# Patient Record
Sex: MALE | Race: WHITE | Employment: OTHER | URBAN - METROPOLITAN AREA
[De-identification: names, ages, dates, MRNs, and addresses within clinical notes are randomized per-mention and may not be internally consistent; named-entity substitution may affect disease eponyms.]

---

## 2022-02-28 ENCOUNTER — APPOINTMENT (OUTPATIENT)
Dept: GENERAL RADIOLOGY | Age: 67
DRG: 200 | End: 2022-02-28
Payer: MEDICARE

## 2022-02-28 ENCOUNTER — HOSPITAL ENCOUNTER (INPATIENT)
Age: 67
LOS: 2 days | Discharge: HOME | DRG: 200 | End: 2022-03-03
Attending: STUDENT IN AN ORGANIZED HEALTH CARE EDUCATION/TRAINING PROGRAM | Admitting: INTERNAL MEDICINE
Payer: MEDICARE

## 2022-02-28 DIAGNOSIS — S22.49XA: Primary | ICD-10-CM

## 2022-02-28 PROBLEM — M54.50 CHRONIC BILATERAL LOW BACK PAIN WITHOUT SCIATICA: Status: ACTIVE | Noted: 2022-02-28

## 2022-02-28 PROBLEM — I10 PRIMARY HYPERTENSION: Status: ACTIVE | Noted: 2022-02-28

## 2022-02-28 PROBLEM — G89.29 CHRONIC BILATERAL LOW BACK PAIN WITHOUT SCIATICA: Status: ACTIVE | Noted: 2022-02-28

## 2022-02-28 PROBLEM — S22.41XA MULTIPLE CLOSED FRACTURES OF RIBS OF RIGHT SIDE: Status: ACTIVE | Noted: 2022-02-28

## 2022-02-28 LAB
ANION GAP SERPL CALCULATED.3IONS-SCNC: 15 MMOL/L (ref 3–16)
BASOPHILS ABSOLUTE: 0.1 K/UL (ref 0–0.2)
BASOPHILS RELATIVE PERCENT: 0.7 %
BUN BLDV-MCNC: 28 MG/DL (ref 7–20)
CALCIUM SERPL-MCNC: 9.8 MG/DL (ref 8.3–10.6)
CHLORIDE BLD-SCNC: 95 MMOL/L (ref 99–110)
CO2: 26 MMOL/L (ref 21–32)
CREAT SERPL-MCNC: 1.2 MG/DL (ref 0.8–1.3)
EOSINOPHILS ABSOLUTE: 0 K/UL (ref 0–0.6)
EOSINOPHILS RELATIVE PERCENT: 0 %
GFR AFRICAN AMERICAN: >60
GFR NON-AFRICAN AMERICAN: >60
GLUCOSE BLD-MCNC: 149 MG/DL (ref 70–99)
HCT VFR BLD CALC: 45.8 % (ref 40.5–52.5)
HEMOGLOBIN: 15.7 G/DL (ref 13.5–17.5)
INFLUENZA A: NOT DETECTED
INFLUENZA B: NOT DETECTED
LYMPHOCYTES ABSOLUTE: 1 K/UL (ref 1–5.1)
LYMPHOCYTES RELATIVE PERCENT: 7.9 %
MCH RBC QN AUTO: 31.2 PG (ref 26–34)
MCHC RBC AUTO-ENTMCNC: 34.4 G/DL (ref 31–36)
MCV RBC AUTO: 90.7 FL (ref 80–100)
MONOCYTES ABSOLUTE: 0.7 K/UL (ref 0–1.3)
MONOCYTES RELATIVE PERCENT: 5.6 %
NEUTROPHILS ABSOLUTE: 10.5 K/UL (ref 1.7–7.7)
NEUTROPHILS RELATIVE PERCENT: 85.8 %
PDW BLD-RTO: 13.9 % (ref 12.4–15.4)
PLATELET # BLD: 167 K/UL (ref 135–450)
PMV BLD AUTO: 9.4 FL (ref 5–10.5)
POTASSIUM REFLEX MAGNESIUM: 4.3 MMOL/L (ref 3.5–5.1)
RBC # BLD: 5.04 M/UL (ref 4.2–5.9)
SARS-COV-2 RNA, RT PCR: NOT DETECTED
SODIUM BLD-SCNC: 136 MMOL/L (ref 136–145)
WBC # BLD: 12.3 K/UL (ref 4–11)

## 2022-02-28 PROCEDURE — G0378 HOSPITAL OBSERVATION PER HR: HCPCS

## 2022-02-28 PROCEDURE — 85025 COMPLETE CBC W/AUTO DIFF WBC: CPT

## 2022-02-28 PROCEDURE — 99221 1ST HOSP IP/OBS SF/LOW 40: CPT | Performed by: NURSE PRACTITIONER

## 2022-02-28 PROCEDURE — 96374 THER/PROPH/DIAG INJ IV PUSH: CPT

## 2022-02-28 PROCEDURE — 2580000003 HC RX 258

## 2022-02-28 PROCEDURE — 96372 THER/PROPH/DIAG INJ SC/IM: CPT

## 2022-02-28 PROCEDURE — 6360000002 HC RX W HCPCS: Performed by: STUDENT IN AN ORGANIZED HEALTH CARE EDUCATION/TRAINING PROGRAM

## 2022-02-28 PROCEDURE — 99284 EMERGENCY DEPT VISIT MOD MDM: CPT

## 2022-02-28 PROCEDURE — 6370000000 HC RX 637 (ALT 250 FOR IP): Performed by: NURSE PRACTITIONER

## 2022-02-28 PROCEDURE — 87636 SARSCOV2 & INF A&B AMP PRB: CPT

## 2022-02-28 PROCEDURE — 80048 BASIC METABOLIC PNL TOTAL CA: CPT

## 2022-02-28 PROCEDURE — 6370000000 HC RX 637 (ALT 250 FOR IP): Performed by: STUDENT IN AN ORGANIZED HEALTH CARE EDUCATION/TRAINING PROGRAM

## 2022-02-28 PROCEDURE — 71101 X-RAY EXAM UNILAT RIBS/CHEST: CPT

## 2022-02-28 PROCEDURE — 6360000002 HC RX W HCPCS

## 2022-02-28 PROCEDURE — 6370000000 HC RX 637 (ALT 250 FOR IP)

## 2022-02-28 RX ORDER — MORPHINE SULFATE 4 MG/ML
4 INJECTION, SOLUTION INTRAMUSCULAR; INTRAVENOUS ONCE
Status: DISCONTINUED | OUTPATIENT
Start: 2022-02-28 | End: 2022-02-28

## 2022-02-28 RX ORDER — OMEPRAZOLE 20 MG/1
20 CAPSULE, DELAYED RELEASE ORAL 2 TIMES DAILY
COMMUNITY

## 2022-02-28 RX ORDER — OXYCODONE HYDROCHLORIDE 5 MG/1
5 TABLET ORAL EVERY 6 HOURS PRN
Status: DISCONTINUED | OUTPATIENT
Start: 2022-02-28 | End: 2022-02-28

## 2022-02-28 RX ORDER — ALBUTEROL SULFATE 90 UG/1
2 AEROSOL, METERED RESPIRATORY (INHALATION) 4 TIMES DAILY PRN
Status: DISCONTINUED | OUTPATIENT
Start: 2022-02-28 | End: 2022-03-03 | Stop reason: HOSPADM

## 2022-02-28 RX ORDER — HYDROXYZINE HYDROCHLORIDE 10 MG/1
25 TABLET, FILM COATED ORAL 3 TIMES DAILY PRN
Status: DISCONTINUED | OUTPATIENT
Start: 2022-02-28 | End: 2022-03-03 | Stop reason: HOSPADM

## 2022-02-28 RX ORDER — GABAPENTIN 300 MG/1
300 CAPSULE ORAL 4 TIMES DAILY
Status: DISCONTINUED | OUTPATIENT
Start: 2022-02-28 | End: 2022-03-03 | Stop reason: HOSPADM

## 2022-02-28 RX ORDER — OXYCODONE HYDROCHLORIDE 5 MG/1
5 TABLET ORAL EVERY 6 HOURS PRN
Status: DISCONTINUED | OUTPATIENT
Start: 2022-02-28 | End: 2022-03-03 | Stop reason: HOSPADM

## 2022-02-28 RX ORDER — POTASSIUM CHLORIDE 7.45 MG/ML
10 INJECTION INTRAVENOUS PRN
Status: DISCONTINUED | OUTPATIENT
Start: 2022-02-28 | End: 2022-03-03 | Stop reason: HOSPADM

## 2022-02-28 RX ORDER — DULOXETIN HYDROCHLORIDE 60 MG/1
60 CAPSULE, DELAYED RELEASE ORAL DAILY
COMMUNITY

## 2022-02-28 RX ORDER — SODIUM CHLORIDE 9 MG/ML
25 INJECTION, SOLUTION INTRAVENOUS PRN
Status: DISCONTINUED | OUTPATIENT
Start: 2022-02-28 | End: 2022-03-03 | Stop reason: HOSPADM

## 2022-02-28 RX ORDER — ACETAMINOPHEN 325 MG/1
650 TABLET ORAL EVERY 6 HOURS PRN
Status: DISCONTINUED | OUTPATIENT
Start: 2022-02-28 | End: 2022-03-01

## 2022-02-28 RX ORDER — CYCLOBENZAPRINE HCL 10 MG
10 TABLET ORAL 3 TIMES DAILY PRN
Status: DISCONTINUED | OUTPATIENT
Start: 2022-02-28 | End: 2022-03-03 | Stop reason: HOSPADM

## 2022-02-28 RX ORDER — ONDANSETRON 4 MG/1
4 TABLET, ORALLY DISINTEGRATING ORAL EVERY 8 HOURS PRN
Status: DISCONTINUED | OUTPATIENT
Start: 2022-02-28 | End: 2022-03-03 | Stop reason: HOSPADM

## 2022-02-28 RX ORDER — PANTOPRAZOLE SODIUM 40 MG/1
40 TABLET, DELAYED RELEASE ORAL
Status: DISCONTINUED | OUTPATIENT
Start: 2022-03-01 | End: 2022-03-03 | Stop reason: HOSPADM

## 2022-02-28 RX ORDER — GABAPENTIN 300 MG/1
300 CAPSULE ORAL 4 TIMES DAILY
COMMUNITY

## 2022-02-28 RX ORDER — ALBUTEROL SULFATE 90 UG/1
2 AEROSOL, METERED RESPIRATORY (INHALATION) 4 TIMES DAILY PRN
COMMUNITY

## 2022-02-28 RX ORDER — POTASSIUM CHLORIDE 20 MEQ/1
40 TABLET, EXTENDED RELEASE ORAL PRN
Status: DISCONTINUED | OUTPATIENT
Start: 2022-02-28 | End: 2022-03-03 | Stop reason: HOSPADM

## 2022-02-28 RX ORDER — CYCLOBENZAPRINE HCL 10 MG
10 TABLET ORAL ONCE
Status: COMPLETED | OUTPATIENT
Start: 2022-02-28 | End: 2022-02-28

## 2022-02-28 RX ORDER — SODIUM CHLORIDE 0.9 % (FLUSH) 0.9 %
5-40 SYRINGE (ML) INJECTION EVERY 12 HOURS SCHEDULED
Status: DISCONTINUED | OUTPATIENT
Start: 2022-02-28 | End: 2022-03-03 | Stop reason: HOSPADM

## 2022-02-28 RX ORDER — LANOLIN ALCOHOL/MO/W.PET/CERES
6 CREAM (GRAM) TOPICAL NIGHTLY PRN
Status: DISCONTINUED | OUTPATIENT
Start: 2022-02-28 | End: 2022-03-03 | Stop reason: HOSPADM

## 2022-02-28 RX ORDER — POLYETHYLENE GLYCOL 3350 17 G/17G
17 POWDER, FOR SOLUTION ORAL DAILY PRN
Status: DISCONTINUED | OUTPATIENT
Start: 2022-02-28 | End: 2022-03-03 | Stop reason: HOSPADM

## 2022-02-28 RX ORDER — MIRTAZAPINE 15 MG/1
15 TABLET, FILM COATED ORAL NIGHTLY
Status: DISCONTINUED | OUTPATIENT
Start: 2022-02-28 | End: 2022-03-03 | Stop reason: HOSPADM

## 2022-02-28 RX ORDER — OXYCODONE HYDROCHLORIDE 5 MG/1
5 TABLET ORAL EVERY 4 HOURS PRN
Status: DISCONTINUED | OUTPATIENT
Start: 2022-02-28 | End: 2022-02-28

## 2022-02-28 RX ORDER — SODIUM CHLORIDE 0.9 % (FLUSH) 0.9 %
10 SYRINGE (ML) INJECTION PRN
Status: DISCONTINUED | OUTPATIENT
Start: 2022-02-28 | End: 2022-03-03 | Stop reason: HOSPADM

## 2022-02-28 RX ORDER — ONDANSETRON 2 MG/ML
4 INJECTION INTRAMUSCULAR; INTRAVENOUS EVERY 6 HOURS PRN
Status: DISCONTINUED | OUTPATIENT
Start: 2022-02-28 | End: 2022-03-03 | Stop reason: HOSPADM

## 2022-02-28 RX ORDER — LISINOPRIL 20 MG/1
20 TABLET ORAL DAILY
COMMUNITY

## 2022-02-28 RX ORDER — OXYCODONE AND ACETAMINOPHEN 7.5; 325 MG/1; MG/1
1 TABLET ORAL EVERY 6 HOURS SCHEDULED
Status: DISCONTINUED | OUTPATIENT
Start: 2022-02-28 | End: 2022-03-03 | Stop reason: HOSPADM

## 2022-02-28 RX ORDER — DULOXETIN HYDROCHLORIDE 60 MG/1
60 CAPSULE, DELAYED RELEASE ORAL DAILY
Status: DISCONTINUED | OUTPATIENT
Start: 2022-02-28 | End: 2022-03-03 | Stop reason: HOSPADM

## 2022-02-28 RX ORDER — CLONAZEPAM 0.5 MG/1
0.5 TABLET ORAL NIGHTLY PRN
Status: DISCONTINUED | OUTPATIENT
Start: 2022-02-28 | End: 2022-03-03 | Stop reason: HOSPADM

## 2022-02-28 RX ORDER — LISINOPRIL 20 MG/1
20 TABLET ORAL DAILY
Status: DISCONTINUED | OUTPATIENT
Start: 2022-02-28 | End: 2022-03-02

## 2022-02-28 RX ORDER — MIRTAZAPINE 15 MG/1
15 TABLET, FILM COATED ORAL NIGHTLY
COMMUNITY

## 2022-02-28 RX ORDER — HYDROXYZINE HYDROCHLORIDE 25 MG/1
25 TABLET, FILM COATED ORAL 3 TIMES DAILY PRN
COMMUNITY

## 2022-02-28 RX ORDER — OXYCODONE HYDROCHLORIDE AND ACETAMINOPHEN 5; 325 MG/1; MG/1
2 TABLET ORAL EVERY 6 HOURS PRN
Status: DISCONTINUED | OUTPATIENT
Start: 2022-02-28 | End: 2022-02-28

## 2022-02-28 RX ORDER — MAGNESIUM SULFATE 1 G/100ML
1000 INJECTION INTRAVENOUS PRN
Status: DISCONTINUED | OUTPATIENT
Start: 2022-02-28 | End: 2022-03-03 | Stop reason: HOSPADM

## 2022-02-28 RX ORDER — CLONAZEPAM 0.5 MG/1
0.5 TABLET ORAL NIGHTLY PRN
COMMUNITY

## 2022-02-28 RX ORDER — IBUPROFEN 400 MG/1
400 TABLET ORAL ONCE
Status: COMPLETED | OUTPATIENT
Start: 2022-02-28 | End: 2022-02-28

## 2022-02-28 RX ORDER — OXYCODONE HYDROCHLORIDE 5 MG/1
10 TABLET ORAL EVERY 4 HOURS PRN
Status: DISCONTINUED | OUTPATIENT
Start: 2022-02-28 | End: 2022-02-28

## 2022-02-28 RX ORDER — OXYCODONE HYDROCHLORIDE 5 MG/1
10 TABLET ORAL EVERY 6 HOURS PRN
Status: DISCONTINUED | OUTPATIENT
Start: 2022-02-28 | End: 2022-02-28

## 2022-02-28 RX ORDER — TERAZOSIN 5 MG/1
10 CAPSULE ORAL 2 TIMES DAILY
COMMUNITY

## 2022-02-28 RX ORDER — OXYCODONE HYDROCHLORIDE AND ACETAMINOPHEN 5; 325 MG/1; MG/1
1 TABLET ORAL EVERY 6 HOURS PRN
Status: DISCONTINUED | OUTPATIENT
Start: 2022-02-28 | End: 2022-02-28

## 2022-02-28 RX ORDER — HYDROCODONE BITARTRATE AND ACETAMINOPHEN 5; 325 MG/1; MG/1
1 TABLET ORAL EVERY 4 HOURS PRN
Status: DISCONTINUED | OUTPATIENT
Start: 2022-02-28 | End: 2022-02-28

## 2022-02-28 RX ORDER — TRAZODONE HYDROCHLORIDE 50 MG/1
50 TABLET ORAL NIGHTLY
COMMUNITY

## 2022-02-28 RX ORDER — ACETAMINOPHEN 650 MG/1
650 SUPPOSITORY RECTAL EVERY 6 HOURS PRN
Status: DISCONTINUED | OUTPATIENT
Start: 2022-02-28 | End: 2022-03-01

## 2022-02-28 RX ORDER — LANOLIN ALCOHOL/MO/W.PET/CERES
6 CREAM (GRAM) TOPICAL NIGHTLY PRN
COMMUNITY

## 2022-02-28 RX ORDER — TRAZODONE HYDROCHLORIDE 50 MG/1
50 TABLET ORAL NIGHTLY
Status: DISCONTINUED | OUTPATIENT
Start: 2022-02-28 | End: 2022-03-03 | Stop reason: HOSPADM

## 2022-02-28 RX ORDER — TIZANIDINE 4 MG/1
4 TABLET ORAL EVERY 8 HOURS PRN
COMMUNITY

## 2022-02-28 RX ORDER — LIDOCAINE 4 G/G
1 PATCH TOPICAL DAILY
Status: DISCONTINUED | OUTPATIENT
Start: 2022-02-28 | End: 2022-03-03 | Stop reason: HOSPADM

## 2022-02-28 RX ORDER — HYDROCODONE BITARTRATE AND ACETAMINOPHEN 5; 325 MG/1; MG/1
2 TABLET ORAL EVERY 4 HOURS PRN
Status: DISCONTINUED | OUTPATIENT
Start: 2022-02-28 | End: 2022-02-28

## 2022-02-28 RX ORDER — DOXAZOSIN 2 MG/1
4 TABLET ORAL DAILY
Status: DISCONTINUED | OUTPATIENT
Start: 2022-02-28 | End: 2022-03-03 | Stop reason: HOSPADM

## 2022-02-28 RX ADMIN — TRAZODONE HYDROCHLORIDE 50 MG: 50 TABLET ORAL at 21:04

## 2022-02-28 RX ADMIN — MIRTAZAPINE 15 MG: 15 TABLET, FILM COATED ORAL at 21:04

## 2022-02-28 RX ADMIN — HYDROMORPHONE HYDROCHLORIDE 0.5 MG: 1 INJECTION, SOLUTION INTRAMUSCULAR; INTRAVENOUS; SUBCUTANEOUS at 11:37

## 2022-02-28 RX ADMIN — DOXAZOSIN MESYLATE 4 MG: 2 TABLET ORAL at 17:09

## 2022-02-28 RX ADMIN — IBUPROFEN 400 MG: 400 TABLET, FILM COATED ORAL at 11:30

## 2022-02-28 RX ADMIN — CLONAZEPAM 0.5 MG: 0.5 TABLET ORAL at 21:04

## 2022-02-28 RX ADMIN — Medication 6 MG: at 21:04

## 2022-02-28 RX ADMIN — DULOXETINE HYDROCHLORIDE 60 MG: 60 CAPSULE, DELAYED RELEASE ORAL at 17:09

## 2022-02-28 RX ADMIN — GABAPENTIN 300 MG: 300 CAPSULE ORAL at 21:04

## 2022-02-28 RX ADMIN — CYCLOBENZAPRINE 10 MG: 10 TABLET, FILM COATED ORAL at 11:30

## 2022-02-28 RX ADMIN — CYCLOBENZAPRINE 10 MG: 10 TABLET, FILM COATED ORAL at 19:01

## 2022-02-28 RX ADMIN — OXYCODONE 10 MG: 5 TABLET ORAL at 17:18

## 2022-02-28 RX ADMIN — LISINOPRIL 20 MG: 20 TABLET ORAL at 17:09

## 2022-02-28 RX ADMIN — OXYCODONE HYDROCHLORIDE AND ACETAMINOPHEN 1 TABLET: 7.5; 325 TABLET ORAL at 21:38

## 2022-02-28 RX ADMIN — SODIUM CHLORIDE, PRESERVATIVE FREE 10 ML: 5 INJECTION INTRAVENOUS at 21:04

## 2022-02-28 RX ADMIN — HYDROMORPHONE HYDROCHLORIDE 0.5 MG: 1 INJECTION, SOLUTION INTRAMUSCULAR; INTRAVENOUS; SUBCUTANEOUS at 13:45

## 2022-02-28 ASSESSMENT — PAIN SCALES - GENERAL
PAINLEVEL_OUTOF10: 8
PAINLEVEL_OUTOF10: 9
PAINLEVEL_OUTOF10: 9
PAINLEVEL_OUTOF10: 4
PAINLEVEL_OUTOF10: 0
PAINLEVEL_OUTOF10: 8
PAINLEVEL_OUTOF10: 9
PAINLEVEL_OUTOF10: 6

## 2022-02-28 ASSESSMENT — PAIN DESCRIPTION - DIRECTION: RADIATING_TOWARDS: BACK

## 2022-02-28 ASSESSMENT — PAIN DESCRIPTION - ORIENTATION
ORIENTATION: RIGHT
ORIENTATION: RIGHT

## 2022-02-28 ASSESSMENT — PAIN DESCRIPTION - FREQUENCY: FREQUENCY: CONTINUOUS

## 2022-02-28 ASSESSMENT — ENCOUNTER SYMPTOMS
GASTROINTESTINAL NEGATIVE: 1
SHORTNESS OF BREATH: 1
CHEST TIGHTNESS: 1

## 2022-02-28 ASSESSMENT — PAIN DESCRIPTION - LOCATION
LOCATION: BACK;RIB CAGE
LOCATION: RIB CAGE

## 2022-02-28 ASSESSMENT — PAIN DESCRIPTION - ONSET: ONSET: ON-GOING

## 2022-02-28 ASSESSMENT — PAIN DESCRIPTION - PAIN TYPE
TYPE: ACUTE PAIN
TYPE: ACUTE PAIN

## 2022-02-28 ASSESSMENT — PAIN DESCRIPTION - DESCRIPTORS: DESCRIPTORS: DISCOMFORT;SORE

## 2022-02-28 ASSESSMENT — PAIN - FUNCTIONAL ASSESSMENT: PAIN_FUNCTIONAL_ASSESSMENT: ACTIVITIES ARE NOT PREVENTED

## 2022-02-28 ASSESSMENT — PAIN DESCRIPTION - PROGRESSION: CLINICAL_PROGRESSION: NOT CHANGED

## 2022-02-28 NOTE — PROGRESS NOTES
PRN pain med given for pain level 8/10 located R ribs. See mar/pain flowsheet. Up in chair. Call light within reach.

## 2022-02-28 NOTE — PLAN OF CARE
2w obs    Multiple rib fractures 2/2 fall  -Acute pain uncontrolled by home percocet for chronic pain  -Admission for pain control and PTOT    Patient needs med rec as no home meds on med list. Nursing communication.     Rere Deluna PA-C  2/28/2022 12:39 PM

## 2022-02-28 NOTE — ED PROVIDER NOTES
[address] ED  EMERGENCY DEPARTMENT ENCOUNTER      Pt Name: Berna Solis  MRN: [id number]  [place] 1955  Date of evaluation: 2/28/2022  Provider: Ramandeep Aquino DO    CHIEF COMPLAINT       Chief Complaint   Patient presents with    Fall     patient states he fell this morning at 4am and c/o rib pain and SOB. Patient SOB is progressively getting worse due to pain.  Shortness of Breath         HISTORY OF PRESENT ILLNESS   (Location/Symptom, Timing/Onset, Context/Setting, Quality, Duration, Modifying Factors, Severity)  Note limiting factors. Berna Solis is a 78 y.o. male who presents to the emergency department complaining of presenting with shortness of breath right-sided chest wall pain. Reports a fall yesterday morning fell down onto his right side, mechanical states he tripped over something that was on the ground fell down onto his arm which compressed into his right lateral chest wall. Pain worsens with movement, breathing. Satting in upper 90s on room air on arrival.  Denies hitting head denies loss of conscious denies midline neck or back pain. Is not on anticoagulation. Took Percocet prior to arrival he is on this for chronic pain. No improvement with home dose to pain meds. Nursing Notes were reviewed. PAST MEDICAL HISTORY     Past Medical History:   Diagnosis Date    Hypertension          SURGICAL HISTORY     History reviewed. No pertinent surgical history. CURRENT MEDICATIONS       Previous Medications    No medications on file       ALLERGIES     Patient has no known allergies. FAMILY HISTORY     History reviewed. No pertinent family history. SOCIAL HISTORY       Social History     Socioeconomic History    Marital status:       Spouse name: None    Number of children: None    Years of education: None    Highest education level: None   Occupational History    None   Tobacco Use    Smoking status: Current Every Day Smoker Packs/day: 1.00     Types: Cigarettes    Smokeless tobacco: None   Substance and Sexual Activity    Alcohol use: Never    Drug use: Never    Sexual activity: None   Other Topics Concern    None   Social History Narrative    None     Social Determinants of Health     Financial Resource Strain:     Difficulty of Paying Living Expenses: Not on file   Food Insecurity:     Worried About Running Out of Food in the Last Year: Not on file    Liss of Food in the Last Year: Not on file   Transportation Needs:     Lack of Transportation (Medical): Not on file    Lack of Transportation (Non-Medical): Not on file   Physical Activity:     Days of Exercise per Week: Not on file    Minutes of Exercise per Session: Not on file   Stress:     Feeling of Stress : Not on file   Social Connections:     Frequency of Communication with Friends and Family: Not on file    Frequency of Social Gatherings with Friends and Family: Not on file    Attends Synagogue Services: Not on file    Active Member of [address] [place] [address] or Organizations: Not on file    Attends Club or Organization Meetings: Not on file    Marital Status: Not on file   Intimate Partner Violence:     Fear of Current or Ex-Partner: Not on file    Emotionally Abused: Not on file    Physically Abused: Not on file    Sexually Abused: Not on file   Housing Stability:     Unable to Pay for Housing in the Last Year: Not on file    Number of [place] in the Last Year: Not on file    Unstable Housing in the Last Year: Not on file       SCREENINGS        Daniela Coma Scale  Eye Opening: Spontaneous  Best Verbal Response: Oriented  Best Motor Response: Obeys commands  Daniela Coma Scale Score: 15                   REVIEW OF SYSTEMS    (2-9 systems for level 4, 10 or more for level 5)   Review of Systems   Constitutional: Negative. HENT: Negative. Respiratory: Positive for chest tightness and shortness of breath. Right-sided chest wall pain. Cardiovascular: Negative. Gastrointestinal: Negative. Hematological: Does not bruise/bleed easily. PHYSICAL EXAM    (up to 7 for level 4, 8 or more for level 5)   RECENT VITALS:     Temp: 97.8 °F (36.6 °C),  Pulse: 87, Resp: 17, BP: 134/77, SpO2: 96 %    Physical Exam  Constitutional:       Appearance: He is well-developed. Neck:      Comments: No midline neck or back pain. Cardiovascular:      Rate and Rhythm: Normal rate and regular rhythm. Pulmonary:      Effort: Tachypnea present. Comments: Low expiratory volume. Diminished lung fields throughout. Significant tenderness over the posterior lateral anterior right chest wall. No crepitus. No flail chest.  Musculoskeletal:      Cervical back: Normal range of motion. Neurological:      Mental Status: He is alert. DIAGNOSTIC RESULTS     EKG: All EKG's are interpreted by the Emergency Department Physician who either signs or Co-signs this chart in the absence of a cardiologist.      RADIOLOGY:   Non-plain film images such as CT, Ultrasound and MRI are read by the radiologist. Plain radiographic images are visualized and preliminarily interpreted by the emergency physician. Interpretation per the Radiologist below, if available at the time of this note:    XR RIBS RIGHT INCLUDE CHEST (MIN 3 VIEWS)   Final Result   Acute mildly displaced right 5th through 9th rib fractures. LABS:  Labs Reviewed   COVID-19 & INFLUENZA COMBO    Narrative:     Performed at:  [address] [facility],  [place], [facility]   Phone [phone]       All other labs were within normal range or not returned as of this dictation. EMERGENCY DEPARTMENT COURSE and DIFFERENTIAL DIAGNOSIS/MDM:   Ana M Solis is a 78 y.o. male who presents to the emergency department with the complaint of right-sided chest wall pain after a fall fell down onto his right upper extremity.   Right upper extremity is asymptomatic without complaint. Does have tenderness throughout the posterior lateral anterior right-sided chest wall without crepitus no flail chest component. Diminished respiratory effort throughout. Obtain a repeat troponin to rule out pneumothorax, evaluate for rib fracture. Will treat with Lidoderm patch, IM morphine, Flexeril    Patient with mildly displaced right-sided 5-9 rib fracture. No pneumothorax no pulmonary contusion seen on plain film imaging. Pain not well controlled even after multimodal therapy including muscle relaxant, anti-inflammatory, pain control and topical Lidoderm. Based on the number of ribs that were fractured, and patient's poor inspiratory effort I do feel he needs to be monitored in hospital.  Isolated rib fractures no other injury noted at this time do not feel he needs to be transferred emergently to [facility] for trauma evaluation. CRITICAL CARE TIME   Total Critical Care time was 0 minutes, excluding separately reportable procedures. There was a high probability of clinically significant/life threatening deterioration in the patient's condition which required my urgent intervention. Clinical concern   Intervention     CONSULTS:  IP CONSULT TO HOSPITALIST    PROCEDURES:  Unless otherwise noted below, none     Procedures        FINAL IMPRESSION      1. Closed traumatic displaced fracture of multiple ribs          DISPOSITION/PLAN   DISPOSITION Admitted 02/28/2022 12:36:53 PM      PATIENT REFERRED TO:  No follow-up provider specified. DISCHARGE MEDICATIONS:  New Prescriptions    No medications on file     Controlled Substances Monitoring:     No flowsheet data found.     (Please note that portions of this note were completed with a voice recognition program.  Efforts were made to edit the dictations but occasionally words are mis-transcribed.)    Edith Elias DO (electronically signed)  Attending Emergency Physician            Edith Elias DO  02/28/22 1342

## 2022-02-28 NOTE — ED NOTES
Writer was told Arianna Weeks RN, whom is taking over care of pt is at lunch and unavailable to take report.      Bell Perez RN  02/28/22 [id number]

## 2022-02-28 NOTE — H&P
Hospital Medicine History & Physical      PCP: Nathanael Jaimes MD    Date of Admission: 2/28/2022    Date of Service: Pt seen/examined on 02/28/22     Chief Complaint:    Chief Complaint   Patient presents with    Fall     patient states he fell this morning at 4am and c/o rib pain and SOB. Patient SOB is progressively getting worse due to pain.  Shortness of Breath       History Of Present Illness: The patient is a 78 y.o. male with PMH of HTN, chronic back pain, Insomnia- hx of hepatitis C, tobacco abuse who presents to [facility] with shortness of breath and rib pain after a mechanical fall. Pt is a poor historian and unsure of his medical diagnosis, [facility]. History obtained from the patient and review of EMR. Reports that he fell yesterday morning about 4 AM on his right side after tripping over a heater in his trailer. Noted that Percocet was not controlling his pain at home after fall and he noticed it was harder for him to take deep breaths related to his pain. He then came into the ED for further evaluation. He stated his pain was a 10/10 upon his arrival.  It is now manageable at a 6/10. He stated he is never pain free which his chronic back and arm pain. In ED chest x-ray was btained which showed mildly displaced right-sided 5 through 9 rib fractures. No pneumothorax or pulmonary contusion seen on plain film imaging. Patient was given muscle relaxer, anti-inflammatory, Lidocaine patch and IV pain medications, in the ED. Patient noted with poor inspiratory effort related to the pain. Patient was admitted for observation for pain control. Past Medical History:        Diagnosis Date    Hypertension        Past Surgical History:    History reviewed. No pertinent surgical history.     Medications Prior to Admission:    Prior to Admission medications    Not on File       Allergies:  Patient has no known allergies. Social History:  The patient currently lives home     TOBACCO:   reports that he has been smoking cigarettes. He has been smoking about 1.00 pack per day. He does not have any smokeless tobacco history on file. ETOH:   reports no history of alcohol use. Family History:   Positive as follows:    History reviewed. No pertinent family history. REVIEW OF SYSTEMS:       Constitutional: Negative for fever   HENT: Negative for sore throat   Eyes: Negative for redness   Respiratory: +SOB, 2/2 pain   Cardiovascular: Negative for chest pain +right sided rib pain   Gastrointestinal: Negative for vomiting, diarrhea   Genitourinary: Negative for hematuria   Musculoskeletal: +arthralgias   Skin: Negative for rash   Neurological: Negative for syncope   Hematological: Negative for adenopathy   Psychiatric/Behavorial: Negative for anxiety    PHYSICAL EXAM:    BP (!) 142/83   Pulse 86   Temp 97.8 °F (36.6 °C) (Temporal)   Resp 12   SpO2 94%     Gen: mild distress. Alert. Eyes: PERRL. No sclera icterus. No conjunctival injection. ENT: No discharge. Pharynx clear. Neck: No JVD. Trachea midline. Resp: No accessory muscle use. Diminished throughout. No crackles. No wheezes. No rhonchi. TTP right anterior and posterior ribs. No crepitus. No flail chest.    CV: Regular rate. Regular rhythm. No murmur. No rub. No edema. GI: Non-tender. Non-distended. No masses. No organomegaly. Normal bowel sounds. No hernia. Skin: Warm and dry. No nodule on exposed extremities. No rash on exposed extremities. M/S: No cyanosis. No joint deformity. No clubbing. Neuro: Awake. Grossly nonfocal    Psych: Oriented x 3. No anxiety or agitation.      CBC:   Recent Labs     02/28/22  1419   WBC 12.3*   HGB 15.7   HCT 45.8   MCV 90.7        BMP:   Recent Labs     02/28/22  1419      K 4.3   CL 95*   CO2 26   BUN 28*   CREATININE 1.2          CULTURES  SARS-CoV-2 RNA, RT PCR NOT DETECTED NOT DETECTED

## 2022-02-28 NOTE — ED NOTES
80- Perfect served Hospitalist for a consult. Telly Joseph  02/28/22 1216    1224- huston called back and spoke with Dr. Elan Muñiz.      Telly Joseph  02/28/22 1228

## 2022-03-01 ENCOUNTER — APPOINTMENT (OUTPATIENT)
Dept: CT IMAGING | Age: 67
DRG: 200 | End: 2022-03-01
Payer: MEDICARE

## 2022-03-01 PROBLEM — S22.49XA: Status: ACTIVE | Noted: 2022-03-01

## 2022-03-01 PROBLEM — S22.41XD MULTIPLE CLOSED FRACTURES OF RIBS OF RIGHT SIDE WITH ROUTINE HEALING: Status: ACTIVE | Noted: 2022-03-01

## 2022-03-01 LAB
ALBUMIN SERPL-MCNC: 4.2 G/DL (ref 3.4–5)
ALP BLD-CCNC: 107 U/L (ref 40–129)
ALT SERPL-CCNC: 27 U/L (ref 10–40)
ANION GAP SERPL CALCULATED.3IONS-SCNC: 13 MMOL/L (ref 3–16)
AST SERPL-CCNC: 27 U/L (ref 15–37)
BASOPHILS ABSOLUTE: 0 K/UL (ref 0–0.2)
BASOPHILS RELATIVE PERCENT: 0.4 %
BILIRUB SERPL-MCNC: 0.4 MG/DL (ref 0–1)
BILIRUBIN DIRECT: <0.2 MG/DL (ref 0–0.3)
BILIRUBIN, INDIRECT: NORMAL MG/DL (ref 0–1)
BUN BLDV-MCNC: 34 MG/DL (ref 7–20)
CALCIUM SERPL-MCNC: 9.6 MG/DL (ref 8.3–10.6)
CHLORIDE BLD-SCNC: 95 MMOL/L (ref 99–110)
CO2: 25 MMOL/L (ref 21–32)
CREAT SERPL-MCNC: 1.4 MG/DL (ref 0.8–1.3)
EOSINOPHILS ABSOLUTE: 0 K/UL (ref 0–0.6)
EOSINOPHILS RELATIVE PERCENT: 0.1 %
GFR AFRICAN AMERICAN: >60
GFR NON-AFRICAN AMERICAN: 50
GLUCOSE BLD-MCNC: 158 MG/DL (ref 70–99)
HCT VFR BLD CALC: 42 % (ref 40.5–52.5)
HEMOGLOBIN: 14.5 G/DL (ref 13.5–17.5)
INR BLD: 1.03 (ref 0.88–1.12)
LYMPHOCYTES ABSOLUTE: 1.6 K/UL (ref 1–5.1)
LYMPHOCYTES RELATIVE PERCENT: 16 %
MCH RBC QN AUTO: 31.7 PG (ref 26–34)
MCHC RBC AUTO-ENTMCNC: 34.6 G/DL (ref 31–36)
MCV RBC AUTO: 91.6 FL (ref 80–100)
MONOCYTES ABSOLUTE: 0.9 K/UL (ref 0–1.3)
MONOCYTES RELATIVE PERCENT: 9.1 %
NEUTROPHILS ABSOLUTE: 7.4 K/UL (ref 1.7–7.7)
NEUTROPHILS RELATIVE PERCENT: 74.4 %
PDW BLD-RTO: 14 % (ref 12.4–15.4)
PLATELET # BLD: 144 K/UL (ref 135–450)
PMV BLD AUTO: 9.6 FL (ref 5–10.5)
POTASSIUM REFLEX MAGNESIUM: 3.6 MMOL/L (ref 3.5–5.1)
PROTHROMBIN TIME: 11.6 SEC (ref 9.9–12.7)
RBC # BLD: 4.58 M/UL (ref 4.2–5.9)
SODIUM BLD-SCNC: 133 MMOL/L (ref 136–145)
TOTAL PROTEIN: 7.5 G/DL (ref 6.4–8.2)
WBC # BLD: 10 K/UL (ref 4–11)

## 2022-03-01 PROCEDURE — 6360000002 HC RX W HCPCS: Performed by: RADIOLOGY

## 2022-03-01 PROCEDURE — 97530 THERAPEUTIC ACTIVITIES: CPT

## 2022-03-01 PROCEDURE — 85025 COMPLETE CBC W/AUTO DIFF WBC: CPT

## 2022-03-01 PROCEDURE — 2709999900 CT GUIDED CHEST TUBE

## 2022-03-01 PROCEDURE — 80048 BASIC METABOLIC PNL TOTAL CA: CPT

## 2022-03-01 PROCEDURE — 97162 PT EVAL MOD COMPLEX 30 MIN: CPT

## 2022-03-01 PROCEDURE — 99233 SBSQ HOSP IP/OBS HIGH 50: CPT | Performed by: INTERNAL MEDICINE

## 2022-03-01 PROCEDURE — 2580000003 HC RX 258: Performed by: INTERNAL MEDICINE

## 2022-03-01 PROCEDURE — 6370000000 HC RX 637 (ALT 250 FOR IP): Performed by: NURSE PRACTITIONER

## 2022-03-01 PROCEDURE — 6360000002 HC RX W HCPCS

## 2022-03-01 PROCEDURE — 0W9930Z DRAINAGE OF RIGHT PLEURAL CAVITY WITH DRAINAGE DEVICE, PERCUTANEOUS APPROACH: ICD-10-PCS | Performed by: INTERNAL MEDICINE

## 2022-03-01 PROCEDURE — 2580000003 HC RX 258

## 2022-03-01 PROCEDURE — 85610 PROTHROMBIN TIME: CPT

## 2022-03-01 PROCEDURE — 36415 COLL VENOUS BLD VENIPUNCTURE: CPT

## 2022-03-01 PROCEDURE — 80076 HEPATIC FUNCTION PANEL: CPT

## 2022-03-01 PROCEDURE — 1200000000 HC SEMI PRIVATE

## 2022-03-01 PROCEDURE — 6370000000 HC RX 637 (ALT 250 FOR IP)

## 2022-03-01 PROCEDURE — 77012 CT SCAN FOR NEEDLE BIOPSY: CPT

## 2022-03-01 PROCEDURE — 6360000004 HC RX CONTRAST MEDICATION: Performed by: INTERNAL MEDICINE

## 2022-03-01 PROCEDURE — 99223 1ST HOSP IP/OBS HIGH 75: CPT | Performed by: INTERNAL MEDICINE

## 2022-03-01 PROCEDURE — 71260 CT THORAX DX C+: CPT

## 2022-03-01 PROCEDURE — 6370000000 HC RX 637 (ALT 250 FOR IP): Performed by: INTERNAL MEDICINE

## 2022-03-01 RX ORDER — SODIUM CHLORIDE 9 MG/ML
INJECTION, SOLUTION INTRAVENOUS ONCE
Status: COMPLETED | OUTPATIENT
Start: 2022-03-01 | End: 2022-03-01

## 2022-03-01 RX ORDER — FENTANYL CITRATE 50 UG/ML
INJECTION, SOLUTION INTRAMUSCULAR; INTRAVENOUS
Status: COMPLETED | OUTPATIENT
Start: 2022-03-01 | End: 2022-03-01

## 2022-03-01 RX ORDER — 0.9 % SODIUM CHLORIDE 0.9 %
1000 INTRAVENOUS SOLUTION INTRAVENOUS ONCE
Status: COMPLETED | OUTPATIENT
Start: 2022-03-01 | End: 2022-03-01

## 2022-03-01 RX ORDER — CELECOXIB 200 MG/1
200 CAPSULE ORAL DAILY
Status: DISCONTINUED | OUTPATIENT
Start: 2022-03-01 | End: 2022-03-02

## 2022-03-01 RX ORDER — SODIUM CHLORIDE 9 MG/ML
INJECTION, SOLUTION INTRAVENOUS CONTINUOUS
Status: DISCONTINUED | OUTPATIENT
Start: 2022-03-01 | End: 2022-03-03

## 2022-03-01 RX ORDER — ACETAMINOPHEN 325 MG/1
650 TABLET ORAL EVERY 4 HOURS
Status: DISCONTINUED | OUTPATIENT
Start: 2022-03-01 | End: 2022-03-03 | Stop reason: HOSPADM

## 2022-03-01 RX ADMIN — OXYCODONE HYDROCHLORIDE AND ACETAMINOPHEN 1 TABLET: 7.5; 325 TABLET ORAL at 18:29

## 2022-03-01 RX ADMIN — OXYCODONE 5 MG: 5 TABLET ORAL at 08:05

## 2022-03-01 RX ADMIN — SODIUM CHLORIDE: 9 INJECTION, SOLUTION INTRAVENOUS at 09:58

## 2022-03-01 RX ADMIN — SODIUM CHLORIDE, PRESERVATIVE FREE 10 ML: 5 INJECTION INTRAVENOUS at 19:49

## 2022-03-01 RX ADMIN — OXYCODONE HYDROCHLORIDE AND ACETAMINOPHEN 1 TABLET: 7.5; 325 TABLET ORAL at 11:29

## 2022-03-01 RX ADMIN — GABAPENTIN 300 MG: 300 CAPSULE ORAL at 17:15

## 2022-03-01 RX ADMIN — OXYCODONE 5 MG: 5 TABLET ORAL at 14:42

## 2022-03-01 RX ADMIN — DOXAZOSIN MESYLATE 4 MG: 2 TABLET ORAL at 08:05

## 2022-03-01 RX ADMIN — PANTOPRAZOLE SODIUM 40 MG: 40 TABLET, DELAYED RELEASE ORAL at 06:36

## 2022-03-01 RX ADMIN — CELECOXIB 200 MG: 200 CAPSULE ORAL at 08:54

## 2022-03-01 RX ADMIN — OXYCODONE HYDROCHLORIDE AND ACETAMINOPHEN 1 TABLET: 7.5; 325 TABLET ORAL at 06:36

## 2022-03-01 RX ADMIN — SODIUM CHLORIDE 1000 ML: 9 INJECTION, SOLUTION INTRAVENOUS at 21:02

## 2022-03-01 RX ADMIN — ACETAMINOPHEN 650 MG: 325 TABLET ORAL at 08:54

## 2022-03-01 RX ADMIN — SODIUM CHLORIDE, PRESERVATIVE FREE 10 ML: 5 INJECTION INTRAVENOUS at 08:07

## 2022-03-01 RX ADMIN — CYCLOBENZAPRINE 10 MG: 10 TABLET, FILM COATED ORAL at 02:13

## 2022-03-01 RX ADMIN — ACETAMINOPHEN 650 MG: 325 TABLET ORAL at 17:15

## 2022-03-01 RX ADMIN — OXYCODONE 5 MG: 5 TABLET ORAL at 01:20

## 2022-03-01 RX ADMIN — ACETAMINOPHEN 650 MG: 325 TABLET ORAL at 21:06

## 2022-03-01 RX ADMIN — GABAPENTIN 300 MG: 300 CAPSULE ORAL at 12:52

## 2022-03-01 RX ADMIN — ENOXAPARIN SODIUM 40 MG: 100 INJECTION SUBCUTANEOUS at 17:16

## 2022-03-01 RX ADMIN — LISINOPRIL 20 MG: 20 TABLET ORAL at 08:05

## 2022-03-01 RX ADMIN — ACETAMINOPHEN 650 MG: 325 TABLET ORAL at 12:52

## 2022-03-01 RX ADMIN — SODIUM CHLORIDE: 9 INJECTION, SOLUTION INTRAVENOUS at 19:48

## 2022-03-01 RX ADMIN — DULOXETINE HYDROCHLORIDE 60 MG: 60 CAPSULE, DELAYED RELEASE ORAL at 08:05

## 2022-03-01 RX ADMIN — FENTANYL CITRATE 50 MCG: 50 INJECTION, SOLUTION INTRAMUSCULAR; INTRAVENOUS at 13:46

## 2022-03-01 RX ADMIN — MIRTAZAPINE 15 MG: 15 TABLET, FILM COATED ORAL at 21:06

## 2022-03-01 RX ADMIN — IOPAMIDOL 75 ML: 755 INJECTION, SOLUTION INTRAVENOUS at 11:37

## 2022-03-01 RX ADMIN — GABAPENTIN 300 MG: 300 CAPSULE ORAL at 08:05

## 2022-03-01 ASSESSMENT — PAIN DESCRIPTION - ONSET
ONSET: ON-GOING
ONSET: SUDDEN
ONSET: ON-GOING
ONSET: ON-GOING

## 2022-03-01 ASSESSMENT — PAIN DESCRIPTION - PAIN TYPE
TYPE: ACUTE PAIN

## 2022-03-01 ASSESSMENT — PAIN DESCRIPTION - LOCATION
LOCATION: RIB CAGE

## 2022-03-01 ASSESSMENT — PAIN DESCRIPTION - FREQUENCY
FREQUENCY: CONTINUOUS

## 2022-03-01 ASSESSMENT — PAIN DESCRIPTION - DESCRIPTORS
DESCRIPTORS: DISCOMFORT;SORE
DESCRIPTORS: DISCOMFORT;SORE
DESCRIPTORS: ACHING;DISCOMFORT;SORE
DESCRIPTORS: DISCOMFORT;SORE
DESCRIPTORS: ACHING;DISCOMFORT;SHARP;SORE
DESCRIPTORS: SHARP;SORE;DISCOMFORT

## 2022-03-01 ASSESSMENT — PAIN DESCRIPTION - ORIENTATION
ORIENTATION: RIGHT

## 2022-03-01 ASSESSMENT — PAIN DESCRIPTION - DIRECTION
RADIATING_TOWARDS: BACK

## 2022-03-01 ASSESSMENT — PAIN DESCRIPTION - PROGRESSION
CLINICAL_PROGRESSION: NOT CHANGED

## 2022-03-01 ASSESSMENT — PAIN SCALES - GENERAL
PAINLEVEL_OUTOF10: 0
PAINLEVEL_OUTOF10: 8
PAINLEVEL_OUTOF10: 8
PAINLEVEL_OUTOF10: 9
PAINLEVEL_OUTOF10: 4
PAINLEVEL_OUTOF10: 8
PAINLEVEL_OUTOF10: 8
PAINLEVEL_OUTOF10: 6
PAINLEVEL_OUTOF10: 7
PAINLEVEL_OUTOF10: 4
PAINLEVEL_OUTOF10: 9
PAINLEVEL_OUTOF10: 8
PAINLEVEL_OUTOF10: 8

## 2022-03-01 ASSESSMENT — PAIN - FUNCTIONAL ASSESSMENT
PAIN_FUNCTIONAL_ASSESSMENT: ACTIVITIES ARE NOT PREVENTED

## 2022-03-01 NOTE — PROGRESS NOTES
Brief Postoperative Note    Poncho Smith  YOB: 1955  [id number]    Pre-operative Diagnosis: Right pneumothorax    Post-operative Diagnosis: Same    Procedure: CT guided chest tube placement. Anesthesia: fentanyl only    Surgeons/Assistants: Hermelinda Briscoe MD    Estimated Blood Loss: less than 5    Complications: None    Specimens: Was Not Obtained    Findings: 8 Fr chest tube within the right pleural space.     Electronically signed by Hermelinda Briscoe MD on 3/1/2022 at 2:44 PM

## 2022-03-01 NOTE — PROGRESS NOTES
Inpatient Physical Therapy Evaluation and Treatment    Unit: 2 [facility]  Date:  3/1/2022  Patient Name:    Poncho Dillon  Admitting diagnosis:  Closed fracture of multiple ribs of right side, initial encounter [S22.41XA]  Closed traumatic displaced fracture of multiple ribs [S22.49XA]  Multiple closed fractures of ribs of right side with routine healing [S22.41XD]  Admit Date:  2/28/2022  Precautions/Restrictions/WB Status/ Lines/ Wounds/ Oxygen: general precautions    Treatment Time:  08:30-08:50  Treatment Number:  1   Timed Code Treatment Minutes: 10 minutes  Total Treatment Minutes:  20  minutes    Patient Goals for Therapy: no needs expressed. Discharge Recommendations: Home PRN assist   DME needs for discharge: Needs Met       Therapy recommendation for EMS Transport: can transport by wheelchair    Therapy recommendations for staff: Independent with use of No AD for all transfers and ambulation within room    History of Present Illness: Per Yanelis Leiva APRN H&P 2/28: \"The patient is a 78 y.o. male with PMH of HTN, chronic back pain, Insomnia- hx of hepatitis C, tobacco abuse who presents to [facility] with shortness of breath and rib pain after a mechanical fall. Pt is a poor historian and unsure of his medical diagnosis, he [facility] and [facility]. History obtained from the patient and review of EMR. Reports that he fell yesterday morning about 4 AM on his right side after tripping over a heater in his trailer. Noted that Percocet was not controlling his pain at home after fall and he noticed it was harder for him to take deep breaths related to his pain. He then came into the ED for further evaluation. He stated his pain was a 10/10 upon his arrival.  It is now manageable at a 6/10.   He stated he is never pain free which his chronic back and arm pain.      In ED chest x-ray was btained which showed mildly displaced right-sided 5 through 9 rib fractures. No pneumothorax or pulmonary contusion seen on plain film imaging. Patient was given muscle relaxer, anti-inflammatory, Lidocaine patch and IV pain medications, in the ED. Patient noted with poor inspiratory effort related to the pain. Patient was admitted for observation for pain control. \"    Home Health S4 Level Recommendation:  NA  AM-PAC Mobility Score       AM-PAC Inpatient Mobility without Stair Climbing Raw Score : 17    Preadmission Environment    Pt. Lives Alone. Son lives in [place], does not work, may be able to help PRN with chores, etc - pt has not confirmed. Home environment:  camper trailer  Steps to enter first floor: 1 steps to enter and hand rail bilateral (through doorway)  Steps to second floor: N/A  Bathroom: tub/shower unit; standard height toilet  Shower at brother's house (bigger water heater); this is a tub shower. Equipment owned: RW and SPC   Pt sleeps in flat non-adjustable bed. Laundry on main level. Preadmission Status:  Pt. Able to drive: Yes  Pt Fully independent with ADLs: Yes  Pt. Required assistance from family for: Independent PTA  Pt. independent for transfers and gait and walked with No Device  History of falls Yes (one, leading to this admission)    Pain   Yes  Location: R flank  Rating: severe /10 (unrated)  Pain Medicine Status: Received pain med prior to tx   Comments: Pt did not c/o pain initially, while sitting in chair. Pain exacerbated during transfers, bed mobility, ADLs. Cognition    A&O Person, Place and Situation; says it's Feb 25, 2022   Able to follow 2 step commands    Subjective  Patient sitting up in chair with no family present. Pt agreeable to this PT eval & tx. Upper Extremity ROM/Strength  Please see OT evaluation. Lower Extremity ROM / Strength   AROM WFL: Yes    BLE strength WFL, but not formally assessed with MMT.      Lower Extremity Sensation    [facility]    Lower Extremity Proprioception:   [facility]    Coordination and Tone  WFL    Balance  Sitting:  Good ; Independent  Comments: Trunk excursion limited, jim in flexion, due to flank pain. Standing: Good ; Independent  Comments: Steady balance throughout functional mobility today. Limited UE reach with guarded posture 2/2 pain. He keeps L arm braced around abdomen typically while transferring and ambulating. Despite this, no overt balance concerns. Bed Mobility   Supine to Sit:    Independent  Sit to Supine:   Independent  Rolling:   Independent  Scooting in sitting: Independent  Scooting in supine:  Not Tested   Comment: Pt education provided regarding techniques for bracing and log rolling. Transfer Training     Sit to stand:   Supervision. 2 x trials from recliner with relative ease although painful. Pt then had increased difficulty standing from EOB. Pt education provided regarding exhalation during standing to prevent valsalva pressure. Pt then able to stand easier next attempt. Stand to sit: Independent  Bed to Chair:   Independent with use of No AD    Gait gait completed as indicated below  Distance:      250 ft  Deviations (firm surface/linoleum):  decreased julius, decreased step length bilaterally and decreased/no arm swing  Assistive Device Used:    No AD  Level of Assist:    Independent  Comment: Mildly increased RR upon completion, likely 2/2 pain/anxiety. HR/SpO2 [facility]. Pain gradually increasing. Stair Training deferred; pt able to perform standing high step with sufficient foot clearance and balance. Activity Tolerance   Pt completed therapy session with No adverse symptoms noted w/activity    Positioning Needs   Pt up in chair, no alarm needed, positioned in proper neutral alignment and pressure relief provided.    Call light provided and all needs within reach    Exercises Initiated  Aldo deferred secondary to treatment focus on functional mobility  NA    Other  Pt was unable to flex forward from seated position to reach foot to demo shoe don/doff 2/2 flank pain. Says he will be wearing sandals for as long as needed (currently wearing personal sandals). Able to don/doff these indep. Pt able to don his personal zip up sweatshirt. Discussed ideal upper body and lower body clothing for ease of dressing tasks and techniques. Pt able to perform sit<>stand from std height toilet indep. Patient/Family Education   Pt educated on role of inpatient PT, POC, importance of continued activity, DC recommendations, transfer techniques and calling for assist with mobility. Assessment  Pt seen for Physical Therapy evaluation in acute care setting. At baseline he lives alone and is indep with all ADLs and mobility without a device. Now limited by acute flank pain related to R rib fxs. Pt able to perform ADLs and mobility with Sup/Ind today although slowly and with inc rest breaks 2/2 pain. Expect pt will be safe to return home with PRN assist.    Recommending Home PRN assist upon discharge as patient functioning close to baseline level    Goals / Plan    1 time eval & treat only - no further acute PT warranted. Signature: Day King, PT, DPT    If patient discharges from this facility prior to next visit, this note will serve as the Discharge Summary.

## 2022-03-01 NOTE — FLOWSHEET NOTE
03/01/22 0119   Vital Signs   Temp 97.9 °F (36.6 °C)   Temp Source Oral   Pulse 88   Heart Rate Source Monitor   Resp 16   BP (!) 143/84   BP Location Right upper arm   Patient Position High fowlers   Level of Consciousness Alert (0)   MEWS Score 1   Oxygen Therapy   SpO2 93 %   O2 Device None (Room air)   Pt awake at this time. PRN Oxy given. Pt requesting his Trazodone informed pt he received that earlier. Pt denies any other needs.  Call light within reach

## 2022-03-01 NOTE — PROGRESS NOTES
Woke pt up at this time for his scheduled meds. Scheduled Percocet given. Pt denies any needs.  Call light within reach

## 2022-03-01 NOTE — PROGRESS NOTES
Pt arrived for image guided chest tube insertion right. Procedure explained including the risk and benefits of the procedure. All questions answered. Pt verbalizes understanding of the procedure and states no more questions. Consent confirmed. Vital signs stable. Labs, allergies, medications, and code status reviewed. No contraindications noted. Vital Signs  Vitals:    03/01/22 0749   BP: 123/72   Pulse: 91   Resp: 16   Temp: 97.9 °F (36.6 °C)   SpO2: 92%      Allergies  Patient has no known allergies.  (allergies)    Labs  Lab Results   Component Value Date    INR 1.03 03/01/2022    PROTIME 11.6 03/01/2022     Lab Results   Component Value Date    CREATININE 1.4 (H) 03/01/2022    BUN 34 (H) 03/01/2022     (L) 03/01/2022    K 3.6 03/01/2022    CL 95 (L) 03/01/2022    CO2 25 03/01/2022     Lab Results   Component Value Date    WBC 10.0 03/01/2022    HGB 14.5 03/01/2022    HCT 42.0 03/01/2022    MCV 91.6 03/01/2022     03/01/2022

## 2022-03-01 NOTE — FLOWSHEET NOTE
02/28/22 1944   Vital Signs   Temp 98.6 °F (37 °C)   Temp Source Oral   Pulse 98   Heart Rate Source Monitor   Resp 16   /86   BP Location Right upper arm   Patient Position Up in chair   Level of Consciousness Alert (0)   MEWS Score 1   Oxygen Therapy   SpO2 94 %   O2 Device None (Room air)   Pt sitting up in chair informed pt of new orders for pain medication. Pt asking for his Flexeril informed pt he had that at 1900. Assessment completed. Pt complains of right side rib pain that radiates to back worse with deep breathing. Pt refuses yellow socks. Pt refused skin assessment, removing his jeans etc. Meds given per MAR. PRN Klonopin and Melatonin given. Pt medium fall risk steady on feet. Pt had yellow pill bottle with polident tablet and 5 blue pills pt states are his smoking cessation pills. Educated pt on policy and pills placed in security bag and locked up in drawer.

## 2022-03-01 NOTE — PROGRESS NOTES
Inpatient Occupational Therapy  Evaluation and Treatment    Unit: [facility]  Date:  3/1/2022  Patient Name:    Paulo Perera  Admitting diagnosis:  Closed fracture of multiple ribs of right side, initial encounter [S22.41XA]  Closed traumatic displaced fracture of multiple ribs [S22.49XA]  Multiple closed fractures of ribs of right side with routine healing [S22.41XD]  Admit Date:  2/28/2022  Precautions/Restrictions/WB Status/ Lines/ Wounds/ Oxygen: {precautions:90428}    Treatment Time:  ***  Treatment Number: 1     Billable Treatment Time: *** minutes   Total Treatment Time:   ***   minutes    Patient Goals for Therapy:  \" *** \"      Discharge Recommendations: {Therapy discharge recommendations:42227}  DME needs for discharge: {DME needs for discharge:53011}       Therapy recommendations for staff:   {staff assist:99709} with use of {Assistive Devices:81897} for all {transfers ambulation:65879} {distance options:60588}    History of Present Illness: 78 y.o. male who presents to the emergency department complaining of presenting with shortness of breath right-sided chest wall pain. Reports a fall yesterday morning fell down onto his right side, mechanical states he tripped over something that was on the ground fell down onto his arm which compressed into his right lateral chest wall. Pain worsens with movement, breathing. Satting in upper 90s on room air on arrival.  Denies hitting head denies loss of conscious denies midline neck or back pain. Is not on anticoagulation. Took Percocet prior to arrival he is on this for chronic pain. No improvement with home dose to pain meds  X-ray R ches-tAcute mildly displaced right 5th through 9th rib fractures    Home Health S4 Level Recommendation:  {Home Health S4 Level :92727::\"NA\"}  AM-PAC Score:      Preadmission Environment    Pt. Lives {Preadmission Environment Pt.  Lives:27968}  Home environment:  {Preadmission Environment Home Environment:41087}  Steps to enter first floor:   {Preadmission Environment Steps 1st:28656}    Steps to second floor: {Steps to enter 2nd floor:34376}  Bathroom:  {Preadmission Environment Bathroom:66799}  Equipment owned:  {Preadmission Environment Equip Owned:22500}     Preadmission Status / PLOF:  History of falls   {Yes No Unknown:22501}  Pt. Able to drive   {Yes No VMBULVT:29402}  Pt Fully independent with ADL's  {Yes No Unknown:22501}  Pt. Required assistance from family for:  {Pt. Required Assistance:22502}    Pt. Fully independent for transfers and gait and walked with: {Pt Fully Independent Walk With:97071}    Pain  {YES/NO:19726}  Rating:{Pain Rating :38348}  Location:***  Pain Medicine Status: {Pain Med Status:66672}      Cognition    A&O {Alert and Oriented:52227}   Able to follow {Cognition:91965}    Subjective  Patient lying supine in bed with no family present   Pt agreeable to this OT eval & tx.      Upper Extremity ROM:    {UE restrictions:82143}    Upper Extremity Strength:    {UE strength assessment:85377}    Upper Extremity Sensation    {WNL Impaired:46825}    Upper Extremity Proprioception:  {WNL/WFL/impaired/NT:17308}    Coordination and Tone  {WNL Impaired:55252}    Balance  Functional Sitting Balance:  {WNL Impaired:92273}  Functional Standing Balance:{WNL Impaired:62677}    Bed mobility:    Supine to sit:   {Level of Assist:41253}  Sit to supine:   {Level of Assist:87200}  Rolling:    {Level of Assist:96592}  Scooting in sitting:  {Level of Assist:83159}  Scooting to head of bed:   {Level of Assist:45782}    Bridging:   {Level of Assist:24614}    Transfers:    Sit to stand:  {Level of Assist:55506}  Stand to sit:  {Level of Assist:67125}  Bed to chair:   {Level of Assist:89329}  Standard toilet: {Level of Assist:38126}  Bed to BSC:  {Level of Assist:20964}    Dressing:      UE:   {Level of Assist:38079}  LE:    {Level of Assist:75903}    Bathing:    UE:  {Level of Assist:84721}  LE:  {Level of Assist:45194}    Eating: {Level of Assist:63770}    Toileting:  {Level of Assist:92615}    Grooming: {Level of Assist:91804}    Activity Tolerance   Pt completed therapy session with {Activity Tolerance PT List:76606}  SpO2: ***  HR: ***  BP: ***    Positioning Needs:   {Positioning Needs :26333}    Exercise / Activities Initiated:   {UE exercises:63453}    Patient/Family Education:   {OT education:55764}    Assessment of Deficits: Pt seen for Occupational therapy evaluation in acute care setting. Pt demonstrated decreased {OT assessment:76470}. Pt functioning below baseline and will likely benefit from skilled occupational therapy services to maximize safety and independence. Goal(s) : To be met in 3 Visits:  1). Bed to toilet/BSC: {Level of Assist:76496}    To be met in 5 Visits:  1). Supine to/from Sit:  {Level of Assist:50170}  2). Upper Body Bathing:   {Level of Assist:47828}  3). Lower Body Bathing:   {Level of Assist:10862}  4). Upper Body Dressing:  {Level of Assist:17288}  5). Lower Body Dressing:  {Level of Assist:62152}  6). Pt to demonstrate UE exs x 15 reps with minimal cues    Rehabilitation Potential:  {Good Fair Poor:49497} for goals listed above. Strengths for achieving goals include: {Strengths for Achieving Goals:58478}  Barriers to achieving goals include:  {IP barriers:62005}     Plan: To be seen {IP plan of care/frequency :45894} while in acute care setting for therapeutic exercises, bed mobility, transfers, dressing, bathing, family/patient education, ADL/IADL retraining, energy conservation training.      ***          If patient discharges from this facility prior to next visit, this note will serve as the Discharge Summary

## 2022-03-01 NOTE — CONSULTS
Patient is being seen at the request of Corry Benedict MD   for a consultation for multiple rib fractures    HISTORY OF PRESENT ILLNESS:   78years old with history of hypertension and hepatitis C presented rib pain after mechanical fall. Severe. Worse with taking deep breath. Associated with shortness of breath. Started after he fell to/27 4 AM after tripping over a heater in his trailer landed on the floor with his elbow tucking underneath him. In ED x-ray showed mildly displaced right-sided 5 through 9 rib fractures. No pneumothorax. No history of falling or trauma. No recent hospitalization. Denies any loss of consciousness. Smoker 1 pack/day uses albuterol as needed. No alcohol. PAST MEDICAL HISTORY:  Past Medical History:   Diagnosis Date    Hepatitis C     Hypertension      PAST SURGICAL HISTORY:  History reviewed. No pertinent surgical history. FAMILY HISTORY:  No lung cancer  SOCIAL HISTORY:   reports that he has been smoking cigarettes. He has been smoking about 1.00 pack per day. He does not have any smokeless tobacco history on file.     Scheduled Meds:   acetaminophen  650 mg Oral Q4H    celecoxib  200 mg Oral Daily    lidocaine  1 patch TransDERmal Daily    sodium chloride flush  5-40 mL IntraVENous 2 times per day    enoxaparin  40 mg SubCUTAneous Daily    DULoxetine  60 mg Oral Daily    mirtazapine  15 mg Oral Nightly    pantoprazole  40 mg Oral QAM AC    doxazosin  4 mg Oral Daily    traZODone  50 mg Oral Nightly    lisinopril  20 mg Oral Daily    gabapentin  300 mg Oral 4x Daily    oxyCODONE-acetaminophen  1 tablet Oral 4 times per day     Continuous Infusions:   sodium chloride       PRN Meds:  sodium chloride flush, sodium chloride, potassium chloride **OR** potassium alternative oral replacement **OR** potassium chloride, magnesium sulfate, ondansetron **OR** ondansetron, polyethylene glycol, cyclobenzaprine, albuterol sulfate HFA, clonazePAM, hydrOXYzine, melatonin, oxyCODONE, HYDROmorphone **OR** HYDROmorphone    ALLERGIES:  Patient has No Known Allergies. REVIEW OF SYSTEMS:  Constitutional: Negative for fever  HENT: Negative for sore throat  Eyes: Negative for redness   Respiratory: + Shortness of breath  Cardiovascular: Negative for chest pain  Gastrointestinal: Negative for vomiting, diarrhea   Genitourinary: Negative for hematuria   Musculoskeletal: + Arthralgias   Skin: Negative for rash  Neurological: Negative for syncope  Hematological: Negative for adenopathy  Psychiatric/Behavorial: Negative for anxiety    PHYSICAL EXAM:  Blood pressure 123/72, pulse 91, temperature 97.9 °F (36.6 °C), temperature source Oral, resp. rate 16, height 5' 10\" (1.778 m), weight 186 lb 3.2 oz (84.5 kg), SpO2 92 %.' on RA  Gen: Mild distress. Eyes: PERRL. No sclera icterus. No conjunctival injection. ENT: No discharge. Pharynx clear. Neck: Trachea midline. No obvious mass. Resp: No accessory muscle use. No crackles. No wheezes. No rhonchi. No dullness on percussion. CV: Regular rate. Regular rhythm. No murmur or rub. No edema. GI: Non-tender. Non-distended. No hernia. Skin: Warm and dry. No nodule on exposed extremities. Lymph: No cervical LAD. No supraclavicular LAD. M/S: No cyanosis. No joint deformity. No clubbing. Right-sided anterior lateral chest wall tenderness with no subcutaneous emphysema  Neuro: Awake. Alert. Moves all four extremities. Psych: Oriented x 3. No anxiety.      LABS:  CBC:   Recent Labs     02/28/22  1419 03/01/22  0500   WBC 12.3* 10.0   HGB 15.7 14.5   HCT 45.8 42.0   MCV 90.7 91.6    144     BMP:   Recent Labs     02/28/22  1419 03/01/22  0500    133*   K 4.3 3.6   CL 95* 95*   CO2 26 25   BUN 28* 34*   CREATININE 1.2 1.4*     LIVER PROFILE:   Recent Labs     03/01/22  0500   AST 27   ALT 27   BILIDIR <0.2   BILITOT 0.4   ALKPHOS 107     PT/INR: No results for input(s): PROTIME, INR in the last 72 hours.  APTT: No results for input(s): APTT in the last 72 hours. UA:No results for input(s): NITRITE, COLORU, PHUR, LABCAST, WBCUA, RBCUA, MUCUS, TRICHOMONAS, YEAST, BACTERIA, CLARITYU, SPECGRAV, LEUKOCYTESUR, UROBILINOGEN, BILIRUBINUR, BLOODU, GLUCOSEU, AMORPHOUS in the last 72 hours. Invalid input(s): KETONESU  No results for input(s): PHART, FSQ3CGB, PO2ART in the last 72 hours. Chest x-ray 2/28 imaging was reviewed by me and showed   Acute mildly displaced right fifth through ninth rib fractures    ASSESSMENT:  · Right-sided 5 through 9 rib fractures-no evidence of flail chest  · Right-sided chest pain  · Shortness of breath-secondary to above  · Acute kidney injury  · Hepatitis C  · 46-pack-year history of smoking    PLAN:  Supplemental oxygen to maintain SaO2 >92%; wean as tolerated  Pulmonary toilet/incentive spirometry  Early mobilization  Pain control-narcotics, Lidoderm patches, Neurontin. Epidural catheter placement/regional analgesia could be considered  IV fluid 1 L normal saline bolus followed by 125 cc an hour  CT chest with IV contrast evaluate for intrathoracic injury related to the fall-aortic injury, contusion, pneumothorax, hemothorax.   Discussed with internal medicine

## 2022-03-01 NOTE — PLAN OF CARE
Problem: Falls - Risk of:  Goal: Will remain free from falls  Description: Will remain free from falls  2/28/2022 2212 by Nava Marie RN  Outcome: Ongoing  2/28/2022 1538 by Abram Hernandez RN  Outcome: Ongoing  Goal: Absence of physical injury  Description: Absence of physical injury  2/28/2022 1538 by Abram Hernandez RN  Outcome: Ongoing     Problem: Infection:  Goal: Will remain free from infection  Description: Will remain free from infection  2/28/2022 1538 by Abram Hernandez RN  Outcome: Ongoing     Problem: Safety:  Goal: Free from accidental physical injury  Description: Free from accidental physical injury  2/28/2022 2212 by Nava Marie RN  Outcome: Ongoing  2/28/2022 1538 by Abram Hernandez RN  Outcome: Ongoing  Goal: Free from intentional harm  Description: Free from intentional harm  2/28/2022 1538 by Abram Hernandez RN  Outcome: Ongoing     Problem: Daily Care:  Goal: Daily care needs are met  Description: Daily care needs are met  2/28/2022 2212 by Nava Marie RN  Outcome: Ongoing  2/28/2022 1538 by Abram Hernandez RN  Outcome: Ongoing     Problem: Pain:  Goal: Patient's pain/discomfort is manageable  Description: Patient's pain/discomfort is manageable  2/28/2022 2212 by Nava Marie RN  Outcome: Ongoing  2/28/2022 1538 by Abram Hernandez RN  Outcome: Ongoing     Problem: Skin Integrity:  Goal: Skin integrity will stabilize  Description: Skin integrity will stabilize  2/28/2022 2212 by Nava Marie RN  Outcome: Ongoing  2/28/2022 1538 by Abram Hernandez RN  Outcome: Ongoing     Problem: Discharge Planning:  Goal: Patients continuum of care needs are met  Description: Patients continuum of care needs are met  2/28/2022 2212 by Nava Maire RN  Outcome: Ongoing  2/28/2022 1538 by Abram Hernandez RN  Outcome: Ongoing

## 2022-03-01 NOTE — CARE COORDINATION
Chart reviewed. Message left for son Mitchell Wilkes to call back @ [phone]. Pt is 78year old male admitted as inpatient for fall yesterday. Pt with rib pain and SOB. Found to have multiple displaced rib fractures. Pulmonology saw this am and CT chest ordered. Pt now GAY for right chest tube insertion. Pt is from home alone. IPTA. PT/OT worked with Pt today and home with prn assist is recommended. Pt has [place] PCP and insurance. No needs identified at this time. However, will follow pending progress.

## 2022-03-01 NOTE — PROGRESS NOTES
Occupational Therapy  Per discussion with PT pt able to perform ADL's and toilet transfers, pt functioning close to baseline and no further tx indicated at this time. Please see PT note for details, will d/c OT order.    Herminia Jj OTR/L #[id number]

## 2022-03-01 NOTE — PROGRESS NOTES
Scheduled Percocet given at this time. Pt continues to sit up in chair offered pillow and blankets to pt, pt refused. Informed pt that his home meds are locked up in his room and to request them when he leaves tomorrow. Pt aware that the next pain medication available will be around 2300. Pt denies any needs.  Call light within reach

## 2022-03-01 NOTE — PROGRESS NOTES
AM assessment complete. See flowsheets. Gave am meds due see mar. Refused Lidocaine Patch. Gave chocolate milk. A/O x 4. PRN pain med given for pain level 8/10 located R ribcage. See mar/pain flowsheet. Bed locked/lowest position. Call light within reach.

## 2022-03-01 NOTE — PROGRESS NOTES
Image guided chest tube insertion right completed. Dr. Aquilino Boyer placed 8 Georgian 25 cm Angiodynamics Exodus Array multipurpose drainage catheter LOT # [id number] EXP 09/30/2024 in the right pleural space. Drain/tube secured at the 23 cm line with sutures. Drain/tube dressing clean, dry, and intact. Pt tolerated procedure without any signs or symptoms of distress. Vital signs stable. Report called to [facility] on [facility]. Pt transported back to [facility] in stable condition via bed by transport and IR Nurse.      Vital Signs  Vitals:    03/01/22 1353   BP: 135/63   Pulse: 89   Resp: 14   Temp:    SpO2: 97%      Post Liana  2 - Able to move 4 extremities voluntarily on command  2 - BP+/- 20mmHg of normal  2 - Fully awake  2 - Able to maintain oxygen saturation >92% on room air  1 - Dyspnic, shallow, or limited breathing     IV Medication  50 mcg IV Fentanyl

## 2022-03-01 NOTE — PROGRESS NOTES
Bedside report given to [facility] pt in stable condition no needs at this time.  Call light within reach

## 2022-03-01 NOTE — PROGRESS NOTES
Progress Note    Admit Date:  2/28/2022    Subjective:  Mr. Ragini Birmingham came to emergency room after he tripped over his space heater. Work-up revealed displaced rib fractures on the right side. 5 ribs are fractured-ribs 5 through 9. He is in a lot of pain. He rates his pain as an 8 on 10. Patient takes chronic narcotics at home. He says is not helping with the pain. Work-up in the ER did not show any evidence of pneumothorax. He is afebrile. His pain seems to be under moderate control at rest but is much worse when he moves in bed. He is having a hard time sitting up. He was alone. He says the lidocaine patch is not helping. Objective:   /72   Pulse 91   Temp 97.9 °F (36.6 °C) (Oral)   Resp 16   Ht 5' 10\" (1.778 m)   Wt 186 lb 3.2 oz (84.5 kg)   SpO2 92%   BMI 26.72 kg/m²        Intake/Output Summary (Last 24 hours) at 3/1/2022 4620  Last data filed at 3/1/2022 6613  Gross per 24 hour   Intake 10 ml   Output --   Net 10 ml       Physical Exam:    General appearance: alert, appears stated age and cooperative/mild distress due to pain  Head: Normocephalic, without obvious abnormality, atraumatic  Eyes: conjunctivae/corneas clear. PERRL, EOM's intact. Neck: no adenopathy, no carotid bruit, no JVD, supple, symmetrical, trachea midline and thyroid not enlarged, symmetric, no tenderness/mass/nodules  Lungs: No accessory muscle use. No wheezes rhonchi or crackles. No subcutaneous emphysema.   Palpable tenderness of the right lower chest.  Heart: regular rate and rhythm, S1, S2 normal, no murmur, click, rub or gallop  Abdomen: soft, non-tender; bowel sounds normal; no masses,  no organomegaly  Extremities: extremities normal, atraumatic, no cyanosis or edema  Pulses: 2+ and symmetric  Skin: Skin color, texture, turgor normal. No rashes or lesions  Neurologic: Grossly normal    Scheduled Meds:   acetaminophen  650 mg Oral Q4H    celecoxib  200 mg Oral Daily    lidocaine  1 patch TransDERmal Daily    sodium chloride flush  5-40 mL IntraVENous 2 times per day    enoxaparin  40 mg SubCUTAneous Daily    DULoxetine  60 mg Oral Daily    mirtazapine  15 mg Oral Nightly    pantoprazole  40 mg Oral QAM AC    doxazosin  4 mg Oral Daily    traZODone  50 mg Oral Nightly    lisinopril  20 mg Oral Daily    gabapentin  300 mg Oral 4x Daily    oxyCODONE-acetaminophen  1 tablet Oral 4 times per day       Continuous Infusions:   sodium chloride         PRN Meds:  sodium chloride flush, sodium chloride, potassium chloride **OR** potassium alternative oral replacement **OR** potassium chloride, magnesium sulfate, ondansetron **OR** ondansetron, polyethylene glycol, cyclobenzaprine, albuterol sulfate HFA, clonazePAM, hydrOXYzine, melatonin, oxyCODONE, HYDROmorphone **OR** HYDROmorphone      Data:  CBC:   Recent Labs     02/28/22  1419 03/01/22  0500   WBC 12.3* 10.0   HGB 15.7 14.5   HCT 45.8 42.0   MCV 90.7 91.6    144     BMP:   Recent Labs     02/28/22  1419 03/01/22  0500    133*   K 4.3 3.6   CL 95* 95*   CO2 26 25   BUN 28* 34*   CREATININE 1.2 1.4*     LIVER PROFILE:   Recent Labs     03/01/22  0500   AST 27   ALT 27   BILIDIR <0.2   BILITOT 0.4   ALKPHOS 107     PT/INR: No results for input(s): PROTIME, INR in the last 72 hours. Cultures:   Results for Dai Vogelvis (MRN [id number]) as of 3/1/2022 08:20   Ref. Range 2/28/2022 12:39   INFLUENZA A Latest Ref Range: NOT DETECTED  NOT DETECTED   INFLUENZA B Latest Ref Range: NOT DETECTED  NOT DETECTED   SARS-CoV-2 RNA, RT PCR Latest Ref Range: NOT DETECTED  NOT DETECTED       XR RIBS RIGHT INCLUDE CHEST (MIN 3 VIEWS)   Final Result   Acute mildly displaced right 5th through 9th rib fractures.               Assessment:  Principal Problem:    Closed fracture of multiple ribs of right side with routine healing  Active Problems:    Multiple closed fractures of ribs of right side    Chronic bilateral low back pain without sciatica    Primary hypertension    Accident due to mechanical fall without injury    Tobacco abuse    Multiple closed fractures of ribs of right side with routine healing  Resolved Problems:    * No resolved hospital problems. *      Plan:    #Multiple rib fractures of the right chest after trauma. Mildly displaced. No evidence of subcutaneous emphysema or pneumothorax. No obvious evidence of pulmonary contusion. He does not have any hemoptysis. Admitted to the hospital.  Is requiring IV pain medication. Pulmonary consultation. Add Celebrex. Add around-the-clock Tylenol. Has a lidocaine patch. Incentive spirometry to prevent atelectasis and pneumonia. #Chronic back pain. On Percocet at home. We will continue. #Hypertension. Blood pressures well controlled. Continue lisinopril. #GERD. Continue Prilosec. #Klonopin for anxiety. #Lovenox DVT prophylaxis. IMPORTANT: Please note that some portions of this note may have been created using Dragon voice recognition software. Some \"sound-alike\" and totally wrong word substitutions may have taken place due to known inherent limitations of any such software, including this voice recognition software. In spite of efforts to eliminate such errors, some may not have been corrected. So please read the note with this in mind and recognize such mistakes and understand the correct version using the  context. If there are still uncertainties in the mind of the medical provider reading this note about any aspect of the note, the provider can feel free to contact me. Thanks.      Peggy Dang MD, MD 3/1/2022 8:23 AM

## 2022-03-01 NOTE — PLAN OF CARE
Problem: Falls - Risk of:  Goal: Will remain free from falls  Description: Will remain free from falls  3/1/2022 0811 by Dustin Sánchez RN  Outcome: Ongoing  2/28/2022 2212 by Aakash Michaud RN  Outcome: Ongoing  Goal: Absence of physical injury  Description: Absence of physical injury  Outcome: Ongoing     Problem: Infection:  Goal: Will remain free from infection  Description: Will remain free from infection  Outcome: Ongoing     Problem: Safety:  Goal: Free from accidental physical injury  Description: Free from accidental physical injury  3/1/2022 0811 by Dustin Sánchez RN  Outcome: Ongoing  2/28/2022 2212 by Aakash Michaud RN  Outcome: Ongoing  Goal: Free from intentional harm  Description: Free from intentional harm  Outcome: Ongoing     Problem: Daily Care:  Goal: Daily care needs are met  Description: Daily care needs are met  3/1/2022 0811 by Dustin Sánchez RN  Outcome: Ongoing  2/28/2022 2212 by Aakash Michaud RN  Outcome: Ongoing     Problem: Pain:  Goal: Patient's pain/discomfort is manageable  Description: Patient's pain/discomfort is manageable  3/1/2022 0811 by Dustin Sánchez RN  Outcome: Ongoing  2/28/2022 2212 by Aakash Michaud RN  Outcome: Ongoing  Goal: Pain level will decrease  Description: Pain level will decrease  Outcome: Ongoing  Goal: Control of acute pain  Description: Control of acute pain  Outcome: Ongoing  Goal: Control of chronic pain  Description: Control of chronic pain  Outcome: Ongoing     Problem: Skin Integrity:  Goal: Skin integrity will stabilize  Description: Skin integrity will stabilize  3/1/2022 0811 by Dustin Sánchez RN  Outcome: Ongoing  2/28/2022 2212 by Aakash Michaud RN  Outcome: Ongoing     Problem: Discharge Planning:  Goal: Patients continuum of care needs are met  Description: Patients continuum of care needs are met  3/1/2022 0811 by Dustin Sánchez RN  Outcome: Ongoing  2/28/2022 2212 by Aakash Michaud RN  Outcome: Ongoing

## 2022-03-02 ENCOUNTER — APPOINTMENT (OUTPATIENT)
Dept: GENERAL RADIOLOGY | Age: 67
DRG: 200 | End: 2022-03-02
Payer: MEDICARE

## 2022-03-02 PROBLEM — J94.8 HYDROPNEUMOTHORAX: Status: ACTIVE | Noted: 2022-03-02

## 2022-03-02 LAB
ANION GAP SERPL CALCULATED.3IONS-SCNC: 12 MMOL/L (ref 3–16)
BASOPHILS ABSOLUTE: 0 K/UL (ref 0–0.2)
BASOPHILS RELATIVE PERCENT: 0.4 %
BUN BLDV-MCNC: 33 MG/DL (ref 7–20)
CALCIUM SERPL-MCNC: 8.4 MG/DL (ref 8.3–10.6)
CHLORIDE BLD-SCNC: 102 MMOL/L (ref 99–110)
CO2: 24 MMOL/L (ref 21–32)
CREAT SERPL-MCNC: 1.4 MG/DL (ref 0.8–1.3)
EOSINOPHILS ABSOLUTE: 0 K/UL (ref 0–0.6)
EOSINOPHILS RELATIVE PERCENT: 0.4 %
GFR AFRICAN AMERICAN: >60
GFR NON-AFRICAN AMERICAN: 50
GLUCOSE BLD-MCNC: 160 MG/DL (ref 70–99)
HCT VFR BLD CALC: 37.3 % (ref 40.5–52.5)
HEMOGLOBIN: 12.8 G/DL (ref 13.5–17.5)
LYMPHOCYTES ABSOLUTE: 1.4 K/UL (ref 1–5.1)
LYMPHOCYTES RELATIVE PERCENT: 16 %
MCH RBC QN AUTO: 31.6 PG (ref 26–34)
MCHC RBC AUTO-ENTMCNC: 34.3 G/DL (ref 31–36)
MCV RBC AUTO: 92.1 FL (ref 80–100)
MONOCYTES ABSOLUTE: 1 K/UL (ref 0–1.3)
MONOCYTES RELATIVE PERCENT: 11.1 %
NEUTROPHILS ABSOLUTE: 6.3 K/UL (ref 1.7–7.7)
NEUTROPHILS RELATIVE PERCENT: 72.1 %
PDW BLD-RTO: 14.3 % (ref 12.4–15.4)
PLATELET # BLD: 126 K/UL (ref 135–450)
PMV BLD AUTO: 9.8 FL (ref 5–10.5)
POTASSIUM REFLEX MAGNESIUM: 4 MMOL/L (ref 3.5–5.1)
RBC # BLD: 4.05 M/UL (ref 4.2–5.9)
SODIUM BLD-SCNC: 138 MMOL/L (ref 136–145)
WBC # BLD: 8.8 K/UL (ref 4–11)

## 2022-03-02 PROCEDURE — 2580000003 HC RX 258: Performed by: INTERNAL MEDICINE

## 2022-03-02 PROCEDURE — 6360000002 HC RX W HCPCS: Performed by: INTERNAL MEDICINE

## 2022-03-02 PROCEDURE — 6370000000 HC RX 637 (ALT 250 FOR IP): Performed by: INTERNAL MEDICINE

## 2022-03-02 PROCEDURE — 6370000000 HC RX 637 (ALT 250 FOR IP)

## 2022-03-02 PROCEDURE — 85025 COMPLETE CBC W/AUTO DIFF WBC: CPT

## 2022-03-02 PROCEDURE — 36415 COLL VENOUS BLD VENIPUNCTURE: CPT

## 2022-03-02 PROCEDURE — 99233 SBSQ HOSP IP/OBS HIGH 50: CPT | Performed by: INTERNAL MEDICINE

## 2022-03-02 PROCEDURE — 6370000000 HC RX 637 (ALT 250 FOR IP): Performed by: NURSE PRACTITIONER

## 2022-03-02 PROCEDURE — 71045 X-RAY EXAM CHEST 1 VIEW: CPT

## 2022-03-02 PROCEDURE — 1200000000 HC SEMI PRIVATE

## 2022-03-02 PROCEDURE — 80048 BASIC METABOLIC PNL TOTAL CA: CPT

## 2022-03-02 RX ADMIN — SODIUM CHLORIDE: 9 INJECTION, SOLUTION INTRAVENOUS at 04:07

## 2022-03-02 RX ADMIN — OXYCODONE HYDROCHLORIDE AND ACETAMINOPHEN 1 TABLET: 7.5; 325 TABLET ORAL at 00:55

## 2022-03-02 RX ADMIN — MIRTAZAPINE 15 MG: 15 TABLET, FILM COATED ORAL at 20:59

## 2022-03-02 RX ADMIN — CELECOXIB 200 MG: 200 CAPSULE ORAL at 08:29

## 2022-03-02 RX ADMIN — HYDROXYZINE HYDROCHLORIDE 25 MG: 10 TABLET, FILM COATED ORAL at 09:58

## 2022-03-02 RX ADMIN — TRAZODONE HYDROCHLORIDE 50 MG: 50 TABLET ORAL at 20:59

## 2022-03-02 RX ADMIN — GABAPENTIN 300 MG: 300 CAPSULE ORAL at 08:29

## 2022-03-02 RX ADMIN — Medication 6 MG: at 20:59

## 2022-03-02 RX ADMIN — SODIUM CHLORIDE: 9 INJECTION, SOLUTION INTRAVENOUS at 12:54

## 2022-03-02 RX ADMIN — OXYCODONE 5 MG: 5 TABLET ORAL at 15:58

## 2022-03-02 RX ADMIN — OXYCODONE HYDROCHLORIDE AND ACETAMINOPHEN 1 TABLET: 7.5; 325 TABLET ORAL at 18:22

## 2022-03-02 RX ADMIN — OXYCODONE HYDROCHLORIDE AND ACETAMINOPHEN 1 TABLET: 7.5; 325 TABLET ORAL at 11:29

## 2022-03-02 RX ADMIN — CLONAZEPAM 0.5 MG: 0.5 TABLET ORAL at 20:59

## 2022-03-02 RX ADMIN — OXYCODONE 5 MG: 5 TABLET ORAL at 06:21

## 2022-03-02 RX ADMIN — PANTOPRAZOLE SODIUM 40 MG: 40 TABLET, DELAYED RELEASE ORAL at 06:06

## 2022-03-02 RX ADMIN — DULOXETINE HYDROCHLORIDE 60 MG: 60 CAPSULE, DELAYED RELEASE ORAL at 08:29

## 2022-03-02 RX ADMIN — GABAPENTIN 300 MG: 300 CAPSULE ORAL at 12:43

## 2022-03-02 RX ADMIN — HYDROMORPHONE HYDROCHLORIDE 1 MG: 1 INJECTION, SOLUTION INTRAMUSCULAR; INTRAVENOUS; SUBCUTANEOUS at 17:21

## 2022-03-02 RX ADMIN — GABAPENTIN 300 MG: 300 CAPSULE ORAL at 20:59

## 2022-03-02 RX ADMIN — ACETAMINOPHEN 650 MG: 325 TABLET ORAL at 17:21

## 2022-03-02 RX ADMIN — CYCLOBENZAPRINE 10 MG: 10 TABLET, FILM COATED ORAL at 15:58

## 2022-03-02 RX ADMIN — GABAPENTIN 300 MG: 300 CAPSULE ORAL at 17:21

## 2022-03-02 RX ADMIN — HYDROMORPHONE HYDROCHLORIDE 1 MG: 1 INJECTION, SOLUTION INTRAMUSCULAR; INTRAVENOUS; SUBCUTANEOUS at 12:44

## 2022-03-02 RX ADMIN — CYCLOBENZAPRINE 10 MG: 10 TABLET, FILM COATED ORAL at 09:08

## 2022-03-02 RX ADMIN — HYDROMORPHONE HYDROCHLORIDE 1 MG: 1 INJECTION, SOLUTION INTRAMUSCULAR; INTRAVENOUS; SUBCUTANEOUS at 21:00

## 2022-03-02 RX ADMIN — OXYCODONE 5 MG: 5 TABLET ORAL at 22:15

## 2022-03-02 RX ADMIN — HYDROMORPHONE HYDROCHLORIDE 1 MG: 1 INJECTION, SOLUTION INTRAMUSCULAR; INTRAVENOUS; SUBCUTANEOUS at 08:26

## 2022-03-02 ASSESSMENT — PAIN SCALES - GENERAL
PAINLEVEL_OUTOF10: 8
PAINLEVEL_OUTOF10: 7
PAINLEVEL_OUTOF10: 8
PAINLEVEL_OUTOF10: 7
PAINLEVEL_OUTOF10: 9
PAINLEVEL_OUTOF10: 8
PAINLEVEL_OUTOF10: 9
PAINLEVEL_OUTOF10: 8
PAINLEVEL_OUTOF10: 9
PAINLEVEL_OUTOF10: 9
PAINLEVEL_OUTOF10: 7
PAINLEVEL_OUTOF10: 7

## 2022-03-02 ASSESSMENT — PAIN DESCRIPTION - DESCRIPTORS
DESCRIPTORS: SHARP;STABBING
DESCRIPTORS: SHARP;SHOOTING;SPASM
DESCRIPTORS: SHARP;DISCOMFORT;SORE
DESCRIPTORS: SHARP;SHOOTING

## 2022-03-02 ASSESSMENT — PAIN DESCRIPTION - ORIENTATION
ORIENTATION: RIGHT

## 2022-03-02 ASSESSMENT — PAIN DESCRIPTION - PAIN TYPE
TYPE: ACUTE PAIN

## 2022-03-02 ASSESSMENT — PAIN DESCRIPTION - FREQUENCY
FREQUENCY: CONTINUOUS

## 2022-03-02 ASSESSMENT — PAIN DESCRIPTION - LOCATION
LOCATION: RIB CAGE

## 2022-03-02 ASSESSMENT — PAIN DESCRIPTION - DIRECTION
RADIATING_TOWARDS: BACK

## 2022-03-02 ASSESSMENT — PAIN DESCRIPTION - ONSET: ONSET: SUDDEN

## 2022-03-02 NOTE — FLOWSHEET NOTE
Pt A/Ox4 but appears tired at this time. BP still low, 1000 ml bolus infusing at this time. Pt denies feeling light-headed & \"woozy\" at this moment. Denies SOB. Pt unlabored; respirations even, regular, effortless. RA. No distress noted. Shift assessment complete. See flowsheet. Chest tube to anterior right side of chest; Drsg non-occlusive change of shift, this RN applied new drsg with split gauze & Tegaderm, occlusive at this time. PM meds given except gabapentin & trazodone d/t BP. See MAR. Will reassess BP after bolus infusion complete. Denies needs at this time. Pt up in chair with wheels locked. Telemetry & continuous pulse ox in place. Call light within reach.         03/01/22 2105 03/01/22 2106   Vital Signs   Pulse 69  --    Heart Rate Source Monitor  --    Resp 16  --    BP 86/60  --    BP Location Right upper arm  --    Patient Position Sitting;Up in chair  --    Level of Consciousness Alert (0)  --    Patient Currently in Pain  --  Yes   Pain Assessment   Pain Assessment  --  0-10   Pain Level  --  9   Patient's Stated Pain Goal  --  No pain   Pain Location  --  Rib cage   Pain Orientation  --  Right   Pain Radiating Towards  --  back   Pain Descriptors  --  Sharp;Sore;Discomfort   Pain Frequency  --  Continuous   Pain Onset  --  Sudden  (with breathing pattern, coughing, laughing)   Non-Pharmaceutical Pain Intervention(s)  --  Rest;Distraction   Oxygen Therapy   SpO2 94 %  --    O2 Device None (Room air)  --

## 2022-03-02 NOTE — PROGRESS NOTES
Bedside report given and pt care transferred to [facility]. Pt denies needs at this time. Call light within reach.

## 2022-03-02 NOTE — PROGRESS NOTES
[facility] called, patient had 8 beats of bigeminy. Called phlebotomy to draw this patient first when arrived to unit. Notified MD via secure message.

## 2022-03-02 NOTE — PROGRESS NOTES
Progress Note    Admit Date:  2/28/2022    Subjective:  Mr. Felicia Hunt came to emergency room after he tripped over his space heater. Work-up revealed displaced rib fractures on the right side. 5 ribs are fractured-ribs 5 through 9. He is in a lot of pain. He rates his pain as an 8 on 10. Patient takes chronic narcotics at home. He says is not helping with the pain. Work-up in the ER did not show any evidence of pneumothorax. He is afebrile. His pain seems to be under moderate control at rest but is much worse when he moves in bed. He is having a hard time sitting up. He was alone. He says the lidocaine patch is not helping. 3/2- see by pulmonary. Chest CT done. It showed a right hydropneumothorax. A chest tube was placed. His pain is not much better. BP was low last night. Objective:   /67   Pulse 67   Temp 98 °F (36.7 °C) (Oral)   Resp 16   Ht 5' 10\" (1.778 m)   Wt 186 lb 3.2 oz (84.5 kg)   SpO2 94%   BMI 26.72 kg/m²          Intake/Output Summary (Last 24 hours) at 3/2/2022 0439  Last data filed at 3/2/2022 0422  Gross per 24 hour   Intake 2192.06 ml   Output 308 ml   Net 1884.06 ml       Physical Exam:    General appearance: alert, appears stated age and cooperative/mild distress due to pain  Head: Normocephalic, without obvious abnormality, atraumatic  Eyes: conjunctivae/corneas clear. PERRL, EOM's intact. Neck: no adenopathy, no carotid bruit, no JVD, supple, symmetrical, trachea midline and thyroid not enlarged, symmetric, no tenderness/mass/nodules  Lungs: No accessory muscle use. No wheezes rhonchi or crackles. No subcutaneous emphysema. Palpable tenderness of the right lower chest. Right sided chest tube is present.    Heart: regular rate and rhythm, S1, S2 normal, no murmur, click, rub or gallop  Abdomen: soft, non-tender; bowel sounds normal; no masses,  no organomegaly  Extremities: extremities normal, atraumatic, no cyanosis or edema  Pulses: 2+ and symmetric  Skin: Skin color, texture, turgor normal. No rashes or lesions  Neurologic: Grossly normal    Scheduled Meds:   acetaminophen  650 mg Oral Q4H    celecoxib  200 mg Oral Daily    lidocaine  1 patch TransDERmal Daily    sodium chloride flush  5-40 mL IntraVENous 2 times per day    enoxaparin  40 mg SubCUTAneous Daily    DULoxetine  60 mg Oral Daily    mirtazapine  15 mg Oral Nightly    pantoprazole  40 mg Oral QAM AC    doxazosin  4 mg Oral Daily    traZODone  50 mg Oral Nightly    lisinopril  20 mg Oral Daily    gabapentin  300 mg Oral 4x Daily    oxyCODONE-acetaminophen  1 tablet Oral 4 times per day       Continuous Infusions:   sodium chloride 125 mL/hr at 03/02/22 0407    sodium chloride         PRN Meds:  sodium chloride flush, sodium chloride, potassium chloride **OR** potassium alternative oral replacement **OR** potassium chloride, magnesium sulfate, ondansetron **OR** ondansetron, polyethylene glycol, cyclobenzaprine, albuterol sulfate HFA, clonazePAM, hydrOXYzine, melatonin, oxyCODONE, HYDROmorphone **OR** HYDROmorphone      Data:  CBC:   Recent Labs     02/28/22  1419 03/01/22  0500   WBC 12.3* 10.0   HGB 15.7 14.5   HCT 45.8 42.0   MCV 90.7 91.6    144     BMP:   Recent Labs     02/28/22  1419 03/01/22  0500    133*   K 4.3 3.6   CL 95* 95*   CO2 26 25   BUN 28* 34*   CREATININE 1.2 1.4*     LIVER PROFILE:   Recent Labs     03/01/22  0500   AST 27   ALT 27   BILIDIR <0.2   BILITOT 0.4   ALKPHOS 107     PT/INR:   Recent Labs     03/01/22  1245   PROTIME 11.6   INR 1.03     Cultures:   Results for Bob Bowen (MRN [id number]) as of 3/1/2022 08:20   Ref. Range 2/28/2022 12:39   INFLUENZA A Latest Ref Range: NOT DETECTED  NOT DETECTED   INFLUENZA B Latest Ref Range: NOT DETECTED  NOT DETECTED   SARS-CoV-2 RNA, RT PCR Latest Ref Range: NOT DETECTED  NOT DETECTED       CT GUIDED CHEST TUBE   Final Result   Successful CT guided placement of an 8 Japanese right-sided chest tube. CT GUIDED NEEDLE PLACEMENT   Final Result   Successful CT guided placement of an 8 [place] right-sided chest tube. CT CHEST W CONTRAST   Final Result   Displaced right 4th through 8th rib fractures with small right-sided   hydropneumothorax. Right basilar atelectasis. XR RIBS RIGHT INCLUDE CHEST (MIN 3 VIEWS)   Final Result   Acute mildly displaced right 5th through 9th rib fractures. XR CHEST PORTABLE    (Results Pending)       Assessment:  Principal Problem:    Closed fracture of multiple ribs of right side with routine healing  Active Problems:    Multiple closed fractures of ribs of right side    Chronic bilateral low back pain without sciatica    Primary hypertension    Accident due to mechanical fall without injury    Tobacco abuse    Closed traumatic displaced fracture of multiple ribs    Gastroesophageal reflux disease    Shortness of breath    Chest pain    Hydropneumothorax  Resolved Problems:    * No resolved hospital problems. *      Plan:    #Multiple rib fractures of the right chest after trauma. Mildly displaced. No evidence of subcutaneous emphysema or pneumothorax. No obvious evidence of pulmonary contusion. He does not have any hemoptysis. Admitted to the hospital.  Is requiring IV pain medication. Pulmonary consultation. On Celebrex. Add around-the-clock Tylenol. Has a lidocaine patch. Pulmonary consult appreciated. Increase dose of Dilaudid. #Right sided hydropneumothorax. Chest tube placed. Seen on chest CT. #Chronic back pain. On Percocet at home. We will continue. #Hypertension. Blood pressures low. Hold lisinopril. #GERD. Continue Prilosec. #Klonopin for anxiety. #Lovenox DVT prophylaxis. IMPORTANT: Please note that some portions of this note may have been created using Dragon voice recognition software.  Some \"sound-alike\" and totally wrong word substitutions may have taken place due to known inherent limitations of any such software, including this voice recognition software. In spite of efforts to eliminate such errors, some may not have been corrected. So please read the note with this in mind and recognize such mistakes and understand the correct version using the  context. If there are still uncertainties in the mind of the medical provider reading this note about any aspect of the note, the provider can feel free to contact me. Thanks.      Barry Stevens MD, MD 3/2/2022 4:39 AM

## 2022-03-02 NOTE — PLAN OF CARE
Problem: Falls - Risk of:  Goal: Will remain free from falls  Description: Will remain free from falls  Outcome: Ongoing  Goal: Absence of physical injury  Description: Absence of physical injury  Outcome: Ongoing     Problem: Infection:  Goal: Will remain free from infection  Description: Will remain free from infection  Outcome: Ongoing     Problem: Safety:  Goal: Free from accidental physical injury  Description: Free from accidental physical injury  Outcome: Ongoing  Goal: Free from intentional harm  Description: Free from intentional harm  Outcome: Ongoing     Problem: Daily Care:  Goal: Daily care needs are met  Description: Daily care needs are met  Outcome: Ongoing     Problem: Pain:  Goal: Patient's pain/discomfort is manageable  Description: Patient's pain/discomfort is manageable  Outcome: Ongoing  Goal: Pain level will decrease  Description: Pain level will decrease  Outcome: Ongoing  Goal: Control of acute pain  Description: Control of acute pain  Outcome: Ongoing     Problem: Skin Integrity:  Goal: Skin integrity will stabilize  Description: Skin integrity will stabilize  Outcome: Ongoing     Problem: Discharge Planning:  Goal: Patients continuum of care needs are met  Description: Patients continuum of care needs are met  Outcome: Ongoing

## 2022-03-02 NOTE — PROGRESS NOTES
Pulmonary Progress Note    CC: Pneumothorax    Subjective:   Still complaining of severe right-sided chest pain  Chest tube in place with no air leak  Dark blood in the Pneumovax    IV line: Peripheral IVs      Intake/Output Summary (Last 24 hours) at 3/2/2022 0857  Last data filed at 3/2/2022 0612  Gross per 24 hour   Intake 2182.06 ml   Output 308 ml   Net 1874.06 ml       Exam:   /77   Pulse 75   Temp 96.6 °F (35.9 °C) (Oral)   Resp 14   Ht 5' 10\" (1.778 m)   Wt 186 lb 3.2 oz (84.5 kg)   SpO2 95%   BMI 26.72 kg/m²  on room air  Gen: Mild distress. Eyes: PERRL. No sclera icterus. No conjunctival injection. ENT: No discharge. Pharynx clear. Neck: Trachea midline. No obvious mass. Resp: No accessory muscle use. No crackles. No wheezes. No rhonchi. No dullness on percussion. CV: Regular rate. Regular rhythm. No murmur or rub. No edema. GI: Non-tender. Non-distended. No hernia. Skin: Warm and dry. No nodule on exposed extremities. Lymph: No cervical LAD. No supraclavicular LAD. M/S: No cyanosis. No joint deformity. No clubbing. Right-sided anterior lateral chest wall tenderness. Chest tube in place  Neuro: Awake. Alert. Moves all four extremities. Psych: Oriented x 3.  No anxiety    Scheduled Meds:   acetaminophen  650 mg Oral Q4H    celecoxib  200 mg Oral Daily    lidocaine  1 patch TransDERmal Daily    sodium chloride flush  5-40 mL IntraVENous 2 times per day    enoxaparin  40 mg SubCUTAneous Daily    DULoxetine  60 mg Oral Daily    mirtazapine  15 mg Oral Nightly    pantoprazole  40 mg Oral QAM AC    doxazosin  4 mg Oral Daily    traZODone  50 mg Oral Nightly    lisinopril  20 mg Oral Daily    gabapentin  300 mg Oral 4x Daily    oxyCODONE-acetaminophen  1 tablet Oral 4 times per day     Continuous Infusions:   sodium chloride 125 mL/hr at 03/02/22 0407    sodium chloride       PRN Meds:  HYDROmorphone **OR** HYDROmorphone, sodium chloride flush, sodium chloride, potassium chloride **OR** potassium alternative oral replacement **OR** potassium chloride, magnesium sulfate, ondansetron **OR** ondansetron, polyethylene glycol, cyclobenzaprine, albuterol sulfate HFA, clonazePAM, hydrOXYzine, melatonin, oxyCODONE    Labs:  CBC:   Recent Labs     02/28/22  1419 03/01/22  0500 03/02/22  0447   WBC 12.3* 10.0 8.8   HGB 15.7 14.5 12.8*   HCT 45.8 42.0 37.3*   MCV 90.7 91.6 92.1    144 126*     BMP:   Recent Labs     02/28/22  1419 03/01/22  0500 03/02/22  0447    133* 138   K 4.3 3.6 4.0   CL 95* 95* 102   CO2 26 25 24   BUN 28* 34* 33*   CREATININE 1.2 1.4* 1.4*     LIVER PROFILE:   Recent Labs     03/01/22  0500   AST 27   ALT 27   BILIDIR <0.2   BILITOT 0.4   ALKPHOS 107     PT/INR:   Recent Labs     03/01/22  1245   PROTIME 11.6   INR 1.03     APTT: No results for input(s): APTT in the last 72 hours. UA:No results for input(s): NITRITE, COLORU, PHUR, LABCAST, WBCUA, RBCUA, MUCUS, TRICHOMONAS, YEAST, BACTERIA, CLARITYU, SPECGRAV, LEUKOCYTESUR, UROBILINOGEN, BILIRUBINUR, BLOODU, GLUCOSEU, AMORPHOUS in the last 72 hours. Invalid input(s): KETONESU  No results for input(s): PHART, EYJ3EXV, PO2ART in the last 72 hours. Cultures:   2/28 COVID-19 not detected    Films:  CT chest 3/1/2022 imaging reviewed by me and showed  Displaced right 4th through 8th rib fractures with small right-sided   hydropneumothorax.    Right basilar atelectasis    Chest x-ray 3/2/2022 imaging reviewed by me and showed  2 cm residual right pneumothorax  Chest tube in place  Right basilar atelectasis      ASSESSMENT:  · Traumatic right-sided pneumothorax post chest tube 3/1/2022-persistent despite chest tube placement  · Right-sided hemothorax  · Right-sided 5 through 9 rib fractures-no evidence of flail chest  · Right-sided chest pain  · Shortness of breath-secondary to above  · Acute kidney injury  · Hepatitis C  · 46-pack-year history of smoking     PLAN:  · Supplemental oxygen to maintain SaO2 >92%; wean as tolerated  · Pulmonary toilet/incentive spirometry  · Early mobilization  · Pain control-narcotics, Lidoderm patches, Neurontin.   Epidural catheter placement/regional analgesia could be considered  · Continue chest tube -20 suction  · Daily chest x-ray  · Normal saline and follow creatinine  · I recommend transfer to  to be evaluated by trauma team given hemothorax, displaced rib fracture, severe pain, persistent pneumothorax despite chest tube placement  · Discussed with internal medicine

## 2022-03-02 NOTE — PROGRESS NOTES
I talked to the trauma surgeon at [facility]. The trauma surgeon felt the patient is appropriate for UC. UC is discharge dependent. I talked to the pulmonologist after I talked to UC trauma. Plan is to get the patient to UC when bed becomes available.       Sara Hameed MD 3/2/2022 12:54 PM

## 2022-03-02 NOTE — PROGRESS NOTES
Dr. Elida Rizzo called.  New orders for chest tube to continuous suction at -20, repeat chest XR in AM.

## 2022-03-02 NOTE — FLOWSHEET NOTE
03/02/22 0734   Handoff   Communication Given Shift Handoff   Handoff Given To [address] Received From [facility] [place] Communication Face to Face; At bedside   Time Handoff Given 0719   End of Shift Check Performed Yes

## 2022-03-02 NOTE — FLOWSHEET NOTE
03/01/22 2330 03/02/22 0612   Chest Tube 1 Right Pleural 8 [place]   Placement Date/Time: 03/01/22 1402   Timeout: Patient;Procedure;Site/Side;Consent Confirmed; Appropriate Equipment;Sterile Technique using full body drape;Site prepped with chlorhexidine; Availability of Implant; Correct Position  Inserted by: Dr. Herman Fix. .. Suction -20 cm H2O -20 cm H2O   Chest Tube Airleak No No   Drainage Description Sanguinous  --    Dressing Status Clean;Dry; Intact Clean;Dry; Intact   Dressing Type Split gauze; Other (Comment)  (tegaderm) Split gauze; Other (Comment)  (tegaderm)   Site Assessment Not assessed Not assessed   Surrounding Skin Unable to view Unable to view   Output (ml) 8 ml 0 ml   Measurement at Chest Wall  23  --      Minimum chest tube drainage this shift. Total 8 ml output, sanguinous. No indications of a air leak.  CXR this AM.

## 2022-03-02 NOTE — PROGRESS NOTES
Scheduled Tylenol & Percocet not given. Pt refused Tylenol, stated it does not help. Tylenol limit almost met in 24 hrs, if this RN would have given Percocet, tylenol limit would have been over the limit in 24 hrs.

## 2022-03-02 NOTE — FLOWSHEET NOTE
1000 ml bolus complete. See VS below.         03/01/22 2246   Vital Signs   Temp 97.7 °F (36.5 °C)   Temp Source Oral   Pulse 65   Heart Rate Source Monitor   Resp 16   BP (!) 96/52   BP Location Right upper arm   Patient Position Sitting;Up in chair   Level of Consciousness Alert (0)   MEWS Score 2   Oxygen Therapy   SpO2 94 %   O2 Device None (Room air)

## 2022-03-02 NOTE — FLOWSHEET NOTE
BP low, see VS below. Pt stated he felt \"woozy\" & light-headed. Restarted continuous IVF at prescribed rate. MD notified of abnormal VS. Waiting for orders. 2036: See new orders. 2102: 1000 ml bolus infusing at this time.       03/01/22 1936 03/01/22 1938   Vital Signs   Pulse 73 71   Heart Rate Source Monitor Monitor   Resp 16 16   BP (!) 72/47 (!) 87/49   BP Location Left upper arm Right upper arm   Patient Position Up in chair;Sitting Up in chair;Sitting   Level of Consciousness Alert (0) Alert (0)   Oxygen Therapy   SpO2 93 % 94 %   O2 Device None (Room air) None (Room air)

## 2022-03-02 NOTE — PROGRESS NOTES
Updated from Clinical  that pt is 12th in line for bed at [facility].

## 2022-03-02 NOTE — PROGRESS NOTES
AM assessment completed, see flow sheet. Pt is alert and oriented. Vital signs are WNL. Respirations are even & easy on RA. Heather Kid Pt c/o pain right ribcage 9/10, medicated see MAR. Right chest tube and dressing c,d,i to -20 continuous suction. Pt denies needs at this time. Refused lidocaine patch and tylenol r/t 4 gm warning. Pt up sitting in chair with alarm on. Call light is within reach.

## 2022-03-03 ENCOUNTER — APPOINTMENT (OUTPATIENT)
Dept: GENERAL RADIOLOGY | Age: 67
DRG: 200 | End: 2022-03-03
Payer: MEDICARE

## 2022-03-03 VITALS
DIASTOLIC BLOOD PRESSURE: 75 MMHG | HEART RATE: 83 BPM | RESPIRATION RATE: 16 BRPM | OXYGEN SATURATION: 93 % | SYSTOLIC BLOOD PRESSURE: 145 MMHG | BODY MASS INDEX: 26.66 KG/M2 | WEIGHT: 186.2 LBS | HEIGHT: 70 IN | TEMPERATURE: 96.5 F

## 2022-03-03 LAB
ANION GAP SERPL CALCULATED.3IONS-SCNC: 8 MMOL/L (ref 3–16)
BASOPHILS ABSOLUTE: 0 K/UL (ref 0–0.2)
BASOPHILS RELATIVE PERCENT: 0.5 %
BUN BLDV-MCNC: 13 MG/DL (ref 7–20)
CALCIUM SERPL-MCNC: 8.7 MG/DL (ref 8.3–10.6)
CHLORIDE BLD-SCNC: 101 MMOL/L (ref 99–110)
CO2: 28 MMOL/L (ref 21–32)
CREAT SERPL-MCNC: 0.7 MG/DL (ref 0.8–1.3)
EOSINOPHILS ABSOLUTE: 0.1 K/UL (ref 0–0.6)
EOSINOPHILS RELATIVE PERCENT: 1.3 %
GFR AFRICAN AMERICAN: >60
GFR NON-AFRICAN AMERICAN: >60
GLUCOSE BLD-MCNC: 142 MG/DL (ref 70–99)
HCT VFR BLD CALC: 38.2 % (ref 40.5–52.5)
HEMOGLOBIN: 13.1 G/DL (ref 13.5–17.5)
LYMPHOCYTES ABSOLUTE: 1.4 K/UL (ref 1–5.1)
LYMPHOCYTES RELATIVE PERCENT: 16.6 %
MCH RBC QN AUTO: 31.3 PG (ref 26–34)
MCHC RBC AUTO-ENTMCNC: 34.2 G/DL (ref 31–36)
MCV RBC AUTO: 91.5 FL (ref 80–100)
MONOCYTES ABSOLUTE: 0.9 K/UL (ref 0–1.3)
MONOCYTES RELATIVE PERCENT: 10.5 %
NEUTROPHILS ABSOLUTE: 6 K/UL (ref 1.7–7.7)
NEUTROPHILS RELATIVE PERCENT: 71.1 %
PDW BLD-RTO: 13.8 % (ref 12.4–15.4)
PLATELET # BLD: 134 K/UL (ref 135–450)
PMV BLD AUTO: 9.6 FL (ref 5–10.5)
POTASSIUM REFLEX MAGNESIUM: 3.8 MMOL/L (ref 3.5–5.1)
RBC # BLD: 4.18 M/UL (ref 4.2–5.9)
SODIUM BLD-SCNC: 137 MMOL/L (ref 136–145)
WBC # BLD: 8.5 K/UL (ref 4–11)

## 2022-03-03 PROCEDURE — 71045 X-RAY EXAM CHEST 1 VIEW: CPT

## 2022-03-03 PROCEDURE — 6370000000 HC RX 637 (ALT 250 FOR IP): Performed by: INTERNAL MEDICINE

## 2022-03-03 PROCEDURE — 36415 COLL VENOUS BLD VENIPUNCTURE: CPT

## 2022-03-03 PROCEDURE — 6360000002 HC RX W HCPCS: Performed by: INTERNAL MEDICINE

## 2022-03-03 PROCEDURE — 6370000000 HC RX 637 (ALT 250 FOR IP): Performed by: NURSE PRACTITIONER

## 2022-03-03 PROCEDURE — 85025 COMPLETE CBC W/AUTO DIFF WBC: CPT

## 2022-03-03 PROCEDURE — 99239 HOSP IP/OBS DSCHRG MGMT >30: CPT | Performed by: INTERNAL MEDICINE

## 2022-03-03 PROCEDURE — 99233 SBSQ HOSP IP/OBS HIGH 50: CPT | Performed by: INTERNAL MEDICINE

## 2022-03-03 PROCEDURE — 6370000000 HC RX 637 (ALT 250 FOR IP)

## 2022-03-03 PROCEDURE — 80048 BASIC METABOLIC PNL TOTAL CA: CPT

## 2022-03-03 RX ADMIN — CYCLOBENZAPRINE 10 MG: 10 TABLET, FILM COATED ORAL at 04:25

## 2022-03-03 RX ADMIN — GABAPENTIN 300 MG: 300 CAPSULE ORAL at 09:00

## 2022-03-03 RX ADMIN — GABAPENTIN 300 MG: 300 CAPSULE ORAL at 13:56

## 2022-03-03 RX ADMIN — DOXAZOSIN MESYLATE 4 MG: 2 TABLET ORAL at 09:00

## 2022-03-03 RX ADMIN — OXYCODONE 5 MG: 5 TABLET ORAL at 16:00

## 2022-03-03 RX ADMIN — HYDROMORPHONE HYDROCHLORIDE 1 MG: 1 INJECTION, SOLUTION INTRAMUSCULAR; INTRAVENOUS; SUBCUTANEOUS at 13:56

## 2022-03-03 RX ADMIN — CYCLOBENZAPRINE 10 MG: 10 TABLET, FILM COATED ORAL at 10:13

## 2022-03-03 RX ADMIN — ACETAMINOPHEN 650 MG: 325 TABLET ORAL at 13:56

## 2022-03-03 RX ADMIN — GABAPENTIN 300 MG: 300 CAPSULE ORAL at 16:45

## 2022-03-03 RX ADMIN — HYDROMORPHONE HYDROCHLORIDE 1 MG: 1 INJECTION, SOLUTION INTRAMUSCULAR; INTRAVENOUS; SUBCUTANEOUS at 01:23

## 2022-03-03 RX ADMIN — ACETAMINOPHEN 650 MG: 325 TABLET ORAL at 16:45

## 2022-03-03 RX ADMIN — HYDROMORPHONE HYDROCHLORIDE 1 MG: 1 INJECTION, SOLUTION INTRAMUSCULAR; INTRAVENOUS; SUBCUTANEOUS at 07:29

## 2022-03-03 RX ADMIN — HYDROXYZINE HYDROCHLORIDE 25 MG: 10 TABLET, FILM COATED ORAL at 04:24

## 2022-03-03 RX ADMIN — OXYCODONE HYDROCHLORIDE AND ACETAMINOPHEN 1 TABLET: 7.5; 325 TABLET ORAL at 02:10

## 2022-03-03 RX ADMIN — OXYCODONE HYDROCHLORIDE AND ACETAMINOPHEN 1 TABLET: 7.5; 325 TABLET ORAL at 12:06

## 2022-03-03 RX ADMIN — OXYCODONE 5 MG: 5 TABLET ORAL at 04:25

## 2022-03-03 RX ADMIN — OXYCODONE HYDROCHLORIDE AND ACETAMINOPHEN 1 TABLET: 7.5; 325 TABLET ORAL at 06:36

## 2022-03-03 RX ADMIN — OXYCODONE 5 MG: 5 TABLET ORAL at 10:13

## 2022-03-03 RX ADMIN — PANTOPRAZOLE SODIUM 40 MG: 40 TABLET, DELAYED RELEASE ORAL at 06:36

## 2022-03-03 ASSESSMENT — PAIN DESCRIPTION - LOCATION
LOCATION: RIB CAGE

## 2022-03-03 ASSESSMENT — PAIN DESCRIPTION - ORIENTATION
ORIENTATION: RIGHT

## 2022-03-03 ASSESSMENT — PAIN SCALES - GENERAL
PAINLEVEL_OUTOF10: 8
PAINLEVEL_OUTOF10: 9
PAINLEVEL_OUTOF10: 7
PAINLEVEL_OUTOF10: 7
PAINLEVEL_OUTOF10: 6
PAINLEVEL_OUTOF10: 7
PAINLEVEL_OUTOF10: 7
PAINLEVEL_OUTOF10: 8
PAINLEVEL_OUTOF10: 9
PAINLEVEL_OUTOF10: 9
PAINLEVEL_OUTOF10: 8

## 2022-03-03 ASSESSMENT — PAIN DESCRIPTION - DESCRIPTORS: DESCRIPTORS: SHARP;SHOOTING;SPASM

## 2022-03-03 ASSESSMENT — PAIN DESCRIPTION - DIRECTION
RADIATING_TOWARDS: BACK

## 2022-03-03 ASSESSMENT — PAIN DESCRIPTION - PAIN TYPE
TYPE: ACUTE PAIN

## 2022-03-03 NOTE — PROGRESS NOTES
Progress Note    Admit Date:  2/28/2022    Subjective:  Mr. Sanya Garnica came to emergency room after he tripped over his space heater. Work-up revealed displaced rib fractures on the right side. 5 ribs are fractured-ribs 5 through 9. He is in a lot of pain. He rates his pain as an 8 on 10. Patient takes chronic narcotics at home. He says is not helping with the pain. Work-up in the ER did not show any evidence of pneumothorax. He is afebrile. His pain seems to be under moderate control at rest but is much worse when he moves in bed. He is having a hard time sitting up. He was alone. He says the lidocaine patch is not helping. 3/2- see by pulmonary. Chest CT done. It showed a right hydropneumothorax. A chest tube was placed. His pain is not much better. BP was low last night.     3/3-  trauma team has accepted patient. Bed not available at present. He has no air leak. He has about 100 cc of dark blood fluid in the pneumo vac. Objective:   BP (!) 180/84   Pulse 79   Temp 98.1 °F (36.7 °C) (Oral)   Resp 14   Ht 5' 10\" (1.778 m)   Wt 186 lb 3.2 oz (84.5 kg)   SpO2 91%   BMI 26.72 kg/m²          Intake/Output Summary (Last 24 hours) at 3/3/2022 1006  Last data filed at 3/3/2022 0840  Gross per 24 hour   Intake 2218.13 ml   Output 2218 ml   Net 0.13 ml       Physical Exam:    General appearance: alert, appears stated age and cooperative/mild distress due to pain  Head: Normocephalic, without obvious abnormality, atraumatic  Eyes: conjunctivae/corneas clear. PERRL, EOM's intact. Neck: no adenopathy, no carotid bruit, no JVD, supple, symmetrical, trachea midline and thyroid not enlarged, symmetric, no tenderness/mass/nodules  Lungs: No accessory muscle use. No wheezes rhonchi or crackles. No subcutaneous emphysema. Palpable tenderness of the right lower chest. Right sided chest tube is present.    Heart: regular rate and rhythm, S1, S2 normal, no murmur, click, rub or gallop  Abdomen: soft, non-tender; bowel sounds normal; no masses,  no organomegaly  Extremities: extremities normal, atraumatic, no cyanosis or edema  Pulses: 2+ and symmetric  Skin: Skin color, texture, turgor normal. No rashes or lesions  Neurologic: Grossly normal    Scheduled Meds:   acetaminophen  650 mg Oral Q4H    lidocaine  1 patch TransDERmal Daily    sodium chloride flush  5-40 mL IntraVENous 2 times per day    enoxaparin  40 mg SubCUTAneous Daily    DULoxetine  60 mg Oral Daily    mirtazapine  15 mg Oral Nightly    pantoprazole  40 mg Oral QAM AC    doxazosin  4 mg Oral Daily    traZODone  50 mg Oral Nightly    gabapentin  300 mg Oral 4x Daily    oxyCODONE-acetaminophen  1 tablet Oral 4 times per day       Continuous Infusions:   sodium chloride 125 mL/hr at 03/02/22 1605    sodium chloride         PRN Meds:  HYDROmorphone **OR** HYDROmorphone, sodium chloride flush, sodium chloride, potassium chloride **OR** potassium alternative oral replacement **OR** potassium chloride, magnesium sulfate, ondansetron **OR** ondansetron, polyethylene glycol, cyclobenzaprine, albuterol sulfate HFA, clonazePAM, hydrOXYzine, melatonin, oxyCODONE      Data:  CBC:   Recent Labs     03/01/22  0500 03/02/22  0447 03/03/22  0535   WBC 10.0 8.8 8.5   HGB 14.5 12.8* 13.1*   HCT 42.0 37.3* 38.2*   MCV 91.6 92.1 91.5    126* 134*     BMP:   Recent Labs     03/01/22  0500 03/02/22  0447 03/03/22  0535   * 138 137   K 3.6 4.0 3.8   CL 95* 102 101   CO2 25 24 28   BUN 34* 33* 13   CREATININE 1.4* 1.4* 0.7*     LIVER PROFILE:   Recent Labs     03/01/22  0500   AST 27   ALT 27   BILIDIR <0.2   BILITOT 0.4   ALKPHOS 107     PT/INR:   Recent Labs     03/01/22  1245   PROTIME 11.6   INR 1.03     Cultures:   Results for Torrie Hodge (MRN [id number]) as of 3/1/2022 08:20   Ref.  Range 2/28/2022 12:39   INFLUENZA A Latest Ref Range: NOT DETECTED  NOT DETECTED   INFLUENZA B Latest Ref Range: NOT DETECTED  NOT DETECTED   SARS-CoV-2 RNA, RT PCR Latest Ref Range: NOT DETECTED  NOT DETECTED       XR CHEST PORTABLE   Final Result   Small right apical pneumothorax, minimally improved. XR CHEST PORTABLE   Final Result   2 cm residual right apical pneumothorax with small caliber right pleural   catheter in satisfactory position. Right lateral rib fractures and basilar atelectasis. CT GUIDED CHEST TUBE   Final Result   Successful CT guided placement of an 8 [place] right-sided chest tube. CT GUIDED NEEDLE PLACEMENT   Final Result   Successful CT guided placement of an 8 [place] right-sided chest tube. CT CHEST W CONTRAST   Final Result   Displaced right 4th through 8th rib fractures with small right-sided   hydropneumothorax. Right basilar atelectasis. XR RIBS RIGHT INCLUDE CHEST (MIN 3 VIEWS)   Final Result   Acute mildly displaced right 5th through 9th rib fractures. XR CHEST PORTABLE    (Results Pending)       Assessment:  Principal Problem:    Closed fracture of multiple ribs of right side with routine healing  Active Problems:    Multiple closed fractures of ribs of right side    Chronic bilateral low back pain without sciatica    Primary hypertension    Accident due to mechanical fall without injury    Tobacco abuse    Closed traumatic displaced fracture of multiple ribs    Gastroesophageal reflux disease    Shortness of breath    Chest pain    Hydropneumothorax    Pneumothorax, traumatic    Hemothorax  Resolved Problems:    * No resolved hospital problems. *      Plan:    #Multiple rib fractures of the right chest after trauma. Mildly displaced. No evidence of subcutaneous emphysema or pneumothorax. No obvious evidence of pulmonary contusion. He does not have any hemoptysis. Admitted to the hospital.  Is requiring IV pain medication. Pulmonary consultation. Celebrex stopped. On around-the-clock Tylenol. Has a lidocaine patch. Pulmonary consult appreciated. On IV Dilaudid.     #Right sided hydropneumothorax. Chest tube placed. Seen on chest CT. Has some dark bloody fluid. #Chronic back pain. On Percocet at home. We will continue. #Hypertension. Blood pressures low. Hold lisinopril. #GERD. Continue Prilosec. #Klonopin for anxiety. #Lovenox DVT prophylaxis. He will go to  trauma when bed becomes available. IMPORTANT: Please note that some portions of this note may have been created using Dragon voice recognition software. Some \"sound-alike\" and totally wrong word substitutions may have taken place due to known inherent limitations of any such software, including this voice recognition software. In spite of efforts to eliminate such errors, some may not have been corrected. So please read the note with this in mind and recognize such mistakes and understand the correct version using the  context. If there are still uncertainties in the mind of the medical provider reading this note about any aspect of the note, the provider can feel free to contact me. Thanks.      Nora Elder MD, MD 3/3/2022 10:06 AM

## 2022-03-03 NOTE — PROGRESS NOTES
Pt requesting pain medication for rib pain rating 8/10. PRN  pain medication given, see MAR. SR up x2, Call light and bedside table in easy reach. Denies any other needs at this time.

## 2022-03-03 NOTE — PROGRESS NOTES
[facility] access states that  does not have a bed as of yet and will probably not have one tonight. They will call when a bed is available.

## 2022-03-03 NOTE — PROGRESS NOTES
Pulmonary Progress Note    CC: Pneumothorax    Subjective:   Still with right-sided chest pain  No air leak on chest tube  Pneumo vac has approximately 100 cc of dark bloody fluid      IV line: Peripheral IVs      Intake/Output Summary (Last 24 hours) at 3/3/2022 0906  Last data filed at 3/3/2022 0840  Gross per 24 hour   Intake 2218.13 ml   Output 2218 ml   Net 0.13 ml       Exam:   BP (!) 180/84   Pulse 79   Temp 98.1 °F (36.7 °C) (Oral)   Resp 14   Ht 5' 10\" (1.778 m)   Wt 186 lb 3.2 oz (84.5 kg)   SpO2 91%   BMI 26.72 kg/m²  on room air  Gen: Mild distress. Eyes: PERRL. No sclera icterus. No conjunctival injection. ENT: No discharge. Pharynx clear. Neck: Trachea midline. No obvious mass. Resp: No accessory muscle use. Few crackles. No wheezes. No rhonchi. No dullness on percussion. CV: Regular rate. Regular rhythm. No murmur or rub. No edema. GI: Non-tender. Non-distended. No hernia. Skin: Warm and dry. No nodule on exposed extremities. Lymph: No cervical LAD. No supraclavicular LAD. M/S: No cyanosis. No joint deformity. No clubbing. Right-sided anterior lateral chest wall tenderness-no change. Chest tube in place with no air leak. No subcutaneous emphysema  Neuro: Awake. Alert. Moves all four extremities. Psych: Oriented x 3.  No anxiety    Scheduled Meds:   acetaminophen  650 mg Oral Q4H    lidocaine  1 patch TransDERmal Daily    sodium chloride flush  5-40 mL IntraVENous 2 times per day    enoxaparin  40 mg SubCUTAneous Daily    DULoxetine  60 mg Oral Daily    mirtazapine  15 mg Oral Nightly    pantoprazole  40 mg Oral QAM AC    doxazosin  4 mg Oral Daily    traZODone  50 mg Oral Nightly    gabapentin  300 mg Oral 4x Daily    oxyCODONE-acetaminophen  1 tablet Oral 4 times per day     Continuous Infusions:   sodium chloride 125 mL/hr at 03/02/22 1605    sodium chloride       PRN Meds:  HYDROmorphone **OR** HYDROmorphone, sodium chloride flush, sodium chloride, potassium chloride **OR** potassium alternative oral replacement **OR** potassium chloride, magnesium sulfate, ondansetron **OR** ondansetron, polyethylene glycol, cyclobenzaprine, albuterol sulfate HFA, clonazePAM, hydrOXYzine, melatonin, oxyCODONE    Labs:  CBC:   Recent Labs     03/01/22  0500 03/02/22  0447 03/03/22  0535   WBC 10.0 8.8 8.5   HGB 14.5 12.8* 13.1*   HCT 42.0 37.3* 38.2*   MCV 91.6 92.1 91.5    126* 134*     BMP:   Recent Labs     03/01/22  0500 03/02/22  0447 03/03/22  0535   * 138 137   K 3.6 4.0 3.8   CL 95* 102 101   CO2 25 24 28   BUN 34* 33* 13   CREATININE 1.4* 1.4* 0.7*     LIVER PROFILE:   Recent Labs     03/01/22  0500   AST 27   ALT 27   BILIDIR <0.2   BILITOT 0.4   ALKPHOS 107     PT/INR:   Recent Labs     03/01/22  1245   PROTIME 11.6   INR 1.03     APTT: No results for input(s): APTT in the last 72 hours. UA:No results for input(s): NITRITE, COLORU, PHUR, LABCAST, WBCUA, RBCUA, MUCUS, TRICHOMONAS, YEAST, BACTERIA, CLARITYU, SPECGRAV, LEUKOCYTESUR, UROBILINOGEN, BILIRUBINUR, BLOODU, GLUCOSEU, AMORPHOUS in the last 72 hours. Invalid input(s): KETONESU  No results for input(s): PHART, GEI6BSL, PO2ART in the last 72 hours. Cultures:   2/28 COVID-19 not detected    Films:  CT chest 3/1/2022 imaging reviewed by me and showed  Displaced right 4th through 8th rib fractures with small right-sided   hydropneumothorax.    Right basilar atelectasis    Chest x-ray 3/3/2022 imaging reviewed by me and showed  Right apical pneumothorax-somewhat improving  Small right effusion layering laterally  Chest tube in place  Right basilar atelectasis-maybe worse      ASSESSMENT:  · Traumatic right-sided pneumothorax post chest tube 3/1/2022-persistent small apical on repeat x-ray today  · Right-sided hemothorax  · Right-sided 5 through 9 rib fractures-no evidence of flail chest  · Right-sided chest pain  · Shortness of breath-secondary to above  · Acute kidney injury  · Hepatitis C  · 46-pack-year history of smoking     PLAN:  · Supplemental oxygen to maintain SaO2 >92%; wean as tolerated  · Continue pulmonary toilet/incentive spirometry  · Continue early mobilization  · Pain control-narcotics, Lidoderm patches, Neurontin.   Epidural catheter placement/regional analgesia could be considered  · Continue chest tube -20 suction  · Daily chest x-ray  · DC IV fluid  · Trauma team at  accepted the patient pending bed availability, hopefully today  · Discussed with internal medicine

## 2022-03-03 NOTE — PROGRESS NOTES
Discussed with patient that transfer center also looking into trauma center in [place] and [address] r/t bed availability. Pt agreeable as long as \"medicaid would cover\". Discussed with  lou and Moy Yanez and they agreed that as long as medically necessary and [facility] couldn't provide bed it would be covered.

## 2022-03-03 NOTE — PROGRESS NOTES
Pt requesting pain medication for rib pain rating 9/10. PRN  pain medication given, see MAR. SR up x2, Call light and bedside table in easy reach. Denies any other needs at this time.

## 2022-03-03 NOTE — DISCHARGE SUMMARY
Name:  Annmarie Lopez  Room:  6051/0146-07  MRN:    [id number]    Discharge Summary      This discharge summary is in conjunction with a complete physical exam done on the day of discharge. Discharging Physician: Dr. Yang Gun: 2/28/2022  Discharge:  3/3/2022    Diagnoses this Admission    Principal Problem:    Closed fracture of multiple ribs of right side with routine healing  Active Problems:    Multiple closed fractures of ribs of right side    Chronic bilateral low back pain without sciatica    Primary hypertension    Accident due to mechanical fall without injury    Tobacco abuse    Closed traumatic displaced fracture of multiple ribs    Gastroesophageal reflux disease    Shortness of breath    Chest pain    Hydropneumothorax    Pneumothorax, traumatic    Hemothorax  Resolved Problems:    * No resolved hospital problems. *          Procedures (Please Review Full Report for Details)  Chest tube placement      Consults    IP CONSULT TO HOSPITALIST  IP CONSULT TO PULMONOLOGY      HPI:    The patient is a 78 y.o. male with PMH of HTN, chronic back pain, Insomnia- hx of hepatitis C, tobacco abuse who presents to [facility] with shortness of breath and rib pain after a mechanical fall. Pt is a poor historian and unsure of his medical diagnosis, [facility]. History obtained from the patient and review of EMR. Reports that he fell yesterday morning about 4 AM on his right side after tripping over a heater in his trailer. Noted that Percocet was not controlling his pain at home after fall and he noticed it was harder for him to take deep breaths related to his pain. He then came into the ED for further evaluation. He stated his pain was a 10/10 upon his arrival.  It is now manageable at a 6/10.   He stated he is never pain free which his chronic back and arm pain.      In ED chest x-ray was btained which showed mildly displaced right-sided 5 through 9 rib fractures. No pneumothorax or pulmonary contusion seen on plain film imaging. Patient was given muscle relaxer, anti-inflammatory, Lidocaine patch and IV pain medications, in the ED. Patient noted with poor inspiratory effort related to the pain. Patient was admitted for observation for pain control. Physical Exam at Discharge:  BP (!) 145/75   Pulse 83   Temp 96.5 °F (35.8 °C) (Oral)   Resp 16   Ht 5' 10\" (1.778 m)   Wt 186 lb 3.2 oz (84.5 kg)   SpO2 93%   BMI 26.72 kg/m²     General appearance: alert, appears stated age and cooperative/mild distress due to pain  Head: Normocephalic, without obvious abnormality, atraumatic  Eyes: conjunctivae/corneas clear. PERRL, EOM's intact. Neck: no adenopathy, no carotid bruit, no JVD, supple, symmetrical, trachea midline and thyroid not enlarged, symmetric, no tenderness/mass/nodules  Lungs: No accessory muscle use. No wheezes rhonchi or crackles. No subcutaneous emphysema. Palpable tenderness of the right lower chest. Right sided chest tube is present. Heart: regular rate and rhythm, S1, S2 normal, no murmur, click, rub or gallop  Abdomen: soft, non-tender; bowel sounds normal; no masses,  no organomegaly  Extremities: extremities normal, atraumatic, no cyanosis or edema  Pulses: 2+ and symmetric  Skin: Skin color, texture, turgor normal. No rashes or lesions  Neurologic: Grossly normal    Hospital Course    #Multiple rib fractures of the right chest after trauma. Mildly displaced. No evidence of subcutaneous emphysema or pneumothorax. No obvious evidence of pulmonary contusion. He does not have any hemoptysis. Admitted to the hospital.  Is requiring IV pain medication. Pulmonary consultation. Celebrex stopped. On around-the-clock Tylenol. Has a lidocaine patch. IV dilaudid used PRN     #Right sided hydropneumothorax. Chest tube placed. Seen on chest CT. Has some dark bloody fluid.  Will transfer to [place] [facility] Michaud MD 3/10/2022 2:54 PM

## 2022-03-03 NOTE — PROGRESS NOTES
Transfer report called to Puja Walters RN at [facility]. Pt going to [address] and room 4415. Transport setup for 5PM. Pt signed transfer consent and called his family to update. Patient belongings packed.

## 2022-03-03 NOTE — PROGRESS NOTES
Pt requesting pain medication for rib pain rating 7/10. PRN  pain medication given, see MAR. SR up x2, Call light and bedside table in easy reach. Denies any other needs at this time.

## 2022-03-03 NOTE — PROGRESS NOTES
Shift assessment complete; see flow sheet. Scheduled medications administered; See MAR. IV infusing without difficulty. Pt c/o rib pain 8/10 PRN pain medication given at this time. Pt denies any needs at this time.  Pt educated on use of call light and to call out with needs, verbalized understanding, bed in low locked position for pt safety

## 2022-03-03 NOTE — PROGRESS NOTES
Report given @ bedside to [facility], for transferral of care. Pt resting in bed. Call light in reach.

## 2022-03-03 NOTE — PROGRESS NOTES
Handoff report and transfer of care given at bedside to Saroj Most. Patient in stable condition, denies needs/concerns at this time. Call light within reach.

## 2022-03-03 NOTE — PLAN OF CARE
Problem: Falls - Risk of:  Goal: Will remain free from falls  Description: Will remain free from falls  Outcome: Ongoing  Goal: Absence of physical injury  Description: Absence of physical injury  Outcome: Ongoing     Problem: Infection:  Goal: Will remain free from infection  Description: Will remain free from infection  Outcome: Ongoing     Problem: Safety:  Goal: Free from accidental physical injury  Description: Free from accidental physical injury  Outcome: Ongoing  Goal: Free from intentional harm  Description: Free from intentional harm  Outcome: Ongoing     Problem: Daily Care:  Goal: Daily care needs are met  Description: Daily care needs are met  Outcome: Ongoing     Problem: Pain:  Description: Pain management should include both nonpharmacologic and pharmacologic interventions.   Goal: Patient's pain/discomfort is manageable  Description: Patient's pain/discomfort is manageable  Outcome: Ongoing  Goal: Pain level will decrease  Description: Pain level will decrease  Outcome: Ongoing  Goal: Control of acute pain  Description: Control of acute pain  Outcome: Ongoing  Goal: Control of chronic pain  Description: Control of chronic pain  Outcome: Ongoing     Problem: Skin Integrity:  Goal: Skin integrity will stabilize  Description: Skin integrity will stabilize  Outcome: Ongoing     Problem: Discharge Planning:  Goal: Patients continuum of care needs are met  Description: Patients continuum of care needs are met  Outcome: Ongoing

## 2023-01-05 ENCOUNTER — APPOINTMENT (OUTPATIENT)
Dept: GENERAL RADIOLOGY | Age: 68
End: 2023-01-05
Payer: MEDICARE

## 2023-01-05 ENCOUNTER — APPOINTMENT (OUTPATIENT)
Dept: CT IMAGING | Age: 68
End: 2023-01-05
Payer: MEDICARE

## 2023-01-05 ENCOUNTER — HOSPITAL ENCOUNTER (EMERGENCY)
Age: 68
Discharge: ANOTHER HOSPITAL | End: 2023-01-05
Attending: STUDENT IN AN ORGANIZED HEALTH CARE EDUCATION/TRAINING PROGRAM
Payer: MEDICARE

## 2023-01-05 ENCOUNTER — HOSPITAL ENCOUNTER (INPATIENT)
Age: 68
LOS: 2 days | Discharge: HOME HEALTH | DRG: 065 | End: 2023-01-07
Attending: INTERNAL MEDICINE | Admitting: INTERNAL MEDICINE
Payer: MEDICARE

## 2023-01-05 VITALS
HEART RATE: 91 BPM | SYSTOLIC BLOOD PRESSURE: 174 MMHG | HEIGHT: 71 IN | DIASTOLIC BLOOD PRESSURE: 79 MMHG | WEIGHT: 207 LBS | BODY MASS INDEX: 28.98 KG/M2 | TEMPERATURE: 97.6 F | OXYGEN SATURATION: 94 % | RESPIRATION RATE: 18 BRPM

## 2023-01-05 DIAGNOSIS — I63.9 ACUTE CVA (CEREBROVASCULAR ACCIDENT) (HCC): Primary | ICD-10-CM

## 2023-01-05 DIAGNOSIS — R53.1 LEFT-SIDED WEAKNESS: Primary | ICD-10-CM

## 2023-01-05 PROBLEM — E83.42 HYPOMAGNESEMIA: Status: ACTIVE | Noted: 2023-01-05

## 2023-01-05 PROBLEM — N17.9 AKI (ACUTE KIDNEY INJURY) (HCC): Status: ACTIVE | Noted: 2023-01-05

## 2023-01-05 LAB
A/G RATIO: 1.3 (ref 1.1–2.2)
ALBUMIN SERPL-MCNC: 4.4 G/DL (ref 3.4–5)
ALP BLD-CCNC: 132 U/L (ref 40–129)
ALT SERPL-CCNC: 68 U/L (ref 10–40)
ANION GAP SERPL CALCULATED.3IONS-SCNC: 16 MMOL/L (ref 3–16)
AST SERPL-CCNC: 57 U/L (ref 15–37)
BACTERIA: ABNORMAL /HPF
BASOPHILS ABSOLUTE: 0.1 K/UL (ref 0–0.2)
BASOPHILS RELATIVE PERCENT: 0.6 %
BILIRUB SERPL-MCNC: 0.4 MG/DL (ref 0–1)
BILIRUBIN URINE: NEGATIVE
BLOOD, URINE: ABNORMAL
BUN BLDV-MCNC: 31 MG/DL (ref 7–20)
CALCIUM SERPL-MCNC: 8.8 MG/DL (ref 8.3–10.6)
CHLORIDE BLD-SCNC: 93 MMOL/L (ref 99–110)
CLARITY: CLEAR
CO2: 26 MMOL/L (ref 21–32)
COLOR: YELLOW
CREAT SERPL-MCNC: 3.6 MG/DL (ref 0.8–1.3)
EKG ATRIAL RATE: 62 BPM
EKG DIAGNOSIS: NORMAL
EKG P AXIS: 34 DEGREES
EKG P-R INTERVAL: 138 MS
EKG Q-T INTERVAL: 520 MS
EKG QRS DURATION: 92 MS
EKG QTC CALCULATION (BAZETT): 527 MS
EKG R AXIS: 36 DEGREES
EKG T AXIS: 17 DEGREES
EKG VENTRICULAR RATE: 62 BPM
EOSINOPHILS ABSOLUTE: 0 K/UL (ref 0–0.6)
EOSINOPHILS RELATIVE PERCENT: 0.4 %
EPITHELIAL CELLS, UA: ABNORMAL /HPF (ref 0–5)
GFR SERPL CREATININE-BSD FRML MDRD: 18 ML/MIN/{1.73_M2}
GLUCOSE BLD-MCNC: 149 MG/DL (ref 70–99)
GLUCOSE URINE: NEGATIVE MG/DL
HCT VFR BLD CALC: 41.8 % (ref 40.5–52.5)
HEMOGLOBIN: 14.7 G/DL (ref 13.5–17.5)
INR BLD: 1.02 (ref 0.87–1.14)
KETONES, URINE: NEGATIVE MG/DL
LEUKOCYTE ESTERASE, URINE: NEGATIVE
LYMPHOCYTES ABSOLUTE: 1.4 K/UL (ref 1–5.1)
LYMPHOCYTES RELATIVE PERCENT: 17 %
MAGNESIUM: 1.2 MG/DL (ref 1.8–2.4)
MCH RBC QN AUTO: 30.3 PG (ref 26–34)
MCHC RBC AUTO-ENTMCNC: 35.1 G/DL (ref 31–36)
MCV RBC AUTO: 86.4 FL (ref 80–100)
MICROSCOPIC EXAMINATION: YES
MONOCYTES ABSOLUTE: 0.5 K/UL (ref 0–1.3)
MONOCYTES RELATIVE PERCENT: 6.2 %
NEUTROPHILS ABSOLUTE: 6.1 K/UL (ref 1.7–7.7)
NEUTROPHILS RELATIVE PERCENT: 75.8 %
NITRITE, URINE: NEGATIVE
PDW BLD-RTO: 17.9 % (ref 12.4–15.4)
PH UA: 5.5 (ref 5–8)
PLATELET # BLD: 180 K/UL (ref 135–450)
PMV BLD AUTO: 9.1 FL (ref 5–10.5)
POTASSIUM REFLEX MAGNESIUM: 3.8 MMOL/L (ref 3.5–5.1)
PROTEIN UA: NEGATIVE MG/DL
PROTHROMBIN TIME: 13.3 SEC (ref 11.7–14.5)
RBC # BLD: 4.84 M/UL (ref 4.2–5.9)
RBC UA: ABNORMAL /HPF (ref 0–4)
SARS-COV-2, NAAT: NOT DETECTED
SODIUM BLD-SCNC: 135 MMOL/L (ref 136–145)
SPECIFIC GRAVITY UA: 1.02 (ref 1–1.03)
TOTAL CK: 258 U/L (ref 39–308)
TOTAL PROTEIN: 7.8 G/DL (ref 6.4–8.2)
TROPONIN: 0.02 NG/ML
TROPONIN: 0.04 NG/ML
URINE REFLEX TO CULTURE: ABNORMAL
URINE TYPE: ABNORMAL
UROBILINOGEN, URINE: 0.2 E.U./DL
WBC # BLD: 8.1 K/UL (ref 4–11)
WBC UA: ABNORMAL /HPF (ref 0–5)

## 2023-01-05 PROCEDURE — 2060000000 HC ICU INTERMEDIATE R&B

## 2023-01-05 PROCEDURE — 72125 CT NECK SPINE W/O DYE: CPT

## 2023-01-05 PROCEDURE — 1200000000 HC SEMI PRIVATE

## 2023-01-05 PROCEDURE — 93005 ELECTROCARDIOGRAM TRACING: CPT | Performed by: EMERGENCY MEDICINE

## 2023-01-05 PROCEDURE — 6370000000 HC RX 637 (ALT 250 FOR IP): Performed by: INTERNAL MEDICINE

## 2023-01-05 PROCEDURE — 93010 ELECTROCARDIOGRAM REPORT: CPT | Performed by: INTERNAL MEDICINE

## 2023-01-05 PROCEDURE — 87635 SARS-COV-2 COVID-19 AMP PRB: CPT

## 2023-01-05 PROCEDURE — 81001 URINALYSIS AUTO W/SCOPE: CPT

## 2023-01-05 PROCEDURE — 99285 EMERGENCY DEPT VISIT HI MDM: CPT

## 2023-01-05 PROCEDURE — 36415 COLL VENOUS BLD VENIPUNCTURE: CPT

## 2023-01-05 PROCEDURE — 6370000000 HC RX 637 (ALT 250 FOR IP): Performed by: STUDENT IN AN ORGANIZED HEALTH CARE EDUCATION/TRAINING PROGRAM

## 2023-01-05 PROCEDURE — 84484 ASSAY OF TROPONIN QUANT: CPT

## 2023-01-05 PROCEDURE — 2580000003 HC RX 258: Performed by: INTERNAL MEDICINE

## 2023-01-05 PROCEDURE — 6360000002 HC RX W HCPCS: Performed by: INTERNAL MEDICINE

## 2023-01-05 PROCEDURE — 70450 CT HEAD/BRAIN W/O DYE: CPT

## 2023-01-05 PROCEDURE — 83735 ASSAY OF MAGNESIUM: CPT

## 2023-01-05 PROCEDURE — 2580000003 HC RX 258: Performed by: STUDENT IN AN ORGANIZED HEALTH CARE EDUCATION/TRAINING PROGRAM

## 2023-01-05 PROCEDURE — 71045 X-RAY EXAM CHEST 1 VIEW: CPT

## 2023-01-05 PROCEDURE — 82550 ASSAY OF CK (CPK): CPT

## 2023-01-05 PROCEDURE — 80053 COMPREHEN METABOLIC PANEL: CPT

## 2023-01-05 PROCEDURE — 85610 PROTHROMBIN TIME: CPT

## 2023-01-05 PROCEDURE — 6360000002 HC RX W HCPCS: Performed by: STUDENT IN AN ORGANIZED HEALTH CARE EDUCATION/TRAINING PROGRAM

## 2023-01-05 PROCEDURE — 85025 COMPLETE CBC W/AUTO DIFF WBC: CPT

## 2023-01-05 RX ORDER — MAGNESIUM SULFATE IN WATER 40 MG/ML
2000 INJECTION, SOLUTION INTRAVENOUS ONCE
Status: COMPLETED | OUTPATIENT
Start: 2023-01-05 | End: 2023-01-05

## 2023-01-05 RX ORDER — PANTOPRAZOLE SODIUM 40 MG/1
40 TABLET, DELAYED RELEASE ORAL
Status: DISCONTINUED | OUTPATIENT
Start: 2023-01-06 | End: 2023-01-07 | Stop reason: HOSPADM

## 2023-01-05 RX ORDER — SODIUM CHLORIDE 0.9 % (FLUSH) 0.9 %
5-40 SYRINGE (ML) INJECTION EVERY 12 HOURS SCHEDULED
Status: DISCONTINUED | OUTPATIENT
Start: 2023-01-05 | End: 2023-01-07 | Stop reason: HOSPADM

## 2023-01-05 RX ORDER — SODIUM CHLORIDE 9 MG/ML
INJECTION, SOLUTION INTRAVENOUS PRN
Status: DISCONTINUED | OUTPATIENT
Start: 2023-01-05 | End: 2023-01-07 | Stop reason: HOSPADM

## 2023-01-05 RX ORDER — ONDANSETRON 2 MG/ML
4 INJECTION INTRAMUSCULAR; INTRAVENOUS EVERY 6 HOURS PRN
Status: DISCONTINUED | OUTPATIENT
Start: 2023-01-05 | End: 2023-01-05 | Stop reason: SDUPTHER

## 2023-01-05 RX ORDER — SODIUM CHLORIDE 9 MG/ML
INJECTION, SOLUTION INTRAVENOUS CONTINUOUS
Status: DISCONTINUED | OUTPATIENT
Start: 2023-01-05 | End: 2023-01-07 | Stop reason: HOSPADM

## 2023-01-05 RX ORDER — ACETAMINOPHEN 325 MG/1
650 TABLET ORAL EVERY 6 HOURS PRN
Status: DISCONTINUED | OUTPATIENT
Start: 2023-01-05 | End: 2023-01-07 | Stop reason: HOSPADM

## 2023-01-05 RX ORDER — HYDROXYZINE HYDROCHLORIDE 25 MG/1
25 TABLET, FILM COATED ORAL 3 TIMES DAILY PRN
Status: DISCONTINUED | OUTPATIENT
Start: 2023-01-05 | End: 2023-01-05 | Stop reason: ALTCHOICE

## 2023-01-05 RX ORDER — ENOXAPARIN SODIUM 100 MG/ML
30 INJECTION SUBCUTANEOUS DAILY
Status: DISCONTINUED | OUTPATIENT
Start: 2023-01-05 | End: 2023-01-06

## 2023-01-05 RX ORDER — ASPIRIN 81 MG/1
324 TABLET, CHEWABLE ORAL ONCE
Status: COMPLETED | OUTPATIENT
Start: 2023-01-05 | End: 2023-01-05

## 2023-01-05 RX ORDER — ACETAMINOPHEN 650 MG/1
650 SUPPOSITORY RECTAL EVERY 6 HOURS PRN
Status: DISCONTINUED | OUTPATIENT
Start: 2023-01-05 | End: 2023-01-07 | Stop reason: HOSPADM

## 2023-01-05 RX ORDER — POLYETHYLENE GLYCOL 3350 17 G/17G
17 POWDER, FOR SOLUTION ORAL DAILY PRN
Status: DISCONTINUED | OUTPATIENT
Start: 2023-01-05 | End: 2023-01-07 | Stop reason: HOSPADM

## 2023-01-05 RX ORDER — MIRTAZAPINE 15 MG/1
15 TABLET, FILM COATED ORAL NIGHTLY
Status: DISCONTINUED | OUTPATIENT
Start: 2023-01-05 | End: 2023-01-07 | Stop reason: HOSPADM

## 2023-01-05 RX ORDER — ATORVASTATIN CALCIUM 80 MG/1
80 TABLET, FILM COATED ORAL NIGHTLY
Status: DISCONTINUED | OUTPATIENT
Start: 2023-01-05 | End: 2023-01-07 | Stop reason: HOSPADM

## 2023-01-05 RX ORDER — ONDANSETRON 4 MG/1
4 TABLET, ORALLY DISINTEGRATING ORAL EVERY 8 HOURS PRN
Status: DISCONTINUED | OUTPATIENT
Start: 2023-01-05 | End: 2023-01-05 | Stop reason: SDUPTHER

## 2023-01-05 RX ORDER — ASPIRIN 300 MG/1
300 SUPPOSITORY RECTAL DAILY
Status: DISCONTINUED | OUTPATIENT
Start: 2023-01-05 | End: 2023-01-07 | Stop reason: HOSPADM

## 2023-01-05 RX ORDER — TRAZODONE HYDROCHLORIDE 50 MG/1
50 TABLET ORAL NIGHTLY
Status: DISCONTINUED | OUTPATIENT
Start: 2023-01-05 | End: 2023-01-07 | Stop reason: HOSPADM

## 2023-01-05 RX ORDER — ASPIRIN 81 MG/1
81 TABLET ORAL DAILY
Status: DISCONTINUED | OUTPATIENT
Start: 2023-01-05 | End: 2023-01-07 | Stop reason: HOSPADM

## 2023-01-05 RX ORDER — SODIUM CHLORIDE 0.9 % (FLUSH) 0.9 %
5-40 SYRINGE (ML) INJECTION PRN
Status: DISCONTINUED | OUTPATIENT
Start: 2023-01-05 | End: 2023-01-07 | Stop reason: HOSPADM

## 2023-01-05 RX ORDER — ONDANSETRON 4 MG/1
4 TABLET, ORALLY DISINTEGRATING ORAL EVERY 8 HOURS PRN
Status: DISCONTINUED | OUTPATIENT
Start: 2023-01-05 | End: 2023-01-07 | Stop reason: HOSPADM

## 2023-01-05 RX ORDER — SODIUM CHLORIDE 9 MG/ML
INJECTION, SOLUTION INTRAVENOUS CONTINUOUS
Status: DISCONTINUED | OUTPATIENT
Start: 2023-01-05 | End: 2023-01-05 | Stop reason: HOSPADM

## 2023-01-05 RX ORDER — 0.9 % SODIUM CHLORIDE 0.9 %
1000 INTRAVENOUS SOLUTION INTRAVENOUS ONCE
Status: COMPLETED | OUTPATIENT
Start: 2023-01-05 | End: 2023-01-05

## 2023-01-05 RX ORDER — ONDANSETRON 2 MG/ML
4 INJECTION INTRAMUSCULAR; INTRAVENOUS EVERY 6 HOURS PRN
Status: DISCONTINUED | OUTPATIENT
Start: 2023-01-05 | End: 2023-01-07 | Stop reason: HOSPADM

## 2023-01-05 RX ORDER — DULOXETIN HYDROCHLORIDE 60 MG/1
60 CAPSULE, DELAYED RELEASE ORAL DAILY
Status: DISCONTINUED | OUTPATIENT
Start: 2023-01-05 | End: 2023-01-07 | Stop reason: HOSPADM

## 2023-01-05 RX ORDER — DOXAZOSIN MESYLATE 4 MG/1
4 TABLET ORAL 2 TIMES DAILY
Status: DISCONTINUED | OUTPATIENT
Start: 2023-01-05 | End: 2023-01-07 | Stop reason: HOSPADM

## 2023-01-05 RX ORDER — ALBUTEROL SULFATE 90 UG/1
2 AEROSOL, METERED RESPIRATORY (INHALATION) 4 TIMES DAILY PRN
Status: DISCONTINUED | OUTPATIENT
Start: 2023-01-05 | End: 2023-01-07 | Stop reason: HOSPADM

## 2023-01-05 RX ADMIN — MIRTAZAPINE 15 MG: 15 TABLET, FILM COATED ORAL at 21:22

## 2023-01-05 RX ADMIN — Medication 10 ML: at 20:27

## 2023-01-05 RX ADMIN — SODIUM CHLORIDE: 9 INJECTION, SOLUTION INTRAVENOUS at 20:29

## 2023-01-05 RX ADMIN — ATORVASTATIN CALCIUM 80 MG: 80 TABLET, FILM COATED ORAL at 21:21

## 2023-01-05 RX ADMIN — MAGNESIUM SULFATE HEPTAHYDRATE 2000 MG: 40 INJECTION, SOLUTION INTRAVENOUS at 08:29

## 2023-01-05 RX ADMIN — SODIUM CHLORIDE: 9 INJECTION, SOLUTION INTRAVENOUS at 10:56

## 2023-01-05 RX ADMIN — SODIUM CHLORIDE: 9 INJECTION, SOLUTION INTRAVENOUS at 22:15

## 2023-01-05 RX ADMIN — TRAZODONE HYDROCHLORIDE 50 MG: 50 TABLET ORAL at 21:22

## 2023-01-05 RX ADMIN — ASPIRIN 81 MG: 81 TABLET, COATED ORAL at 21:24

## 2023-01-05 RX ADMIN — ASPIRIN 324 MG: 81 TABLET, CHEWABLE ORAL at 10:55

## 2023-01-05 RX ADMIN — ENOXAPARIN SODIUM 30 MG: 100 INJECTION SUBCUTANEOUS at 21:24

## 2023-01-05 RX ADMIN — DULOXETINE HYDROCHLORIDE 60 MG: 60 CAPSULE, DELAYED RELEASE ORAL at 21:24

## 2023-01-05 RX ADMIN — SODIUM CHLORIDE 1000 ML: 9 INJECTION, SOLUTION INTRAVENOUS at 08:27

## 2023-01-05 RX ADMIN — ACETAMINOPHEN 650 MG: 325 TABLET ORAL at 22:01

## 2023-01-05 RX ADMIN — DOXAZOSIN 4 MG: 4 TABLET ORAL at 21:22

## 2023-01-05 ASSESSMENT — PAIN DESCRIPTION - DESCRIPTORS
DESCRIPTORS: SHARP

## 2023-01-05 ASSESSMENT — PAIN - FUNCTIONAL ASSESSMENT
PAIN_FUNCTIONAL_ASSESSMENT: NONE - DENIES PAIN
PAIN_FUNCTIONAL_ASSESSMENT: 0-10

## 2023-01-05 ASSESSMENT — PAIN DESCRIPTION - ORIENTATION
ORIENTATION: RIGHT;LEFT

## 2023-01-05 ASSESSMENT — PAIN DESCRIPTION - FREQUENCY
FREQUENCY: CONTINUOUS
FREQUENCY: CONTINUOUS

## 2023-01-05 ASSESSMENT — PAIN SCALES - GENERAL
PAINLEVEL_OUTOF10: 0
PAINLEVEL_OUTOF10: 5
PAINLEVEL_OUTOF10: 5
PAINLEVEL_OUTOF10: 4

## 2023-01-05 ASSESSMENT — PAIN DESCRIPTION - ONSET
ONSET: ON-GOING
ONSET: ON-GOING

## 2023-01-05 ASSESSMENT — LIFESTYLE VARIABLES: HOW OFTEN DO YOU HAVE A DRINK CONTAINING ALCOHOL: NEVER

## 2023-01-05 ASSESSMENT — PAIN DESCRIPTION - PAIN TYPE
TYPE: CHRONIC PAIN

## 2023-01-05 ASSESSMENT — PAIN DESCRIPTION - LOCATION
LOCATION: ARM

## 2023-01-05 NOTE — ED TRIAGE NOTES
Pt arrived via EMS c/o weakness to left side and fall at home. Pt states he woke up on the floor and did not remember getting there. Further questioning, pt reports falls in increasing amounts and weakness has been increasing.

## 2023-01-05 NOTE — ED NOTES
Elly at the transfer center affirmed [facility] are not accepting, she stated [facility] is on bypass and she is calling [facility] to check on their status for a Neuro bed.       Melina Collins  01/05/23 0920

## 2023-01-05 NOTE — ED PROVIDER NOTES
Emergency Department Encounter    Patient: Lenard Henao  MRN: [id number]  : 1955  Date of Evaluation: 2023  ED Provider:  Maria Eugenia Rizvi MD    Triage Chief Complaint:   Extremity Weakness (Left sided x 1 week)    [place]:  Lenard Henao is a 75 y.o. male with history seen below presenting with complaints of left upper and lower extremity weakness and numbness. Patient states over the past couple weeks he has had increased falls. Patient denies any neck or back pain, chest pain or shortness of breath, abdominal pain, nausea vomiting, diarrhea constipation, urinary symptoms, fevers or chills. States he also has had some mild blurred vision over the past several days. Patient states yesterday the weakness in his left upper extremity worsened. Patient states his left upper extremity weakness and numbness worsened around 3 PM yesterday. Denies headache. Denies pain in his extremities. Denies history of strokes in the past.    ROS - see HPI, below listed is current ROS at time of my eval:  At least 14 systems reviewed, negative other HPI    Past Medical History:   Diagnosis Date    Hepatitis C     Hypertension      Past Surgical History:   Procedure Laterality Date    CT GUIDED CHEST TUBE  3/1/2022    CT GUIDED CHEST TUBE 3/1/2022 [address] SCAN     History reviewed. No pertinent family history. Social History     Socioeconomic History    Marital status:       Spouse name: Not on file    Number of children: Not on file    Years of education: Not on file    Highest education level: Not on file   Occupational History    Not on file   Tobacco Use    Smoking status: Every Day     Packs/day: 1.00     Types: Cigarettes    Smokeless tobacco: Not on file   Substance and Sexual Activity    Alcohol use: Never    Drug use: Never    Sexual activity: Not on file   Other Topics Concern    Not on file   Social History Narrative    Not on file     Social Determinants of Health     Financial Resource Strain: Not on file   Food Insecurity: Not on file   Transportation Needs: Not on file   Physical Activity: Not on file   Stress: Not on file   Social Connections: Not on file   Intimate Partner Violence: Not on file   Housing Stability: Not on file     No current facility-administered medications for this encounter. Current Outpatient Medications   Medication Sig Dispense Refill    albuterol sulfate HFA (VENTOLIN HFA) 108 (90 Base) MCG/ACT inhaler Inhale 2 puffs into the lungs 4 times daily as needed for Wheezing or Shortness of Breath      clonazePAM (KLONOPIN) 0.5 MG tablet Take 0.5 mg by mouth nightly as needed. DULoxetine (CYMBALTA) 60 MG extended release capsule Take 60 mg by mouth daily      gabapentin (NEURONTIN) 300 MG capsule Take 300 mg by mouth 4 times daily. 3 capsules      hydrOXYzine (ATARAX) 25 MG tablet Take 25 mg by mouth 3 times daily as needed for Anxiety      lisinopril (PRINIVIL;ZESTRIL) 20 MG tablet Take 20 mg by mouth daily      melatonin 3 MG TABS tablet Take 6 mg by mouth nightly as needed      mirtazapine (REMERON) 15 MG tablet Take 15 mg by mouth nightly      omeprazole (PRILOSEC) 20 MG delayed release capsule Take 20 mg by mouth 2 times daily      terazosin (HYTRIN) 5 MG capsule Take 10 mg by mouth 2 times daily      tiZANidine (ZANAFLEX) 4 MG tablet Take 4 mg by mouth every 8 hours as needed 2 tablets      traZODone (DESYREL) 50 MG tablet Take 50 mg by mouth nightly       No Known Allergies    Nursing Notes Reviewed    Physical Exam:  Triage VS:    ED Triage Vitals [01/05/23 0643]   Enc Vitals Group      /60      Heart Rate 61      Resp 13      Temp 97.6 °F (36.4 °C)      Temp Source Oral      SpO2 92 %      Weight 207 lb (93.9 kg)      Height 5' 11\" (1.803 m)      Head Circumference       Peak Flow       Pain Score       Pain Loc       Pain Edu? Excl. in [address]? My pulse ox interpretation is - normal    General appearance:  No acute distress. Skin:  Warm. Dry. Eye:  Extraocular movements intact. Ears, nose, mouth and throat:  Oral mucosa moist   Neck:  Trachea midline. Extremity: Easily palpable and symmetric pulses in all extremities, no swelling. Normal ROM     Heart:  Regular rate and rhythm, normal S1 & S2, no extra heart sounds. Perfusion:  intact  Respiratory:  Lungs clear to auscultation bilaterally. Respirations nonlabored. Abdominal:  Normal bowel sounds. Soft. Nontender. Non distended. Back:  No CVA tenderness to palpation no tenderness palpation of the CTL spine  Neurological:  Alert and oriented times 3. No facial asymmetry, normal sensation light touch of the face, extraocular eye movements are intact, pupils are 3 mm reactive bilaterally, no gross visual field deficits, patient does have pronator drift of the left upper and lower extremity the left upper extremity does not hit the bed the left lower extremity does hit the bed, there is decreased sensation of the left upper and lower extremity although patient states he does have sensation denies total numbness.   There is no pronator drift or sensory changes in the right upper or lower extremity patient has normal finger-nose-finger and heel shin there is no dysarthria dysphagia          Psychiatric:  Appropriate    I have reviewed and interpreted all of the currently available lab results from this visit (if applicable):  Results for orders placed or performed during the hospital encounter of 01/05/23   Comprehensive Metabolic Panel w/ Reflex to MG   Result Value Ref Range    Sodium 135 (L) 136 - 145 mmol/L    Potassium reflex Magnesium 3.8 3.5 - 5.1 mmol/L    Chloride 93 (L) 99 - 110 mmol/L    CO2 26 21 - 32 mmol/L    Anion Gap 16 3 - 16    Glucose 149 (H) 70 - 99 mg/dL    BUN 31 (H) 7 - 20 mg/dL    Creatinine 3.6 (H) 0.8 - 1.3 mg/dL    Est, Glom Filt Rate 18 (A) >60    Calcium 8.8 8.3 - 10.6 mg/dL    Total Protein 7.8 6.4 - 8.2 g/dL    Albumin 4.4 3.4 - 5.0 g/dL    Albumin/Globulin Ratio 1.3 1.1 - 2.2    Total Bilirubin 0.4 0.0 - 1.0 mg/dL    Alkaline Phosphatase 132 (H) 40 - 129 U/L    ALT 68 (H) 10 - 40 U/L    AST 57 (H) 15 - 37 U/L   CBC with Auto Differential   Result Value Ref Range    WBC 8.1 4.0 - 11.0 K/uL    RBC 4.84 4.20 - 5.90 M/uL    Hemoglobin 14.7 13.5 - 17.5 g/dL    Hematocrit 41.8 40.5 - 52.5 %    MCV 86.4 80.0 - 100.0 fL    MCH 30.3 26.0 - 34.0 pg    MCHC 35.1 31.0 - 36.0 g/dL    RDW 17.9 (H) 12.4 - 15.4 %    Platelets 140 421 - 659 K/uL    MPV 9.1 5.0 - 10.5 fL    Neutrophils % 75.8 %    Lymphocytes % 17.0 %    Monocytes % 6.2 %    Eosinophils % 0.4 %    Basophils % 0.6 %    Neutrophils Absolute 6.1 1.7 - 7.7 K/uL    Lymphocytes Absolute 1.4 1.0 - 5.1 K/uL    Monocytes Absolute 0.5 0.0 - 1.3 K/uL    Eosinophils Absolute 0.0 0.0 - 0.6 K/uL    Basophils Absolute 0.1 0.0 - 0.2 K/uL   Troponin   Result Value Ref Range    Troponin 0.04 (H) <0.01 ng/mL   EKG 12 Lead   Result Value Ref Range    Ventricular Rate 62 BPM    Atrial Rate 62 BPM    P-R Interval 138 ms    QRS Duration 92 ms    Q-T Interval 520 ms    QTc Calculation (Bazett) 527 ms    P Axis 34 degrees    R Axis 36 degrees    T Axis 17 degrees    Diagnosis       Normal sinus rhythmProlonged QTAbnormal ECGNo previous ECGs available      Radiographs (if obtained):  Radiologist's Report Reviewed:  No results found. EKG (if obtained): (All EKG's are interpreted by myself in the absence of a cardiologist)  Normal sinus rhythm, ventricular rate 62, HI interval 138, QRS duration 92, QTc 527, no significant ST elevation or depression    MDM:    59-year-old male presenting with history seen above. Vitals on presentation are reassuring and patient afebrile satting well on room air. Physical exam can be seen above. CBC reveals an ROSALINO with creatinine of 3.6 with a baseline of 0.7. CBC reveals no leukocytosis. Hemoglobin is within normal limits.   Abigail Aver is mildly elevated 0.04 patient denies any recent chest pain or shortness of breath. May be related to elevated creatinine. Magnesium is 1.2 and began replacement in the ED. Chest x-ray reveals mild improvement from prior with persistent mild basilar atelectasis bilateral trace pleural effusions. CT head reveals no acute intracranial abnormality. There is a remote right frontal infarct with mild small vessel ischemic changes in the brain. There is no acute findings of the cervical spine. A stroke alert was called on presentation given that that last known well was difficult to ascertain. Does seem like last known well was several days ago but then patient states symptoms worsened around 3 PM yesterday with elevated creatinine did discuss with stroke neurology who believes the risk of CTA head and neck likely outweigh the benefits given multiple recent falls recently and visual changes increased over the past 2 days likely would not be a thrombectomy candidate. States he would like to hold off on CTA at this time with transfer to neuro capable center for MRI and further evaluation and treatment. Will transfer for further evaluation and treatment. Clinical Impression:  1. Left-sided weakness      Total critical care time provided today was 32 minutes. This excludes seperately billable procedures and family discussion time. Critical care time provided for obtaining history, conducting a physical exam, performing and monitoring interventions, ordering, collecting and interpreting tests, and establishing medical decision-making. There was a potential for life/limb threatening pathology requiring close evaluation and intervention with concern for patient decompensation. Comment: Please note this report has been produced using speech recognition software and may contain errors related to that system including errors in grammar, punctuation, and spelling, as well as words and phrases that may be inappropriate. Efforts were made to edit the dictations. MD  01/16/23 2512       Lauri Gama MD  01/16/23 2768

## 2023-01-05 NOTE — ED NOTES
Bedside report given to Apple Cai RN. POC reviewed and care transferred at this time.       Duayne Landau, RN  01/05/23 1314

## 2023-01-05 NOTE — ED NOTES
Stroke team notified per MD request, return call and MD completed consult.       Schuyler Aguilera RN  01/05/23 8541

## 2023-01-05 NOTE — PROGRESS NOTES
Patient with L sided weakness at [facility] ED. Will transfer to SDU for acute CVA and ROSALINO.     Ivy Sharma MD

## 2023-01-05 NOTE — ED NOTES
Elly at the transfer center advised that [facility] is wait listing and currently has 9 holds. Dr. Carroll Pryor then requested any neuro bed outside of the [facility] system and to try [address] first. She is calling [address] and will advise.       Lorena Kelly  01/05/23 5677

## 2023-01-05 NOTE — ED NOTES
Advised Beth that we can cancel [facility] as [facility]ital has already found a closer transport.       Vidhya Arias  01/05/23 2683

## 2023-01-05 NOTE — ED NOTES
Elly at the transfer center advised [facility] is wait listing patients, they have 29 holds and some are waiting longer than a day. She suggested [facility] and is starting the process with [facility].       Dylan Matias  01/05/23 1016

## 2023-01-06 ENCOUNTER — APPOINTMENT (OUTPATIENT)
Dept: MRI IMAGING | Age: 68
DRG: 065 | End: 2023-01-06
Attending: INTERNAL MEDICINE
Payer: MEDICARE

## 2023-01-06 LAB
ANION GAP SERPL CALCULATED.3IONS-SCNC: 10 MMOL/L (ref 3–16)
ANISOCYTOSIS: ABNORMAL
BASOPHILS ABSOLUTE: 0 K/UL (ref 0–0.2)
BASOPHILS RELATIVE PERCENT: 0 %
BUN BLDV-MCNC: 20 MG/DL (ref 7–20)
CALCIUM SERPL-MCNC: 8.5 MG/DL (ref 8.3–10.6)
CHLORIDE BLD-SCNC: 102 MMOL/L (ref 99–110)
CHOLESTEROL, TOTAL: 184 MG/DL (ref 0–199)
CO2: 27 MMOL/L (ref 21–32)
CREAT SERPL-MCNC: 1.4 MG/DL (ref 0.8–1.3)
EOSINOPHILS ABSOLUTE: 0.1 K/UL (ref 0–0.6)
EOSINOPHILS RELATIVE PERCENT: 1 %
GFR SERPL CREATININE-BSD FRML MDRD: 55 ML/MIN/{1.73_M2}
GLUCOSE BLD-MCNC: 159 MG/DL (ref 70–99)
HCT VFR BLD CALC: 38.7 % (ref 40.5–52.5)
HDLC SERPL-MCNC: 32 MG/DL (ref 40–60)
HEMOGLOBIN: 13.1 G/DL (ref 13.5–17.5)
LDL CHOLESTEROL CALCULATED: 98 MG/DL
LV EF: 65 %
LVEF MODALITY: NORMAL
LYMPHOCYTES ABSOLUTE: 1.4 K/UL (ref 1–5.1)
LYMPHOCYTES RELATIVE PERCENT: 27 %
MAGNESIUM: 1.6 MG/DL (ref 1.8–2.4)
MCH RBC QN AUTO: 29.9 PG (ref 26–34)
MCHC RBC AUTO-ENTMCNC: 34 G/DL (ref 31–36)
MCV RBC AUTO: 88.1 FL (ref 80–100)
MONOCYTES ABSOLUTE: 0.5 K/UL (ref 0–1.3)
MONOCYTES RELATIVE PERCENT: 10 %
NEUTROPHILS ABSOLUTE: 3.3 K/UL (ref 1.7–7.7)
NEUTROPHILS RELATIVE PERCENT: 62 %
PDW BLD-RTO: 17.3 % (ref 12.4–15.4)
PLATELET # BLD: 167 K/UL (ref 135–450)
PLATELET SLIDE REVIEW: ADEQUATE
PMV BLD AUTO: 9.3 FL (ref 5–10.5)
POTASSIUM REFLEX MAGNESIUM: 3.4 MMOL/L (ref 3.5–5.1)
RBC # BLD: 4.39 M/UL (ref 4.2–5.9)
SLIDE REVIEW: ABNORMAL
SODIUM BLD-SCNC: 139 MMOL/L (ref 136–145)
TRIGL SERPL-MCNC: 272 MG/DL (ref 0–150)
VLDLC SERPL CALC-MCNC: 54 MG/DL
WBC # BLD: 5.3 K/UL (ref 4–11)

## 2023-01-06 PROCEDURE — 83735 ASSAY OF MAGNESIUM: CPT

## 2023-01-06 PROCEDURE — 6370000000 HC RX 637 (ALT 250 FOR IP): Performed by: INTERNAL MEDICINE

## 2023-01-06 PROCEDURE — 92523 SPEECH SOUND LANG COMPREHEN: CPT

## 2023-01-06 PROCEDURE — 97116 GAIT TRAINING THERAPY: CPT

## 2023-01-06 PROCEDURE — 1200000000 HC SEMI PRIVATE

## 2023-01-06 PROCEDURE — 97530 THERAPEUTIC ACTIVITIES: CPT

## 2023-01-06 PROCEDURE — 80061 LIPID PANEL: CPT

## 2023-01-06 PROCEDURE — 36415 COLL VENOUS BLD VENIPUNCTURE: CPT

## 2023-01-06 PROCEDURE — 6370000000 HC RX 637 (ALT 250 FOR IP): Performed by: NURSE PRACTITIONER

## 2023-01-06 PROCEDURE — 2580000003 HC RX 258: Performed by: INTERNAL MEDICINE

## 2023-01-06 PROCEDURE — 6360000002 HC RX W HCPCS: Performed by: INTERNAL MEDICINE

## 2023-01-06 PROCEDURE — 70551 MRI BRAIN STEM W/O DYE: CPT

## 2023-01-06 PROCEDURE — 97535 SELF CARE MNGMENT TRAINING: CPT

## 2023-01-06 PROCEDURE — 93880 EXTRACRANIAL BILAT STUDY: CPT

## 2023-01-06 PROCEDURE — 97166 OT EVAL MOD COMPLEX 45 MIN: CPT

## 2023-01-06 PROCEDURE — 99223 1ST HOSP IP/OBS HIGH 75: CPT | Performed by: PSYCHIATRY & NEUROLOGY

## 2023-01-06 PROCEDURE — 92610 EVALUATE SWALLOWING FUNCTION: CPT

## 2023-01-06 PROCEDURE — 85025 COMPLETE CBC W/AUTO DIFF WBC: CPT

## 2023-01-06 PROCEDURE — 80048 BASIC METABOLIC PNL TOTAL CA: CPT

## 2023-01-06 PROCEDURE — C8929 TTE W OR WO FOL WCON,DOPPLER: HCPCS

## 2023-01-06 PROCEDURE — 83036 HEMOGLOBIN GLYCOSYLATED A1C: CPT

## 2023-01-06 PROCEDURE — 97162 PT EVAL MOD COMPLEX 30 MIN: CPT

## 2023-01-06 PROCEDURE — 6360000004 HC RX CONTRAST MEDICATION: Performed by: INTERNAL MEDICINE

## 2023-01-06 RX ORDER — CARVEDILOL 6.25 MG/1
12.5 TABLET ORAL 2 TIMES DAILY WITH MEALS
Status: DISCONTINUED | OUTPATIENT
Start: 2023-01-06 | End: 2023-01-07 | Stop reason: HOSPADM

## 2023-01-06 RX ORDER — OXYCODONE AND ACETAMINOPHEN 7.5; 325 MG/1; MG/1
1 TABLET ORAL 4 TIMES DAILY PRN
COMMUNITY

## 2023-01-06 RX ORDER — HYDRALAZINE HYDROCHLORIDE 20 MG/ML
10 INJECTION INTRAMUSCULAR; INTRAVENOUS EVERY 4 HOURS PRN
Status: DISCONTINUED | OUTPATIENT
Start: 2023-01-06 | End: 2023-01-07 | Stop reason: HOSPADM

## 2023-01-06 RX ORDER — POTASSIUM CHLORIDE 20 MEQ/1
40 TABLET, EXTENDED RELEASE ORAL
Status: COMPLETED | OUTPATIENT
Start: 2023-01-06 | End: 2023-01-06

## 2023-01-06 RX ORDER — GABAPENTIN 300 MG/1
300 CAPSULE ORAL 4 TIMES DAILY
Status: DISCONTINUED | OUTPATIENT
Start: 2023-01-06 | End: 2023-01-07 | Stop reason: HOSPADM

## 2023-01-06 RX ORDER — ENOXAPARIN SODIUM 100 MG/ML
40 INJECTION SUBCUTANEOUS DAILY
Status: DISCONTINUED | OUTPATIENT
Start: 2023-01-07 | End: 2023-01-07 | Stop reason: HOSPADM

## 2023-01-06 RX ORDER — MAGNESIUM SULFATE IN WATER 40 MG/ML
2000 INJECTION, SOLUTION INTRAVENOUS ONCE
Status: COMPLETED | OUTPATIENT
Start: 2023-01-06 | End: 2023-01-06

## 2023-01-06 RX ORDER — HYDRALAZINE HYDROCHLORIDE 25 MG/1
50 TABLET, FILM COATED ORAL EVERY 8 HOURS SCHEDULED
Status: DISCONTINUED | OUTPATIENT
Start: 2023-01-06 | End: 2023-01-07 | Stop reason: HOSPADM

## 2023-01-06 RX ORDER — DIAZEPAM 5 MG/ML
5 INJECTION, SOLUTION INTRAMUSCULAR; INTRAVENOUS ONCE
Status: COMPLETED | OUTPATIENT
Start: 2023-01-06 | End: 2023-01-06

## 2023-01-06 RX ORDER — OXYCODONE AND ACETAMINOPHEN 7.5; 325 MG/1; MG/1
1 TABLET ORAL EVERY 4 HOURS PRN
Status: DISCONTINUED | OUTPATIENT
Start: 2023-01-06 | End: 2023-01-07 | Stop reason: HOSPADM

## 2023-01-06 RX ORDER — NIFEDIPINE 30 MG/1
30 TABLET, EXTENDED RELEASE ORAL 2 TIMES DAILY
Status: DISCONTINUED | OUTPATIENT
Start: 2023-01-06 | End: 2023-01-07 | Stop reason: HOSPADM

## 2023-01-06 RX ADMIN — GABAPENTIN 300 MG: 300 CAPSULE ORAL at 09:09

## 2023-01-06 RX ADMIN — Medication 10 ML: at 06:10

## 2023-01-06 RX ADMIN — ATORVASTATIN CALCIUM 80 MG: 80 TABLET, FILM COATED ORAL at 20:22

## 2023-01-06 RX ADMIN — MIRTAZAPINE 15 MG: 15 TABLET, FILM COATED ORAL at 20:22

## 2023-01-06 RX ADMIN — PERFLUTREN 1.5 ML: 6.52 INJECTION, SUSPENSION INTRAVENOUS at 14:46

## 2023-01-06 RX ADMIN — HYDRALAZINE HYDROCHLORIDE 10 MG: 20 INJECTION INTRAMUSCULAR; INTRAVENOUS at 17:21

## 2023-01-06 RX ADMIN — GABAPENTIN 300 MG: 300 CAPSULE ORAL at 20:22

## 2023-01-06 RX ADMIN — DOXAZOSIN 4 MG: 4 TABLET ORAL at 20:25

## 2023-01-06 RX ADMIN — HYDRALAZINE HYDROCHLORIDE 50 MG: 25 TABLET, FILM COATED ORAL at 21:51

## 2023-01-06 RX ADMIN — CARVEDILOL 12.5 MG: 6.25 TABLET, FILM COATED ORAL at 09:09

## 2023-01-06 RX ADMIN — GABAPENTIN 300 MG: 300 CAPSULE ORAL at 12:47

## 2023-01-06 RX ADMIN — MAGNESIUM SULFATE HEPTAHYDRATE 2000 MG: 40 INJECTION, SOLUTION INTRAVENOUS at 09:16

## 2023-01-06 RX ADMIN — HYDRALAZINE HYDROCHLORIDE 10 MG: 20 INJECTION INTRAMUSCULAR; INTRAVENOUS at 09:11

## 2023-01-06 RX ADMIN — OXYCODONE AND ACETAMINOPHEN 1 TABLET: 7.5; 325 TABLET ORAL at 20:23

## 2023-01-06 RX ADMIN — DOXAZOSIN 4 MG: 4 TABLET ORAL at 07:39

## 2023-01-06 RX ADMIN — NIFEDIPINE 30 MG: 30 TABLET, EXTENDED RELEASE ORAL at 09:09

## 2023-01-06 RX ADMIN — DIAZEPAM 5 MG: 5 INJECTION, SOLUTION INTRAMUSCULAR; INTRAVENOUS at 12:48

## 2023-01-06 RX ADMIN — GABAPENTIN 300 MG: 300 CAPSULE ORAL at 02:21

## 2023-01-06 RX ADMIN — PANTOPRAZOLE SODIUM 40 MG: 40 TABLET, DELAYED RELEASE ORAL at 09:09

## 2023-01-06 RX ADMIN — GABAPENTIN 300 MG: 300 CAPSULE ORAL at 16:19

## 2023-01-06 RX ADMIN — HYDRALAZINE HYDROCHLORIDE 50 MG: 25 TABLET, FILM COATED ORAL at 16:19

## 2023-01-06 RX ADMIN — TRAZODONE HYDROCHLORIDE 50 MG: 50 TABLET ORAL at 21:51

## 2023-01-06 RX ADMIN — NIFEDIPINE 30 MG: 30 TABLET, EXTENDED RELEASE ORAL at 20:23

## 2023-01-06 RX ADMIN — POTASSIUM CHLORIDE 40 MEQ: 1500 TABLET, EXTENDED RELEASE ORAL at 10:00

## 2023-01-06 RX ADMIN — DULOXETINE HYDROCHLORIDE 60 MG: 60 CAPSULE, DELAYED RELEASE ORAL at 09:09

## 2023-01-06 RX ADMIN — HYDRALAZINE HYDROCHLORIDE 10 MG: 20 INJECTION INTRAMUSCULAR; INTRAVENOUS at 12:44

## 2023-01-06 RX ADMIN — CARVEDILOL 12.5 MG: 6.25 TABLET, FILM COATED ORAL at 16:19

## 2023-01-06 RX ADMIN — POTASSIUM CHLORIDE 40 MEQ: 1500 TABLET, EXTENDED RELEASE ORAL at 09:09

## 2023-01-06 RX ADMIN — ASPIRIN 81 MG: 81 TABLET, COATED ORAL at 09:09

## 2023-01-06 RX ADMIN — ENOXAPARIN SODIUM 30 MG: 100 INJECTION SUBCUTANEOUS at 09:09

## 2023-01-06 RX ADMIN — ACETAMINOPHEN 650 MG: 325 TABLET ORAL at 11:01

## 2023-01-06 ASSESSMENT — PAIN DESCRIPTION - DESCRIPTORS
DESCRIPTORS: SHARP
DESCRIPTORS: SHARP

## 2023-01-06 ASSESSMENT — PAIN DESCRIPTION - ORIENTATION
ORIENTATION: RIGHT;LEFT
ORIENTATION: LEFT
ORIENTATION: RIGHT;LEFT

## 2023-01-06 ASSESSMENT — PAIN DESCRIPTION - LOCATION
LOCATION: SHOULDER
LOCATION: ARM
LOCATION: SHOULDER
LOCATION: SHOULDER
LOCATION: ARM
LOCATION: SHOULDER

## 2023-01-06 ASSESSMENT — PAIN DESCRIPTION - FREQUENCY
FREQUENCY: CONTINUOUS
FREQUENCY: CONTINUOUS

## 2023-01-06 ASSESSMENT — PAIN SCALES - GENERAL
PAINLEVEL_OUTOF10: 5
PAINLEVEL_OUTOF10: 4
PAINLEVEL_OUTOF10: 8
PAINLEVEL_OUTOF10: 8
PAINLEVEL_OUTOF10: 6
PAINLEVEL_OUTOF10: 5
PAINLEVEL_OUTOF10: 8

## 2023-01-06 ASSESSMENT — PAIN DESCRIPTION - ONSET
ONSET: ON-GOING
ONSET: ON-GOING

## 2023-01-06 ASSESSMENT — PAIN DESCRIPTION - PAIN TYPE
TYPE: CHRONIC PAIN
TYPE: CHRONIC PAIN

## 2023-01-06 NOTE — CARE COORDINATION
Discharge Planning. PT/OT recommended ARU. The patient has declined ARU and would like the patient stated he would like to return home with [facility] AT [facility]. The  sent a referral to  [facility]. Referral pending at this time.        Electronically signed by DEANNA Glasgow on 1/6/2023 at 4:04 PM

## 2023-01-06 NOTE — PLAN OF CARE
Problem: Discharge Planning  Goal: Discharge to home or other facility with appropriate resources  Outcome: Progressing     Problem: Pain  Goal: Verbalizes/displays adequate comfort level or baseline comfort level  1/6/2023 1408 by Elsie Squires RN  Outcome: Progressing     Problem: Safety - Adult  Goal: Free from fall injury  1/6/2023 1408 by Elsie Squires RN  Outcome: Progressing     Problem: Hematologic - Adult  Goal: Maintains hematologic stability  1/6/2023 1408 by Elsie Squires RN  Outcome: Progressing

## 2023-01-06 NOTE — PROGRESS NOTES
Facility/Department: [address]  SLP Clinical Swallow Evaluation and Speech Language Cognitive Assessment     Patient: Susana Martinez   : 1955   MRN: [id number]      Evaluation Date: 2023      Admitting Dx: Acute CVA (cerebrovascular accident) [facility]) [I63.9]  Pain: Reported pain in 5/10, RN notified                                  H&P: \"Pt arrived via EMS c/o weakness to left side and fall at home. Pt states he woke up on the floor and did not remember getting there. Further questioning, pt reports falls in increasing amounts and weakness has been increasing. \"    Imaging:  Chest X-ray:   Mild improvement from priors. Persistent mild basilar atelectasis versus   infiltrates. Bilateral trace pleural effusions. Head CT:   No acute intracranial abnormality. Remote right frontal infarct. Mild small vessel ischemic changes in the   brain. History/Prior Level of Function:   Living Status: lives at home alone  Prior Dysphagia History: no dysphagia noted per chart review  Prior Speech History: no prior speech history noted per chart review    Reason for referral: SLP evaluation orders received due to CVA protocol . DYSPHAGIA BEDSIDE SWALLOW EVALUATION   Dysphagia Impressions/Dysphagia Diagnosis: Oropharyngeal Dysphagia   Pt alert, cooperative, pleasant, agreeable to evaluation and fully upright in bed. Pt with upper and lower dentures. OME appears functional. Pt assessed with ice chips, thins via tsp, cup edge, and straw, puree, and regular solids. Pt with no anterior loss, adequate mastication, oral prep, AP transit time, and swallow; no overt s/s associated with aspiration; no cough/ throat clear/ wet vocal quality noted. Pt assessed with meds whole with thins; no overt s/s associated with aspiration; no cough/ throat clear/ wet vocal quality noted. Recommend regular/ thin/ meds whole with thin liquids.  No further tx needed for dysphagia; if s/s occur with PO downgrade to NPO and reconsult ST. Recommended Diet and Intervention:  Diet Solids Recommendation:  Regular texture diet  Liquid Consistency Recommendation: Thin liquids  Recommended form of Meds: Meds whole with water        Compensatory Swallowing Strategies: Alternate solids/liquids , Upright as possible with all PO intake , Small bites/sips , Eat/feed slowly, Remain upright 30-45 min     Oral Mechanism Exam:  [x]WFL []Mild   [] Moderate  []Severe  []To be assessed    Patient Positioning: Upright in bed       SPEECH LANGUAGE COGNITIVE ASSESSMENT:     Speech Diagnosis:   No cognitive linguistic impairment    Impressions:   Pt reporting no change with speech, language, or cognition. Pt administered the MoCA with a score of 26/30 which falls within normal range. Pt educated on results of assessment and tx if wanting to pursue; pt politely declined. Pt educated if speech, language, cognition changes ST can be reconsulted. [place] Cognitive Assessment (MoCA)    Section Subtest Score Comments   Visuospatial/ [place] making 1/1     Copy Cube 1/1     Clock 3/3    Naming Picture naming 3/3    Attention Number repetition 1/2 Pt repeated both lines in forward order when told of error pt corrected    Selective attention 1/1     Serial 7s 3/3    Language Repetition 2/2     Fluency 1/1    Abstraction Comparisons 2/2    Delayed Recall Recall 5 novel words 2/5 Able to increase to 5/5 when given category cue   Orientation Spatial and Temporal 6/6     Total: 26/30      COMPREHENSION  Auditory Comprehension: Within functional limits     EXPRESSION  Verbal Expression: Within functional limits      Pragmatics/Social Functioning: Within functional limits       MOTOR SPEECH  Motor Speech: Within functional limits     VOICE  Voice:  Within functional limits     COGNITION    Overall Orientation : Within functional limits    Oriented x4    Attention: Mild      Memory: Mild    Impaired Short-term Memory  Impaired Immediate/working Memory    Problem Solving: Within functional limits      Safety/Judgement: Within functional limits      GOALS:  Plan of care: No further follow-up indicated     Discharge Recommendations:  No further follow-up appears indicated at this time. EDUCATION:   Provided education regarding role of SLP, results of assessment, recommendations and general speech pathology plan of care. [x] Pt verbalized understanding and agreement   [] Pt requires ongoing learning   [] No evidence of comprehension     If patient discharges prior to next visit, this note will serve as discharge.      Treatment time:  Timed Code Treatment Minutes: 0  Total Treatment time: 28 minutes    Electronically signed by:    Kar Suero MA. CF- SLP  Speech Language Pathologist  [id number]

## 2023-01-06 NOTE — PROGRESS NOTES
[address] [facility] Msb Department   Phone: [phone]    Occupational Therapy     [x]Initial Evaluation            [] Daily Treatment Note         [] Discharge Summary      Patient: Yasmin Ruiz   : 1955   MRN: [id number]   Date of Service:  2023    Admitting Diagnosis:  Acute CVA (cerebrovascular accident) [facility])  Current Admission Summary:   Pt arrived via EMS c/o weakness to left side and fall at home. Pt states he woke up on the floor and did not remember getting there. Further questioning, pt reports falls in increasing amounts and weakness has been increasing. Patient transferred from [facility], patient is from [place]. [place]      Past Medical History:  has a past medical history of COPD (chronic obstructive pulmonary disease) ([facility] [address] Diabetic peripheral neuropathy ([facility] [address] Hepatitis C, and Hypertension. Past Surgical History:  has a past surgical history that includes CT GUIDED CHEST TUBE (3/1/2022). Discharge Recommendations: Yasmin Ruiz scored a 19/24 on the AM-PAC ADL Inpatient form. Current research shows that an AM-PAC score of 17 or less is typically not associated with a discharge to the patient's home setting. Based on the patient's AM-PAC score and their current ADL deficits, it is recommended that the patient have 5-7 sessions per week of Occupational Therapy at d/c to increase the patient's independence. At this time, this patient demonstrates complex nursing, medical, and rehabilitative needs, and would benefit from intensive rehabilitation services upon discharge from the Inpatient setting. This patient demonstrates the ability to participate in and benefit from an intensive therapy program with a coordinated interdisciplinary team approach to foster frequent, structured, and documented communication among disciplines, who will work together to establish, prioritize, and achieve treatment goals.  Please see assessment section for further patient specific details. If patient discharges prior to next session this note will serve as a discharge summary. Please see below for the latest assessment towards goals. Patient is not safe to return home alone, recommend ARU evaluation due to lives alone and needs CGA/min assist with ambulation. Unable to perform further transfers/ambulation due to high BP this date. Will continue to assess. MRI pending to rule out CVA    DME Required For Discharge: DME to be determined pending patient progress, rolling walker, shower chair with back    Precautions/Restrictions: high fall risk, up as tolerated  Weight Bearing Restrictions: no restrictions  [] Right Upper Extremity  [] Left Upper Extremity [] Right Lower Extremity  [] Left Lower Extremity     Required Braces/Orthotics: no braces required   [] Right  [] Left  Positional Restrictions:no positional restrictions        Pre-Admission Information   Lives With: alone    Type of Home:  travel/camper besides broth's house   Home Layout: one level  Home Access:  3 step to enter without rails   Bathroom Layout: tub/shower unit-- at [facility] [place]   Bathroom Equipment: grab bars in shower  Toilet Height: standard height  Home Equipment: single point cane  Transfer Assistance: Independent without use of device  Ambulation Assistance:Independent without use of device  ADL Assistance: independent with all ADL's  IADL Assistance: independent with homemaking tasks  Active :        [x] Yes  [] No  Hand Dominance: [] Left  [x] Right  Current Employment: disabled for 21 years  Hobbies: watch TV, drive around in the car. Lives in [facility] [place]   Recent Falls: reports 2 major falls in the last 3 weeks.  Stated left side is \"collapsing\" and \"down I go\"        Examination   Vision:   Vision Corrective Device: wears glasses at all times  [facility]  Perception:   WFL  Observation:   General Observation:  high BP, nurse and MD aware   Posture: C/o left shoulder pain   Sensation:   reports numbness and tingling in (L) UE-- ring and little finger  Proprioception:    WFL  Tone:   Normotonic  Coordination Testing:   WFL    ROM:   (B) UE AROM WFL  Strength:   (B) UE strength grossly WFL    Therapist Clinical Decision Making (Complexity): medium complexity  Clinical Presentation: evolving-- getting MRI to r/o CVA       Subjective  General: Upon entering room patient in bathroom with nurse aide, patient reports ambulation is worse than normal. Stated left side weakness started 1-2 days ago. MRI pending to rule out CVA   Pain: 6/10. Location: left shoulder from fall  Pain Interventions: RN notified of patient request for pain medication and repositioned , ice pack applied on left shoulder      /109 sitting EOB, pulse 87, room air 94%. Took another BP around 10 minutes later with /107. Activities of Daily Living  Basic Activities of Daily Living  Feeding: Independent  Grooming: Comment: supervision in stance to wash hands   Upper Extremity Bathing: Comment: not tested this date  Lower Extremity Bathing: Comment: declined to perform    Upper Extremity Dressing: contact guard assistance minimal assistance Comment: don/doff socks, NT pants   Lower Extremity Dressing: contact guard assistance  Toileting: supervision contact guard assistance. Toileting Comments: CGA functional transfers, used RW back from bathroom,   General Comments: unable to ambulate or perform full ADLs due to high BP. Instrumental Activities of Daily Living  No IADL completed on this date. Functional Mobility  Bed Mobility  Supine to Sit: modified independent  Sit to Supine: modified independent  Comments:  Transfers  Sit to stand transfer:contact guard assistance  Stand to sit transfer: contact guard assistance  Bed / Chair transfer: contact guard assistance.     Bed / Chair comments: CGA  Toilet transfer: contact guard assistance  Comments: heavy use of grab bar on right side with transfer to [facility]. 1 loss of balance without use of RW when ambulating into bathroom, able to correct self. Functional Mobility:  Sitting Balance: Independent. Functional Mobility: .  contact guard assistance, minimal assistance  Functional Mobility Comment: min assist without use of device, CGA with RW x 15 feet x 3. See PT for gait analysis. Ambulated quickly, patient stating gait is NOT baseline. Other Therapeutic Interventions-- mild left incoordination in left hand, reports left ring and index fingers are numb and tingly. Functional Outcomes  AM-PAC Inpatient Daily Activity Raw Score: 19    Cognition  WFL  Orientation:    alert and oriented x 4  Command Following:   [facility]     Education  Barriers To Learning: none  Patient Education: patient educated on discharge recommendations  Learning Assessment:  patient verbalizes and demonstrates understanding    Assessment  Activity Tolerance: Unable to tolerate ambulation and further mobility due to high BP this date, to further assess  Impairments Requiring Therapeutic Intervention: decreased functional mobility, decreased ADL status, decreased endurance, decreased IADL, increased pain  Prognosis: excellent and good  Clinical Assessment: Unable to perform full ADL and ambulation assessment due to high BP. Nurse aware. Recommend ARU evaluation due to patient lives alone and gait and mobility is not at baseline.   Safety Interventions: patient left in bed, bed alarm in place, call light within reach, and patient at risk for falls    Plan  Frequency: 5-7 x/week (rulfe out CVA, MRI pending)  Current Treatment Recommendations: strengthening, ROM, balance training, functional mobility training, patient/caregiver education, ADL/self-care training, IADL training, and pain management    Goals  Patient Goals: Get stronger, go home    Short Term Goals:  Time Frame: until d/c   Patient will complete lower body ADL at set up assistance   Patient will complete toileting at Independent   Patient will complete functional transfers at modified independent   Patient will complete functional mobility at modified independent   Patient will increase [facility] ADL score = to or > than 23/24    Therapy Session Time     Individual Group Co-treatment   Time In    1017   Time Out    1058   Minutes    41        Timed Code Treatment Minutes: 26    Total Treatment Minutes:  41       Electronically Signed By: MAMTA Jean Baptiste/L [address]

## 2023-01-06 NOTE — RT PROTOCOL NOTE
RT Inhaler-Nebulizer Bronchodilator Protocol Note    There is a bronchodilator order in the chart from a provider indicating to follow the RT Bronchodilator Protocol and there is an Initiate RT Inhaler-Nebulizer Bronchodilator Protocol order as well (see protocol at bottom of note). CXR Findings:  XR CHEST PORTABLE    Result Date: 1/5/2023  Mild improvement from priors. Persistent mild basilar atelectasis versus infiltrates. Bilateral trace pleural effusions. The findings from the last RT Protocol Assessment were as follows:   History Pulmonary Disease: Chronic pulmonary disease  Respiratory Pattern: Regular pattern and RR 12-20 bpm  Breath Sounds: Clear breath sounds  Cough: Strong, spontaneous, non-productive  Indication for Bronchodilator Therapy: Decreased or absent breath sounds  Bronchodilator Assessment Score: 2    Aerosolized bronchodilator medication orders have been revised according to the RT Inhaler-Nebulizer Bronchodilator Protocol below. Respiratory Therapist to perform RT Therapy Protocol Assessment initially then follow the protocol. Repeat RT Therapy Protocol Assessment PRN for score 0-3 or on second treatment, BID, and PRN for scores above 3. No Indications - adjust the frequency to every 6 hours PRN wheezing or bronchospasm, if no treatments needed after 48 hours then discontinue using Per Protocol order mode. If indication present, adjust the RT bronchodilator orders based on the Bronchodilator Assessment Score as indicated below. Use Inhaler orders unless patient has one or more of the following: on home nebulizer, not able to hold breath for 10 seconds, is not alert and oriented, cannot activate and use MDI correctly, or respiratory rate 25 breaths per minute or more, then use the equivalent nebulizer order(s) with same Frequency and PRN reasons based on the score. If a patient is on this medication at home then do not decrease Frequency below that used at home.     0-3 - enter or revise RT bronchodilator order(s) to equivalent RT Bronchodilator order with Frequency of every 4 hours PRN for wheezing or increased work of breathing using Per Protocol order mode. 4-6 - enter or revise RT Bronchodilator order(s) to two equivalent RT bronchodilator orders with one order with BID Frequency and one order with Frequency of every 4 hours PRN wheezing or increased work of breathing using Per Protocol order mode. 7-10 - enter or revise RT Bronchodilator order(s) to two equivalent RT bronchodilator orders with one order with TID Frequency and one order with Frequency of every 4 hours PRN wheezing or increased work of breathing using Per Protocol order mode. 11-13 - enter or revise RT Bronchodilator order(s) to one equivalent RT bronchodilator order with QID Frequency and an Albuterol order with Frequency of every 4 hours PRN wheezing or increased work of breathing using Per Protocol order mode. Greater than 13 - enter or revise RT Bronchodilator order(s) to one equivalent RT bronchodilator order with every 4 hours Frequency and an Albuterol order with Frequency of every 2 hours PRN wheezing or increased work of breathing using Per Protocol order mode. RT to enter RT Home Evaluation for COPD & MDI Assessment order using Per Protocol order mode.     Electronically signed by Augie Pham RCP on 1/6/2023 at 4:32 AM

## 2023-01-06 NOTE — PLAN OF CARE
Problem: Hematologic - Adult  Goal: Maintains hematologic stability  Outcome: Progressing     Problem: Pain  Goal: Verbalizes/displays adequate comfort level or baseline comfort level  Outcome: Progressing     Problem: Safety - Adult  Goal: Free from fall injury  Outcome: Progressing     Vitals:    01/06/23 0357   BP: (!) 149/67   Pulse: 87   Resp: 20   Temp: 98.1 °F (36.7 °C)   SpO2: 94%     Pt awake in bed, had eaten all snack, denies pain in feet now s/p Neurontin but reports chronic pain in arms continues; pt declines Tylenol at this time. VSS with /67. See [facility] & all flowsheets. Pt is a high fall risk. Pt remains free from falls, throughout night. Bed alarm remains in place, door open. Pt encouraged to use call light for needs throughout night; call light is within reach. Bed lock is in lowest position. Will continue to monitor throughout night.

## 2023-01-06 NOTE — H&P
HOSPITALISTS HISTORY AND PHYSICAL    1/6/2023 8:47 AM    Patient Information:  Joli Sicard is a 75 y.o. male [id number]  PCP:  Doron Gauthier MD (Tel: [phone] )    Chief complaint:  Direct admit from [facility] ER with acute CVA    History of Present Illness:  Louisa Eid is a 75 y.o. male with history of COPD, DM 2, HTN, Hep C, diabetic neuropathy went to [facility] ER yesterday with complaints of LUE/LLE weakness and numbness. Symptoms began about 2 weeks ago and were associated with falls. He noted blurry vision. No CP, SOB, HA or syncope. Patient noted worsening LUE weakness and numbness 2 days ago. Found to have remote R frontal infarct on CT Head and ROSALINO in ED. Lives alone. Otherwise complete ROS is negative unless listed above. REVIEW OF SYSTEMS:   Pertinent positives as noted in HPI. All other systems were reviewed and are negative. Past Medical History:   has a past medical history of COPD (chronic obstructive pulmonary disease) ([facility] [address] Diabetic peripheral neuropathy ([facility] [address] Hepatitis C, and Hypertension. Past Surgical History:   has a past surgical history that includes CT GUIDED CHEST TUBE (3/1/2022). Medications:  No current facility-administered medications on file prior to encounter. Current Outpatient Medications on File Prior to Encounter   Medication Sig Dispense Refill    albuterol sulfate HFA (PROVENTIL;VENTOLIN;PROAIR) 108 (90 Base) MCG/ACT inhaler Inhale 2 puffs into the lungs 4 times daily as needed for Wheezing or Shortness of Breath      clonazePAM (KLONOPIN) 0.5 MG tablet Take 0.5 mg by mouth nightly as needed. DULoxetine (CYMBALTA) 60 MG extended release capsule Take 60 mg by mouth at bedtime      gabapentin (NEURONTIN) 300 MG capsule Take 300 mg by mouth 4 times daily.  3 capsules per dose to total 900mg 4 times a day      hydrOXYzine (ATARAX) 25 MG tablet Take 25 mg by mouth 3 times daily as needed for Anxiety (Patient not taking: Reported on 1/5/2023)      lisinopril (PRINIVIL;ZESTRIL) 20 MG tablet Take 20 mg by mouth 2 times daily      melatonin 3 MG TABS tablet Take 6 mg by mouth nightly as needed (Patient not taking: Reported on 1/5/2023)      mirtazapine (REMERON) 15 MG tablet Take 15 mg by mouth nightly      omeprazole (PRILOSEC) 20 MG delayed release capsule Take 20 mg by mouth 2 times daily      terazosin (HYTRIN) 5 MG capsule Take 5 mg by mouth 2 times daily      tiZANidine (ZANAFLEX) 4 MG tablet Take 12 mg by mouth 4 times daily as needed      traZODone (DESYREL) 50 MG tablet Take 150 mg by mouth nightly         Allergies:  No Known Allergies     Social History:  Patient Lives alone   reports that he has been smoking cigarettes. He has been smoking an average of 1 pack per day. He does not have any smokeless tobacco history on file. He reports that he does not drink alcohol and does not use drugs. Family History:  family history is not on file. ,    Physical Exam:  BP (!) 220/107   Pulse 83   Temp 97.5 °F (36.4 °C) (Oral)   Resp 20   Ht 5' 11\" (1.803 m)   Wt 209 lb 11.2 oz (95.1 kg)   SpO2 95%   BMI 29.25 kg/m²     General appearance:  Appears comfortable. Well nourished  Eyes: Sclera clear, pupils equal  ENT: Moist mucus membranes, no thrush. Trachea midline. Cardiovascular: Regular rhythm, normal S1, S2. No murmur, gallop, rub. No edema in lower extremities  Respiratory: Clear to auscultation bilaterally, no wheeze, good inspiratory effort  Gastrointestinal: Abdomen soft, non-tender, not distended, normal bowel sounds  Musculoskeletal: No cyanosis in digits, neck supple  Neurology: Decreased sensation on LUE/LLE vs RUE/RLE.  5/5 all extremities. Psychiatry: Appropriate affect.  Not agitated  Skin: Warm, dry, normal turgor, no rash  Brisk capillary refill, peripheral pulses palpable   Labs:  CBC:   Lab Results   Component Value Date/Time    WBC 5.3 01/06/2023 06:58 AM    RBC 4.39 01/06/2023 06:58 AM    HGB 13.1 01/06/2023 06:58 AM    HCT 38.7 01/06/2023 06:58 AM    MCV 88.1 01/06/2023 06:58 AM    MCH 29.9 01/06/2023 06:58 AM    MCHC 34.0 01/06/2023 06:58 AM    RDW 17.3 01/06/2023 06:58 AM     01/06/2023 06:58 AM    MPV 9.3 01/06/2023 06:58 AM     BMP:    Lab Results   Component Value Date/Time     01/06/2023 06:58 AM    K 3.4 01/06/2023 06:58 AM     01/06/2023 06:58 AM    CO2 27 01/06/2023 06:58 AM    BUN 20 01/06/2023 06:58 AM    CREATININE 1.4 01/06/2023 06:58 AM    CALCIUM 8.5 01/06/2023 06:58 AM    GFRAA >60 03/03/2022 05:35 AM    LABGLOM 55 01/06/2023 06:58 AM    GLUCOSE 159 01/06/2023 06:58 AM     Vascular carotid duplex bilateral    (Results Pending)   MRI brain without contrast    (Results Pending)       Problem List  Principal Problem:    Acute CVA (cerebrovascular accident) ([facility] [address]  Active Problems:    ROSALINO (acute kidney injury) ([facility] [address]    Hypomagnesemia    Primary hypertension  Resolved Problems:    * No resolved hospital problems. *        Assessment/Plan:   Acute CVA  Admit to inpatient SDU  Check MRI Brain to evaluate for acute CVA  Check BL Carotid US to evaluate for carotid disease  Check Echo for acute CVA  Start ASA and Lipitor  Neuro checks  PT/OT/SLP eval  Telemetry to r/o arrhythmia    2. HTN   -   Start Coreg 12.5 mg PO bid   -   Start Nifedipine 30 mg PO bid   -   Start Hydralazine 50 mg PO tid   -   Hydralazine IV PRN SBP > 160 or DBP > 90    3. ROSALINO   -   IVF   -   Hold Lisinopril    -   Check PVR, place Bledsoe if > 300 mL    4. BPH   -   Continue Terazosin    5. DM 2   -   Check HgA1C   -   SSI    6. Diabetic neuropathy   -   Continue Gabapentin      DVT prophylaxis Lovenox  Code status Full code  Diet General  IV access Peripheral   Bledsoe Catheter No    Admit as inpatient.  I anticipate hospitalization spanning more than two midnights for investigation and treatment of the above medically necessary diagnoses. Discussed with patient, nursing and CM.     Leidy Rose MD    1/6/2023 8:47 AM

## 2023-01-06 NOTE — FLOWSHEET NOTE
pt passed swallow screening prior to nightshift RN coming on shift, and pt has already had a turkey sandwich, pudding x2, and soda. pt tolerates well.     see nursing communication for MD order to advance diet after swallow screening passed.  Pt diet at home \"regular\" and d/w pt; regular diet order placed in epic

## 2023-01-06 NOTE — PROGRESS NOTES
[facility] Department   Phone: [phone]    Physical Therapy    [x] Initial Evaluation            [] Daily Treatment Note         [] Discharge Summary      Patient: Lauren Sheehan   : 1955   MRN: [id number]   Date of Service:  2023  Admitting Diagnosis: Acute CVA (cerebrovascular accident) [facility])  Current Admission Summary: 76 yo male admitted to [facility] from [place]. [place] [facility] after fall at home with (L) sided weakness. Pt feels he has had a stroke. BP elevated. Past Medical History:  has a past medical history of COPD (chronic obstructive pulmonary disease) ([facility] [address] Diabetic peripheral neuropathy ([facility] [address] Hepatitis C, and Hypertension. Past Surgical History:  has a past surgical history that includes CT GUIDED CHEST TUBE (3/1/2022). Discharge Recommendations: Lauren Sheehan scored a 17/24 on the AM-PAC short mobility form. Current research shows that an AM-PAC score of 17 or less is typically not associated with a discharge to the patient's home setting. Based on the patient's AM-PAC score and their current functional mobility deficits, it is recommended that the patient have 5-7 sessions per week of Physical Therapy at d/c to increase the patient's independence. At this time, this patient demonstrates complex nursing, medical, and rehabilitative needs, and would benefit from intensive rehabilitation services upon discharge from the Inpatient setting. This patient demonstrates the ability to participate in and benefit from an intensive therapy program with a coordinated interdisciplinary team approach to foster frequent, structured, and documented communication among disciplines, who will work together to establish, prioritize, and achieve treatment goals. Please see assessment section for further patient specific details. If patient discharges prior to next session this note will serve as a discharge summary.   Please see below for the latest assessment towards goals. DME Required For Discharge: DME to be determined at next level of care    Precautions/Restrictions: high fall risk (recommended)  Weight Bearing Restrictions: no restrictions  [] Right Upper Extremity  [] Left Upper Extremity [] Right Lower Extremity  [] Left Lower Extremity     Required Braces/Orthotics: no braces required   [] Right  [] Left  Positional Restrictions:no positional restrictions    Pre-Admission Information   Lives With: alone                     Type of Home:  travel/camper besides brother's house   Home Layout: one level  Home Access:  3 step to enter without rails   Bathroom Layout: tub/shower unit-- at brother's house   Bathroom Equipment: grab bars in shower  Toilet Height: standard height  Home Equipment: single point cane  Transfer Assistance: Independent without use of device  Ambulation Assistance:Independent without use of device  ADL Assistance: independent with all ADL's  IADL Assistance: independent with homemaking tasks  Active :        [x] Yes                 [] No  Hand Dominance: [] Left                 [x] Right  Current Employment: disabled for 21 years  Hobbies: watch TV, drive around in the car. Lives in [place]. [place]   Recent Falls: reports 2 major falls in the last 3 weeks.  Stated left side is \"collapsing\" and down I go     Examination   Vision:   Vision Gross Assessment: Impaired and Vision Corrective Device: wears glasses at all times  Visual tracking WNL  Hearing:   WFL  Observation:   General Observation:  Pt on RA throughout session, peripheral IV line  Posture:   Good - some guarding of (R) shoulder noted, due to shoulder pain since fall at home  Sensation:   accurately detects gross touch to all lower extremities  Numbness/tingling in (L) ring and little finger  Proprioception:    diminished proprioception in (L) UE  Tone:   Normotonic  Coordination Testing:   Finger to Nose: Impaired on Left with eyes open, deviation greater with eyes closed x3 trials  Nose to therapist's finger with static and varying target: impaired on (L)  Alternating Pronation/Supination: WFL  Finger/Thumb Opposition: WFL  Heel to Shin: Impaired on Left  Alternating Toe Tapping: Impaired on Left    ROM:   (B) LE AROM WFL  Strength:   (L) Hip flex: -4        (R) Hip flex: -4  (L) Knee flex: +4     (R) Knee flex: 5  (L) Knee ext: 4     (R) Knee ext: 5  (L) Ankle DF: +4     (R) Ankle DF: 5  Therapist Clinical Decision Making (Complexity): medium complexity  Clinical Presentation: evolving      Subjective  General: Patient up in bathroom with PCA in room making bed. Pt ambulated back to bed with CGA from OT, very unsteady, reaching for furniture. Pt states he feels he has had a stroke. Pain: 6/10. Location: (L) shoulder since fall  Pain Interventions: ice applied and repositioned        Functional Mobility  Bed Mobility  Supine to Sit: supervision  Sit to Supine: supervision  Scooting: supervision  Comments: Bed flat for one trial, HOB elevated for 2nd trial  Transfers  Sit to stand transfer: contact guard assistance  Stand to sit transfer: contact guard assistance  Toilet transfer: contact guard assistance, heavy use of grab bar for stability  Comments: Pt holding onto bed rail for sit<>stand at EOB with forward flexed posture once in standing. Pt reaching for garb bar with both hands, only able to reach (L) grab bar at this time, for stability with toilet transfer. Pt stood to RW from toilet for return trip from bathroom, education/cues for hand placement. Ambulation  Surface:level surface  Assistive Device: no device  Assistance: minimal assistance  Distance: 20 ft  Gait Mechanics: Fast julius, forward flexed posture, (L) LE instability, decreased (L) stance time, decreased (R) step length, decreased (L) toe off, pt heavily furniture walking for stability  Comments:  Pt very unsteady and with abnormal gait, at high risk of falling without Min A from therapist for stability.  1 LOB after first 2 steps without UE support on furniture. Ambulation Trial 2  Surface:level surface  Assistive Device: rolling walker  Assistance: contact guard assistance  Distance: 20 ft  Gait Mechanics: Fast julius, improved stability, decreased (L) stance time, forward flexed posture present but improved  Comments:  Overall improve stability and moderate improvement in gait deviations. (L) LE continues to be unstable but pt able to compensate with UE support. Further ambulation assessment deferred due to elevated BP. Stair Mobility  Stair mobility not completed on this date. Comments:  Wheelchair Mobility:  No w/c mobility completed on this date. Comments:  Balance  Static Sitting Balance: good: independent with functional balance in unsupported position  Dynamic Sitting Balance: fair (+): maintains balance at SBA/supervision without use of UE support  Static Standing Balance: fair: maintains balance at CGA without use of UE support  Dynamic Standing Balance: poor (+): requires min (A) to maintain balance  Comments: Mild posterior lean with LE MMTs at EOB but no LOB, pt able to return to mid line. Static standing at EOB with wide JOSE, crouch stance, arms out for balance, CGA, mild sway. Pt with LOB when ambulating without AD and very unsteady. Balance improved with UE support via furniture walking. Balance further improved with (B) UE support on RW.     Other Therapeutic Interventions  See OT note for assist with ADLs    Functional Outcomes  AM-PAC Inpatient Mobility Raw Score : 17              Cognition  WFL  Orientation:    alert and oriented x 4  Command Following:   [facility]    Education  Barriers To Learning: none  Patient Education: patient educated on goals, PT role and benefits, plan of care, general safety, functional mobility training, proper use of assistive device/equipment, disease specific education, transfer training, discharge recommendations  Learning Assessment:  patient verbalizes understanding, would benefit from continued reinforcement    Assessment  Activity Tolerance: Further activity limited by elevated BP. Sitting EOB SpO2 93% on RA, HR 86 bpm, /109. RN notified, pt with no further medications at this time, RN to discuss with MD. Pt returned to bed but asymptomatic. Impairments Requiring Therapeutic Intervention: decreased functional mobility, decreased ADL status, decreased strength, decreased endurance, decreased balance, decreased fine motor control, decreased coordination, decreased posture  Prognosis: good  Clinical Assessment: Patient is a 74 yo male admitted to [facility] for (L) sided weakness and fall at home. At this time the patient continues to have (L) LE weakness and impaired (L) UE/LE coordination resulting in decreased balance and safety with ambulation. The patient required Min A for ambulating without AD but demonstrated improved balance with use of RW. The patient is normally independent for ambulation and all ADLs. Recommending continued skilled PT to safely progress tolerance to activity and independence with functional mobility back to baseline.    Safety Interventions: patient left in bed, bed alarm in place, call light within reach, gait belt, patient at risk for falls, and nurse notified    Plan  Frequency: 5-7 x/week  Current Treatment Recommendations: strengthening, balance training, functional mobility training, transfer training, gait training, stair training, endurance training, neuromuscular re-education, patient/caregiver education, home exercise program, safety education, and equipment evaluation/education    Goals  Patient Goals: None stated   Short Term Goals:  Time Frame: Before discharge  Patient will complete bed mobility at Independent   Patient will complete transfers at stand by assistance   Patient will ambulate 50 ft with use of rolling walker at supervision  Patient will ascend/descend 3 stairs without use of HR at minimal assistance with [employer] (lives [place] [address] [facility], unable to use RW on stairs)  Patient to maintain standing at contact guard assistance for 5 minutes with unilateral UE support in order to complete ADLs    Therapy Session Time      Individual Group Co-treatment   Time In     1017   Time Out     1058   Minutes     41     Timed Code Treatment Minutes:  26 Minutes  Total Treatment Minutes:  41 minutes       Electronically Signed By: Wagner Salgado, PT      Eliseo Wang PT, DPT #[id number]

## 2023-01-06 NOTE — FLOWSHEET NOTE
Messaged hospitalist via [employer] at 0144 per pt request, \"Pt c/o pain, Tylenol was ineffective, and pt is requesting Neurontin please. Pt takes at home; home med rec updated overnight. \"

## 2023-01-06 NOTE — CONSULTS
Patient: Abdirizak Patel  [id number]  Date: 1/6/2023      Chief Complaint: Left-sided weakness    History of Present Illness/Hospital Course:  21-year-old male with a history of COPD, diabetes, diabetic neuropathy, HTN, and hepatitis C who was admitted from [facility] with acute left-sided weakness. CTA with no acute abnormality. Of note his BP was greater than 744 systolic on admission and has remained elevated throughout his stay. MRI brain revealed a small acute to subacute infarct in the right posterior frontal corona radiata extending into the right basal ganglia. Neurology is currently following. Echo currently pending. He was evaluated by therapy and suggested to continue in an inpatient setting prior to returning home. He states that he is not interested in any postacute inpatient stay and he is only interested in discharging to home to be with his \"boy\". Prior Level of Function:  Independent and living alone    Current Level of Function:  CGA     has a past medical history of COPD (chronic obstructive pulmonary disease) ([address] Diabetic peripheral neuropathy ([address] Hepatitis C, and Hypertension. has a past surgical history that includes CT GUIDED CHEST TUBE (3/1/2022). reports that he has been smoking cigarettes. He has been smoking an average of 1 pack per day. He does not have any smokeless tobacco history on file. He reports that he does not drink alcohol and does not use drugs. family history is not contributory      REVIEW OF SYSTEMS:   CONSTITUTIONAL: negative for fevers, chills, diaphoresis, appetite change, night sweats and unexpected weight change. HEENT: negative for hearing loss, tinnitus, ear drainage, sinus pressure, nasal congestion, epistaxis and snoring. RESPIRATORY: Negative for hemoptysis, cough, sputum production. CARDIOVASCULAR: negative for chest pain, palpitations, exertional chest pressure/discomfort, edema, syncope.    GASTROINTESTINAL: negative for nausea, vomiting, diarrhea, constipation, blood in stool and abdominal pain. GENITOURINARY: negative for frequency, dysuria, urinary incontinence, decreased urine volume, and hematuria. HEMATOLOGIC/LYMPHATIC: negative for easy bruising, bleeding and lymphadenopathy. ALLERGIC/IMMUNOLOGIC: negative for recurrent infections, angioedema, anaphylaxis and drug reactions. ENDOCRINE: negative for weight changes and diabetic symptoms including polyuria, polydipsia and polyphagia. MUSCULOSKELETAL: negative for pain, joint swelling, decreased range of motion and muscle weakness. NEUROLOGICAL: negative for headaches, slurred speech. Positive unilateral weakness. PSYCHIATRIC/BEHAVIORAL: negative for hallucinations, behavioral problems, confusion and agitation. All pertinent positives are noted in the HPI. Physical Examination:  Vitals: Patient Vitals for the past 24 hrs:   BP Temp Temp src Pulse Resp SpO2 Height Weight   01/06/23 1130 (!) 229/118 97.2 °F (36.2 °C) Oral 80 20 93 % -- --   01/06/23 0945 (!) 194/106 -- -- -- -- -- -- --   01/06/23 0845 (!) 224/114 -- -- -- -- -- -- --   01/06/23 0753 -- -- -- -- -- -- -- 209 lb 11.2 oz (95.1 kg)   01/06/23 0730 (!) 220/107 97.5 °F (36.4 °C) Oral 83 20 95 % -- --   01/06/23 0400 -- -- -- 88 -- -- -- --   01/06/23 0357 (!) 149/67 98.1 °F (36.7 °C) Oral 87 20 94 % -- --   01/06/23 0020 (!) 177/82 97.7 °F (36.5 °C) Oral 93 18 93 % -- --   01/05/23 2227 -- -- -- (!) 101 -- -- -- --   01/05/23 2022 -- -- -- -- -- -- -- 209 lb 4.8 oz (94.9 kg)   01/05/23 2000 -- -- -- -- -- -- 5' 11\" (1.803 m) 207 lb (93.9 kg)   01/05/23 1905 (!) 166/87 97.5 °F (36.4 °C) Oral 91 18 94 % -- --     Psych: Stable mood, normal judgement, normal affect. Const: No distress  Eyes: Conjunctiva noninjected, no icterus noted; pupils equal, round. HENT: Atraumatic, normocephalic; Oral mucosa moist  Neck: Trachea midline, neck supple. No thyromegaly noted.   CV: No audible murmurs  Resp: No increased WOB, no audible wheezing   GI: Nondistended   Neuro: Alert, oriented, appropriate. LUE/LLE 4/5 diffusely  Skin: No visible abnormalities  MSK: No joint abnormalities noted. Ext: No significant edema appreciated. No varicosities. Lab Results   Component Value Date    WBC 5.3 01/06/2023    HGB 13.1 (L) 01/06/2023    HCT 38.7 (L) 01/06/2023    MCV 88.1 01/06/2023     01/06/2023     Lab Results   Component Value Date    INR 1.02 01/05/2023    INR 1.03 03/01/2022    PROTIME 13.3 01/05/2023    PROTIME 11.6 03/01/2022     Lab Results   Component Value Date    CREATININE 1.4 (H) 01/06/2023    BUN 20 01/06/2023     01/06/2023    K 3.4 (L) 01/06/2023     01/06/2023    CO2 27 01/06/2023     Lab Results   Component Value Date    ALT 68 (H) 01/05/2023    AST 57 (H) 01/05/2023    ALKPHOS 132 (H) 01/05/2023    BILITOT 0.4 01/05/2023         Most recent imaging studies revealed   EXAMINATION:   MRI OF THE BRAIN WITHOUT CONTRAST  1/6/2023 1:32 pm       TECHNIQUE:   Multiplanar multisequence MRI of the brain was performed without the   administration of intravenous contrast.       COMPARISON:   None. HISTORY:   ORDERING SYSTEM PROVIDED HISTORY: Acute CVA   TECHNOLOGIST PROVIDED HISTORY:   Reason for exam:->Acute CVA   Reason for Exam: acute CVA       Initial evaluation. FINDINGS:   INTRACRANIAL STRUCTURES/VENTRICLES: There is a small acute to subacute   infarct involving the right posterior frontal corona radiata extending along   the posterior right basal ganglia. There is no significant mass effect or   midline shift. Areas of chronic infarct are seen involving the frontal lobes   bilaterally, right more than left. Areas of T2 FLAIR hyperintensity are seen   in the periventricular and subcortical white matter, which are nonspecific,   but may represent chronic microvascular ischemic change. There is minimal   global parenchymal volume loss.   The normal signal voids within the major intracranial vessels appear maintained. No acute abnormality within the   sellar or suprasellar region. ORBITS: The visualized portion of the orbits demonstrate no acute abnormality. SINUSES: The visualized paranasal sinuses and mastoid air cells demonstrate   no acute abnormality. BONES/SOFT TISSUES: The bone marrow signal intensity appears normal. The soft   tissues demonstrate no acute abnormality. Impression   1. Small acute to subacute infarct involving the right posterior frontal   corona radiata extending along the posterior right basal ganglia. No   significant mass effect or midline shift. 2. Otherwise, no acute intracranial abnormality. 3. There is a chronic infarct involving the bilateral frontal lobes, right   greater than left. 4. Minimal global parenchymal volume loss with mild chronic microvascular   ischemic changes. The above laboratory data have been reviewed. The above imaging data have been reviewed. The above medical testing have been reviewed. Body mass index is 29.25 kg/m². Assessment and Plan:  Acute ischemic stroke: ASA, statin. BP controlled. PT/OT/SLP  Uncontrolled HTN  Hypokalemia  DM  DM neuropathy    Dispo: Patient is an appropriate ARU candidate. Functionally high risk for falls however the patient is not interested in any postacute stay. He is only interested in discharging to home with home health care. He states he has multiple family members that live on the same property that would be able to assist him in his single level trailer. CM notified. If the patient has to change his mind, he would require [employer] pre-CERT for an ARU level of care. Please notify us should he change his intentions. We will sign off. Thank you for the consultation. Shari Jimenes MD 1/6/2023, 2:29 PM     * This document was created using dictation software.   While all precautions were taken to ensure accuracy, errors may have occurred. Please disregard any typographical errors. Admission Reconciliation is Completed  Discharge Reconciliation is Not Complete Admission Reconciliation is Completed  Discharge Reconciliation is Completed

## 2023-01-06 NOTE — FLOWSHEET NOTE
sending $71 cash, money clip, credit cards, keys,  lottery tickets, nail file/pic, cigarettes, & lighter to security

## 2023-01-06 NOTE — PROGRESS NOTES
/107 at shift change. Cardua given per [facility] Dr. Joshua Jesus notified. Will continue to monitor.

## 2023-01-06 NOTE — FLOWSHEET NOTE
Security here to [employer]; sent $71 cash, money clip, credit cards, keys,  lottery tickets, nail file/pic, cigarettes, & lighter to security.  Receipt/slip placed on chart

## 2023-01-06 NOTE — CONSULTS
In patient Neurology consult        [facility] Neurology      Verner Richards, MD Brice Jessikirti  1955    Date of Service: 1/6/2023    Referring Physician: Juan Merino MD      Reason for the consult and CC: Acute left sided weakness    HPI:   The patient is a 75y.o.  years old male with multiple medical problems was admitted to the hospital yesterday with acute left-sided weakness. Onset was 2 to 3 days ago. Description increased weakness in the left side at home with falling but no head injury or trauma. Degree was severe. Duration was persistent. Other associated symptom including intermittent speech impairment. No visual changes. No other relieving or aggravating factors or clear triggers. No other associated symptoms. He came to the ED for evaluation of any possible stroke. Initial imaging showed no acute stroke. No CTA. He was admitted. Today he is about the same. Other review of system was unremarkable except for his blood pressure was above 200. Constitutional:   Vitals:    01/06/23 0753 01/06/23 0845 01/06/23 0945 01/06/23 1130   BP:  (!) 224/114 (!) 194/106 (!) 229/118   Pulse:    80   Resp:    20   Temp:    97.2 °F (36.2 °C)   TempSrc:    Oral   SpO2:    93%   Weight: 209 lb 11.2 oz (95.1 kg)      Height:             I personally reviewed and updated social history, past medical history, medications, allergy, surgical history, and family history as documented in the patient's electronic health records. ROS: 10-14 ROS reviewed with the patient/nurse/family which were unremarkable except mentioned in H&P. General appearance:  Normal development and appear in no acute distress. Mental Status:   Oriented to person, place, problem, and time. Memory: Good immediate recall. Intact remote memory  Normal attention span and concentration. Language: intact naming, repeating and fluency   Good fund of Knowledge.  Aware of current events and vocabulary   Cranial Nerves: II: Visual fields: Full. Pupils: equal, round, reactive to light, bilaterally  III,IV,VI: Extra Ocular Movements are intact. No nystagmus  V: Facial sensation is intact  VII: Facial strength and movements: intact and symmetric  IX: Normal palatal elevation and shoulder shrug  XII: Tongue movements are normal  Musculoskeletal: 5/5 in right side. Left-sided weakness -4/5. Good range of motion. No muscle atrophy. Tone: Normal tone. Reflexes: Symmetric 2+ in the arms and 2+ in the legs, symmetric  Planters: Flexor bilaterally  Coordination: Left pronator drift, no dysmetria with FNF and normal REM on the right but more on the left  Sensation: normal in both arms and legs. Gait/Posture: unsteady gait with normal posturing and station. Medical decision making:  Data: reviewed   LABS:   Lab Results   Component Value Date/Time     01/06/2023 06:58 AM    K 3.4 01/06/2023 06:58 AM     01/06/2023 06:58 AM    CO2 27 01/06/2023 06:58 AM    BUN 20 01/06/2023 06:58 AM    CREATININE 1.4 01/06/2023 06:58 AM    GFRAA >60 03/03/2022 05:35 AM    LABGLOM 55 01/06/2023 06:58 AM    GLUCOSE 159 01/06/2023 06:58 AM    MG 1.60 01/06/2023 06:58 AM    CALCIUM 8.5 01/06/2023 06:58 AM     Lab Results   Component Value Date/Time    WBC 5.3 01/06/2023 06:58 AM    RBC 4.39 01/06/2023 06:58 AM    HGB 13.1 01/06/2023 06:58 AM    HCT 38.7 01/06/2023 06:58 AM    MCV 88.1 01/06/2023 06:58 AM    RDW 17.3 01/06/2023 06:58 AM     01/06/2023 06:58 AM     Lab Results   Component Value Date    INR 1.02 01/05/2023    PROTIME 13.3 01/05/2023       Neuroimaging was independently reviewed by myself and discussed results with the patient  Reviewed notes from different physicians including H&P and ED notes. Reviewed lab and blood testing    Impression:  Acute left-sided weakness likely secondary to new ischemic right hemispheric CVA and ICA/MCA distribution.   Could be thromboembolic from poorly controlled hypertension  Hypertensive urgency  Hyperlipidemia      Recommendation:  Aspirin  Statin  Blood pressure control and management  Inpatient goal 160/90  MRI brain  Carotid Doppler  Echo  DVT and GI prophylaxis  Lipid panel  A1c  Diabetic control  PT OT  Speech evaluation  Neurochecks  Stroke education and prevention discussed with the patient   Discussed risk of poorly controlled hypertension  Will follow      Thank you for referring such patient. If you have any questions regarding my consult note, please don't hesitate to call me. Angelique Garcia MD  [phone]    This dictation was generated by voice recognition computer software.  Although all attempts are made to edit the dictation for accuracy, there may be errors in the  transcription that are not intended

## 2023-01-07 ENCOUNTER — APPOINTMENT (OUTPATIENT)
Dept: GENERAL RADIOLOGY | Age: 68
DRG: 065 | End: 2023-01-07
Attending: INTERNAL MEDICINE
Payer: MEDICARE

## 2023-01-07 VITALS
OXYGEN SATURATION: 94 % | TEMPERATURE: 97.7 F | HEART RATE: 71 BPM | HEIGHT: 71 IN | DIASTOLIC BLOOD PRESSURE: 83 MMHG | BODY MASS INDEX: 29.36 KG/M2 | RESPIRATION RATE: 18 BRPM | SYSTOLIC BLOOD PRESSURE: 124 MMHG | WEIGHT: 209.7 LBS

## 2023-01-07 LAB
ANION GAP SERPL CALCULATED.3IONS-SCNC: 12 MMOL/L (ref 3–16)
BASOPHILS ABSOLUTE: 0 K/UL (ref 0–0.2)
BASOPHILS RELATIVE PERCENT: 0.5 %
BUN BLDV-MCNC: 15 MG/DL (ref 7–20)
CALCIUM SERPL-MCNC: 8.9 MG/DL (ref 8.3–10.6)
CHLORIDE BLD-SCNC: 102 MMOL/L (ref 99–110)
CO2: 25 MMOL/L (ref 21–32)
CREAT SERPL-MCNC: 0.8 MG/DL (ref 0.8–1.3)
EOSINOPHILS ABSOLUTE: 0 K/UL (ref 0–0.6)
EOSINOPHILS RELATIVE PERCENT: 0.6 %
ESTIMATED AVERAGE GLUCOSE: 159.9 MG/DL
GFR SERPL CREATININE-BSD FRML MDRD: >60 ML/MIN/{1.73_M2}
GLUCOSE BLD-MCNC: 134 MG/DL (ref 70–99)
HBA1C MFR BLD: 7.2 %
HCT VFR BLD CALC: 44.4 % (ref 40.5–52.5)
HEMOGLOBIN: 14.6 G/DL (ref 13.5–17.5)
LYMPHOCYTES ABSOLUTE: 1.9 K/UL (ref 1–5.1)
LYMPHOCYTES RELATIVE PERCENT: 25.3 %
MCH RBC QN AUTO: 29.1 PG (ref 26–34)
MCHC RBC AUTO-ENTMCNC: 32.9 G/DL (ref 31–36)
MCV RBC AUTO: 88.6 FL (ref 80–100)
MONOCYTES ABSOLUTE: 0.6 K/UL (ref 0–1.3)
MONOCYTES RELATIVE PERCENT: 8.7 %
NEUTROPHILS ABSOLUTE: 4.8 K/UL (ref 1.7–7.7)
NEUTROPHILS RELATIVE PERCENT: 64.9 %
PDW BLD-RTO: 16.9 % (ref 12.4–15.4)
PLATELET # BLD: 184 K/UL (ref 135–450)
PMV BLD AUTO: 9 FL (ref 5–10.5)
POTASSIUM REFLEX MAGNESIUM: 4.3 MMOL/L (ref 3.5–5.1)
RBC # BLD: 5.01 M/UL (ref 4.2–5.9)
SODIUM BLD-SCNC: 139 MMOL/L (ref 136–145)
WBC # BLD: 7.4 K/UL (ref 4–11)

## 2023-01-07 PROCEDURE — 99232 SBSQ HOSP IP/OBS MODERATE 35: CPT | Performed by: PSYCHIATRY & NEUROLOGY

## 2023-01-07 PROCEDURE — 6370000000 HC RX 637 (ALT 250 FOR IP): Performed by: NURSE PRACTITIONER

## 2023-01-07 PROCEDURE — 6370000000 HC RX 637 (ALT 250 FOR IP): Performed by: INTERNAL MEDICINE

## 2023-01-07 PROCEDURE — 80048 BASIC METABOLIC PNL TOTAL CA: CPT

## 2023-01-07 PROCEDURE — 2580000003 HC RX 258: Performed by: INTERNAL MEDICINE

## 2023-01-07 PROCEDURE — 97116 GAIT TRAINING THERAPY: CPT

## 2023-01-07 PROCEDURE — 73030 X-RAY EXAM OF SHOULDER: CPT

## 2023-01-07 PROCEDURE — 97530 THERAPEUTIC ACTIVITIES: CPT

## 2023-01-07 PROCEDURE — 36415 COLL VENOUS BLD VENIPUNCTURE: CPT

## 2023-01-07 PROCEDURE — 6360000002 HC RX W HCPCS: Performed by: INTERNAL MEDICINE

## 2023-01-07 PROCEDURE — 85025 COMPLETE CBC W/AUTO DIFF WBC: CPT

## 2023-01-07 RX ORDER — HYDRALAZINE HYDROCHLORIDE 50 MG/1
50 TABLET, FILM COATED ORAL EVERY 8 HOURS SCHEDULED
Qty: 90 TABLET | Refills: 0 | Status: SHIPPED | OUTPATIENT
Start: 2023-01-07

## 2023-01-07 RX ORDER — ATORVASTATIN CALCIUM 80 MG/1
80 TABLET, FILM COATED ORAL NIGHTLY
Qty: 30 TABLET | Refills: 0 | Status: SHIPPED | OUTPATIENT
Start: 2023-01-07

## 2023-01-07 RX ORDER — CARVEDILOL 12.5 MG/1
12.5 TABLET ORAL 2 TIMES DAILY WITH MEALS
Qty: 60 TABLET | Refills: 0 | Status: SHIPPED | OUTPATIENT
Start: 2023-01-07

## 2023-01-07 RX ORDER — NIFEDIPINE 30 MG/1
30 TABLET, EXTENDED RELEASE ORAL 2 TIMES DAILY
Qty: 60 TABLET | Refills: 0 | Status: SHIPPED | OUTPATIENT
Start: 2023-01-07

## 2023-01-07 RX ORDER — ASPIRIN 81 MG/1
81 TABLET ORAL DAILY
Qty: 30 TABLET | Refills: 0 | Status: SHIPPED | OUTPATIENT
Start: 2023-01-08

## 2023-01-07 RX ADMIN — HYDRALAZINE HYDROCHLORIDE 50 MG: 25 TABLET, FILM COATED ORAL at 07:03

## 2023-01-07 RX ADMIN — GABAPENTIN 300 MG: 300 CAPSULE ORAL at 13:29

## 2023-01-07 RX ADMIN — OXYCODONE AND ACETAMINOPHEN 1 TABLET: 7.5; 325 TABLET ORAL at 13:29

## 2023-01-07 RX ADMIN — ENOXAPARIN SODIUM 40 MG: 100 INJECTION SUBCUTANEOUS at 08:53

## 2023-01-07 RX ADMIN — NIFEDIPINE 30 MG: 30 TABLET, EXTENDED RELEASE ORAL at 08:53

## 2023-01-07 RX ADMIN — OXYCODONE AND ACETAMINOPHEN 1 TABLET: 7.5; 325 TABLET ORAL at 03:29

## 2023-01-07 RX ADMIN — OXYCODONE AND ACETAMINOPHEN 1 TABLET: 7.5; 325 TABLET ORAL at 08:52

## 2023-01-07 RX ADMIN — Medication 10 ML: at 08:55

## 2023-01-07 RX ADMIN — ASPIRIN 81 MG: 81 TABLET, COATED ORAL at 08:53

## 2023-01-07 RX ADMIN — GABAPENTIN 300 MG: 300 CAPSULE ORAL at 08:53

## 2023-01-07 RX ADMIN — DULOXETINE HYDROCHLORIDE 60 MG: 60 CAPSULE, DELAYED RELEASE ORAL at 08:53

## 2023-01-07 RX ADMIN — HYDRALAZINE HYDROCHLORIDE 50 MG: 25 TABLET, FILM COATED ORAL at 13:29

## 2023-01-07 RX ADMIN — DOXAZOSIN 4 MG: 4 TABLET ORAL at 08:53

## 2023-01-07 RX ADMIN — PANTOPRAZOLE SODIUM 40 MG: 40 TABLET, DELAYED RELEASE ORAL at 07:03

## 2023-01-07 RX ADMIN — CARVEDILOL 12.5 MG: 6.25 TABLET, FILM COATED ORAL at 08:53

## 2023-01-07 ASSESSMENT — PAIN DESCRIPTION - ORIENTATION
ORIENTATION: LEFT

## 2023-01-07 ASSESSMENT — PAIN DESCRIPTION - LOCATION
LOCATION: SHOULDER
LOCATION: BACK;SHOULDER
LOCATION: SHOULDER
LOCATION: SHOULDER

## 2023-01-07 ASSESSMENT — PAIN DESCRIPTION - DESCRIPTORS
DESCRIPTORS: SHARP
DESCRIPTORS: SHARP

## 2023-01-07 ASSESSMENT — PAIN SCALES - GENERAL
PAINLEVEL_OUTOF10: 7
PAINLEVEL_OUTOF10: 7
PAINLEVEL_OUTOF10: 6
PAINLEVEL_OUTOF10: 5
PAINLEVEL_OUTOF10: 6

## 2023-01-07 NOTE — CARE COORDINATION
Discharge note:      CM/SW has been notified of discharge. Patient noted to have the following needs at discharge. CM/SW has coordinated the following services: Home with home health care PT/OT/SLP/RN      [facility])  [address] [address]  Phone: [phone]  Fax: Jeffry Rodríguez Provider: Malathi Bajwa. [place], [address] [facility]  P: [phone]  F: [phone]      Discharge Destination: Home  Transportation: Family/friend        All CM/SW needs met, will sign off.     Electronically signed by Adi Chinchilla RN on 1/7/2023 at 2:40 PM

## 2023-01-07 NOTE — PLAN OF CARE
Problem: Pain  Goal: Verbalizes/displays adequate comfort level or baseline comfort level  Outcome: Progressing     Problem: Safety - Adult  Goal: Free from fall injury  Outcome: Progressing     Problem: Cardiovascular - Adult  Goal: Maintains optimal cardiac output and hemodynamic stability  Outcome: Progressing     Problem: Cardiovascular - Adult  Goal: Absence of cardiac dysrhythmias or at baseline  Outcome: Progressing     Problem: Musculoskeletal - Adult  Goal: Return mobility to safest level of function  Outcome: Progressing

## 2023-01-07 NOTE — PLAN OF CARE
Problem: Discharge Planning  Goal: Discharge to home or other facility with appropriate resources  Outcome: Progressing  Flowsheets (Taken 1/7/2023 0848)  Discharge to home or other facility with appropriate resources:   Identify barriers to discharge with patient and caregiver   Arrange for needed discharge resources and transportation as appropriate   Identify discharge learning needs (meds, wound care, etc)   Arrange for interpreters to assist at discharge as needed   Refer to discharge planning if patient needs post-hospital services based on physician order or complex needs related to functional status, cognitive ability or social support system     Problem: Pain  Goal: Verbalizes/displays adequate comfort level or baseline comfort level  1/7/2023 1349 by Juani Daniels RN  Outcome: Progressing  1/7/2023 0654 by Esperanza Ward RN  Outcome: Progressing     Problem: Safety - Adult  Goal: Free from fall injury  1/7/2023 1349 by Juani Daniels RN  Outcome: Progressing  1/7/2023 0654 by Esperanza Ward RN  Outcome: Progressing     Problem: Hematologic - Adult  Goal: Maintains hematologic stability  Outcome: Progressing  Flowsheets (Taken 1/7/2023 0848)  Maintains hematologic stability:   Assess for signs and symptoms of bleeding or hemorrhage   Monitor labs for bleeding or clotting disorders   Administer blood products/factors as ordered     Problem: Cardiovascular - Adult  Goal: Maintains optimal cardiac output and hemodynamic stability  1/7/2023 1349 by Juani Daniels RN  Outcome: Progressing  Flowsheets (Taken 1/7/2023 0848)  Maintains optimal cardiac output and hemodynamic stability:   Monitor blood pressure and heart rate   Monitor urine output and notify Licensed Independent Practitioner for values outside of normal range   Assess for signs of decreased cardiac output   Administer fluid and/or volume expanders as ordered   Administer vasoactive medications as ordered   For PPHN infants, administer sedation as ordered and minimize all controllable stressors.   1/7/2023 0654 by Trenna Brittle, RN  Outcome: Progressing  Goal: Absence of cardiac dysrhythmias or at baseline  1/7/2023 1349 by Jessi Brown RN  Outcome: Progressing  Flowsheets (Taken 1/7/2023 0848)  Absence of cardiac dysrhythmias or at baseline:   Monitor cardiac rate and rhythm   Assess for signs of decreased cardiac output   Administer antiarrhythmia medication and electrolyte replacement as ordered  1/7/2023 0654 by Trenna Brittle, RN  Outcome: Progressing     Problem: Musculoskeletal - Adult  Goal: Return mobility to safest level of function  1/7/2023 1349 by Jessi Brown RN  Outcome: Progressing  Flowsheets (Taken 1/7/2023 0848)  Return Mobility to Safest Level of Function:   Assess patient stability and activity tolerance for standing, transferring and ambulating with or without assistive devices   Assist with transfers and ambulation using safe patient handling equipment as needed   Ensure adequate protection for wounds/incisions during mobilization   Obtain physical therapy/occupational therapy consults as needed   Apply continuous passive motion per provider or physical therapy orders to increase flexion toward goal   Instruct patient/family in ordered activity level  1/7/2023 0654 by Trenna Brittle, RN  Outcome: Progressing     Problem: Chronic Conditions and Co-morbidities  Goal: Patient's chronic conditions and co-morbidity symptoms are monitored and maintained or improved  Outcome: Progressing  Flowsheets (Taken 1/7/2023 0848)  Care Plan - Patient's Chronic Conditions and Co-Morbidity Symptoms are Monitored and Maintained or Improved:   Monitor and assess patient's chronic conditions and comorbid symptoms for stability, deterioration, or improvement   Collaborate with multidisciplinary team to address chronic and comorbid conditions and prevent exacerbation or deterioration   Update acute care plan with appropriate goals if chronic or comorbid symptoms are exacerbated and prevent overall improvement and discharge

## 2023-01-07 NOTE — PROGRESS NOTES
Patient discharged to home with home care. IV removed without complication. Dressing applied to both sites. Discharge instructions explained and all questions answered. Patient given paper prescriptions to fill at a pharmacy of his choosing. Patient will be driven home by family. Patient taken to lobby via wheel chair by PCA.

## 2023-01-07 NOTE — DISCHARGE INSTR - COC
Continuity of Care Form    Patient Name: Dalia Mcfarlane   :    MRN:  [id number]    Admit date:  2023  Discharge date:  2023      Code Status Order: Full Code   Advance Directives:     Admitting Physician:  Elzbieta Whitney MD  PCP: Danii Anguiano MD    Discharging Nurse: Casey Morris RN  [address] Unit/Room#: 7VS-4419/8654-61  Discharging Unit Phone Number: [phone]    Emergency Contact:   Extended Emergency Contact Information  Primary Emergency Contact: Edward Esteban  Mobile Phone: [phone]  Relation: Other Relative  Preferred language: English   needed? No    Past Surgical History:  Past Surgical History:   Procedure Laterality Date    CT GUIDED CHEST TUBE  3/1/2022    CT GUIDED CHEST TUBE 3/1/2022 [facility] CT SCAN       Immunization History: There is no immunization history on file for this patient. Active Problems:  Patient Active Problem List   Diagnosis Code    Multiple closed fractures of ribs of right side S22.41XA    Chronic bilateral low back pain without sciatica M54.50, G89.29    Primary hypertension I10    Accident due to mechanical fall without injury W19. XXXA    Closed fracture of multiple ribs of right side with routine healing S22.41XD    Tobacco abuse Z72.0    Closed traumatic displaced fracture of multiple ribs S22.49XA    Gastroesophageal reflux disease K21.9    Shortness of breath R06.02    Chest pain R07.9    Hydropneumothorax J94.8    Pneumothorax, traumatic S27. 0XXA    Hemothorax J94.2    Acute CVA (cerebrovascular accident) ([facility] [address] I63.9    ROSALINO (acute kidney injury) ([facility] [address] N17.9    Hypomagnesemia E83.42       Isolation/Infection:   Isolation            No Isolation          Patient Infection Status       Infection Onset Added Last Indicated Last Indicated By Review Planned Expiration Resolved Resolved By    None active    Resolved    COVID-19 (Rule Out) 23 COVID-19, Rapid (Ordered)   23 Rule-Out Test Resulted    COVID-19 (Rule Out) 02/28/22 02/28/22 02/28/22 COVID-19 & Influenza Combo (Ordered)   02/28/22 Rule-Out Test Resulted            Nurse Assessment:  Last Vital Signs: /83   Pulse 71   Temp 97.7 °F (36.5 °C) (Oral)   Resp 18   Ht 5' 11\" (1.803 m)   Wt 209 lb 11.2 oz (95.1 kg)   SpO2 94%   BMI 29.25 kg/m²     Last documented pain score (0-10 scale): Pain Level: 7  Last Weight:   Wt Readings from Last 1 Encounters:   01/06/23 209 lb 11.2 oz (95.1 kg)     Mental Status:  oriented, alert, coherent, logical, thought processes intact, and able to concentrate and follow conversation    IV Access:  - None    Nursing Mobility/ADLs:  Walking   Assisted  Transfer  Independent  Bathing  Independent  Dressing  Independent  [address]  Independent  Med Delivery   whole    Wound Care Documentation and Therapy:        Elimination:  Continence: Bowel: Yes  Bladder: Yes  Urinary Catheter: None   Colostomy/Ileostomy/Ileal Conduit: No       Date of Last BM: 01/07/2023      Intake/Output Summary (Last 24 hours) at 1/7/2023 1243  Last data filed at 1/7/2023 0704  Gross per 24 hour   Intake 529.91 ml   Output 3600 ml   Net -3070.09 ml     I/O last 3 completed shifts: In: 1330.7 [P.O.:222; I.V.:1108.7]  Out: 4800 [Urine:4800]    Safety Concerns:     History of Falls (last 30 days) and At Risk for Falls    Impairments/Disabilities:      None    Nutrition Therapy:  Current Nutrition Therapy:   - Oral Diet:  General    Routes of Feeding: Oral  Liquids: Thin Liquids  Daily Fluid Restriction: no  Last Modified Barium Swallow with Video (Video Swallowing Test): not done    Treatments at the Time of Hospital Discharge:   Respiratory Treatments:     Oxygen Therapy:  is not on home oxygen therapy.   Ventilator:    - No ventilator support    Rehab Therapies: SN, PT,OT,ST  Weight Bearing Status/Restrictions: No weight bearing restrictions  Other Medical Equipment (for information only, NOT a DME order):  walker  Other Treatments:HOME HEALTH CARE: LEVEL 3 SAFETY       -Initial home health evaluation to occur within 24-48 hours, in patient home   -Home health agency to establish plan of care for patient over 60 day period   -Medication Reconciliation   -PT/OT/Speech evaluations in home within 24-48 hours of discharge; including  -DME and home safety   -Frontload therapy 5 days, then 3x a week   -OT to evaluate if patient has [address] needs for personal care   - evaluation within 24-48 hours, includes evaluation of resources   and insurance to determine [place], [place], LTC, and Medicaid options   -PCP Visit scheduled within three to seven days of discharge   -Telehealth-Homecare Vitals(If patient is agreeable and meets guidelines)       Patient's personal belongings (please select all that are sent with patient):  Dentures upper and lower    RN SIGNATURE:  Electronically signed by Gabbie Juarez RN on 1/7/23 at 2:12 PM EST    CASE MANAGEMENT/SOCIAL WORK SECTION    Inpatient Status Date: 1/5/2023    Readmission Risk Assessment Score: 12  Readmission Risk              Risk of Unplanned Readmission:  12           Discharging to Facility/ [address])  [address] [address]  Phone: [phone]  Fax: [address] Provider:    [place]  [address] [place], [address] [place] [address]  P: [phone]  F: [phone]               / signature: Electronically signed by Pepper Hamilton RN on 1/7/23 at 2:38 PM EST    PHYSICIAN SECTION    Prognosis: Fair    Condition at Discharge: Stable    Rehab Potential (if transferring to Rehab):  Fair    Recommended Labs or Other Treatments After Discharge:     Physician Certification: I certify the above information and transfer of Lenard Henao  is necessary for the continuing treatment of the diagnosis listed and that he requires 1 [facility] for [facility] 30 days.      Update Admission H&P: No change in H&P    PHYSICIAN SIGNATURE:  Electronically signed by Gagan Dinero MD on 1/7/23 at 12:43 PM EST

## 2023-01-07 NOTE — PROGRESS NOTES
[facility]   Phone: [phone]    Physical Therapy    [] Initial Evaluation            [x] Daily Treatment Note         [] Discharge Summary      Patient: Dakota Blas   : 1955   MRN: [id number]   Date of Service:  2023  Admitting Diagnosis: Acute CVA (cerebrovascular accident) [facility])  Current Admission Summary: 76 yo male admitted to [facility] from [place]. [place] [facility] after fall at home with (L) sided weakness. Pt feels he has had a stroke. BP elevated. Past Medical History:  has a past medical history of COPD (chronic obstructive pulmonary disease) ([facility] [address] Diabetic peripheral neuropathy ([facility] [address] Hepatitis C, and Hypertension. Past Surgical History:  has a past surgical history that includes CT GUIDED CHEST TUBE (3/1/2022). Discharge Recommendations: Dakota Blas scored a 21/24 on the AM-PAC short mobility form. Current research shows that an AM-PAC score of 18 or greater is typically associated with a discharge to the patient's home setting. Based on the patient's AM-PAC score and their current functional mobility deficits, it is recommended that the patient have 2-3 sessions per week of Physical Therapy at d/c to increase the patient's independence. At this time, this patient demonstrates the endurance and safety to discharge home with home [facility] PT services, however. pt may refuse and is expressing an interest in OP PT services and a follow up treatment frequency of 2-3x/wk. Please see assessment section for further patient specific details. If patient discharges prior to next session this note will serve as a discharge summary. Please see below for the latest assessment towards goals.        DME Required For Discharge: rolling walker    Precautions/Restrictions: high fall risk (recommended)  Weight Bearing Restrictions: no restrictions  [] Right Upper Extremity  [] Left Upper Extremity [] Right Lower Extremity  [] Left Lower Extremity Required Braces/Orthotics: no braces required   [] Right  [] Left  Positional Restrictions:no positional restrictions    Pre-Admission Information   Lives With: alone                     Type of Home:  travel/camper besides brother's house   Home Layout: one level  Home Access:  3 step to enter without rails   Bathroom Layout: tub/shower unit-- at brother's house   Bathroom Equipment: grab bars in shower  Toilet Height: standard height  Home Equipment: single point cane  Transfer Assistance: Independent without use of device  Ambulation Assistance:Independent without use of device  ADL Assistance: independent with all ADL's  IADL Assistance: independent with homemaking tasks  Active :        [x] Yes                 [] No  Hand Dominance: [] Left                 [x] Right  Current Employment: disabled for 21 years  Hobbies: watch TV, drive around in the car. Lives in [place]. [place]   Recent Falls: reports 2 major falls in the last 3 weeks.  Stated left side is \"collapsing\" and down I go     Examination   Vision:   Vision Gross Assessment: Impaired and Vision Corrective Device: wears glasses at all times  Visual tracking WNL  Hearing:   WFL  Observation:   General Observation:  Pt on RA throughout session, peripheral IV line  Posture:   Good - some guarding of (R) shoulder noted, due to shoulder pain since fall at home  Sensation:   accurately detects gross touch to all lower extremities  Numbness/tingling in (L) ring and little finger  Proprioception:    diminished proprioception in (L) UE  Tone:   Normotonic  Coordination Testing:   Finger to Nose: Impaired on Left with eyes open, deviation greater with eyes closed x3 trials  Nose to therapist's finger with static and varying target: impaired on (L)  Alternating Pronation/Supination: WFL  Finger/Thumb Opposition: WFL  Heel to Shin: Impaired on Left  Alternating Toe Tapping: Impaired on Left    ROM:   (B) LE AROM WFL  Strength:   (L) Hip flex: -4        (R) Hip flex: -4  (L) Knee flex: +4     (R) Knee flex: 5  (L) Knee ext: 4     (R) Knee ext: 5  (L) Ankle DF: +4     (R) Ankle DF: 5  Therapist Clinical Decision Making (Complexity): medium complexity  Clinical Presentation: evolving      Subjective  General: Pt supine in bed upon arrival, agreeable to PT and requesting to use bathroom. Pain: Patient does not rate upon questioning; L shoulder   Pain Interventions: repositioned        Functional Mobility  Bed Mobility  Supine to Sit: modified independent  Sit to Supine: modified independent  Scooting: modified independent  Comments: Bed flat, increased time   Transfers  Sit to stand transfer: supervision  Stand to sit transfer: supervision  Toilet transfer: supervision  Comments: verbal cues for hand placement with RW and alignment with RW prior to standing     Ambulation   Surface:level surface  Assistive Device: rolling walker  Assistance: contact guard assistance  Distance: 175'   Gait Mechanics: Fast julius, improved stability, decreased (L) stance time, forward flexed posture present but improved  Comments:  Overall improve stability and moderate improvement in gait deviations. (L) LE continues to be unstable but pt able to compensate with UE support. Stair Mobility  Number of Steps: 3  Step Height: 6 inch  Hand Rails: None  Assistance: contact guard assistance  Comments:  Wheelchair Mobility:  No w/c mobility completed on this date.   Comments:  Balance  Static Sitting Balance: good: independent with functional balance in unsupported position  Dynamic Sitting Balance: good: independent with functional balance in unsupported position  Static Standing Balance: fair (+): maintains balance at SBA/supervision without use of UE support  Dynamic Standing Balance: fair (-): maintains balance at CGA with use of UE support  Comments: Pt impulsive with movement, especially with turns which reduces dynamic balance     Other Therapeutic Interventions  Pt completed toileting at start of session. Functional Outcomes  AM-PAC Inpatient Mobility Raw Score : 21              Cognition  WFL  Orientation:    alert and oriented x 4  Command Following:   [facility]    Education  Barriers To Learning: none  Patient Education: patient educated on goals, PT role and benefits, plan of care, general safety, functional mobility training, proper use of assistive device/equipment, disease specific education, transfer training, discharge recommendations  Learning Assessment:  patient verbalizes understanding, would benefit from continued reinforcement    Assessment  Activity Tolerance: Further activity limited by elevated BP. Sitting EOB SpO2 93% on RA, HR 86 bpm, /109. RN notified, pt with no further medications at this time, RN to discuss with MD. Pt returned to bed but asymptomatic. Impairments Requiring Therapeutic Intervention: decreased functional mobility, decreased ADL status, decreased strength, decreased endurance, decreased balance, decreased fine motor control, decreased coordination, decreased posture  Prognosis: good  Clinical Assessment: Pt showing improved functional mobility on this date completing bed mobility at [facility], supervision for transfers, and CGA for ambulation and stairs with RW. Pt was initially recommended to go to ARU but is demonstrating safe functional mobility to return home with 24 hour supervision/assist as needed. Pt educated on HHPT vs OPPT services and is recommended to receive HHPT initially and then to begin OP PT to progress ambulation with reduce need for RW; pt also with increased L shoulder pain due to fall and imaging is pending. Pt would continue to benefit from skilled PT services to improve safety with functional mobility.    Safety Interventions: patient left in bed, bed alarm in place, call light within reach, gait belt, patient at risk for falls, and nurse notified    Plan  Frequency: 5-7 x/week  Current Treatment Recommendations: strengthening, balance training, functional mobility training, transfer training, gait training, stair training, endurance training, neuromuscular re-education, patient/caregiver education, home exercise program, safety education, and equipment evaluation/education    Goals  Patient Goals: None stated   Short Term Goals:  Time Frame: Before discharge  Patient will complete bed mobility at Independent   Patient will complete transfers at stand by assistance MET 1/7  Patient will ambulate 50 ft with use of rolling walker at supervision  Patient will ascend/descend 3 stairs without use of HR at minimal assistance with SPC (lives in [place], unable to use RW on stairs) MET 1/7  Patient to maintain standing at contact guard assistance for 5 minutes with unilateral UE support in order to complete ADLs  2 goals met 1/7    Therapy Session Time      Individual Group Co-treatment   Time In 1158       Time Out 1226       Minutes 28         Timed Code Treatment Minutes:   28 minutes   Total Treatment Minutes:  28 minutes       Electronically Signed By: Mary Carmen Horton [address] [place] [address], DPT [id number]

## 2023-01-07 NOTE — PROGRESS NOTES
[place]  Neurology Follow-up  [facility] Neurology    Date of Service: 1/7/2023    Subjective:   CC: Follow up today regarding: Acute left-sided weakness and new stroke    Events noted. Chart and lab reviewed. The patient had his MRI of the brain which showed acute right ischemic MCA stroke. He feels better today. Mild weakness of the left side. No headache, dysphagia or dysarthria, no neck or back pain. Other review of system was unremarkable. ROS : A 10-12 system review obtained and updated today and is unremarkable except as mentioned  in my interval history. Past medical history, social history, medication and family history reviewed. Objective:  Exam:   Constitutional:   Vitals:    01/07/23 0328 01/07/23 0700 01/07/23 0852 01/07/23 1150   BP: (!) 150/82 (!) 157/81 (!) 157/81 124/83   Pulse: 73 69 74 71   Resp: 18 18 18    Temp: 97.8 °F (36.6 °C) 97.5 °F (36.4 °C)  97.7 °F (36.5 °C)   TempSrc: Oral Oral  Oral   SpO2: 94% 94%  94%   Weight:       Height:         General appearance:  Normal development and appear in no acute distress. Mental Status:   Oriented to person, place, problem, and time. Memory: Good immediate recall. Intact remote memory  Normal attention span and concentration. Language: intact naming, repeating and fluency   Good fund of Knowledge. Cranial Nerves:   II:   Pupils: equal, round, reactive to light  III,IV,VI: Extra Ocular Movements are intact. No nystagmus  V: Facial sensation is intact  VII: Facial strength and movements: intact and symmetric  XII: Tongue movements are normal  Musculoskeletal: 5/5 in the right side. Left-sided weakness 4/5, the same . Tone: Normal tone. Reflexes: Symmetric 2+ in both arms and legs.   Coordination: no pronator drift, no dysmetria with FNF  Sensation: normal.  Gait/Posture: unsteady gait        Data:  LABS:   Lab Results   Component Value Date/Time     01/07/2023 05:07 AM    K 4.3 01/07/2023 05:07 AM  01/07/2023 05:07 AM    CO2 25 01/07/2023 05:07 AM    BUN 15 01/07/2023 05:07 AM    CREATININE 0.8 01/07/2023 05:07 AM    GFRAA >60 03/03/2022 05:35 AM    LABGLOM >60 01/07/2023 05:07 AM    GLUCOSE 134 01/07/2023 05:07 AM    MG 1.60 01/06/2023 06:58 AM    CALCIUM 8.9 01/07/2023 05:07 AM     Lab Results   Component Value Date/Time    WBC 7.4 01/07/2023 05:07 AM    RBC 5.01 01/07/2023 05:07 AM    HGB 14.6 01/07/2023 05:07 AM    HCT 44.4 01/07/2023 05:07 AM    MCV 88.6 01/07/2023 05:07 AM    RDW 16.9 01/07/2023 05:07 AM     01/07/2023 05:07 AM     Lab Results   Component Value Date    INR 1.02 01/05/2023    PROTIME 13.3 01/05/2023       Neuroimaging was independently reviewed by me and discussed results with the patient  I reviewed blood testing and other test results and discussed results with the patient      Impression:  Acute ischemic right MCA stroke with left-sided weakness and ataxia  Hypertension, not controlled  Hyperlipidemia        Recommendation  Aspirin  Statin  Agree with inpatient rehab  PT and OT  Telemetry  Blood pressure control  Continue home blood pressure medications  DVT and GI prophylaxis  Stroke education provided today including risk of recurrence and prevention  DC planning when medically stable  No further recommendation           Mario Taylor MD   [phone]      This dictation was generated by voice recognition computer software. Although all attempts are made to edit the dictation for accuracy, there may be errors in the transcription that are not intended.

## 2023-01-08 NOTE — DISCHARGE SUMMARY
Hospital Medicine Discharge Summary    Patient: Troy Saldaña     Gender: male  : 1955   Age: 75 y.o. MRN: [id number]    Admitting Physician: Julita Arambula MD  Discharge Physician: Julita Arambula MD     Code Status: Full code    Admit Date: 2023   Discharge Date: 2023      Disposition:  Home    Discharge Diagnoses: Active Hospital Problems    Diagnosis Date Noted    Acute CVA (cerebrovascular accident) ([facility] [address] [I63.9] 2023     Priority: Medium    ROSALINO (acute kidney injury) ([facility] [address] [N17.9] 2023     Priority: Medium    Hypomagnesemia [E83.42] 2023     Priority: Medium    Primary hypertension [I10] 2022       Follow-up appointments:  one week    Outpatient to do list: F/U with PCP    Condition at Discharge:  Stable    Hospital Course:   75 y.o. male with history of COPD, DM 2, HTN, Hep C, diabetic neuropathy went to [facility] ER with complaints of LUE/LLE weakness and numbness. Admitted as inpatient for acute CVA. Followed by Neuro, PT/OT and SLP. MRI Brain:  1. Small acute to subacute infarct involving the right posterior frontal   corona radiata extending along the posterior right basal ganglia. No   significant mass effect or midline shift. 2. Otherwise, no acute intracranial abnormality. 3. There is a chronic infarct involving the bilateral frontal lobes, right   greater than left. 4. Minimal global parenchymal volume loss with mild chronic microvascular   ischemic changes. Carotid US:  Right   The right internal carotid artery appears to have a <50% diameter reducing   stenosis based on velocity criteria. The right vertebral artery demonstrates normal antegrade flow. The right subclavian artery is visualized and demonstrates multiphasic. Heterogeneous plaque is noted. Left   The left internal carotid artery appears to have a <50% diameter reducing   stenosis based on velocity criteria.    The left vertebral artery demonstrates normal antegrade flow. The left subclavian artery is visualized and demonstrates multiphasic flow. Heterogeneous plaque is noted. Will continue on ASA, Lipitor. Started on Coreg, Hydralazine and Nifedipine for HTN. Written for rolling walker, home PT/OT/SLP. F/U with PCP. Discharge Medications:   Discharge Medication List as of 1/7/2023  2:46 PM        START taking these medications    Details   aspirin 81 MG EC tablet Take 1 tablet by mouth daily, Disp-30 tablet, R-0Print      atorvastatin (LIPITOR) 80 MG tablet Take 1 tablet by mouth nightly, Disp-30 tablet, R-0Print      carvedilol (COREG) 12.5 MG tablet Take 1 tablet by mouth 2 times daily (with meals), Disp-60 tablet, R-0Print      hydrALAZINE (APRESOLINE) 50 MG tablet Take 1 tablet by mouth every 8 hours, Disp-90 tablet, R-0Print      NIFEdipine (PROCARDIA XL) 30 MG extended release tablet Take 1 tablet by mouth in the morning and at bedtime, Disp-60 tablet, R-0Print           Discharge Medication List as of 1/7/2023  2:46 PM        Discharge Medication List as of 1/7/2023  2:46 PM        CONTINUE these medications which have NOT CHANGED    Details   oxyCODONE-acetaminophen (PERCOCET) 7.5-325 MG per tablet Take 1 tablet by mouth 4 times daily as needed for Pain. Historical Med      albuterol sulfate HFA (PROVENTIL;VENTOLIN;PROAIR) 108 (90 Base) MCG/ACT inhaler Inhale 2 puffs into the lungs 4 times daily as needed for Wheezing or Shortness of BreathHistorical Med      DULoxetine (CYMBALTA) 60 MG extended release capsule Take 60 mg by mouth at bedtimeHistorical Med      gabapentin (NEURONTIN) 300 MG capsule Take 300 mg by mouth 4 times daily.  3 capsules per dose to total 900mg 4 times a dayHistorical Med      mirtazapine (REMERON) 15 MG tablet Take 15 mg by mouth nightlyHistorical Med      omeprazole (PRILOSEC) 20 MG delayed release capsule Take 20 mg by mouth 2 times dailyHistorical Med      terazosin (HYTRIN) 5 MG capsule Take 5 mg by mouth 2 times dailyHistorical Med      traZODone (DESYREL) 50 MG tablet Take 150 mg by mouth nightlyHistorical Med           Discharge Medication List as of 1/7/2023  2:46 PM        STOP taking these medications       clonazePAM (KLONOPIN) 0.5 MG tablet Comments:   Reason for Stopping:         hydrOXYzine (ATARAX) 25 MG tablet Comments:   Reason for Stopping:         lisinopril (PRINIVIL;ZESTRIL) 20 MG tablet Comments:   Reason for Stopping:         melatonin 3 MG TABS tablet Comments:   Reason for Stopping:         tiZANidine (ZANAFLEX) 4 MG tablet Comments:   Reason for Stopping:             Discharge Exam:    /83   Pulse 71   Temp 97.7 °F (36.5 °C) (Oral)   Resp 18   Ht 5' 11\" (1.803 m)   Wt 209 lb 11.2 oz (95.1 kg)   SpO2 94%   BMI 29.25 kg/m²   General appearance:  Appears comfortable. Well nourished  Eyes: Sclera clear, pupils equal  ENT: Moist mucus membranes, no thrush. Trachea midline. Cardiovascular: Regular rhythm, normal S1, S2. No murmur, gallop, rub. No edema in lower extremities  Respiratory: Clear to auscultation bilaterally, no wheeze, good inspiratory effort  Gastrointestinal: Abdomen soft, non-tender, not distended, normal bowel sounds  Musculoskeletal: No cyanosis in digits, neck supple  Neurology: Decreased sensation on LUE/LLE vs RUE/RLE.  5/5 all extremities. Psychiatry: Appropriate affect. Not agitated  Skin: Warm, dry, normal turgor, no rash  Brisk capillary refill, peripheral pulses palpable     Labs:  For convenience and continuity at follow-up the following most recent labs are provided:    Lab Results   Component Value Date/Time    WBC 7.4 01/07/2023 05:07 AM    HGB 14.6 01/07/2023 05:07 AM    HCT 44.4 01/07/2023 05:07 AM    MCV 88.6 01/07/2023 05:07 AM     01/07/2023 05:07 AM     01/07/2023 05:07 AM    K 4.3 01/07/2023 05:07 AM     01/07/2023 05:07 AM    CO2 25 01/07/2023 05:07 AM    BUN 15 01/07/2023 05:07 AM    CREATININE 0.8 01/07/2023 05:07 AM    CALCIUM 8.9 01/07/2023 05:07 AM    ALKPHOS 132 01/05/2023 06:50 AM    ALT 68 01/05/2023 06:50 AM    AST 57 01/05/2023 06:50 AM    BILITOT 0.4 01/05/2023 06:50 AM    BILIDIR <0.2 03/01/2022 05:00 AM    LABALBU 4.4 01/05/2023 06:50 AM    LDLCALC 98 01/06/2023 06:58 AM    TRIG 272 01/06/2023 06:58 AM     Lab Results   Component Value Date    INR 1.02 01/05/2023    INR 1.03 03/01/2022       Radiology:  CT HEAD WO CONTRAST    Result Date: 1/5/2023  EXAMINATION: CT OF THE HEAD WITHOUT CONTRAST  1/5/2023 7:29 am TECHNIQUE: CT of the head was performed without the administration of intravenous contrast. Automated exposure control, iterative reconstruction, and/or weight based adjustment of the mA/kV was utilized to reduce the radiation dose to as low as reasonably achievable. COMPARISON: None. HISTORY: ORDERING SYSTEM PROVIDED HISTORY: Stroke Symptoms TECHNOLOGIST PROVIDED HISTORY: Has a \"code stroke\" or \"stroke alert\" been called? ->Yes Reason for exam:->Stroke Symptoms Decision Support Exception - unselect if not a suspected or confirmed emergency medical condition->Emergency Medical Condition (MA) Reason for Exam: weakness for over a weak FINDINGS: BRAIN/VENTRICLES: There is no acute intracranial hemorrhage, mass effect or midline shift. No abnormal extra-axial fluid collection. The gray-white differentiation is maintained without evidence of an acute infarct. There is no evidence of hydrocephalus. Remote right frontal infarct. Mild small vessel ischemic change the brain. ORBITS: No acute abnormality in the orbits. SINUSES: The visualized paranasal sinuses and mastoid air cells demonstrate no acute abnormality. SOFT TISSUES/SKULL:  No acute abnormality of the visualized skull or soft tissues. No acute intracranial abnormality. Remote right frontal infarct. Mild small vessel ischemic changes in the brain.      CT CERVICAL SPINE WO CONTRAST    Result Date: 1/5/2023  EXAMINATION: CT OF THE CERVICAL SPINE WITHOUT CONTRAST 1/5/2023 7:42 am TECHNIQUE: CT of the cervical spine was performed without the administration of intravenous contrast. Multiplanar reformatted images are provided for review. Automated exposure control, iterative reconstruction, and/or weight based adjustment of the mA/kV was utilized to reduce the radiation dose to as low as reasonably achievable. COMPARISON: None. HISTORY: ORDERING SYSTEM PROVIDED HISTORY: falls TECHNOLOGIST PROVIDED HISTORY: Reason for exam:->falls Decision Support Exception - unselect if not a suspected or confirmed emergency medical condition->Emergency Medical Condition (MA) Reason for Exam: weakness for over a weak FINDINGS: BONES/ALIGNMENT: Prior surgical change of ACDF at both C3-4 and at C5-6. No evidence of hardware fracture or complication. There is no acute fracture throughout the cervical spine. DEGENERATIVE CHANGES: Degenerative disc disease and spondylosis is demonstrated throughout. Facet DJD is also asymmetric on the left. Mild degree of spinal canal stenosis throughout the cervical spine. SOFT TISSUES: Paraspinal soft tissues are normal.  The lung apices are clear. 1. No acute finding of the cervical spine. 2. Postsurgical and degenerative changes are chronic. XR SHOULDER LEFT (MIN 2 VIEWS)    Result Date: 1/7/2023  EXAMINATION: 3 XRAY VIEWS OF THE LEFT SHOULDER 1/7/2023 11:33 am COMPARISON: None. HISTORY: ORDERING SYSTEM PROVIDED HISTORY: left shoulder pain TECHNOLOGIST PROVIDED HISTORY: Reason for exam:->left shoulder pain Reason for Exam: left shoulder pain FINDINGS: No acute fracture or dislocation. The glenohumeral and acromioclavicular joints are in anatomic alignment with degenerative disease. Visualized soft tissues and lung space are unremarkable. No acute osseous abnormality.      XR CHEST PORTABLE    Result Date: 1/5/2023  EXAMINATION: ONE XRAY VIEW OF THE CHEST 1/5/2023 7:01 am COMPARISON: March 3, 2022 HISTORY: ORDERING SYSTEM PROVIDED HISTORY: weakness TECHNOLOGIST PROVIDED HISTORY: Reason for exam:->weakness Reason for Exam: weakness FINDINGS: Mild basilar atelectasis versus infiltrates. Mild improvement from prior comparison. Trace bilateral pleural effusions likely present. There is no pneumothorax. Normal cardiac size. Significant osteopenic changes and degenerative changes noted in the bony structures. .  Old right mid rib fractures. Mild improvement from priors. Persistent mild basilar atelectasis versus infiltrates. Bilateral trace pleural effusions. Vascular carotid duplex bilateral    Result Date: 1/6/2023  Vascular Carotid Procedure -- PRELIMINARY SONOGRAPHER REPORT --   Demographics   Patient Name       Urbano Leak   Date of Study      01/06/2023        Gender              Male   Patient Number     [id number]        Date of Birth       1955   Visit Number       [id number]         Age                 75 year(s)   Accession Number   [id number]        Room Number         2US-0431   Corporate ID       [id number]           Sonographer         Jose Guadalupe Paris,                                                           RACHELLET, RT   Ordering Physician Gladis Aguilera MD  Interpreting        Gracie Aranda MD                                       Physician  Procedure Type of Study:   Cerebral:Carotid, VL CAROTID DUPLEX BILATERAL. Tech Comments Right The right internal carotid artery appears to have a <50% diameter reducing stenosis based on velocity criteria. The right vertebral artery demonstrates normal antegrade flow. The right subclavian artery is visualized and demonstrates multiphasic. Heterogeneous plaque is noted. Left The left internal carotid artery appears to have a <50% diameter reducing stenosis based on velocity criteria. The left vertebral artery demonstrates normal antegrade flow. The left subclavian artery is visualized and demonstrates multiphasic flow. Heterogeneous plaque is noted.  Velocities are measured in cm/s ; Diameters are measured in mm Carotid Right Measurements +---------------+----+----+-----+----+ ! Location       ! PSV ! EDV ! Angle! RI  ! +---------------+----+----+-----+----+ ! Prox CCA       !90. 1!26. 7! 60   !0.7 ! +---------------+----+----+-----+----+ ! Mid CCA        !60.9!19. 9!60   !0.67! +---------------+----+----+-----+----+ ! Dist CCA       !60.4!16. 7!60   !0.72! +---------------+----+----+-----+----+ ! Prox ICA       !77. 9!25. 6! 60   !0.7 ! +---------------+----+----+-----+----+ ! Mid ICA        !75. 2!63. 1! 60   !0.62! +---------------+----+----+-----+----+ ! Dist ICA       !79.4!23. 1! 50   !0.71! +---------------+----+----+-----+----+ ! Prox ECA       !107 !17. 4!50   !0.84! +---------------+----+----+-----+----+ ! Vertebral      !45. 2!12. 8!60   !0.72! +---------------+----+----+-----+----+ ! Prox Subclavian!97. 5!1.63!50   !0.98! +---------------+----+----+-----+----+   - There is antegrade vertebral flow noted on the right side. - Additional Measurements:ICAPSV/CCAPSV 1.3. ICAEDV/CCAEDV 1.09. Carotid Left Measurements +---------------+----+----+-----+----+ ! Location       ! PSV ! EDV ! Angle! RI  ! +---------------+----+----+-----+----+ ! Prox CCA       !89  !21. 7!60   !0.76! +---------------+----+----+-----+----+ ! Mid CCA        !80.6!20. 3! 60   !0.75! +---------------+----+----+-----+----+ ! Dist CCA       !84. 1!23. 1! 60   !0.73! +---------------+----+----+-----+----+ ! Prox ICA       !58. 4!20. 1! 54   !0.66! +---------------+----+----+-----+----+ ! Mid ICA        !59. 6!78. 1! 54   !0.56! +---------------+----+----+-----+----+ ! Dist ICA       !78  !23. 3!54   !0.7 ! +---------------+----+----+-----+----+ ! Prox ECA       !140 !33. 3!54   !0.76! +---------------+----+----+-----+----+ ! Vertebral      !43. 5!12. 1! 54   !0.72! +---------------+----+----+-----+----+ ! Prox Subclavian! 123 ! 107 !54   !0.13! +---------------+----+----+-----+----+   - There is antegrade vertebral flow noted on the left side.    - Additional Measurements:ICAPSV/CCAPSV 0.97. ICAEDV/CCAEDV 1.2. MRI brain without contrast    Result Date: 1/6/2023  EXAMINATION: MRI OF THE BRAIN WITHOUT CONTRAST  1/6/2023 1:32 pm TECHNIQUE: Multiplanar multisequence MRI of the brain was performed without the administration of intravenous contrast. COMPARISON: None. HISTORY: ORDERING SYSTEM PROVIDED HISTORY: Acute CVA TECHNOLOGIST PROVIDED HISTORY: Reason for exam:->Acute CVA Reason for Exam: acute CVA Initial evaluation. FINDINGS: INTRACRANIAL STRUCTURES/VENTRICLES: There is a small acute to subacute infarct involving the right posterior frontal corona radiata extending along the posterior right basal ganglia. There is no significant mass effect or midline shift. Areas of chronic infarct are seen involving the frontal lobes bilaterally, right more than left. Areas of T2 FLAIR hyperintensity are seen in the periventricular and subcortical white matter, which are nonspecific, but may represent chronic microvascular ischemic change. There is minimal global parenchymal volume loss. The normal signal voids within the major intracranial vessels appear maintained. No acute abnormality within the sellar or suprasellar region. ORBITS: The visualized portion of the orbits demonstrate no acute abnormality. SINUSES: The visualized paranasal sinuses and mastoid air cells demonstrate no acute abnormality. BONES/SOFT TISSUES: The bone marrow signal intensity appears normal. The soft tissues demonstrate no acute abnormality. 1. Small acute to subacute infarct involving the right posterior frontal corona radiata extending along the posterior right basal ganglia. No significant mass effect or midline shift. 2. Otherwise, no acute intracranial abnormality. 3. There is a chronic infarct involving the bilateral frontal lobes, right greater than left. 4. Minimal global parenchymal volume loss with mild chronic microvascular ischemic changes.        The patient was seen and examined on day of discharge and this discharge summary is in conjunction with any daily progress note from day of discharge. Time Spent on discharge is 45 minutes  in the examination, evaluation, counseling and review of medications and discharge plan. Note that more than 30 minutes was spent in preparing discharge papers, discussing discharge with patient, medication review, etc.       Signed:    Janna Littlejohn MD   1/7/2023      Thank you Arjun Mahan MD for the opportunity to be involved in this patient's care.  If you have any questions or concerns please feel free to contact me at [address] [place] [address]

## 2023-01-09 ENCOUNTER — CARE COORDINATION (OUTPATIENT)
Dept: CASE MANAGEMENT | Age: 68
End: 2023-01-09

## 2023-01-09 NOTE — CARE COORDINATION
[facility].Aware of discharge with home care. Home care orders sent to [facility]. Discharge planner notified.

## 2023-01-09 NOTE — CARE COORDINATION
[facility] Care Transitions Initial Follow Up Call    Call within 2 business days of discharge: Yes  Patient: Ariel Roe Patient : 1955   MRN: [id number]  Reason for Admission:   Discharge Date: 23 RARS: Readmission Risk Score: 11.8      Last Discharge  Street       Date Complaint Diagnosis Description Type Department Provider    23  Acute CVA (cerebrovascular accident) [facility]) Admission (Discharged) Katt Brown MD    23 Extremity Weakness Left-sided weakness ED (TRANSFER) [address] [facility] Beatriz Eagle MD        Initial 24 hr call attempted,no answer, no vm. Follow Up  No future appointments.     Mearl Ormond, RN

## 2023-01-10 ENCOUNTER — CARE COORDINATION (OUTPATIENT)
Dept: CASE MANAGEMENT | Age: 68
End: 2023-01-10

## 2023-01-10 NOTE — CARE COORDINATION
[facility] Care Transitions Initial Follow Up Call    Call within 2 business days of discharge: Yes       Patient: Shabnam Campos Patient : 1955   MRN: [id number]  Reason for Admission:   Discharge Date: 23 RARS: Readmission Risk Score: 11.8      Last Discharge  Street       Date Complaint Diagnosis Description Type Department Provider    23  Acute CVA (cerebrovascular accident) [facility]) Admission (Discharged) Jeff Boateng MD    23 Extremity Weakness Left-sided weakness ED (TRANSFER) [facility] ED Lali Elizabeth MD          2nd and final attempt at an initial 24 hour call, no answer, no vm.       Oliver Luz, RN

## 2023-01-13 NOTE — CARE COORDINATION
[facility] called patient to start home care, and patient refused , stating he was already active with another agency.

## 2023-01-13 NOTE — CARE COORDINATION
CM received call from Loni Lindo [phone] at [address] questioning why pt was set up with [address] [facility]) at recent DC. Per Loni Lindo 8141, they have been set up for [facility] thru the [place]. Upon deep dive into chart, CM was able to identify that [facility] is not listed as a payer source and  pt did not inform CM/SW that he was covered under [place] or active with any [facility]. Loni Forman requested copy of pt's AVS to be faxed to [phone].       Electronically signed by Yane Arzate RN on 1/13/2023 at 11:14 AM

## 2023-03-24 ENCOUNTER — APPOINTMENT (OUTPATIENT)
Dept: GENERAL RADIOLOGY | Age: 68
DRG: 682 | End: 2023-03-24
Payer: MEDICARE

## 2023-03-24 ENCOUNTER — APPOINTMENT (OUTPATIENT)
Dept: CT IMAGING | Age: 68
DRG: 682 | End: 2023-03-24
Payer: MEDICARE

## 2023-03-24 ENCOUNTER — HOSPITAL ENCOUNTER (INPATIENT)
Age: 68
LOS: 2 days | Discharge: HOME HEALTH | DRG: 682 | End: 2023-03-26
Attending: STUDENT IN AN ORGANIZED HEALTH CARE EDUCATION/TRAINING PROGRAM | Admitting: INTERNAL MEDICINE
Payer: MEDICARE

## 2023-03-24 DIAGNOSIS — E87.6 HYPOKALEMIA: ICD-10-CM

## 2023-03-24 DIAGNOSIS — R55 PRE-SYNCOPE: ICD-10-CM

## 2023-03-24 DIAGNOSIS — E83.42 HYPOMAGNESEMIA: ICD-10-CM

## 2023-03-24 DIAGNOSIS — J18.9 PNEUMONIA DUE TO INFECTIOUS ORGANISM, UNSPECIFIED LATERALITY, UNSPECIFIED PART OF LUNG: Primary | ICD-10-CM

## 2023-03-24 LAB
ALBUMIN SERPL-MCNC: 4.2 G/DL (ref 3.4–5)
ALBUMIN/GLOB SERPL: 1.4 {RATIO} (ref 1.1–2.2)
ALP SERPL-CCNC: 131 U/L (ref 40–129)
ALT SERPL-CCNC: 33 U/L (ref 10–40)
ANION GAP SERPL CALCULATED.3IONS-SCNC: 17 MMOL/L (ref 3–16)
AST SERPL-CCNC: 37 U/L (ref 15–37)
BASOPHILS # BLD: 0.1 K/UL (ref 0–0.2)
BASOPHILS NFR BLD: 0.8 %
BILIRUB SERPL-MCNC: 0.3 MG/DL (ref 0–1)
BUN SERPL-MCNC: 21 MG/DL (ref 7–20)
CALCIUM SERPL-MCNC: 9 MG/DL (ref 8.3–10.6)
CHLORIDE SERPL-SCNC: 93 MMOL/L (ref 99–110)
CO2 SERPL-SCNC: 26 MMOL/L (ref 21–32)
CREAT SERPL-MCNC: 2.8 MG/DL (ref 0.8–1.3)
DEPRECATED RDW RBC AUTO: 16.9 % (ref 12.4–15.4)
EKG ATRIAL RATE: 48 BPM
EKG DIAGNOSIS: NORMAL
EKG P AXIS: 54 DEGREES
EKG P-R INTERVAL: 164 MS
EKG Q-T INTERVAL: 612 MS
EKG QRS DURATION: 96 MS
EKG QTC CALCULATION (BAZETT): 546 MS
EKG R AXIS: 22 DEGREES
EKG T AXIS: 71 DEGREES
EKG VENTRICULAR RATE: 48 BPM
EOSINOPHIL # BLD: 0 K/UL (ref 0–0.6)
EOSINOPHIL NFR BLD: 0.1 %
FLUAV RNA RESP QL NAA+PROBE: NOT DETECTED
FLUBV RNA RESP QL NAA+PROBE: NOT DETECTED
GFR SERPLBLD CREATININE-BSD FMLA CKD-EPI: 24 ML/MIN/{1.73_M2}
GLUCOSE BLD-MCNC: 151 MG/DL (ref 70–99)
GLUCOSE BLD-MCNC: 164 MG/DL (ref 70–99)
GLUCOSE SERPL-MCNC: 148 MG/DL (ref 70–99)
HCT VFR BLD AUTO: 38.4 % (ref 40.5–52.5)
HGB BLD-MCNC: 12.9 G/DL (ref 13.5–17.5)
LACTATE BLDV-SCNC: 2.8 MMOL/L (ref 0.4–1.9)
LACTATE BLDV-SCNC: 3.4 MMOL/L (ref 0.4–2)
LACTATE BLDV-SCNC: 5 MMOL/L (ref 0.4–2)
LYMPHOCYTES # BLD: 1.6 K/UL (ref 1–5.1)
LYMPHOCYTES NFR BLD: 16.8 %
MAGNESIUM SERPL-MCNC: 1.4 MG/DL (ref 1.8–2.4)
MCH RBC QN AUTO: 31.5 PG (ref 26–34)
MCHC RBC AUTO-ENTMCNC: 33.6 G/DL (ref 31–36)
MCV RBC AUTO: 93.8 FL (ref 80–100)
MONOCYTES # BLD: 0.8 K/UL (ref 0–1.3)
MONOCYTES NFR BLD: 8.4 %
NEUTROPHILS # BLD: 6.8 K/UL (ref 1.7–7.7)
NEUTROPHILS NFR BLD: 73.9 %
PERFORMED ON: ABNORMAL
PERFORMED ON: ABNORMAL
PLATELET # BLD AUTO: 219 K/UL (ref 135–450)
PMV BLD AUTO: 9.5 FL (ref 5–10.5)
POTASSIUM SERPL-SCNC: 3.3 MMOL/L (ref 3.5–5.1)
PROCALCITONIN SERPL IA-MCNC: 0.07 NG/ML (ref 0–0.15)
PROT SERPL-MCNC: 7.1 G/DL (ref 6.4–8.2)
RBC # BLD AUTO: 4.1 M/UL (ref 4.2–5.9)
SARS-COV-2 RNA RESP QL NAA+PROBE: NOT DETECTED
SODIUM SERPL-SCNC: 136 MMOL/L (ref 136–145)
TROPONIN T SERPL-MCNC: 0.01 NG/ML
TROPONIN T SERPL-MCNC: <0.01 NG/ML
TROPONIN T SERPL-MCNC: <0.01 NG/ML
TSH SERPL DL<=0.005 MIU/L-ACNC: 3.82 UIU/ML (ref 0.27–4.2)
WBC # BLD AUTO: 9.3 K/UL (ref 4–11)

## 2023-03-24 PROCEDURE — 6370000000 HC RX 637 (ALT 250 FOR IP): Performed by: INTERNAL MEDICINE

## 2023-03-24 PROCEDURE — 6370000000 HC RX 637 (ALT 250 FOR IP)

## 2023-03-24 PROCEDURE — 70450 CT HEAD/BRAIN W/O DYE: CPT

## 2023-03-24 PROCEDURE — 84145 PROCALCITONIN (PCT): CPT

## 2023-03-24 PROCEDURE — 84443 ASSAY THYROID STIM HORMONE: CPT

## 2023-03-24 PROCEDURE — 83735 ASSAY OF MAGNESIUM: CPT

## 2023-03-24 PROCEDURE — 93010 ELECTROCARDIOGRAM REPORT: CPT | Performed by: INTERNAL MEDICINE

## 2023-03-24 PROCEDURE — 6360000002 HC RX W HCPCS: Performed by: INTERNAL MEDICINE

## 2023-03-24 PROCEDURE — 85025 COMPLETE CBC W/AUTO DIFF WBC: CPT

## 2023-03-24 PROCEDURE — 96360 HYDRATION IV INFUSION INIT: CPT

## 2023-03-24 PROCEDURE — 36415 COLL VENOUS BLD VENIPUNCTURE: CPT

## 2023-03-24 PROCEDURE — 94761 N-INVAS EAR/PLS OXIMETRY MLT: CPT

## 2023-03-24 PROCEDURE — 96365 THER/PROPH/DIAG IV INF INIT: CPT

## 2023-03-24 PROCEDURE — 87040 BLOOD CULTURE FOR BACTERIA: CPT

## 2023-03-24 PROCEDURE — 6360000002 HC RX W HCPCS: Performed by: STUDENT IN AN ORGANIZED HEALTH CARE EDUCATION/TRAINING PROGRAM

## 2023-03-24 PROCEDURE — 2580000003 HC RX 258: Performed by: STUDENT IN AN ORGANIZED HEALTH CARE EDUCATION/TRAINING PROGRAM

## 2023-03-24 PROCEDURE — 99285 EMERGENCY DEPT VISIT HI MDM: CPT

## 2023-03-24 PROCEDURE — 71045 X-RAY EXAM CHEST 1 VIEW: CPT

## 2023-03-24 PROCEDURE — 2580000003 HC RX 258

## 2023-03-24 PROCEDURE — 93005 ELECTROCARDIOGRAM TRACING: CPT | Performed by: STUDENT IN AN ORGANIZED HEALTH CARE EDUCATION/TRAINING PROGRAM

## 2023-03-24 PROCEDURE — 2060000000 HC ICU INTERMEDIATE R&B

## 2023-03-24 PROCEDURE — 83605 ASSAY OF LACTIC ACID: CPT

## 2023-03-24 PROCEDURE — 96361 HYDRATE IV INFUSION ADD-ON: CPT

## 2023-03-24 PROCEDURE — 94640 AIRWAY INHALATION TREATMENT: CPT

## 2023-03-24 PROCEDURE — 99223 1ST HOSP IP/OBS HIGH 75: CPT | Performed by: INTERNAL MEDICINE

## 2023-03-24 PROCEDURE — 84484 ASSAY OF TROPONIN QUANT: CPT

## 2023-03-24 PROCEDURE — 87636 SARSCOV2 & INF A&B AMP PRB: CPT

## 2023-03-24 PROCEDURE — 6360000002 HC RX W HCPCS

## 2023-03-24 PROCEDURE — 80053 COMPREHEN METABOLIC PANEL: CPT

## 2023-03-24 RX ORDER — LEVOFLOXACIN 5 MG/ML
750 INJECTION, SOLUTION INTRAVENOUS ONCE
Status: COMPLETED | OUTPATIENT
Start: 2023-03-24 | End: 2023-03-24

## 2023-03-24 RX ORDER — ATORVASTATIN CALCIUM 40 MG/1
80 TABLET, FILM COATED ORAL NIGHTLY
Status: DISCONTINUED | OUTPATIENT
Start: 2023-03-24 | End: 2023-03-26 | Stop reason: HOSPADM

## 2023-03-24 RX ORDER — ASPIRIN 81 MG/1
81 TABLET ORAL DAILY
Status: DISCONTINUED | OUTPATIENT
Start: 2023-03-24 | End: 2023-03-26 | Stop reason: HOSPADM

## 2023-03-24 RX ORDER — 0.9 % SODIUM CHLORIDE 0.9 %
1000 INTRAVENOUS SOLUTION INTRAVENOUS ONCE
Status: COMPLETED | OUTPATIENT
Start: 2023-03-24 | End: 2023-03-24

## 2023-03-24 RX ORDER — POLYETHYLENE GLYCOL 3350 17 G/17G
17 POWDER, FOR SOLUTION ORAL DAILY PRN
Status: DISCONTINUED | OUTPATIENT
Start: 2023-03-24 | End: 2023-03-26 | Stop reason: HOSPADM

## 2023-03-24 RX ORDER — POTASSIUM CHLORIDE 7.45 MG/ML
10 INJECTION INTRAVENOUS PRN
Status: DISCONTINUED | OUTPATIENT
Start: 2023-03-24 | End: 2023-03-24

## 2023-03-24 RX ORDER — SODIUM CHLORIDE 9 MG/ML
INJECTION, SOLUTION INTRAVENOUS PRN
Status: DISCONTINUED | OUTPATIENT
Start: 2023-03-24 | End: 2023-03-26 | Stop reason: HOSPADM

## 2023-03-24 RX ORDER — ALBUTEROL SULFATE 90 UG/1
2 AEROSOL, METERED RESPIRATORY (INHALATION) 4 TIMES DAILY
Status: DISCONTINUED | OUTPATIENT
Start: 2023-03-24 | End: 2023-03-24

## 2023-03-24 RX ORDER — IPRATROPIUM BROMIDE AND ALBUTEROL SULFATE 2.5; .5 MG/3ML; MG/3ML
1 SOLUTION RESPIRATORY (INHALATION) EVERY 4 HOURS PRN
Status: DISCONTINUED | OUTPATIENT
Start: 2023-03-24 | End: 2023-03-26 | Stop reason: HOSPADM

## 2023-03-24 RX ORDER — POTASSIUM CHLORIDE 20 MEQ/1
20 TABLET, EXTENDED RELEASE ORAL ONCE
Status: COMPLETED | OUTPATIENT
Start: 2023-03-24 | End: 2023-03-24

## 2023-03-24 RX ORDER — SODIUM CHLORIDE 9 MG/ML
INJECTION, SOLUTION INTRAVENOUS CONTINUOUS
Status: DISCONTINUED | OUTPATIENT
Start: 2023-03-24 | End: 2023-03-25

## 2023-03-24 RX ORDER — SODIUM CHLORIDE 0.9 % (FLUSH) 0.9 %
5-40 SYRINGE (ML) INJECTION EVERY 12 HOURS SCHEDULED
Status: DISCONTINUED | OUTPATIENT
Start: 2023-03-24 | End: 2023-03-26 | Stop reason: HOSPADM

## 2023-03-24 RX ORDER — ACETAMINOPHEN 650 MG/1
650 SUPPOSITORY RECTAL EVERY 6 HOURS PRN
Status: DISCONTINUED | OUTPATIENT
Start: 2023-03-24 | End: 2023-03-26 | Stop reason: HOSPADM

## 2023-03-24 RX ORDER — GABAPENTIN 100 MG/1
100 CAPSULE ORAL 3 TIMES DAILY
Status: DISCONTINUED | OUTPATIENT
Start: 2023-03-24 | End: 2023-03-26 | Stop reason: HOSPADM

## 2023-03-24 RX ORDER — AZITHROMYCIN 250 MG/1
500 TABLET, FILM COATED ORAL DAILY
Status: COMPLETED | OUTPATIENT
Start: 2023-03-24 | End: 2023-03-26

## 2023-03-24 RX ORDER — MAGNESIUM SULFATE 1 G/100ML
1000 INJECTION INTRAVENOUS PRN
Status: DISCONTINUED | OUTPATIENT
Start: 2023-03-24 | End: 2023-03-24

## 2023-03-24 RX ORDER — ACETAMINOPHEN 325 MG/1
650 TABLET ORAL EVERY 6 HOURS PRN
Status: DISCONTINUED | OUTPATIENT
Start: 2023-03-24 | End: 2023-03-26 | Stop reason: HOSPADM

## 2023-03-24 RX ORDER — SODIUM CHLORIDE 0.9 % (FLUSH) 0.9 %
10 SYRINGE (ML) INJECTION PRN
Status: DISCONTINUED | OUTPATIENT
Start: 2023-03-24 | End: 2023-03-26 | Stop reason: HOSPADM

## 2023-03-24 RX ORDER — MAGNESIUM SULFATE IN WATER 40 MG/ML
2000 INJECTION, SOLUTION INTRAVENOUS ONCE
Status: COMPLETED | OUTPATIENT
Start: 2023-03-24 | End: 2023-03-24

## 2023-03-24 RX ORDER — POTASSIUM CHLORIDE 20 MEQ/1
40 TABLET, EXTENDED RELEASE ORAL PRN
Status: DISCONTINUED | OUTPATIENT
Start: 2023-03-24 | End: 2023-03-24

## 2023-03-24 RX ORDER — IPRATROPIUM BROMIDE AND ALBUTEROL SULFATE 2.5; .5 MG/3ML; MG/3ML
1 SOLUTION RESPIRATORY (INHALATION)
Status: DISCONTINUED | OUTPATIENT
Start: 2023-03-24 | End: 2023-03-24

## 2023-03-24 RX ORDER — DULOXETIN HYDROCHLORIDE 60 MG/1
60 CAPSULE, DELAYED RELEASE ORAL NIGHTLY
Status: DISCONTINUED | OUTPATIENT
Start: 2023-03-24 | End: 2023-03-26 | Stop reason: HOSPADM

## 2023-03-24 RX ORDER — ONDANSETRON 2 MG/ML
4 INJECTION INTRAMUSCULAR; INTRAVENOUS EVERY 6 HOURS PRN
Status: DISCONTINUED | OUTPATIENT
Start: 2023-03-24 | End: 2023-03-26 | Stop reason: HOSPADM

## 2023-03-24 RX ORDER — ONDANSETRON 4 MG/1
4 TABLET, ORALLY DISINTEGRATING ORAL EVERY 8 HOURS PRN
Status: DISCONTINUED | OUTPATIENT
Start: 2023-03-24 | End: 2023-03-26 | Stop reason: HOSPADM

## 2023-03-24 RX ORDER — ENOXAPARIN SODIUM 100 MG/ML
30 INJECTION SUBCUTANEOUS DAILY
Status: DISCONTINUED | OUTPATIENT
Start: 2023-03-24 | End: 2023-03-26 | Stop reason: HOSPADM

## 2023-03-24 RX ORDER — PANTOPRAZOLE SODIUM 40 MG/1
40 TABLET, DELAYED RELEASE ORAL
Status: DISCONTINUED | OUTPATIENT
Start: 2023-03-25 | End: 2023-03-26 | Stop reason: HOSPADM

## 2023-03-24 RX ADMIN — GABAPENTIN 100 MG: 100 CAPSULE ORAL at 21:34

## 2023-03-24 RX ADMIN — SODIUM CHLORIDE 1000 ML: 9 INJECTION, SOLUTION INTRAVENOUS at 12:46

## 2023-03-24 RX ADMIN — TRAZODONE HYDROCHLORIDE 150 MG: 100 TABLET ORAL at 21:34

## 2023-03-24 RX ADMIN — POTASSIUM CHLORIDE 20 MEQ: 1500 TABLET, EXTENDED RELEASE ORAL at 15:46

## 2023-03-24 RX ADMIN — ATORVASTATIN CALCIUM 80 MG: 40 TABLET, FILM COATED ORAL at 21:35

## 2023-03-24 RX ADMIN — LEVOFLOXACIN 750 MG: 5 INJECTION, SOLUTION INTRAVENOUS at 13:52

## 2023-03-24 RX ADMIN — MAGNESIUM SULFATE HEPTAHYDRATE 2000 MG: 40 INJECTION, SOLUTION INTRAVENOUS at 16:19

## 2023-03-24 RX ADMIN — SODIUM CHLORIDE 1000 ML: 9 INJECTION, SOLUTION INTRAVENOUS at 10:45

## 2023-03-24 RX ADMIN — Medication 2 PUFF: at 20:47

## 2023-03-24 RX ADMIN — ENOXAPARIN SODIUM 30 MG: 100 INJECTION SUBCUTANEOUS at 16:20

## 2023-03-24 RX ADMIN — SODIUM CHLORIDE: 9 INJECTION, SOLUTION INTRAVENOUS at 15:50

## 2023-03-24 RX ADMIN — DULOXETINE HYDROCHLORIDE 60 MG: 60 CAPSULE, DELAYED RELEASE ORAL at 21:34

## 2023-03-24 RX ADMIN — CEFTRIAXONE SODIUM 1000 MG: 1 INJECTION, POWDER, FOR SOLUTION INTRAMUSCULAR; INTRAVENOUS at 21:39

## 2023-03-24 RX ADMIN — IPRATROPIUM BROMIDE AND ALBUTEROL SULFATE 1 AMPULE: .5; 2.5 SOLUTION RESPIRATORY (INHALATION) at 15:45

## 2023-03-24 RX ADMIN — AZITHROMYCIN 500 MG: 250 TABLET, FILM COATED ORAL at 15:46

## 2023-03-24 RX ADMIN — GABAPENTIN 100 MG: 100 CAPSULE ORAL at 15:46

## 2023-03-24 RX ADMIN — CEFEPIME 2000 MG: 2 INJECTION, POWDER, FOR SOLUTION INTRAVENOUS at 12:46

## 2023-03-24 RX ADMIN — ASPIRIN 81 MG: 81 TABLET, COATED ORAL at 15:46

## 2023-03-24 RX ADMIN — ACETAMINOPHEN 650 MG: 325 TABLET ORAL at 21:34

## 2023-03-24 ASSESSMENT — PAIN SCALES - GENERAL
PAINLEVEL_OUTOF10: 0
PAINLEVEL_OUTOF10: 7
PAINLEVEL_OUTOF10: 0
PAINLEVEL_OUTOF10: 6

## 2023-03-24 ASSESSMENT — LIFESTYLE VARIABLES
HOW MANY STANDARD DRINKS CONTAINING ALCOHOL DO YOU HAVE ON A TYPICAL DAY: 3 OR 4
HOW OFTEN DO YOU HAVE A DRINK CONTAINING ALCOHOL: 4 OR MORE TIMES A WEEK

## 2023-03-24 ASSESSMENT — PAIN DESCRIPTION - LOCATION
LOCATION: BACK
LOCATION: BACK

## 2023-03-24 ASSESSMENT — PAIN - FUNCTIONAL ASSESSMENT: PAIN_FUNCTIONAL_ASSESSMENT: 0-10

## 2023-03-24 ASSESSMENT — PAIN DESCRIPTION - FREQUENCY: FREQUENCY: CONTINUOUS

## 2023-03-24 ASSESSMENT — PAIN DESCRIPTION - PAIN TYPE: TYPE: CHRONIC PAIN

## 2023-03-24 NOTE — ED NOTES
1247- Vannesa served the Hospitalist for a consult.  pneumonia, lightheaded     Jaylene Altamirano  03/24/23 1243

## 2023-03-24 NOTE — H&P
History and Physical        HISTORY OF PRESENT ILLNESS: A 74-year-old male with a history of hypertension, COPD, hepatitis C presented to the emergency room with generalized weakness and dizziness of 2 to 3 days. Also has been short of breath with even mild exertion. Denies any cough. Denies any fever or chills. His oral intake has been extremely poor. Denies any vomiting or diarrhea. Patient has No Known Allergies. Past Medical History:   Diagnosis Date    COPD (chronic obstructive pulmonary disease) ([address]     Diabetic peripheral neuropathy (HCC)     Hepatitis C     Hypertension        Past Surgical History:   Procedure Laterality Date    CT GUIDED CHEST TUBE  3/1/2022    CT GUIDED CHEST TUBE 3/1/2022 [facility] CT SCAN       Scheduled Meds:   levofloxacin  750 mg IntraVENous Once    sodium chloride flush  5-40 mL IntraVENous 2 times per day    enoxaparin  30 mg SubCUTAneous Daily    ipratropium-albuterol  1 ampule Inhalation Q4H WA    cefTRIAXone (ROCEPHIN) IV  1,000 mg IntraVENous Q24H    azithromycin  500 mg Oral Daily       Continuous Infusions:   sodium chloride      sodium chloride         PRN Meds:  sodium chloride flush, sodium chloride, ondansetron **OR** ondansetron, polyethylene glycol, acetaminophen **OR** acetaminophen       reports that he has been smoking cigarettes. He has been smoking an average of 1 pack per day. He does not have any smokeless tobacco history on file. History reviewed. No pertinent family history. Social History     Socioeconomic History    Marital status:       Spouse name: None    Number of children: None    Years of education: None    Highest education level: None   Tobacco Use    Smoking status: Every Day     Packs/day: 1.00     Types: Cigarettes   Substance and Sexual Activity    Alcohol use: Never     Comment: 2-3 shots every other day    Drug use: Never     REVIEW OF SYSTEMS:   Constitutional: Negative for fever   HENT: Negative for sore throat   Eyes:

## 2023-03-24 NOTE — ED PROVIDER NOTES
affect. DIAGNOSTIC RESULTS     LABS:   Labs Reviewed   CBC WITH AUTO DIFFERENTIAL - Abnormal; Notable for the following components:       Result Value    RBC 4.10 (*)     Hemoglobin 12.9 (*)     Hematocrit 38.4 (*)     RDW 16.9 (*)     All other components within normal limits   COMPREHENSIVE METABOLIC PANEL W/ REFLEX TO MG FOR LOW K - Abnormal; Notable for the following components:    Potassium reflex Magnesium 3.3 (*)     Chloride 93 (*)     Anion Gap 17 (*)     Glucose 148 (*)     BUN 21 (*)     Creatinine 2.8 (*)     Est, Glom Filt Rate 24 (*)     Alkaline Phosphatase 131 (*)     All other components within normal limits   LACTATE, SEPSIS - Abnormal; Notable for the following components:    Lactic Acid, Sepsis 2.8 (*)     All other components within normal limits   MAGNESIUM - Abnormal; Notable for the following components:    Magnesium 1.40 (*)     All other components within normal limits   POCT GLUCOSE - Abnormal; Notable for the following components:    POC Glucose 151 (*)     All other components within normal limits   COVID-19 & INFLUENZA COMBO   CULTURE, BLOOD 1   CULTURE, BLOOD 2   CULTURE, RESPIRATORY   TROPONIN   TSH WITH REFLEX TO FT4   LACTATE, SEPSIS   PROCALCITONIN   LACTIC ACID   TROPONIN   TROPONIN   TROPONIN   URINALYSIS WITH REFLEX TO CULTURE   URINALYSIS WITH MICROSCOPIC      When ordered only abnormal lab results are displayed. All other labs were within normal range or not returned as of this dictation. RADIOLOGY:      Non-plain film images such as CT, Ultrasound and MRI are read by the radiologist. Plain radiographic images are visualized and preliminarily interpreted by the ED Provider with the below findings:   Interpretation per the Radiologist below, if available at the time of this note:     XR CHEST PORTABLE   Final Result   Patchy bilateral lower lobe airspace disease could reflect atelectasis versus   pneumonia in the appropriate clinical setting.          CT HEAD WO CONTRAST

## 2023-03-24 NOTE — PLAN OF CARE
Pre-syncope, dizziness     Cr 2.8  LA elevated    Hypotensive and bradycardic     Responding to IVF     CAP     IVF   CT head pending     PCU admit       Home meds not reconciled, not reordered. Nursing communication in         [place], [place]  03/24/23  1:35 PM

## 2023-03-25 LAB
ANION GAP SERPL CALCULATED.3IONS-SCNC: 15 MMOL/L (ref 3–16)
BASOPHILS # BLD: 0.1 K/UL (ref 0–0.2)
BASOPHILS NFR BLD: 1 %
BUN SERPL-MCNC: 12 MG/DL (ref 7–20)
CALCIUM SERPL-MCNC: 8.9 MG/DL (ref 8.3–10.6)
CHLORIDE SERPL-SCNC: 103 MMOL/L (ref 99–110)
CO2 SERPL-SCNC: 24 MMOL/L (ref 21–32)
CREAT SERPL-MCNC: 1.2 MG/DL (ref 0.8–1.3)
DEPRECATED RDW RBC AUTO: 17.1 % (ref 12.4–15.4)
EOSINOPHIL # BLD: 0 K/UL (ref 0–0.6)
EOSINOPHIL NFR BLD: 0.3 %
GFR SERPLBLD CREATININE-BSD FMLA CKD-EPI: >60 ML/MIN/{1.73_M2}
GLUCOSE BLD-MCNC: 131 MG/DL (ref 70–99)
GLUCOSE SERPL-MCNC: 116 MG/DL (ref 70–99)
HCT VFR BLD AUTO: 45 % (ref 40.5–52.5)
HGB BLD-MCNC: 15.4 G/DL (ref 13.5–17.5)
LACTATE BLDV-SCNC: 2 MMOL/L (ref 0.4–2)
LYMPHOCYTES # BLD: 2.5 K/UL (ref 1–5.1)
LYMPHOCYTES NFR BLD: 24.4 %
MAGNESIUM SERPL-MCNC: 1.8 MG/DL (ref 1.8–2.4)
MCH RBC QN AUTO: 31.5 PG (ref 26–34)
MCHC RBC AUTO-ENTMCNC: 34.3 G/DL (ref 31–36)
MCV RBC AUTO: 91.8 FL (ref 80–100)
MONOCYTES # BLD: 1 K/UL (ref 0–1.3)
MONOCYTES NFR BLD: 9.5 %
NEUTROPHILS # BLD: 6.5 K/UL (ref 1.7–7.7)
NEUTROPHILS NFR BLD: 64.8 %
PERFORMED ON: ABNORMAL
PLATELET # BLD AUTO: 241 K/UL (ref 135–450)
PMV BLD AUTO: 9.2 FL (ref 5–10.5)
POTASSIUM SERPL-SCNC: 3.1 MMOL/L (ref 3.5–5.1)
RBC # BLD AUTO: 4.9 M/UL (ref 4.2–5.9)
SODIUM SERPL-SCNC: 142 MMOL/L (ref 136–145)
WBC # BLD AUTO: 10.1 K/UL (ref 4–11)

## 2023-03-25 PROCEDURE — 6370000000 HC RX 637 (ALT 250 FOR IP): Performed by: INTERNAL MEDICINE

## 2023-03-25 PROCEDURE — 83605 ASSAY OF LACTIC ACID: CPT

## 2023-03-25 PROCEDURE — 85025 COMPLETE CBC W/AUTO DIFF WBC: CPT

## 2023-03-25 PROCEDURE — 6360000002 HC RX W HCPCS

## 2023-03-25 PROCEDURE — 6370000000 HC RX 637 (ALT 250 FOR IP)

## 2023-03-25 PROCEDURE — 36415 COLL VENOUS BLD VENIPUNCTURE: CPT

## 2023-03-25 PROCEDURE — 6360000002 HC RX W HCPCS: Performed by: INTERNAL MEDICINE

## 2023-03-25 PROCEDURE — 2060000000 HC ICU INTERMEDIATE R&B

## 2023-03-25 PROCEDURE — 2580000003 HC RX 258

## 2023-03-25 PROCEDURE — 94640 AIRWAY INHALATION TREATMENT: CPT

## 2023-03-25 PROCEDURE — 94761 N-INVAS EAR/PLS OXIMETRY MLT: CPT

## 2023-03-25 PROCEDURE — 83735 ASSAY OF MAGNESIUM: CPT

## 2023-03-25 PROCEDURE — 80048 BASIC METABOLIC PNL TOTAL CA: CPT

## 2023-03-25 RX ORDER — DOXAZOSIN 2 MG/1
4 TABLET ORAL NIGHTLY
Status: DISCONTINUED | OUTPATIENT
Start: 2023-03-25 | End: 2023-03-26 | Stop reason: HOSPADM

## 2023-03-25 RX ORDER — DOXAZOSIN 2 MG/1
8 TABLET ORAL DAILY
Status: CANCELLED | OUTPATIENT
Start: 2023-03-25

## 2023-03-25 RX ORDER — NIFEDIPINE 30 MG/1
30 TABLET, EXTENDED RELEASE ORAL 2 TIMES DAILY
Status: DISCONTINUED | OUTPATIENT
Start: 2023-03-25 | End: 2023-03-26 | Stop reason: HOSPADM

## 2023-03-25 RX ORDER — POTASSIUM CHLORIDE 20 MEQ/1
40 TABLET, EXTENDED RELEASE ORAL 2 TIMES DAILY WITH MEALS
Status: COMPLETED | OUTPATIENT
Start: 2023-03-25 | End: 2023-03-25

## 2023-03-25 RX ORDER — HYDRALAZINE HYDROCHLORIDE 50 MG/1
50 TABLET, FILM COATED ORAL EVERY 8 HOURS SCHEDULED
Status: DISCONTINUED | OUTPATIENT
Start: 2023-03-25 | End: 2023-03-26 | Stop reason: HOSPADM

## 2023-03-25 RX ORDER — OXYCODONE HYDROCHLORIDE AND ACETAMINOPHEN 5; 325 MG/1; MG/1
1 TABLET ORAL EVERY 6 HOURS PRN
Status: DISCONTINUED | OUTPATIENT
Start: 2023-03-25 | End: 2023-03-26 | Stop reason: HOSPADM

## 2023-03-25 RX ORDER — HYDRALAZINE HYDROCHLORIDE 20 MG/ML
10 INJECTION INTRAMUSCULAR; INTRAVENOUS EVERY 6 HOURS PRN
Status: DISCONTINUED | OUTPATIENT
Start: 2023-03-25 | End: 2023-03-26 | Stop reason: HOSPADM

## 2023-03-25 RX ORDER — CARVEDILOL 6.25 MG/1
6.25 TABLET ORAL 2 TIMES DAILY WITH MEALS
Status: DISCONTINUED | OUTPATIENT
Start: 2023-03-25 | End: 2023-03-26 | Stop reason: HOSPADM

## 2023-03-25 RX ADMIN — CEFTRIAXONE SODIUM 1000 MG: 1 INJECTION, POWDER, FOR SOLUTION INTRAMUSCULAR; INTRAVENOUS at 20:15

## 2023-03-25 RX ADMIN — IPRATROPIUM BROMIDE AND ALBUTEROL 1 PUFF: 20; 100 SPRAY, METERED RESPIRATORY (INHALATION) at 07:51

## 2023-03-25 RX ADMIN — POTASSIUM CHLORIDE 40 MEQ: 1500 TABLET, EXTENDED RELEASE ORAL at 16:50

## 2023-03-25 RX ADMIN — CARVEDILOL 6.25 MG: 6.25 TABLET, FILM COATED ORAL at 16:50

## 2023-03-25 RX ADMIN — HYDRALAZINE HYDROCHLORIDE 10 MG: 20 INJECTION INTRAMUSCULAR; INTRAVENOUS at 18:49

## 2023-03-25 RX ADMIN — CARVEDILOL 6.25 MG: 6.25 TABLET, FILM COATED ORAL at 10:41

## 2023-03-25 RX ADMIN — GABAPENTIN 100 MG: 100 CAPSULE ORAL at 14:12

## 2023-03-25 RX ADMIN — GABAPENTIN 100 MG: 100 CAPSULE ORAL at 20:09

## 2023-03-25 RX ADMIN — DOXAZOSIN 4 MG: 2 TABLET ORAL at 20:08

## 2023-03-25 RX ADMIN — TRAZODONE HYDROCHLORIDE 150 MG: 100 TABLET ORAL at 20:09

## 2023-03-25 RX ADMIN — AZITHROMYCIN 500 MG: 250 TABLET, FILM COATED ORAL at 09:50

## 2023-03-25 RX ADMIN — NIFEDIPINE 30 MG: 30 TABLET, FILM COATED, EXTENDED RELEASE ORAL at 20:09

## 2023-03-25 RX ADMIN — NIFEDIPINE 30 MG: 30 TABLET, FILM COATED, EXTENDED RELEASE ORAL at 10:41

## 2023-03-25 RX ADMIN — SODIUM CHLORIDE, PRESERVATIVE FREE 10 ML: 5 INJECTION INTRAVENOUS at 20:08

## 2023-03-25 RX ADMIN — SODIUM CHLORIDE: 9 INJECTION, SOLUTION INTRAVENOUS at 12:44

## 2023-03-25 RX ADMIN — GABAPENTIN 100 MG: 100 CAPSULE ORAL at 09:50

## 2023-03-25 RX ADMIN — OXYCODONE HYDROCHLORIDE AND ACETAMINOPHEN 1 TABLET: 5; 325 TABLET ORAL at 22:10

## 2023-03-25 RX ADMIN — POTASSIUM CHLORIDE 40 MEQ: 1500 TABLET, EXTENDED RELEASE ORAL at 10:41

## 2023-03-25 RX ADMIN — ENOXAPARIN SODIUM 30 MG: 100 INJECTION SUBCUTANEOUS at 16:50

## 2023-03-25 RX ADMIN — HYDRALAZINE HYDROCHLORIDE 50 MG: 50 TABLET, FILM COATED ORAL at 22:09

## 2023-03-25 RX ADMIN — HYDRALAZINE HYDROCHLORIDE 50 MG: 50 TABLET, FILM COATED ORAL at 15:53

## 2023-03-25 RX ADMIN — ASPIRIN 81 MG: 81 TABLET, COATED ORAL at 09:50

## 2023-03-25 RX ADMIN — DULOXETINE HYDROCHLORIDE 60 MG: 60 CAPSULE, DELAYED RELEASE ORAL at 20:09

## 2023-03-25 RX ADMIN — OXYCODONE HYDROCHLORIDE AND ACETAMINOPHEN 1 TABLET: 5; 325 TABLET ORAL at 15:53

## 2023-03-25 RX ADMIN — ATORVASTATIN CALCIUM 80 MG: 40 TABLET, FILM COATED ORAL at 20:09

## 2023-03-25 ASSESSMENT — PAIN SCALES - GENERAL
PAINLEVEL_OUTOF10: 6
PAINLEVEL_OUTOF10: 0
PAINLEVEL_OUTOF10: 7

## 2023-03-25 ASSESSMENT — PAIN DESCRIPTION - DESCRIPTORS
DESCRIPTORS: ACHING
DESCRIPTORS: ACHING;DISCOMFORT

## 2023-03-25 ASSESSMENT — PAIN DESCRIPTION - LOCATION
LOCATION: BACK
LOCATION: ARM;BACK;FOOT

## 2023-03-25 ASSESSMENT — PAIN - FUNCTIONAL ASSESSMENT
PAIN_FUNCTIONAL_ASSESSMENT: ACTIVITIES ARE NOT PREVENTED
PAIN_FUNCTIONAL_ASSESSMENT: ACTIVITIES ARE NOT PREVENTED

## 2023-03-25 ASSESSMENT — PAIN DESCRIPTION - ORIENTATION
ORIENTATION: RIGHT;LEFT;LOWER
ORIENTATION: LOWER

## 2023-03-25 NOTE — FLOWSHEET NOTE
03/25/23 1822   Vital Signs   Heart Rate 84   Heart Rate Source Monitor   BP (!) 188/94   MAP (Calculated) 125   BP Location Left upper arm   BP Method Automatic   Oxygen Therapy   O2 Device None (Room air)   O2 Flow Rate (L/min) 95 L/min     PRN hydralazine given per orders. Will monitor pt.

## 2023-03-25 NOTE — FLOWSHEET NOTE
03/25/23 0949   Vital Signs   Heart Rate (!) 110   Heart Rate Source Monitor   BP (!) 174/101  (Dr Cliff Carreon aware)   MAP (Calculated) 125   BP Location Right upper arm   BP Method Automatic   Patient Position Semi fowlers     AM assessment complete. Pt a&o x4. Denies any pain or discomfort. Denies any dizziness with standing. No edema noted. Dr Cliff Carreon aware of need for k+ replacement and she is aware of current BP. New orders in place. Call light and bedside table within reach. Will continue to monitor.

## 2023-03-25 NOTE — FLOWSHEET NOTE
03/24/23 2130   Vital Signs   Temp 97.4 °F (36.3 °C)   Temp Source Oral   Heart Rate 86   Heart Rate Source Monitor   Resp 16   /67   MAP (Calculated) 86   BP Location Right upper arm   BP Method Automatic   Patient Position High fowlers   Level of Consciousness 0   MEWS Score 1   Pain Assessment   Pain Assessment 0-10   Pain Level 7   Pain Location Back   Oxygen Therapy   SpO2 96 %   Pulse Oximeter Device Mode Other (Comment)   O2 Device None (Room air)   HS assessment completed. No complaints of pain or discomfort voiced. No signs of symptoms of distress noted. Patient tolerated night medications well. Respirations easy and even. Bed in lowest position, high fall risk but refuses bed alarm, telesitter camera at bedside for safety. Pt educated on the risks of being a fall risk and the need to utilize the call light for assistance, he refuses, but has done somewhat better this shift with attempting to ask for help. , bed rails x2 up,  Call light within reach. Bedside Mobility Assessment Tool (BMAT):     Assessment Level 1- Sit and Shake    1. From a semi-reclined position, ask patient to sit up and rotate to a seated position at the side of the bed. Can use the bedrail. 2. Ask patient to reach out and grab your hand and shake making sure patient reaches across his/her midline. Pass- Patient is able to come to a seated position, maintain core strength. Maintains seated balance while reaching across midline. Move on to Assessment Level 2. Assessment Level 2- Stretch and Point   1. With patient in seated position at the side of the bed, have patient place both feet on the floor (or stool) with knees no higher than hips. 2. Ask patient to stretch one leg and straighten the knee, then bend the ankle/flex and point the toes. If appropriate, repeat with the other leg. Pass- Patient is able to demonstrate appropriate quad strength on intended weight bearing limb(s). Move onto Assessment Level 3.

## 2023-03-25 NOTE — PLAN OF CARE
Problem: Discharge Planning  Goal: Discharge to home or other facility with appropriate resources  3/25/2023 0231 by Meryle Stade, RN  Outcome: Progressing  3/25/2023 0231 by Meryle Stade, RN  Outcome: Progressing     Problem: Pain  Goal: Verbalizes/displays adequate comfort level or baseline comfort level  3/25/2023 0231 by Meryle Stade, RN  Outcome: Progressing  3/25/2023 0231 by Meryle Stade, RN  Outcome: Progressing     Problem: Safety - Adult  Goal: Free from fall injury  3/25/2023 0231 by Meryle Stade, RN  Outcome: Progressing  3/25/2023 0231 by Meryle Stade, RN  Outcome: Progressing

## 2023-03-26 VITALS
BODY MASS INDEX: 28.52 KG/M2 | OXYGEN SATURATION: 93 % | WEIGHT: 203.7 LBS | TEMPERATURE: 98.1 F | RESPIRATION RATE: 16 BRPM | SYSTOLIC BLOOD PRESSURE: 152 MMHG | HEART RATE: 86 BPM | HEIGHT: 71 IN | DIASTOLIC BLOOD PRESSURE: 82 MMHG

## 2023-03-26 LAB
ANION GAP SERPL CALCULATED.3IONS-SCNC: 12 MMOL/L (ref 3–16)
BACTERIA URNS QL MICRO: ABNORMAL /HPF
BASOPHILS # BLD: 0 K/UL (ref 0–0.2)
BASOPHILS NFR BLD: 0.7 %
BILIRUB UR QL STRIP.AUTO: NEGATIVE
BUN SERPL-MCNC: 11 MG/DL (ref 7–20)
CALCIUM SERPL-MCNC: 9.3 MG/DL (ref 8.3–10.6)
CHLORIDE SERPL-SCNC: 100 MMOL/L (ref 99–110)
CLARITY UR: CLEAR
CO2 SERPL-SCNC: 28 MMOL/L (ref 21–32)
COLOR UR: YELLOW
CREAT SERPL-MCNC: 0.9 MG/DL (ref 0.8–1.3)
DEPRECATED RDW RBC AUTO: 16.9 % (ref 12.4–15.4)
EOSINOPHIL # BLD: 0 K/UL (ref 0–0.6)
EOSINOPHIL NFR BLD: 0.6 %
EPI CELLS #/AREA URNS HPF: ABNORMAL /HPF (ref 0–5)
GFR SERPLBLD CREATININE-BSD FMLA CKD-EPI: >60 ML/MIN/{1.73_M2}
GLUCOSE SERPL-MCNC: 156 MG/DL (ref 70–99)
GLUCOSE UR STRIP.AUTO-MCNC: >=1000 MG/DL
HCT VFR BLD AUTO: 42.6 % (ref 40.5–52.5)
HGB BLD-MCNC: 14.7 G/DL (ref 13.5–17.5)
HGB UR QL STRIP.AUTO: NEGATIVE
HYALINE CASTS #/AREA URNS LPF: ABNORMAL /LPF (ref 0–2)
KETONES UR STRIP.AUTO-MCNC: NEGATIVE MG/DL
LEUKOCYTE ESTERASE UR QL STRIP.AUTO: NEGATIVE
LYMPHOCYTES # BLD: 1.8 K/UL (ref 1–5.1)
LYMPHOCYTES NFR BLD: 28.2 %
MAGNESIUM SERPL-MCNC: 1.6 MG/DL (ref 1.8–2.4)
MCH RBC QN AUTO: 32 PG (ref 26–34)
MCHC RBC AUTO-ENTMCNC: 34.5 G/DL (ref 31–36)
MCV RBC AUTO: 92.8 FL (ref 80–100)
MONOCYTES # BLD: 0.6 K/UL (ref 0–1.3)
MONOCYTES NFR BLD: 9.5 %
MUCOUS THREADS #/AREA URNS LPF: ABNORMAL /LPF
NEUTROPHILS # BLD: 4 K/UL (ref 1.7–7.7)
NEUTROPHILS NFR BLD: 61 %
NITRITE UR QL STRIP.AUTO: NEGATIVE
PH UR STRIP.AUTO: 6 [PH] (ref 5–8)
PLATELET # BLD AUTO: 202 K/UL (ref 135–450)
PMV BLD AUTO: 9.7 FL (ref 5–10.5)
POTASSIUM SERPL-SCNC: 3.4 MMOL/L (ref 3.5–5.1)
PROT UR STRIP.AUTO-MCNC: ABNORMAL MG/DL
RBC # BLD AUTO: 4.59 M/UL (ref 4.2–5.9)
RBC #/AREA URNS HPF: ABNORMAL /HPF (ref 0–4)
SODIUM SERPL-SCNC: 140 MMOL/L (ref 136–145)
SP GR UR STRIP.AUTO: 1.02 (ref 1–1.03)
UA COMPLETE W REFLEX CULTURE PNL UR: ABNORMAL
UA DIPSTICK W REFLEX MICRO PNL UR: YES
URN SPEC COLLECT METH UR: ABNORMAL
UROBILINOGEN UR STRIP-ACNC: 0.2 E.U./DL
WBC # BLD AUTO: 6.6 K/UL (ref 4–11)
WBC #/AREA URNS HPF: ABNORMAL /HPF (ref 0–5)

## 2023-03-26 PROCEDURE — 36415 COLL VENOUS BLD VENIPUNCTURE: CPT

## 2023-03-26 PROCEDURE — 81001 URINALYSIS AUTO W/SCOPE: CPT

## 2023-03-26 PROCEDURE — 6370000000 HC RX 637 (ALT 250 FOR IP): Performed by: INTERNAL MEDICINE

## 2023-03-26 PROCEDURE — 80048 BASIC METABOLIC PNL TOTAL CA: CPT

## 2023-03-26 PROCEDURE — 94761 N-INVAS EAR/PLS OXIMETRY MLT: CPT

## 2023-03-26 PROCEDURE — 85025 COMPLETE CBC W/AUTO DIFF WBC: CPT

## 2023-03-26 PROCEDURE — 6370000000 HC RX 637 (ALT 250 FOR IP)

## 2023-03-26 PROCEDURE — 83735 ASSAY OF MAGNESIUM: CPT

## 2023-03-26 PROCEDURE — 6360000002 HC RX W HCPCS: Performed by: INTERNAL MEDICINE

## 2023-03-26 PROCEDURE — 2580000003 HC RX 258

## 2023-03-26 PROCEDURE — 94640 AIRWAY INHALATION TREATMENT: CPT

## 2023-03-26 RX ORDER — MIRTAZAPINE 15 MG/1
15 TABLET, FILM COATED ORAL NIGHTLY
Qty: 30 TABLET | Refills: 3 | Status: SHIPPED
Start: 2023-03-26

## 2023-03-26 RX ORDER — DOXYCYCLINE 100 MG/1
100 TABLET ORAL 2 TIMES DAILY
Qty: 10 TABLET | Refills: 0 | Status: SHIPPED | OUTPATIENT
Start: 2023-03-26 | End: 2023-03-31

## 2023-03-26 RX ORDER — CARVEDILOL 6.25 MG/1
6.25 TABLET ORAL 2 TIMES DAILY WITH MEALS
Qty: 60 TABLET | Refills: 3 | Status: SHIPPED | OUTPATIENT
Start: 2023-03-26

## 2023-03-26 RX ORDER — DOXAZOSIN MESYLATE 4 MG/1
4 TABLET ORAL NIGHTLY
Qty: 30 TABLET | Refills: 1 | Status: SHIPPED | OUTPATIENT
Start: 2023-03-26

## 2023-03-26 RX ORDER — POTASSIUM CHLORIDE 7.45 MG/ML
10 INJECTION INTRAVENOUS PRN
Status: DISCONTINUED | OUTPATIENT
Start: 2023-03-26 | End: 2023-03-26 | Stop reason: HOSPADM

## 2023-03-26 RX ORDER — POTASSIUM CHLORIDE 20 MEQ/1
40 TABLET, EXTENDED RELEASE ORAL PRN
Status: DISCONTINUED | OUTPATIENT
Start: 2023-03-26 | End: 2023-03-26 | Stop reason: HOSPADM

## 2023-03-26 RX ORDER — CEFUROXIME AXETIL 500 MG/1
500 TABLET ORAL 2 TIMES DAILY
Qty: 10 TABLET | Refills: 0 | Status: SHIPPED | OUTPATIENT
Start: 2023-03-26 | End: 2023-03-31

## 2023-03-26 RX ORDER — MAGNESIUM SULFATE 1 G/100ML
1000 INJECTION INTRAVENOUS PRN
Status: DISCONTINUED | OUTPATIENT
Start: 2023-03-26 | End: 2023-03-26 | Stop reason: HOSPADM

## 2023-03-26 RX ORDER — DOXAZOSIN MESYLATE 4 MG/1
4 TABLET ORAL NIGHTLY
Qty: 30 TABLET | Refills: 3 | Status: SHIPPED | OUTPATIENT
Start: 2023-03-26 | End: 2023-03-26 | Stop reason: SDUPTHER

## 2023-03-26 RX ADMIN — GABAPENTIN 100 MG: 100 CAPSULE ORAL at 09:43

## 2023-03-26 RX ADMIN — OXYCODONE HYDROCHLORIDE AND ACETAMINOPHEN 1 TABLET: 5; 325 TABLET ORAL at 05:13

## 2023-03-26 RX ADMIN — MAGNESIUM SULFATE HEPTAHYDRATE 1000 MG: 1 INJECTION, SOLUTION INTRAVENOUS at 09:53

## 2023-03-26 RX ADMIN — MAGNESIUM SULFATE HEPTAHYDRATE 1000 MG: 1 INJECTION, SOLUTION INTRAVENOUS at 11:39

## 2023-03-26 RX ADMIN — OXYCODONE HYDROCHLORIDE AND ACETAMINOPHEN 1 TABLET: 5; 325 TABLET ORAL at 11:41

## 2023-03-26 RX ADMIN — AZITHROMYCIN 500 MG: 250 TABLET, FILM COATED ORAL at 09:43

## 2023-03-26 RX ADMIN — SODIUM CHLORIDE, PRESERVATIVE FREE 10 ML: 5 INJECTION INTRAVENOUS at 10:00

## 2023-03-26 RX ADMIN — NIFEDIPINE 30 MG: 30 TABLET, FILM COATED, EXTENDED RELEASE ORAL at 09:43

## 2023-03-26 RX ADMIN — ASPIRIN 81 MG: 81 TABLET, COATED ORAL at 09:43

## 2023-03-26 RX ADMIN — POTASSIUM CHLORIDE 40 MEQ: 1500 TABLET, EXTENDED RELEASE ORAL at 09:50

## 2023-03-26 RX ADMIN — PANTOPRAZOLE SODIUM 40 MG: 40 TABLET, DELAYED RELEASE ORAL at 05:13

## 2023-03-26 RX ADMIN — HYDRALAZINE HYDROCHLORIDE 50 MG: 50 TABLET, FILM COATED ORAL at 05:13

## 2023-03-26 RX ADMIN — GABAPENTIN 100 MG: 100 CAPSULE ORAL at 14:34

## 2023-03-26 ASSESSMENT — PAIN - FUNCTIONAL ASSESSMENT: PAIN_FUNCTIONAL_ASSESSMENT: ACTIVITIES ARE NOT PREVENTED

## 2023-03-26 ASSESSMENT — PAIN DESCRIPTION - DESCRIPTORS: DESCRIPTORS: ACHING;DISCOMFORT

## 2023-03-26 ASSESSMENT — PAIN DESCRIPTION - LOCATION
LOCATION: BACK;ARM;FOOT
LOCATION: GENERALIZED

## 2023-03-26 ASSESSMENT — PAIN SCALES - GENERAL
PAINLEVEL_OUTOF10: 8
PAINLEVEL_OUTOF10: 6
PAINLEVEL_OUTOF10: 6

## 2023-03-26 ASSESSMENT — PAIN DESCRIPTION - ORIENTATION: ORIENTATION: RIGHT;LEFT;LOWER

## 2023-03-26 NOTE — PROGRESS NOTES
FYI:  Additive QT interval prolongation may occur during combination therapy of Zofran and Zithromax. Patient also received one dose of Levaquin 750 mg IVPB. Most adverse events occur when the QTC > 500. Patient's QTC = 546. Compazine has lesser effects on the QT interval and can be ordered as an alternative to Zofran. Observe for signs and symptoms.   Radha Lee R.Ph.3/24/13314:22 PM
Handoff report given to [facility]. Care transferred.
[facility] Progress Note    Daily Progress Note for 3/25/2023 9:52 AM /4902-02  Claudette Gonzalez : 1955 Age: 75 y.o. Sex: male  Length of Stay:  1    Interval History:      CC: F/U Dizziness (Pt brought in by EMS due to dizziness x2 days. Pt reports that he has been running into walls at home. Pt is awake alert and oriented during triage. )      Subjective:     Feels ok, no cough, eating and drinking ok this morning. Did not get dizzy when up to the bathroom. Breathing feels better. Objective:     Vitals:    23 0059 23 0742 23 0751 23 0949   BP: 139/80 (!) 194/109  (!) 174/101   Pulse: 89 96 92 (!) 110   Resp: 16  16    Temp: 97.3 °F (36.3 °C) 97.4 °F (36.3 °C)     TempSrc: Oral Oral     SpO2: 95% 95% 95%    Weight:       Height:              Intake/Output Summary (Last 24 hours) at 3/25/2023 0952  Last data filed at 3/25/2023 0859  Gross per 24 hour   Intake 460 ml   Output 500 ml   Net -40 ml     Body mass index is 28.98 kg/m². Physical Exam:  General: Cooperative, pleasant/Ill appearing, on  Nasal cannula  HEENT:  Head: normocephalic,atraumatic, anicteric sclera, clear conjunctiva  Neck: Normal size, Jugular venous pulsations: normal  Respiratory:unlabored breathing, clear to auscultation with no crackles, wheezes rhonchi  Heart: Regular rate and rhythm, S1, S2-normal, No murmurs  Abdomen: soft, nondistended, nontender, normoactive bowel sounds,  Neurological/Psych: Alert and oriented times three, no focal neurological deficits, Mood and affect appropriate.   Skin: No obvious rashes    Extremities:  no edema, Pedal pulses 2+ bilaterally    LINES/TUBES:     Scheduled Medications:  potassium chloride, 40 mEq, BID WC  carvedilol, 6.25 mg, BID WC  NIFEdipine, 30 mg, BID  sodium chloride flush, 5-40 mL, 2 times per day  enoxaparin, 30 mg, Daily  cefTRIAXone (ROCEPHIN) IV, 1,000 mg, Q24H  azithromycin, 500 mg, Daily  aspirin, 81 mg, Daily  DULoxetine,
PRN percocet and ordered hydralazine given per orders. Advised we would recheck bp after admin.
Patient educated on discharge instructions as well as new medications use, dosage, administration and possible side effects. Patient verified knowledge. IV removed without difficulty and dry dressing in place. Telemetry monitor removed and returned to [facility]. Pt left facility in stable condition to Home with all of their personal belongings.
Patient is being transported downstairs for CT. Patient denied any needs prior to leaving the floor.
Patient refusing the bed alarm. All efforts have been made to educate patient on fall risk scoring, fall precautions and the benefits of the bed alarm system. Refusal has been escalated to Charge Nurse, and the attending provider. Attempts have been made to engage patient's family in fall prevention/intervention discussion, however, patient continues to refuse. Patient is alert and oriented X 4    [] Patient has signed the falls agreement. [x] Patient refused to sign the falls agreement.         Interventions utilized to mitigate the risk of falls:    []  Moving patient to more visible area   []  Gait belt    []  Fall mats      [x]  Keep door open   [x]  Low beds      []  Lift equipment  [x]  Telesitter       []  Bedside commode  []       []  Intentional toileting  [x]  Increase frequency of rounding   []  Contact provider for PT/OT consult  []  Family presence     []  Stay with Me  []  Assistive devices (walker, cane)   []  Yellow socks, gown, and bracelet   []  Assess for orthostatic hypotension  []  Fall Risk banner displayed in Care IT 99 Record      Primary Nurse eSignature: Electronically signed by Alma Delia Green RN on 3/24/23 at 3:03 PM EDT
Patient resting in bed with eyes closed. Breathing regular and unlabored. Tele monitor in place. Call light within reach.
Perfect served Hospitalist about 3.4 K+ this am. No new orders at this time.
Pt BP elevated, notified Dr Natalie Garcia. New orders in place. IVF stopped. She is aware of his want for pain medication and tizanidine which he states he takes at home.
Pt attempted to get a hold of brother for a ride. Pt unable to reach his brother. Pt now asking about obtaining a cab home. Pt informed nurse will check on cab voucher for him.
Pt sitting up in bed this morning. Shift assessment complete and all meds given per MAR. Pt A&O x 4, Mg 1.6, 2 Grams given, K+ 3.4, 40 MEQ PO given . Pt treated for pain, PRN Percocet given. Urine collected and sent to the lab. Tele-sitter removed from the room. Pt compliant and using call light appropriately. Bed in lowest position, call light in hand. All patient needs addressed.
Pts IV infiltrated, when writer walked into pts room, pts IV dressing was almost completely taken off and he had tape taped all the way around his arm. Edema noted to right wrist and forearm.
Report  has bene given to [facility] transferred at this time. Patient has admitted to drinking roughly 3 shots.  Of vodka 3 x weekly
Standard potassium/magnesium supplementation prn orders discontinued per protocol for CrCl < 30 ml/min. Orders must be placed on a dose specific basis.   JAKY CagePh.3/24/68780:10 PM
Tele-sitter has bene started for patient. He is alert and oriented X4. Patient agreed to that but refusing be alarm or to call out when ambulating to restroom.
breaths per minute or more, then use the equivalent nebulizer order(s) with same Frequency and PRN reasons based on the score. If a patient is on this medication at home then do not decrease Frequency below that used at home. 0-3 - enter or revise RT bronchodilator order(s) to equivalent RT Bronchodilator order with Frequency of every 4 hours PRN for wheezing or increased work of breathing using Per Protocol order mode. 4-6 - enter or revise RT Bronchodilator order(s) to two equivalent RT bronchodilator orders with one order with BID Frequency and one order with Frequency of every 4 hours PRN wheezing or increased work of breathing using Per Protocol order mode. 7-10 - enter or revise RT Bronchodilator order(s) to two equivalent RT bronchodilator orders with one order with TID Frequency and one order with Frequency of every 4 hours PRN wheezing or increased work of breathing using Per Protocol order mode. 11-13 - enter or revise RT Bronchodilator order(s) to one equivalent RT bronchodilator order with QID Frequency and an Albuterol order with Frequency of every 4 hours PRN wheezing or increased work of breathing using Per Protocol order mode. Greater than 13 - enter or revise RT Bronchodilator order(s) to one equivalent RT bronchodilator order with every 4 hours Frequency and an Albuterol order with Frequency of every 2 hours PRN wheezing or increased work of breathing using Per Protocol order mode.        Electronically signed by Leila Gilbert RCP on 3/24/2023 at 8:51 PM
breaths per minute or more, then use the equivalent nebulizer order(s) with same Frequency and PRN reasons based on the score. If a patient is on this medication at home then do not decrease Frequency below that used at home. 0-3 - enter or revise RT bronchodilator order(s) to equivalent RT Bronchodilator order with Frequency of every 4 hours PRN for wheezing or increased work of breathing using Per Protocol order mode. 4-6 - enter or revise RT Bronchodilator order(s) to two equivalent RT bronchodilator orders with one order with BID Frequency and one order with Frequency of every 4 hours PRN wheezing or increased work of breathing using Per Protocol order mode. 7-10 - enter or revise RT Bronchodilator order(s) to two equivalent RT bronchodilator orders with one order with TID Frequency and one order with Frequency of every 4 hours PRN wheezing or increased work of breathing using Per Protocol order mode. 11-13 - enter or revise RT Bronchodilator order(s) to one equivalent RT bronchodilator order with QID Frequency and an Albuterol order with Frequency of every 4 hours PRN wheezing or increased work of breathing using Per Protocol order mode. Greater than 13 - enter or revise RT Bronchodilator order(s) to one equivalent RT bronchodilator order with every 4 hours Frequency and an Albuterol order with Frequency of every 2 hours PRN wheezing or increased work of breathing using Per Protocol order mode.      Electronically signed by Bobby Calvin RCP on 3/25/2023 at 9:02 PM
home.    0-3 - enter or revise RT bronchodilator order(s) to equivalent RT Bronchodilator order with Frequency of every 4 hours PRN for wheezing or increased work of breathing using Per Protocol order mode. 4-6 - enter or revise RT Bronchodilator order(s) to two equivalent RT bronchodilator orders with one order with BID Frequency and one order with Frequency of every 4 hours PRN wheezing or increased work of breathing using Per Protocol order mode. 7-10 - enter or revise RT Bronchodilator order(s) to two equivalent RT bronchodilator orders with one order with TID Frequency and one order with Frequency of every 4 hours PRN wheezing or increased work of breathing using Per Protocol order mode. 11-13 - enter or revise RT Bronchodilator order(s) to one equivalent RT bronchodilator order with QID Frequency and an Albuterol order with Frequency of every 4 hours PRN wheezing or increased work of breathing using Per Protocol order mode. Greater than 13 - enter or revise RT Bronchodilator order(s) to one equivalent RT bronchodilator order with every 4 hours Frequency and an Albuterol order with Frequency of every 2 hours PRN wheezing or increased work of breathing using Per Protocol order mode. RT to enter RT Home Evaluation for COPD & MDI Assessment order using Per Protocol order mode.     Electronically signed by Jaswant Nielson RCP on 3/24/2023 at 3:50 PM
that used at home. 0-3 - enter or revise RT bronchodilator order(s) to equivalent RT Bronchodilator order with Frequency of every 4 hours PRN for wheezing or increased work of breathing using Per Protocol order mode. 4-6 - enter or revise RT Bronchodilator order(s) to two equivalent RT bronchodilator orders with one order with BID Frequency and one order with Frequency of every 4 hours PRN wheezing or increased work of breathing using Per Protocol order mode. 7-10 - enter or revise RT Bronchodilator order(s) to two equivalent RT bronchodilator orders with one order with TID Frequency and one order with Frequency of every 4 hours PRN wheezing or increased work of breathing using Per Protocol order mode. 11-13 - enter or revise RT Bronchodilator order(s) to one equivalent RT bronchodilator order with QID Frequency and an Albuterol order with Frequency of every 4 hours PRN wheezing or increased work of breathing using Per Protocol order mode. Greater than 13 - enter or revise RT Bronchodilator order(s) to one equivalent RT bronchodilator order with every 4 hours Frequency and an Albuterol order with Frequency of every 2 hours PRN wheezing or increased work of breathing using Per Protocol order mode.          Electronically signed by Bridget Johnston RCP on 3/25/2023 at 7:55 AM
that used at home. 0-3 - enter or revise RT bronchodilator order(s) to equivalent RT Bronchodilator order with Frequency of every 4 hours PRN for wheezing or increased work of breathing using Per Protocol order mode. 4-6 - enter or revise RT Bronchodilator order(s) to two equivalent RT bronchodilator orders with one order with BID Frequency and one order with Frequency of every 4 hours PRN wheezing or increased work of breathing using Per Protocol order mode. 7-10 - enter or revise RT Bronchodilator order(s) to two equivalent RT bronchodilator orders with one order with TID Frequency and one order with Frequency of every 4 hours PRN wheezing or increased work of breathing using Per Protocol order mode. 11-13 - enter or revise RT Bronchodilator order(s) to one equivalent RT bronchodilator order with QID Frequency and an Albuterol order with Frequency of every 4 hours PRN wheezing or increased work of breathing using Per Protocol order mode. Greater than 13 - enter or revise RT Bronchodilator order(s) to one equivalent RT bronchodilator order with every 4 hours Frequency and an Albuterol order with Frequency of every 2 hours PRN wheezing or increased work of breathing using Per Protocol order mode. RT to enter RT Home Evaluation for COPD & MDI Assessment order using Per Protocol order mode.     Electronically signed by Xin Uribe RCP on 3/26/2023 at 8:07 AM

## 2023-03-26 NOTE — ACP (ADVANCE CARE PLANNING)
Advance Care Planning     General Advance Care Planning (ACP) Conversation    Date of Conversation: 3/24/2023  Conducted with: Patient with Decision Making Capacity    Healthcare Decision Maker:    Primary Decision Maker: Edward Esteban - Other Relative - [phone]  Click here to complete Healthcare Decision Makers including selection of the Healthcare Decision Maker Relationship (ie \"Primary\"). Today we documented Decision Maker(s). The patient will provide ACP documents. Pt stated that his sister in law Burke Pham is his healthcare POA and CM requested copies which he stated understanding. Pt aware without POA, his legal next of kin would be adult children if he has them. Content/Action Overview:   Has ACP document(s) NOT on file - requested patient to provide  Reviewed DNR/DNI and patient elects Full Code (Attempt Resuscitation)    Length of Voluntary ACP Conversation in minutes:  <16 minutes (Non-Billable)    [facility] JANE Canales  Case Management Department  Phone: [phone] Fax: [phone]

## 2023-03-26 NOTE — CARE COORDINATION
CM chart review and screening triggered by age and DX: CAP, ROSALINO, recent CVA. Late admission to floor on Friday then was taken to CT. Recently DC'd from [facility] after CVA. Pt was not in room when CM went to interview him, he was off floor to CT scan.   CM did call [facility] 72 hour [facility]  Verification code provided P [id number]  [facility] Auth # [id number]  Per EMR pt is active with [facility] for SN/PT/OT phone [phone]  Call [facility] upon DC and they will pull clinicals and orders through [facility] (through the [facility]).    [facility]   
Freedom of choice list with basic dialogue that supports the patient's individualized plan of care/goals and shares the quality data associated with the providers was provided to:     Patient Representative Name:       The Patient and/or Patient Representative Agree with the Discharge Plan?  yes  JANE Cooper  Case Management Department  Phone: [phone] Fax: [phone]    Addendum at 1:32pm: discharge order noted. Called and spoke with Gaston Davis at [facility] who is aware of discharge and confirmed pt is active with them. Gaston Davis stated they will do resumption of care tomorrow 3/27/2023 and they will pull information from epic; CM doesn't need to fax anything to them. All information in epic and has been printed for them to pull. Met with pt at bedside. He was updated on the above and denied all additional needs. Dusty Hope RN aware.      Last IMM given 3/24/2023    93% on room air per vitals in epic

## 2023-03-26 NOTE — DISCHARGE INSTRUCTIONS
Check your blood pressures at home twice daily every day after at last 5 minutes at rest with feet on the ground and back supported. Bring your cuff to the doctors office to compare. Call your primary care or cardiologist tomorrow to make an appointment for hospital follow-up.

## 2023-03-26 NOTE — PLAN OF CARE
Problem: Discharge Planning  Goal: Discharge to home or other facility with appropriate resources  3/26/2023 1523 by Emmett Curling, RN  Outcome: Progressing  3/26/2023 1304 by Emmett Curling, RN  Outcome: Progressing

## 2023-03-26 NOTE — DISCHARGE SUMMARY
3/24/2023 10:51 am COMPARISON: None. HISTORY: ORDERING SYSTEM PROVIDED HISTORY: hypotension TECHNOLOGIST PROVIDED HISTORY: Reason for exam:->hypotension FINDINGS: Normal cardiomediastinal silhouette. No pneumothorax. No pleural effusion. Patchy bilateral lower lobe airspace disease. Patchy bilateral lower lobe airspace disease could reflect atelectasis versus pneumonia in the appropriate clinical setting. The patient was seen and examined on day of discharge and this discharge summary is in conjunction with any daily progress note from day of discharge. Time Spent on discharge is more than 30 minutes in the examination, evaluation, counseling and review of medications and discharge plan. SignedChzehratesergio Tena DO   3/26/2023      Thank you Thea Bowens MD for the opportunity to be involved in this patient's care. If you have any questions or concerns please feel free to contact me at perfectserve.

## 2023-03-26 NOTE — PLAN OF CARE
Problem: Pain  Goal: Verbalizes/displays adequate comfort level or baseline comfort level  3/26/2023 1304 by Mamadou Meehan RN  Outcome: Progressing  3/25/2023 2341 by Arlene Davidson RN  Outcome: Progressing     Problem: Safety - Adult  Goal: Free from fall injury  3/26/2023 1304 by Mamadou Meehan RN  Outcome: Progressing  3/25/2023 2341 by Arlene Davidson RN  Outcome: Progressing

## 2023-03-26 NOTE — FLOWSHEET NOTE
03/25/23 2000   Vitals   Temp 97.4 °F (36.3 °C)   Temp Source Oral   Heart Rate 81   Heart Rate Source Monitor   Resp 16   BP (!) 194/89   MAP (Calculated) 124   BP Location Right upper arm   BP Upper/Lower Upper   BP Method Automatic   Patient Position Semi fowlers   Level of Consciousness 0   MEWS Score 1   Oxygen Therapy   SpO2 96 %   O2 Device None (Room air)   Shift assessment completed. Patient awake in bed. A/Ox4. BP elevated. BP medications will be administered intermittently. Will continue to monitor. Scheduled PM medications given. Pt denies any needs at this time. Telesitter in place. Call light and bedside table within reach.

## 2023-03-26 NOTE — DISCHARGE INSTR - DIET

## 2023-03-26 NOTE — PLAN OF CARE
Problem: Discharge Planning  Goal: Discharge to home or other facility with appropriate resources  3/25/2023 2341 by Beau Lopez RN  Outcome: Progressing  3/25/2023 1005 by Sang Obrien RN  Outcome: Progressing     Problem: Pain  Goal: Verbalizes/displays adequate comfort level or baseline comfort level  3/25/2023 2341 by eBau Lopez RN  Outcome: Progressing  3/25/2023 1005 by Sang Obrien RN  Outcome: Progressing     Problem: Safety - Adult  Goal: Free from fall injury  3/25/2023 2341 by Beau Lopez RN  Outcome: Progressing  3/25/2023 1005 by Sang Obrien RN  Outcome: Progressing     Problem: ABCDS Injury Assessment  Goal: Absence of physical injury  Outcome: Progressing

## 2023-03-27 ENCOUNTER — CARE COORDINATION (OUTPATIENT)
Dept: CASE MANAGEMENT | Age: 68
End: 2023-03-27

## 2023-03-27 NOTE — CARE COORDINATION
[facility] Care Transitions Initial Follow Up Call    Call within 2 business days of discharge: Yes      Patient: Arlyn Tony Patient : 1955   MRN: [id number]  Reason for Admission: CAP, ROSALINO, mild anion gap metabolic acidosis, generalized weakness and dizziness, hypomagnesemia and hypokalemia, sinus bradycardia, HTN, hx peripheral neuropathy, poor intake, hx Hep C -> home with [address] PCP  Discharge Date: 3/26/23 RARS: Readmission Risk Score: 13.8      Last Discharge [address]       Date Complaint Diagnosis Description Type Department Provider    3/24/23 Dizziness Pneumonia due to infectious organism, unspecified laterality, unspecified part of lung . .. ED to Hosp-Admission (Discharged) (Gisele Palacios) Rosalee Salas MD; Br. ..     1st attempt - Unable to reach or leave message because only listed number did not work at this time. Follow Up  No future appointments.     Adilson Gunter RN  Care Transition Nurse  [phone] mobile

## 2023-03-28 ENCOUNTER — CARE COORDINATION (OUTPATIENT)
Dept: CASE MANAGEMENT | Age: 68
End: 2023-03-28

## 2023-03-28 LAB
BACTERIA BLD CULT ORG #2: NORMAL
BACTERIA BLD CULT: NORMAL

## 2023-03-28 NOTE — CARE COORDINATION
[facility] Initial Follow Up Call    Call within 2 business days of discharge: Yes      Patient: Craven Lennox Patient : 1955   MRN: [id number]  Reason for Admission: CAP, ROSALINO, mild anion gap metabolic acidosis, generalized weakness and dizziness, hypomagnesemia and hypokalemia, sinus bradycardia, HTN, hx peripheral neuropathy, poor intake, hx Hep C -> home with [address] [place], [place] PCP  Discharge Date: 3/26/23 RARS: Readmission Risk Score: 13.8      Last Discharge [address]       Date Complaint Diagnosis Description Type Department Provider    3/24/23 Dizziness Pneumonia due to infectious organism, unspecified laterality, unspecified part of lung . .. ED to Hosp-Admission (Discharged) (Jacque Velasquez) Silvia Ashley MD; Br...     2nd attempt - only listed number is not working. No further CTN outreach scheduled at this time. Follow Up  No future appointments.     Molly Caballero RN  Care Transition Nurse  [phone] mobile

## 2023-04-03 PROBLEM — E87.6 HYPOKALEMIA: Status: ACTIVE | Noted: 2023-04-03

## 2023-04-03 PROBLEM — R55 PRE-SYNCOPE: Status: ACTIVE | Noted: 2023-04-03

## 2023-12-01 ENCOUNTER — APPOINTMENT (OUTPATIENT)
Dept: CT IMAGING | Age: 68
DRG: 482 | End: 2023-12-01
Payer: MEDICARE

## 2023-12-01 ENCOUNTER — APPOINTMENT (OUTPATIENT)
Dept: GENERAL RADIOLOGY | Age: 68
DRG: 482 | End: 2023-12-01
Payer: MEDICARE

## 2023-12-01 ENCOUNTER — HOSPITAL ENCOUNTER (INPATIENT)
Age: 68
LOS: 4 days | Discharge: NURSING, REHAB OR LONG-TERM CARE | DRG: 482 | End: 2023-12-05
Attending: EMERGENCY MEDICINE | Admitting: INTERNAL MEDICINE
Payer: MEDICARE

## 2023-12-01 DIAGNOSIS — E87.6 HYPOKALEMIA: Primary | ICD-10-CM

## 2023-12-01 DIAGNOSIS — E83.42 HYPOMAGNESEMIA: ICD-10-CM

## 2023-12-01 DIAGNOSIS — S72.351A CLOSED DISPLACED COMMINUTED FRACTURE OF SHAFT OF RIGHT FEMUR, INITIAL ENCOUNTER (HCC): ICD-10-CM

## 2023-12-01 PROBLEM — I10 HYPERTENSION: Status: ACTIVE | Noted: 2023-12-01

## 2023-12-01 PROBLEM — J44.9 CHRONIC OBSTRUCTIVE PULMONARY DISEASE (HCC): Status: ACTIVE | Noted: 2023-12-01

## 2023-12-01 PROBLEM — S72.001A: Status: ACTIVE | Noted: 2023-12-01

## 2023-12-01 PROBLEM — F10.10 ALCOHOL ABUSE: Status: ACTIVE | Noted: 2023-12-01

## 2023-12-01 LAB
ALBUMIN SERPL-MCNC: 3.6 G/DL (ref 3.4–5)
ALBUMIN/GLOB SERPL: 1.1 {RATIO} (ref 1.1–2.2)
ALP SERPL-CCNC: 173 U/L (ref 40–129)
ALT SERPL-CCNC: 40 U/L (ref 10–40)
ANION GAP SERPL CALCULATED.3IONS-SCNC: 15 MMOL/L (ref 3–16)
ANION GAP SERPL CALCULATED.3IONS-SCNC: 20 MMOL/L (ref 3–16)
AST SERPL-CCNC: 104 U/L (ref 15–37)
BASOPHILS # BLD: 0.1 K/UL (ref 0–0.2)
BASOPHILS NFR BLD: 1.6 %
BILIRUB SERPL-MCNC: 1.2 MG/DL (ref 0–1)
BUN SERPL-MCNC: 8 MG/DL (ref 7–20)
BUN SERPL-MCNC: 8 MG/DL (ref 7–20)
CALCIUM SERPL-MCNC: 7.9 MG/DL (ref 8.3–10.6)
CALCIUM SERPL-MCNC: 8.1 MG/DL (ref 8.3–10.6)
CHLORIDE SERPL-SCNC: 95 MMOL/L (ref 99–110)
CHLORIDE SERPL-SCNC: 97 MMOL/L (ref 99–110)
CO2 SERPL-SCNC: 23 MMOL/L (ref 21–32)
CO2 SERPL-SCNC: 27 MMOL/L (ref 21–32)
CREAT SERPL-MCNC: <0.5 MG/DL (ref 0.8–1.3)
CREAT SERPL-MCNC: <0.5 MG/DL (ref 0.8–1.3)
DEPRECATED RDW RBC AUTO: 19.4 % (ref 12.4–15.4)
EOSINOPHIL # BLD: 0 K/UL (ref 0–0.6)
EOSINOPHIL NFR BLD: 0.1 %
ETHANOLAMINE SERPL-MCNC: 114 MG/DL (ref 0–0.08)
GFR SERPLBLD CREATININE-BSD FMLA CKD-EPI: >60 ML/MIN/{1.73_M2}
GFR SERPLBLD CREATININE-BSD FMLA CKD-EPI: >60 ML/MIN/{1.73_M2}
GLUCOSE SERPL-MCNC: 148 MG/DL (ref 70–99)
GLUCOSE SERPL-MCNC: 154 MG/DL (ref 70–99)
HCT VFR BLD AUTO: 42.3 % (ref 40.5–52.5)
HGB BLD-MCNC: 14.2 G/DL (ref 13.5–17.5)
LYMPHOCYTES # BLD: 0.9 K/UL (ref 1–5.1)
LYMPHOCYTES NFR BLD: 14 %
MAGNESIUM SERPL-MCNC: 1.5 MG/DL (ref 1.8–2.4)
MAGNESIUM SERPL-MCNC: 1.5 MG/DL (ref 1.8–2.4)
MCH RBC QN AUTO: 29.3 PG (ref 26–34)
MCHC RBC AUTO-ENTMCNC: 33.5 G/DL (ref 31–36)
MCV RBC AUTO: 87.6 FL (ref 80–100)
MONOCYTES # BLD: 0.3 K/UL (ref 0–1.3)
MONOCYTES NFR BLD: 5.7 %
NEUTROPHILS # BLD: 4.8 K/UL (ref 1.7–7.7)
NEUTROPHILS NFR BLD: 78.6 %
PLATELET # BLD AUTO: 152 K/UL (ref 135–450)
PMV BLD AUTO: 9.3 FL (ref 5–10.5)
POTASSIUM SERPL-SCNC: 2.8 MMOL/L (ref 3.5–5.1)
POTASSIUM SERPL-SCNC: 2.9 MMOL/L (ref 3.5–5.1)
PROCALCITONIN SERPL IA-MCNC: 0.2 NG/ML (ref 0–0.15)
PROT SERPL-MCNC: 6.8 G/DL (ref 6.4–8.2)
RBC # BLD AUTO: 4.83 M/UL (ref 4.2–5.9)
SODIUM SERPL-SCNC: 138 MMOL/L (ref 136–145)
SODIUM SERPL-SCNC: 139 MMOL/L (ref 136–145)
TROPONIN, HIGH SENSITIVITY: 11 NG/L (ref 0–22)
TROPONIN, HIGH SENSITIVITY: 11 NG/L (ref 0–22)
WBC # BLD AUTO: 6.1 K/UL (ref 4–11)

## 2023-12-01 PROCEDURE — 96368 THER/DIAG CONCURRENT INF: CPT

## 2023-12-01 PROCEDURE — 6370000000 HC RX 637 (ALT 250 FOR IP): Performed by: REGISTERED NURSE

## 2023-12-01 PROCEDURE — 2500000003 HC RX 250 WO HCPCS: Performed by: REGISTERED NURSE

## 2023-12-01 PROCEDURE — 71045 X-RAY EXAM CHEST 1 VIEW: CPT

## 2023-12-01 PROCEDURE — 6370000000 HC RX 637 (ALT 250 FOR IP): Performed by: INTERNAL MEDICINE

## 2023-12-01 PROCEDURE — 83735 ASSAY OF MAGNESIUM: CPT

## 2023-12-01 PROCEDURE — 1200000000 HC SEMI PRIVATE

## 2023-12-01 PROCEDURE — 73700 CT LOWER EXTREMITY W/O DYE: CPT

## 2023-12-01 PROCEDURE — 80053 COMPREHEN METABOLIC PANEL: CPT

## 2023-12-01 PROCEDURE — 6360000002 HC RX W HCPCS: Performed by: REGISTERED NURSE

## 2023-12-01 PROCEDURE — 96365 THER/PROPH/DIAG IV INF INIT: CPT

## 2023-12-01 PROCEDURE — 36415 COLL VENOUS BLD VENIPUNCTURE: CPT

## 2023-12-01 PROCEDURE — 73552 X-RAY EXAM OF FEMUR 2/>: CPT

## 2023-12-01 PROCEDURE — 96366 THER/PROPH/DIAG IV INF ADDON: CPT

## 2023-12-01 PROCEDURE — 99223 1ST HOSP IP/OBS HIGH 75: CPT

## 2023-12-01 PROCEDURE — 6370000000 HC RX 637 (ALT 250 FOR IP)

## 2023-12-01 PROCEDURE — 85025 COMPLETE CBC W/AUTO DIFF WBC: CPT

## 2023-12-01 PROCEDURE — 82077 ASSAY SPEC XCP UR&BREATH IA: CPT

## 2023-12-01 PROCEDURE — 2580000003 HC RX 258: Performed by: REGISTERED NURSE

## 2023-12-01 PROCEDURE — 96375 TX/PRO/DX INJ NEW DRUG ADDON: CPT

## 2023-12-01 PROCEDURE — 93005 ELECTROCARDIOGRAM TRACING: CPT | Performed by: REGISTERED NURSE

## 2023-12-01 PROCEDURE — 84145 PROCALCITONIN (PCT): CPT

## 2023-12-01 PROCEDURE — 99285 EMERGENCY DEPT VISIT HI MDM: CPT

## 2023-12-01 PROCEDURE — 6360000002 HC RX W HCPCS: Performed by: INTERNAL MEDICINE

## 2023-12-01 PROCEDURE — 96376 TX/PRO/DX INJ SAME DRUG ADON: CPT

## 2023-12-01 PROCEDURE — 84484 ASSAY OF TROPONIN QUANT: CPT

## 2023-12-01 RX ORDER — POTASSIUM CHLORIDE 20 MEQ/1
40 TABLET, EXTENDED RELEASE ORAL ONCE
Status: COMPLETED | OUTPATIENT
Start: 2023-12-01 | End: 2023-12-01

## 2023-12-01 RX ORDER — NIFEDIPINE 30 MG/1
30 TABLET, EXTENDED RELEASE ORAL 2 TIMES DAILY
Status: DISCONTINUED | OUTPATIENT
Start: 2023-12-01 | End: 2023-12-05 | Stop reason: HOSPADM

## 2023-12-01 RX ORDER — TRAZODONE HYDROCHLORIDE 50 MG/1
75 TABLET ORAL NIGHTLY
Status: DISCONTINUED | OUTPATIENT
Start: 2023-12-01 | End: 2023-12-05 | Stop reason: HOSPADM

## 2023-12-01 RX ORDER — DULOXETIN HYDROCHLORIDE 60 MG/1
60 CAPSULE, DELAYED RELEASE ORAL NIGHTLY
Status: DISCONTINUED | OUTPATIENT
Start: 2023-12-01 | End: 2023-12-05 | Stop reason: HOSPADM

## 2023-12-01 RX ORDER — SODIUM CHLORIDE 0.9 % (FLUSH) 0.9 %
5-40 SYRINGE (ML) INJECTION PRN
Status: DISCONTINUED | OUTPATIENT
Start: 2023-12-01 | End: 2023-12-05 | Stop reason: HOSPADM

## 2023-12-01 RX ORDER — POLYETHYLENE GLYCOL 3350 17 G/17G
17 POWDER, FOR SOLUTION ORAL DAILY PRN
Status: DISCONTINUED | OUTPATIENT
Start: 2023-12-01 | End: 2023-12-05 | Stop reason: HOSPADM

## 2023-12-01 RX ORDER — LORAZEPAM 2 MG/ML
3 INJECTION INTRAMUSCULAR
Status: DISCONTINUED | OUTPATIENT
Start: 2023-12-01 | End: 2023-12-05 | Stop reason: HOSPADM

## 2023-12-01 RX ORDER — SODIUM CHLORIDE 9 MG/ML
INJECTION, SOLUTION INTRAVENOUS PRN
Status: DISCONTINUED | OUTPATIENT
Start: 2023-12-01 | End: 2023-12-05 | Stop reason: HOSPADM

## 2023-12-01 RX ORDER — PROCHLORPERAZINE EDISYLATE 5 MG/ML
10 INJECTION INTRAMUSCULAR; INTRAVENOUS EVERY 6 HOURS PRN
Status: DISCONTINUED | OUTPATIENT
Start: 2023-12-01 | End: 2023-12-05 | Stop reason: HOSPADM

## 2023-12-01 RX ORDER — ACETAMINOPHEN 650 MG/1
650 SUPPOSITORY RECTAL EVERY 6 HOURS PRN
Status: DISCONTINUED | OUTPATIENT
Start: 2023-12-01 | End: 2023-12-05 | Stop reason: HOSPADM

## 2023-12-01 RX ORDER — CARVEDILOL 6.25 MG/1
6.25 TABLET ORAL 2 TIMES DAILY WITH MEALS
Status: DISCONTINUED | OUTPATIENT
Start: 2023-12-01 | End: 2023-12-05 | Stop reason: HOSPADM

## 2023-12-01 RX ORDER — MAGNESIUM SULFATE IN WATER 40 MG/ML
2000 INJECTION, SOLUTION INTRAVENOUS ONCE
Status: COMPLETED | OUTPATIENT
Start: 2023-12-01 | End: 2023-12-01

## 2023-12-01 RX ORDER — LORAZEPAM 2 MG/ML
2 INJECTION INTRAMUSCULAR
Status: DISCONTINUED | OUTPATIENT
Start: 2023-12-01 | End: 2023-12-05 | Stop reason: HOSPADM

## 2023-12-01 RX ORDER — LORAZEPAM 2 MG/1
4 TABLET ORAL
Status: DISCONTINUED | OUTPATIENT
Start: 2023-12-01 | End: 2023-12-05 | Stop reason: HOSPADM

## 2023-12-01 RX ORDER — POTASSIUM CHLORIDE 7.45 MG/ML
10 INJECTION INTRAVENOUS PRN
Status: DISCONTINUED | OUTPATIENT
Start: 2023-12-01 | End: 2023-12-05 | Stop reason: HOSPADM

## 2023-12-01 RX ORDER — SODIUM CHLORIDE 9 MG/ML
1000 INJECTION, SOLUTION INTRAVENOUS CONTINUOUS
Status: ACTIVE | OUTPATIENT
Start: 2023-12-01 | End: 2023-12-01

## 2023-12-01 RX ORDER — POTASSIUM CHLORIDE 20 MEQ/1
40 TABLET, EXTENDED RELEASE ORAL PRN
Status: DISCONTINUED | OUTPATIENT
Start: 2023-12-01 | End: 2023-12-05 | Stop reason: HOSPADM

## 2023-12-01 RX ORDER — DOXAZOSIN 2 MG/1
4 TABLET ORAL NIGHTLY
Status: DISCONTINUED | OUTPATIENT
Start: 2023-12-01 | End: 2023-12-05 | Stop reason: HOSPADM

## 2023-12-01 RX ORDER — ONDANSETRON 2 MG/ML
4 INJECTION INTRAMUSCULAR; INTRAVENOUS EVERY 6 HOURS PRN
Status: DISCONTINUED | OUTPATIENT
Start: 2023-12-01 | End: 2023-12-05 | Stop reason: HOSPADM

## 2023-12-01 RX ORDER — DIAZEPAM 5 MG/1
5 TABLET ORAL ONCE
Status: COMPLETED | OUTPATIENT
Start: 2023-12-01 | End: 2023-12-01

## 2023-12-01 RX ORDER — LORAZEPAM 2 MG/ML
1 INJECTION INTRAMUSCULAR
Status: DISCONTINUED | OUTPATIENT
Start: 2023-12-01 | End: 2023-12-05 | Stop reason: HOSPADM

## 2023-12-01 RX ORDER — PANTOPRAZOLE SODIUM 40 MG/1
40 TABLET, DELAYED RELEASE ORAL
Status: DISCONTINUED | OUTPATIENT
Start: 2023-12-02 | End: 2023-12-05 | Stop reason: HOSPADM

## 2023-12-01 RX ORDER — SODIUM CHLORIDE 0.9 % (FLUSH) 0.9 %
5-40 SYRINGE (ML) INJECTION EVERY 12 HOURS SCHEDULED
Status: DISCONTINUED | OUTPATIENT
Start: 2023-12-01 | End: 2023-12-05 | Stop reason: HOSPADM

## 2023-12-01 RX ORDER — MIRTAZAPINE 15 MG/1
15 TABLET, FILM COATED ORAL NIGHTLY
Status: DISCONTINUED | OUTPATIENT
Start: 2023-12-01 | End: 2023-12-05 | Stop reason: HOSPADM

## 2023-12-01 RX ORDER — POTASSIUM CHLORIDE 7.45 MG/ML
10 INJECTION INTRAVENOUS
Status: COMPLETED | OUTPATIENT
Start: 2023-12-01 | End: 2023-12-01

## 2023-12-01 RX ORDER — OXYCODONE AND ACETAMINOPHEN 7.5; 325 MG/1; MG/1
1 TABLET ORAL EVERY 4 HOURS PRN
Status: DISCONTINUED | OUTPATIENT
Start: 2023-12-01 | End: 2023-12-04

## 2023-12-01 RX ORDER — ACETAMINOPHEN 325 MG/1
650 TABLET ORAL EVERY 6 HOURS PRN
Status: DISCONTINUED | OUTPATIENT
Start: 2023-12-01 | End: 2023-12-05 | Stop reason: HOSPADM

## 2023-12-01 RX ORDER — LANOLIN ALCOHOL/MO/W.PET/CERES
100 CREAM (GRAM) TOPICAL DAILY
Status: DISCONTINUED | OUTPATIENT
Start: 2023-12-01 | End: 2023-12-05 | Stop reason: HOSPADM

## 2023-12-01 RX ORDER — ONDANSETRON 4 MG/1
4 TABLET, ORALLY DISINTEGRATING ORAL EVERY 8 HOURS PRN
Status: DISCONTINUED | OUTPATIENT
Start: 2023-12-01 | End: 2023-12-05 | Stop reason: HOSPADM

## 2023-12-01 RX ORDER — ASPIRIN 81 MG/1
81 TABLET ORAL DAILY
Status: DISCONTINUED | OUTPATIENT
Start: 2023-12-01 | End: 2023-12-05 | Stop reason: HOSPADM

## 2023-12-01 RX ORDER — MAGNESIUM SULFATE IN WATER 40 MG/ML
2000 INJECTION, SOLUTION INTRAVENOUS PRN
Status: DISCONTINUED | OUTPATIENT
Start: 2023-12-01 | End: 2023-12-05 | Stop reason: HOSPADM

## 2023-12-01 RX ORDER — LORAZEPAM 1 MG/1
1 TABLET ORAL
Status: DISCONTINUED | OUTPATIENT
Start: 2023-12-01 | End: 2023-12-05 | Stop reason: HOSPADM

## 2023-12-01 RX ORDER — GABAPENTIN 300 MG/1
300 CAPSULE ORAL 4 TIMES DAILY
Status: DISCONTINUED | OUTPATIENT
Start: 2023-12-01 | End: 2023-12-05 | Stop reason: HOSPADM

## 2023-12-01 RX ORDER — METHOCARBAMOL 500 MG/1
500 TABLET, FILM COATED ORAL 4 TIMES DAILY
Status: ON HOLD | COMMUNITY
End: 2023-12-05 | Stop reason: HOSPADM

## 2023-12-01 RX ORDER — HYDRALAZINE HYDROCHLORIDE 50 MG/1
50 TABLET, FILM COATED ORAL EVERY 8 HOURS SCHEDULED
Status: DISCONTINUED | OUTPATIENT
Start: 2023-12-01 | End: 2023-12-05 | Stop reason: HOSPADM

## 2023-12-01 RX ORDER — ENOXAPARIN SODIUM 100 MG/ML
40 INJECTION SUBCUTANEOUS DAILY
Status: DISCONTINUED | OUTPATIENT
Start: 2023-12-01 | End: 2023-12-04

## 2023-12-01 RX ORDER — LORAZEPAM 2 MG/1
2 TABLET ORAL
Status: DISCONTINUED | OUTPATIENT
Start: 2023-12-01 | End: 2023-12-05 | Stop reason: HOSPADM

## 2023-12-01 RX ORDER — ALBUTEROL SULFATE 90 UG/1
2 AEROSOL, METERED RESPIRATORY (INHALATION) 4 TIMES DAILY PRN
Status: DISCONTINUED | OUTPATIENT
Start: 2023-12-01 | End: 2023-12-05 | Stop reason: HOSPADM

## 2023-12-01 RX ORDER — LORAZEPAM 2 MG/ML
4 INJECTION INTRAMUSCULAR
Status: DISCONTINUED | OUTPATIENT
Start: 2023-12-01 | End: 2023-12-05 | Stop reason: HOSPADM

## 2023-12-01 RX ORDER — METHOCARBAMOL 500 MG/1
500 TABLET, FILM COATED ORAL ONCE
Status: COMPLETED | OUTPATIENT
Start: 2023-12-01 | End: 2023-12-01

## 2023-12-01 RX ORDER — NICOTINE 21 MG/24HR
1 PATCH, TRANSDERMAL 24 HOURS TRANSDERMAL DAILY
Status: DISCONTINUED | OUTPATIENT
Start: 2023-12-01 | End: 2023-12-05 | Stop reason: HOSPADM

## 2023-12-01 RX ADMIN — TRAZODONE HYDROCHLORIDE 75 MG: 50 TABLET ORAL at 23:52

## 2023-12-01 RX ADMIN — HYDROMORPHONE HYDROCHLORIDE 1 MG: 1 INJECTION, SOLUTION INTRAMUSCULAR; INTRAVENOUS; SUBCUTANEOUS at 14:24

## 2023-12-01 RX ADMIN — METHOCARBAMOL TABLETS 500 MG: 500 TABLET, COATED ORAL at 23:53

## 2023-12-01 RX ADMIN — HYDRALAZINE HYDROCHLORIDE 50 MG: 50 TABLET, FILM COATED ORAL at 23:52

## 2023-12-01 RX ADMIN — CARVEDILOL 6.25 MG: 6.25 TABLET, FILM COATED ORAL at 23:52

## 2023-12-01 RX ADMIN — DOXAZOSIN 4 MG: 2 TABLET ORAL at 23:52

## 2023-12-01 RX ADMIN — DIAZEPAM 5 MG: 5 TABLET ORAL at 14:48

## 2023-12-01 RX ADMIN — HYDROMORPHONE HYDROCHLORIDE 0.5 MG: 1 INJECTION, SOLUTION INTRAMUSCULAR; INTRAVENOUS; SUBCUTANEOUS at 23:10

## 2023-12-01 RX ADMIN — Medication 100 MG: at 23:52

## 2023-12-01 RX ADMIN — OXYCODONE HYDROCHLORIDE AND ACETAMINOPHEN 1 TABLET: 7.5; 325 TABLET ORAL at 17:40

## 2023-12-01 RX ADMIN — MAGNESIUM SULFATE HEPTAHYDRATE 2000 MG: 40 INJECTION, SOLUTION INTRAVENOUS at 15:38

## 2023-12-01 RX ADMIN — DULOXETINE HYDROCHLORIDE 60 MG: 60 CAPSULE, DELAYED RELEASE ORAL at 23:52

## 2023-12-01 RX ADMIN — GABAPENTIN 300 MG: 300 CAPSULE ORAL at 23:53

## 2023-12-01 RX ADMIN — MIRTAZAPINE 15 MG: 15 TABLET, FILM COATED ORAL at 23:53

## 2023-12-01 RX ADMIN — ASPIRIN 81 MG: 81 TABLET, COATED ORAL at 23:53

## 2023-12-01 RX ADMIN — HYDROMORPHONE HYDROCHLORIDE 0.5 MG: 1 INJECTION, SOLUTION INTRAMUSCULAR; INTRAVENOUS; SUBCUTANEOUS at 19:51

## 2023-12-01 RX ADMIN — MAGNESIUM SULFATE HEPTAHYDRATE 2000 MG: 40 INJECTION, SOLUTION INTRAVENOUS at 23:27

## 2023-12-01 RX ADMIN — PROCHLORPERAZINE EDISYLATE 10 MG: 5 INJECTION INTRAMUSCULAR; INTRAVENOUS at 23:07

## 2023-12-01 RX ADMIN — HYDROMORPHONE HYDROCHLORIDE 1 MG: 1 INJECTION, SOLUTION INTRAMUSCULAR; INTRAVENOUS; SUBCUTANEOUS at 15:44

## 2023-12-01 RX ADMIN — NIFEDIPINE 30 MG: 30 TABLET, FILM COATED, EXTENDED RELEASE ORAL at 23:52

## 2023-12-01 RX ADMIN — SODIUM CHLORIDE 1000 ML: 9 INJECTION, SOLUTION INTRAVENOUS at 15:37

## 2023-12-01 RX ADMIN — POTASSIUM CHLORIDE 10 MEQ: 7.46 INJECTION, SOLUTION INTRAVENOUS at 16:40

## 2023-12-01 RX ADMIN — POTASSIUM CHLORIDE 40 MEQ: 1500 TABLET, EXTENDED RELEASE ORAL at 15:41

## 2023-12-01 RX ADMIN — ENOXAPARIN SODIUM 40 MG: 100 INJECTION SUBCUTANEOUS at 23:10

## 2023-12-01 RX ADMIN — ONDANSETRON 4 MG: 4 TABLET, ORALLY DISINTEGRATING ORAL at 17:40

## 2023-12-01 RX ADMIN — POTASSIUM CHLORIDE 10 MEQ: 7.46 INJECTION, SOLUTION INTRAVENOUS at 15:39

## 2023-12-01 RX ADMIN — POTASSIUM CHLORIDE 10 MEQ: 7.46 INJECTION, SOLUTION INTRAVENOUS at 17:43

## 2023-12-01 ASSESSMENT — PAIN DESCRIPTION - FREQUENCY
FREQUENCY: CONTINUOUS

## 2023-12-01 ASSESSMENT — PAIN - FUNCTIONAL ASSESSMENT
PAIN_FUNCTIONAL_ASSESSMENT: PREVENTS OR INTERFERES SOME ACTIVE ACTIVITIES AND ADLS
PAIN_FUNCTIONAL_ASSESSMENT: PREVENTS OR INTERFERES SOME ACTIVE ACTIVITIES AND ADLS
PAIN_FUNCTIONAL_ASSESSMENT: 0-10
PAIN_FUNCTIONAL_ASSESSMENT: PREVENTS OR INTERFERES SOME ACTIVE ACTIVITIES AND ADLS

## 2023-12-01 ASSESSMENT — PAIN DESCRIPTION - ONSET
ONSET: ON-GOING

## 2023-12-01 ASSESSMENT — LIFESTYLE VARIABLES
HOW OFTEN DO YOU HAVE A DRINK CONTAINING ALCOHOL: NEVER
HOW MANY STANDARD DRINKS CONTAINING ALCOHOL DO YOU HAVE ON A TYPICAL DAY: PATIENT DOES NOT DRINK
HOW OFTEN DO YOU HAVE A DRINK CONTAINING ALCOHOL: 4 OR MORE TIMES A WEEK
HOW MANY STANDARD DRINKS CONTAINING ALCOHOL DO YOU HAVE ON A TYPICAL DAY: 7 TO 9

## 2023-12-01 ASSESSMENT — PAIN DESCRIPTION - ORIENTATION
ORIENTATION: RIGHT

## 2023-12-01 ASSESSMENT — PAIN DESCRIPTION - PAIN TYPE
TYPE: ACUTE PAIN

## 2023-12-01 ASSESSMENT — PAIN DESCRIPTION - LOCATION
LOCATION: LEG

## 2023-12-01 ASSESSMENT — PAIN SCALES - GENERAL
PAINLEVEL_OUTOF10: 8
PAINLEVEL_OUTOF10: 9

## 2023-12-01 ASSESSMENT — PAIN DESCRIPTION - DESCRIPTORS
DESCRIPTORS: ACHING;DISCOMFORT;SHARP;SORE;SHOOTING
DESCRIPTORS: ACHING;CRAMPING;DISCOMFORT;SORE;SHARP

## 2023-12-01 NOTE — ED NOTES
[address] [facility] placed for [facility].   [facility] with callback.      Kaelyn Blank RN  12/01/23 3437

## 2023-12-01 NOTE — ED NOTES
Dajuan Gold sent to Dr. Wilhelminia Severance for ortho consult. 1- Dr. Wilhelminia Severance with callback.       Hollie Hartman RN  12/01/23 2056

## 2023-12-01 NOTE — ED PROVIDER NOTES
supplementation of potassium. CBC is negative for leukocytosis or anemias. Magnesium 1.5, orders placed for p.o. supplementation. Was interpreted by the attending physician for no acute changes    CT hip right without contrast interpreted by the radiologist and myself for no hip fracture or dislocation  CT femur right without contrast interpreted by the radiologist and myself for comminuted displaced fracture of the mid to distal femur fracture does not extend to the articular surface. Patellar shows normal tracking proximal tibia and fibular unremarkable large amount of hemorrhage surrounding the femoral fracture which is to be expected. Only a small joint effusion with no visualized gas in the soft tissues. Consult was placed to the [place] for disposition of transfer for orthopedic repair. I did speak with Dr. Michelle Metzger who actually felt the patient will be better served to stay with the emergency as they would not be able to repair him until Monday. A consult was placed to [facility] orthopedics and I spoke with Dr. Bird Melgar who reviewed the images. He did request some plain film images as well which she placed orders for. He requested the patient be admitted under hospital medicine after reviewing CT imaging for OR repair tomorrow. He requested the patient be n.p.o. after midnight. Chest x-ray interpreted by the radiologist and myself for suspected infectious/inflammatory airway process at the right lung base. Consult was placed to hospital medicine for disposition of admission via [employer]. I did again speak with Dr. Bird Melagr who stated that he has the patient scheduled for the OR tomorrow morning at 10 AM.  He did request the patient be placed in Torres's traction but we do not have this in the ER as I am told per nursing. He states that it is okay for the patient to stay in his current traction device placed by EMS until OR tomorrow morning.   I did communicate this with hospital medicine 0.4 mLs into the skin daily for 28 days, Disp-11.2 mL, R-0Print             DISCONTINUED MEDICATIONS:  Discharge Medication List as of 12/5/2023  4:05 PM        STOP taking these medications       oxyCODONE-acetaminophen (PERCOCET) 7.5-325 MG per tablet Comments:   Reason for Stopping:         methocarbamol (ROBAXIN) 500 MG tablet Comments:   Reason for Stopping:         atorvastatin (LIPITOR) 80 MG tablet Comments:   Reason for Stopping:         oxyCODONE-acetaminophen (PERCOCET) 7.5-325 MG per tablet Comments:   Reason for Stopping:                      (Please note that portions ofthis note were completed with a voice recognition program.  Efforts were made to edit the dictations but occasionally words are mis-transcribed.)    BREANNE Fonseca CNP (electronically signed)       BREANNE Fonseca CNP  12/06/23 2012

## 2023-12-01 NOTE — PLAN OF CARE
Fall,  R femur fx. CT pending. ER spoke to ortho. NPO after MN     H/O COPD- hypoxia after Pain meds      K, mg repleted.      EKG - recheck, check trop

## 2023-12-01 NOTE — H&P
Hospital Medicine History & Physical      PCP: Felicitas Silverman MD    Date of Admission: 12/1/2023    Date of Service: Pt seen/examined on 12/01/23      Chief Complaint:    Chief Complaint   Patient presents with    Leg Injury     Slipped while walking in the wet grass and felt a pop on R leg. Obvious swelling at femur. Received 75mcg Fentanyl per EMS. Denies hitting head or LOC. History Of Present Illness: The patient is a 70 y.o. male with depression, neuropathy, BPH, GERD, hepatitis C, COPD, HTN, CVA who presented to [facility] ED with complaint of fall. Patient reports slipping on wet grass and falling to the ground, his right leg went out behind him. He reports hearing a \"pop\" in the leg with immediate pain. He was unable to ambulate and called EMS. No head injury or LOC. Denies blood thinner use. Denies numbness or tingling of the extremities. Admitted for further evaluation and management in consultation with orthopedic surgery. Past Medical History:        Diagnosis Date    Arthritis     Asthma     Cerebral artery occlusion with cerebral infarction ([address])     COPD (chronic obstructive pulmonary disease) (HCC)     Diabetic peripheral neuropathy (HCC)     Hepatitis C     Hypertension        Past Surgical History:        Procedure Laterality Date    CHOLECYSTECTOMY      CT GUIDED CHEST TUBE  03/01/2022    CT GUIDED CHEST TUBE 3/1/2022 [place] CT SCAN    FRACTURE SURGERY         Medications Prior to Admission:    Prior to Admission medications    Medication Sig Start Date End Date Taking? Authorizing Provider   mirtazapine (REMERON) 15 MG tablet Take 1 tablet by mouth nightly Follow-up with your primary care doctor to clarify your correct home medications.  3/26/23   Rebekah Mahan DO   carvedilol (COREG) 6.25 MG tablet Take 1 tablet by mouth 2 times daily (with meals) 3/26/23   Rebekah Mahan, DO   doxazosin (CARDURA) 4 MG tablet Take 1 tablet by mouth nightly 3/26/23   Rebekah Mahan, DO   aspirin 81 MG EC tablet Take 1 tablet by mouth daily 1/8/23   Preston Gonzalez MD   hydrALAZINE (APRESOLINE) 50 MG tablet Take 1 tablet by mouth every 8 hours 1/7/23   Preston Gonzalez MD   NIFEdipine (PROCARDIA XL) 30 MG extended release tablet Take 1 tablet by mouth in the morning and at bedtime 1/7/23   Preston Gonzalez MD   oxyCODONE-acetaminophen (PERCOCET) 7.5-325 MG per tablet Take 1 tablet by mouth 4 times daily as needed for Pain. Maribel Cornelius MD   albuterol sulfate HFA (PROVENTIL;VENTOLIN;PROAIR) 108 (90 Base) MCG/ACT inhaler Inhale 2 puffs into the lungs 4 times daily as needed for Wheezing or Shortness of Breath    Maribel Cornelius MD   DULoxetine (CYMBALTA) 60 MG extended release capsule Take 60 mg by mouth at bedtime    Maribel Cornelius MD   gabapentin (NEURONTIN) 300 MG capsule Take 300 mg by mouth 4 times daily. 3 capsules per dose to total 900mg 4 times a day    Maribel Cornelius MD   omeprazole (PRILOSEC) 20 MG delayed release capsule Take 20 mg by mouth 2 times daily    Maribel Cornelius MD   traZODone (DESYREL) 50 MG tablet Take 75 mg by mouth nightly    ProviderMaribel MD       Allergies:  Patient has no known allergies. Social History:      TOBACCO:   reports that he has been smoking cigarettes. He has been smoking an average of 1 pack per day. He does not have any smokeless tobacco history on file. ETOH:   reports no history of alcohol use. Family History:   Positive as follows:    History reviewed. No pertinent family history.     REVIEW OF SYSTEMS:     Constitutional: Negative for fever, chills, weight loss   HENT: Negative for sore throat   Eyes: Negative for redness, visual disturbance   Respiratory: Negative  for dyspnea, cough   Cardiovascular: Negative for chest pain   Gastrointestinal: Negative for vomiting, diarrhea, abdominal pain   Genitourinary: Negative for hematuria, dysuria   Musculoskeletal: +arthralgias   Skin: Negative for rash   Neurological: Negative for

## 2023-12-01 NOTE — PLAN OF CARE
Ortho Plan of Care    The patient sustained a right distal third femoral shaft fracture with extension distally to the supracondylar area.     Plan  - NPO after midnight  - Traction for tonight  - Plan for right femur retrograde nail tomorrow morning at 1000am  - IM admission for risk stratification greatly appreciated  - Full consult to follow    Fidelia Dominguez DO

## 2023-12-01 NOTE — ED PROVIDER NOTES
ED Attending Attestation Note    This patient was seen by the advanced practice provider. I personally saw the patient and performed a substantive portion of the visit including all aspects of the medical decision making. Briefly, 70 y.o. male presents with right leg injury. His leg bent under him and he felt a pop and has had significant pain to the right lower extremity with obvious deformity to the distal third of his femur. He arrives in traction. Focused exam:   Gen: 70 y.o. male, NAD  Patient with motor and sensory intact distally in the right lower extremity with 2+ DP pulse noted. Patient right thigh with obvious deformity to the distal third but compartments remain soft without any noted tenting of the skin as well. Imaging:  CT HIP RIGHT WO CONTRAST    Result Date: 12/1/2023  EXAMINATION: CT OF THE RIGHT HIP WITHOUT CONTRAST 12/1/2023 2:26 pm TECHNIQUE: CT of the right hip was performed without the administration of intravenous contrast.  Multiplanar reformatted images are provided for review. Automated exposure control, iterative reconstruction, and/or weight based adjustment of the mA/kV was utilized to reduce the radiation dose to as low as reasonably achievable. COMPARISON: None. HISTORY ORDERING SYSTEM PROVIDED HISTORY: concern for femur fracture TECHNOLOGIST PROVIDED HISTORY: Reason for exam:->concern for femur fracture Decision Support Exception - unselect if not a suspected or confirmed emergency medical condition->Emergency Medical Condition (MA) Reason for Exam: Fall today FINDINGS: Bones: No evidence of acute fracture or dislocation. No aggressive appearing osseous abnormality or periostitis. Soft Tissue: No significant soft tissue edema or fluid collections. Joint: Mild osteoarthritis of the right hip joint. No osteonecrosis. No hip fracture or dislocation.           MDM:   Patient presenting for evaluation of right leg injury and on review of the images personally, patient

## 2023-12-01 NOTE — PROGRESS NOTES
Telemetry Assignment Communication Form    Patient has orders for continuous telemetry OR pulse oximeter only orders    To be filled out by Clinical    Patient has Admission or Transfer orders and is assigned to 222      (Once this top section is completed:  Select \"Route\" and send note to Fax number: [phone]))      ___________________________________________________________________________      [facility]    Patient assigned to tele box number: _____[id number]_____________               (to be written in by [address] when telemetry box is assigned to patient)    ___________________________________________________________________________      Bedside RN confirming that the box listed above is in fact the telemetry box number being placed on the patient listed above.         X________________________________________ RN signature        __________________________________________RN assigned to Patient (please print)    _______________ Date    ____________ Time

## 2023-12-02 ENCOUNTER — ANESTHESIA (OUTPATIENT)
Dept: OPERATING ROOM | Age: 68
End: 2023-12-02
Payer: MEDICARE

## 2023-12-02 ENCOUNTER — APPOINTMENT (OUTPATIENT)
Dept: GENERAL RADIOLOGY | Age: 68
DRG: 482 | End: 2023-12-02
Payer: MEDICARE

## 2023-12-02 ENCOUNTER — ANESTHESIA EVENT (OUTPATIENT)
Dept: OPERATING ROOM | Age: 68
End: 2023-12-02
Payer: MEDICARE

## 2023-12-02 LAB
ALBUMIN SERPL-MCNC: 3.4 G/DL (ref 3.4–5)
ALBUMIN/GLOB SERPL: 1 {RATIO} (ref 1.1–2.2)
ALP SERPL-CCNC: 153 U/L (ref 40–129)
ALT SERPL-CCNC: 36 U/L (ref 10–40)
ANION GAP SERPL CALCULATED.3IONS-SCNC: -4 MMOL/L (ref 3–16)
AST SERPL-CCNC: 82 U/L (ref 15–37)
BASOPHILS # BLD: 0.1 K/UL (ref 0–0.2)
BASOPHILS NFR BLD: 1.3 %
BILIRUB SERPL-MCNC: 1.2 MG/DL (ref 0–1)
BUN SERPL-MCNC: 7 MG/DL (ref 7–20)
CALCIUM SERPL-MCNC: 7.5 MG/DL (ref 8.3–10.6)
CHLORIDE SERPL-SCNC: 97 MMOL/L (ref 99–110)
CO2 SERPL-SCNC: 41 MMOL/L (ref 21–32)
CREAT SERPL-MCNC: <0.5 MG/DL (ref 0.8–1.3)
DEPRECATED RDW RBC AUTO: 19.3 % (ref 12.4–15.4)
EKG ATRIAL RATE: 107 BPM
EKG ATRIAL RATE: 84 BPM
EKG DIAGNOSIS: NORMAL
EKG DIAGNOSIS: NORMAL
EKG P AXIS: 43 DEGREES
EKG P-R INTERVAL: 104 MS
EKG P-R INTERVAL: 166 MS
EKG Q-T INTERVAL: 358 MS
EKG Q-T INTERVAL: 422 MS
EKG QRS DURATION: 82 MS
EKG QRS DURATION: 82 MS
EKG QTC CALCULATION (BAZETT): 477 MS
EKG QTC CALCULATION (BAZETT): 498 MS
EKG R AXIS: 27 DEGREES
EKG R AXIS: 9 DEGREES
EKG T AXIS: 44 DEGREES
EKG T AXIS: 55 DEGREES
EKG VENTRICULAR RATE: 107 BPM
EKG VENTRICULAR RATE: 84 BPM
EOSINOPHIL # BLD: 0 K/UL (ref 0–0.6)
EOSINOPHIL NFR BLD: 0.3 %
FLUAV RNA RESP QL NAA+PROBE: NOT DETECTED
FLUBV RNA RESP QL NAA+PROBE: NOT DETECTED
GFR SERPLBLD CREATININE-BSD FMLA CKD-EPI: >60 ML/MIN/{1.73_M2}
GLUCOSE BLD-MCNC: 138 MG/DL (ref 70–99)
GLUCOSE BLD-MCNC: 156 MG/DL (ref 70–99)
GLUCOSE SERPL-MCNC: 128 MG/DL (ref 70–99)
HCT VFR BLD AUTO: 37.8 % (ref 40.5–52.5)
HGB BLD-MCNC: 12.8 G/DL (ref 13.5–17.5)
LYMPHOCYTES # BLD: 0.9 K/UL (ref 1–5.1)
LYMPHOCYTES NFR BLD: 15.9 %
MCH RBC QN AUTO: 29.6 PG (ref 26–34)
MCHC RBC AUTO-ENTMCNC: 34 G/DL (ref 31–36)
MCV RBC AUTO: 87.2 FL (ref 80–100)
MONOCYTES # BLD: 0.4 K/UL (ref 0–1.3)
MONOCYTES NFR BLD: 6.5 %
NEUTROPHILS # BLD: 4.4 K/UL (ref 1.7–7.7)
NEUTROPHILS NFR BLD: 76 %
PERFORMED ON: ABNORMAL
PERFORMED ON: ABNORMAL
PLATELET # BLD AUTO: 124 K/UL (ref 135–450)
PMV BLD AUTO: 9.7 FL (ref 5–10.5)
POTASSIUM SERPL-SCNC: 3.9 MMOL/L (ref 3.5–5.1)
PROT SERPL-MCNC: 6.7 G/DL (ref 6.4–8.2)
RBC # BLD AUTO: 4.34 M/UL (ref 4.2–5.9)
SARS-COV-2 RNA RESP QL NAA+PROBE: NOT DETECTED
SODIUM SERPL-SCNC: 134 MMOL/L (ref 136–145)
TROPONIN, HIGH SENSITIVITY: 10 NG/L (ref 0–22)
WBC # BLD AUTO: 5.8 K/UL (ref 4–11)

## 2023-12-02 PROCEDURE — 2500000003 HC RX 250 WO HCPCS: Performed by: ANESTHESIOLOGY

## 2023-12-02 PROCEDURE — 6370000000 HC RX 637 (ALT 250 FOR IP): Performed by: STUDENT IN AN ORGANIZED HEALTH CARE EDUCATION/TRAINING PROGRAM

## 2023-12-02 PROCEDURE — 0QS804Z REPOSITION RIGHT FEMORAL SHAFT WITH INTERNAL FIXATION DEVICE, OPEN APPROACH: ICD-10-PCS | Performed by: STUDENT IN AN ORGANIZED HEALTH CARE EDUCATION/TRAINING PROGRAM

## 2023-12-02 PROCEDURE — 99232 SBSQ HOSP IP/OBS MODERATE 35: CPT

## 2023-12-02 PROCEDURE — 7100000001 HC PACU RECOVERY - ADDTL 15 MIN: Performed by: STUDENT IN AN ORGANIZED HEALTH CARE EDUCATION/TRAINING PROGRAM

## 2023-12-02 PROCEDURE — 27506 TREATMENT OF THIGH FRACTURE: CPT | Performed by: STUDENT IN AN ORGANIZED HEALTH CARE EDUCATION/TRAINING PROGRAM

## 2023-12-02 PROCEDURE — 93010 ELECTROCARDIOGRAM REPORT: CPT | Performed by: INTERNAL MEDICINE

## 2023-12-02 PROCEDURE — 1200000000 HC SEMI PRIVATE

## 2023-12-02 PROCEDURE — 93005 ELECTROCARDIOGRAM TRACING: CPT | Performed by: INTERNAL MEDICINE

## 2023-12-02 PROCEDURE — C1713 ANCHOR/SCREW BN/BN,TIS/BN: HCPCS | Performed by: STUDENT IN AN ORGANIZED HEALTH CARE EDUCATION/TRAINING PROGRAM

## 2023-12-02 PROCEDURE — 94761 N-INVAS EAR/PLS OXIMETRY MLT: CPT

## 2023-12-02 PROCEDURE — 2580000003 HC RX 258: Performed by: ANESTHESIOLOGY

## 2023-12-02 PROCEDURE — 2580000003 HC RX 258: Performed by: STUDENT IN AN ORGANIZED HEALTH CARE EDUCATION/TRAINING PROGRAM

## 2023-12-02 PROCEDURE — 3600000015 HC SURGERY LEVEL 5 ADDTL 15MIN: Performed by: STUDENT IN AN ORGANIZED HEALTH CARE EDUCATION/TRAINING PROGRAM

## 2023-12-02 PROCEDURE — 3600000005 HC SURGERY LEVEL 5 BASE: Performed by: STUDENT IN AN ORGANIZED HEALTH CARE EDUCATION/TRAINING PROGRAM

## 2023-12-02 PROCEDURE — C1769 GUIDE WIRE: HCPCS | Performed by: STUDENT IN AN ORGANIZED HEALTH CARE EDUCATION/TRAINING PROGRAM

## 2023-12-02 PROCEDURE — 99223 1ST HOSP IP/OBS HIGH 75: CPT | Performed by: STUDENT IN AN ORGANIZED HEALTH CARE EDUCATION/TRAINING PROGRAM

## 2023-12-02 PROCEDURE — 3700000000 HC ANESTHESIA ATTENDED CARE: Performed by: STUDENT IN AN ORGANIZED HEALTH CARE EDUCATION/TRAINING PROGRAM

## 2023-12-02 PROCEDURE — 6360000002 HC RX W HCPCS: Performed by: ANESTHESIOLOGY

## 2023-12-02 PROCEDURE — 73552 X-RAY EXAM OF FEMUR 2/>: CPT

## 2023-12-02 PROCEDURE — 2700000000 HC OXYGEN THERAPY PER DAY

## 2023-12-02 PROCEDURE — C1889 IMPLANT/INSERT DEVICE, NOC: HCPCS | Performed by: STUDENT IN AN ORGANIZED HEALTH CARE EDUCATION/TRAINING PROGRAM

## 2023-12-02 PROCEDURE — 6360000002 HC RX W HCPCS: Performed by: STUDENT IN AN ORGANIZED HEALTH CARE EDUCATION/TRAINING PROGRAM

## 2023-12-02 PROCEDURE — 36415 COLL VENOUS BLD VENIPUNCTURE: CPT

## 2023-12-02 PROCEDURE — 27506 TREATMENT OF THIGH FRACTURE: CPT | Performed by: PHYSICIAN ASSISTANT

## 2023-12-02 PROCEDURE — 2709999900 HC NON-CHARGEABLE SUPPLY: Performed by: STUDENT IN AN ORGANIZED HEALTH CARE EDUCATION/TRAINING PROGRAM

## 2023-12-02 PROCEDURE — 80053 COMPREHEN METABOLIC PANEL: CPT

## 2023-12-02 PROCEDURE — 7100000000 HC PACU RECOVERY - FIRST 15 MIN: Performed by: STUDENT IN AN ORGANIZED HEALTH CARE EDUCATION/TRAINING PROGRAM

## 2023-12-02 PROCEDURE — 6360000002 HC RX W HCPCS: Performed by: INTERNAL MEDICINE

## 2023-12-02 PROCEDURE — 85025 COMPLETE CBC W/AUTO DIFF WBC: CPT

## 2023-12-02 PROCEDURE — 3700000001 HC ADD 15 MINUTES (ANESTHESIA): Performed by: STUDENT IN AN ORGANIZED HEALTH CARE EDUCATION/TRAINING PROGRAM

## 2023-12-02 PROCEDURE — 87636 SARSCOV2 & INF A&B AMP PRB: CPT

## 2023-12-02 PROCEDURE — 2720000010 HC SURG SUPPLY STERILE: Performed by: STUDENT IN AN ORGANIZED HEALTH CARE EDUCATION/TRAINING PROGRAM

## 2023-12-02 DEVICE — CAP END F/RFNA 0MM STERILE: Type: IMPLANTABLE DEVICE | Site: LEG | Status: FUNCTIONAL

## 2023-12-02 DEVICE — SCREW LCK F/IM NAIL 5X40MM XL25 STR: Type: IMPLANTABLE DEVICE | Site: LEG | Status: FUNCTIONAL

## 2023-12-02 DEVICE — SCREW LCK F/IM NAIL 5X46MM XL25 STR: Type: IMPLANTABLE DEVICE | Status: FUNCTIONAL

## 2023-12-02 DEVICE — IMPLANTABLE DEVICE: Type: IMPLANTABLE DEVICE | Site: LEG | Status: FUNCTIONAL

## 2023-12-02 DEVICE — SCREW LOCKING FOR IM NAIL 5X66MM XL25 SILX: Type: IMPLANTABLE DEVICE | Site: LEG | Status: FUNCTIONAL

## 2023-12-02 DEVICE — SCREW LK F/IM NAIL 5X38MM XL25 SILE: Type: IMPLANTABLE DEVICE | Site: LEG | Status: FUNCTIONAL

## 2023-12-02 DEVICE — IMPLANTABLE DEVICE: Type: IMPLANTABLE DEVICE | Status: FUNCTIONAL

## 2023-12-02 RX ORDER — SODIUM CHLORIDE, SODIUM LACTATE, POTASSIUM CHLORIDE, CALCIUM CHLORIDE 600; 310; 30; 20 MG/100ML; MG/100ML; MG/100ML; MG/100ML
INJECTION, SOLUTION INTRAVENOUS CONTINUOUS PRN
Status: DISCONTINUED | OUTPATIENT
Start: 2023-12-02 | End: 2023-12-02 | Stop reason: SDUPTHER

## 2023-12-02 RX ORDER — BUPIVACAINE HYDROCHLORIDE 5 MG/ML
INJECTION, SOLUTION PERINEURAL PRN
Status: DISCONTINUED | OUTPATIENT
Start: 2023-12-02 | End: 2023-12-02 | Stop reason: ALTCHOICE

## 2023-12-02 RX ORDER — LABETALOL HYDROCHLORIDE 5 MG/ML
10 INJECTION, SOLUTION INTRAVENOUS
Status: DISCONTINUED | OUTPATIENT
Start: 2023-12-02 | End: 2023-12-02 | Stop reason: HOSPADM

## 2023-12-02 RX ORDER — CEFAZOLIN SODIUM 2 G/50ML
2000 SOLUTION INTRAVENOUS ONCE
Status: DISCONTINUED | OUTPATIENT
Start: 2023-12-02 | End: 2023-12-02 | Stop reason: CLARIF

## 2023-12-02 RX ORDER — LIDOCAINE HYDROCHLORIDE 20 MG/ML
INJECTION, SOLUTION INFILTRATION; PERINEURAL PRN
Status: DISCONTINUED | OUTPATIENT
Start: 2023-12-02 | End: 2023-12-02 | Stop reason: SDUPTHER

## 2023-12-02 RX ORDER — DEXAMETHASONE SODIUM PHOSPHATE 4 MG/ML
INJECTION, SOLUTION INTRA-ARTICULAR; INTRALESIONAL; INTRAMUSCULAR; INTRAVENOUS; SOFT TISSUE PRN
Status: DISCONTINUED | OUTPATIENT
Start: 2023-12-02 | End: 2023-12-02 | Stop reason: SDUPTHER

## 2023-12-02 RX ORDER — SODIUM CHLORIDE 0.9 % (FLUSH) 0.9 %
5-40 SYRINGE (ML) INJECTION EVERY 12 HOURS SCHEDULED
Status: DISCONTINUED | OUTPATIENT
Start: 2023-12-02 | End: 2023-12-02 | Stop reason: HOSPADM

## 2023-12-02 RX ORDER — OXYCODONE HYDROCHLORIDE 5 MG/1
10 TABLET ORAL PRN
Status: DISCONTINUED | OUTPATIENT
Start: 2023-12-02 | End: 2023-12-02 | Stop reason: HOSPADM

## 2023-12-02 RX ORDER — DIPHENHYDRAMINE HYDROCHLORIDE 50 MG/ML
12.5 INJECTION INTRAMUSCULAR; INTRAVENOUS
Status: DISCONTINUED | OUTPATIENT
Start: 2023-12-02 | End: 2023-12-02 | Stop reason: HOSPADM

## 2023-12-02 RX ORDER — ROCURONIUM BROMIDE 10 MG/ML
INJECTION, SOLUTION INTRAVENOUS PRN
Status: DISCONTINUED | OUTPATIENT
Start: 2023-12-02 | End: 2023-12-02 | Stop reason: SDUPTHER

## 2023-12-02 RX ORDER — SODIUM CHLORIDE 9 MG/ML
INJECTION, SOLUTION INTRAVENOUS PRN
Status: DISCONTINUED | OUTPATIENT
Start: 2023-12-02 | End: 2023-12-02 | Stop reason: HOSPADM

## 2023-12-02 RX ORDER — PROPOFOL 10 MG/ML
INJECTION, EMULSION INTRAVENOUS PRN
Status: DISCONTINUED | OUTPATIENT
Start: 2023-12-02 | End: 2023-12-02 | Stop reason: SDUPTHER

## 2023-12-02 RX ORDER — MEPERIDINE HYDROCHLORIDE 25 MG/ML
12.5 INJECTION INTRAMUSCULAR; INTRAVENOUS; SUBCUTANEOUS EVERY 5 MIN PRN
Status: DISCONTINUED | OUTPATIENT
Start: 2023-12-02 | End: 2023-12-02 | Stop reason: HOSPADM

## 2023-12-02 RX ORDER — ONDANSETRON 2 MG/ML
INJECTION INTRAMUSCULAR; INTRAVENOUS PRN
Status: DISCONTINUED | OUTPATIENT
Start: 2023-12-02 | End: 2023-12-02 | Stop reason: SDUPTHER

## 2023-12-02 RX ORDER — CEFAZOLIN 2 G/1
INJECTION, POWDER, FOR SOLUTION INTRAMUSCULAR; INTRAVENOUS
Status: DISPENSED
Start: 2023-12-02 | End: 2023-12-02

## 2023-12-02 RX ORDER — OXYCODONE HYDROCHLORIDE 5 MG/1
5 TABLET ORAL PRN
Status: DISCONTINUED | OUTPATIENT
Start: 2023-12-02 | End: 2023-12-02 | Stop reason: HOSPADM

## 2023-12-02 RX ORDER — SODIUM CHLORIDE 0.9 % (FLUSH) 0.9 %
5-40 SYRINGE (ML) INJECTION PRN
Status: DISCONTINUED | OUTPATIENT
Start: 2023-12-02 | End: 2023-12-02 | Stop reason: HOSPADM

## 2023-12-02 RX ORDER — ONDANSETRON 2 MG/ML
4 INJECTION INTRAMUSCULAR; INTRAVENOUS
Status: COMPLETED | OUTPATIENT
Start: 2023-12-02 | End: 2023-12-02

## 2023-12-02 RX ADMIN — PHENYLEPHRINE HYDROCHLORIDE 100 MCG: 10 INJECTION INTRAVENOUS at 11:49

## 2023-12-02 RX ADMIN — POTASSIUM CHLORIDE 10 MEQ: 7.46 INJECTION, SOLUTION INTRAVENOUS at 01:38

## 2023-12-02 RX ADMIN — SUGAMMADEX 100 MG: 100 INJECTION, SOLUTION INTRAVENOUS at 13:24

## 2023-12-02 RX ADMIN — HYDROMORPHONE HYDROCHLORIDE 0.5 MG: 1 INJECTION, SOLUTION INTRAMUSCULAR; INTRAVENOUS; SUBCUTANEOUS at 14:35

## 2023-12-02 RX ADMIN — DEXAMETHASONE SODIUM PHOSPHATE 8 MG: 4 INJECTION, SOLUTION INTRAMUSCULAR; INTRAVENOUS at 11:14

## 2023-12-02 RX ADMIN — POTASSIUM CHLORIDE 10 MEQ: 7.46 INJECTION, SOLUTION INTRAVENOUS at 06:18

## 2023-12-02 RX ADMIN — LIDOCAINE HYDROCHLORIDE 5 ML: 20 INJECTION, SOLUTION INFILTRATION; PERINEURAL at 11:06

## 2023-12-02 RX ADMIN — CEFAZOLIN 2000 MG: 2 INJECTION, POWDER, FOR SOLUTION INTRAMUSCULAR; INTRAVENOUS at 17:39

## 2023-12-02 RX ADMIN — TRAZODONE HYDROCHLORIDE 75 MG: 50 TABLET ORAL at 20:49

## 2023-12-02 RX ADMIN — PROPOFOL 150 MG: 10 INJECTION, EMULSION INTRAVENOUS at 11:06

## 2023-12-02 RX ADMIN — HYDROMORPHONE HYDROCHLORIDE 0.5 MG: 1 INJECTION, SOLUTION INTRAMUSCULAR; INTRAVENOUS; SUBCUTANEOUS at 09:25

## 2023-12-02 RX ADMIN — HYDROMORPHONE HYDROCHLORIDE 0.5 MG: 1 INJECTION, SOLUTION INTRAMUSCULAR; INTRAVENOUS; SUBCUTANEOUS at 05:47

## 2023-12-02 RX ADMIN — POTASSIUM CHLORIDE 10 MEQ: 7.46 INJECTION, SOLUTION INTRAVENOUS at 02:45

## 2023-12-02 RX ADMIN — SODIUM CHLORIDE, POTASSIUM CHLORIDE, SODIUM LACTATE AND CALCIUM CHLORIDE: 600; 310; 30; 20 INJECTION, SOLUTION INTRAVENOUS at 12:01

## 2023-12-02 RX ADMIN — DULOXETINE HYDROCHLORIDE 60 MG: 60 CAPSULE, DELAYED RELEASE ORAL at 20:47

## 2023-12-02 RX ADMIN — ROCURONIUM BROMIDE 20 MG: 10 INJECTION, SOLUTION INTRAVENOUS at 11:13

## 2023-12-02 RX ADMIN — GABAPENTIN 300 MG: 300 CAPSULE ORAL at 16:33

## 2023-12-02 RX ADMIN — DOXAZOSIN 4 MG: 2 TABLET ORAL at 20:47

## 2023-12-02 RX ADMIN — GABAPENTIN 300 MG: 300 CAPSULE ORAL at 20:51

## 2023-12-02 RX ADMIN — ROCURONIUM BROMIDE 50 MG: 10 INJECTION, SOLUTION INTRAVENOUS at 11:06

## 2023-12-02 RX ADMIN — POTASSIUM CHLORIDE 10 MEQ: 7.46 INJECTION, SOLUTION INTRAVENOUS at 03:57

## 2023-12-02 RX ADMIN — POTASSIUM CHLORIDE 10 MEQ: 7.46 INJECTION, SOLUTION INTRAVENOUS at 05:15

## 2023-12-02 RX ADMIN — OXYCODONE HYDROCHLORIDE AND ACETAMINOPHEN 1 TABLET: 7.5; 325 TABLET ORAL at 20:50

## 2023-12-02 RX ADMIN — CEFAZOLIN 2000 MG: 2 INJECTION, POWDER, FOR SOLUTION INTRAMUSCULAR; INTRAVENOUS at 11:02

## 2023-12-02 RX ADMIN — MIRTAZAPINE 15 MG: 15 TABLET, FILM COATED ORAL at 20:47

## 2023-12-02 RX ADMIN — POTASSIUM CHLORIDE 10 MEQ: 7.46 INJECTION, SOLUTION INTRAVENOUS at 07:24

## 2023-12-02 RX ADMIN — ONDANSETRON HYDROCHLORIDE 4 MG: 2 INJECTION, SOLUTION INTRAMUSCULAR; INTRAVENOUS at 12:35

## 2023-12-02 RX ADMIN — ROCURONIUM BROMIDE 20 MG: 10 INJECTION, SOLUTION INTRAVENOUS at 11:52

## 2023-12-02 RX ADMIN — HYDROMORPHONE HYDROCHLORIDE 0.5 MG: 1 INJECTION, SOLUTION INTRAMUSCULAR; INTRAVENOUS; SUBCUTANEOUS at 02:42

## 2023-12-02 RX ADMIN — ONDANSETRON 4 MG: 2 INJECTION INTRAMUSCULAR; INTRAVENOUS at 14:28

## 2023-12-02 RX ADMIN — CARVEDILOL 6.25 MG: 6.25 TABLET, FILM COATED ORAL at 16:33

## 2023-12-02 RX ADMIN — HYDROMORPHONE HYDROCHLORIDE 0.5 MG: 1 INJECTION, SOLUTION INTRAMUSCULAR; INTRAVENOUS; SUBCUTANEOUS at 14:23

## 2023-12-02 RX ADMIN — SODIUM CHLORIDE, POTASSIUM CHLORIDE, SODIUM LACTATE AND CALCIUM CHLORIDE: 600; 310; 30; 20 INJECTION, SOLUTION INTRAVENOUS at 11:05

## 2023-12-02 ASSESSMENT — PAIN DESCRIPTION - FREQUENCY
FREQUENCY: CONTINUOUS

## 2023-12-02 ASSESSMENT — PAIN DESCRIPTION - LOCATION
LOCATION: HIP;LEG
LOCATION: LEG
LOCATION: HIP;LEG
LOCATION: LEG

## 2023-12-02 ASSESSMENT — PAIN - FUNCTIONAL ASSESSMENT
PAIN_FUNCTIONAL_ASSESSMENT: PREVENTS OR INTERFERES SOME ACTIVE ACTIVITIES AND ADLS

## 2023-12-02 ASSESSMENT — PAIN DESCRIPTION - PAIN TYPE
TYPE: ACUTE PAIN
TYPE: SURGICAL PAIN
TYPE: ACUTE PAIN

## 2023-12-02 ASSESSMENT — PAIN DESCRIPTION - ORIENTATION
ORIENTATION: RIGHT

## 2023-12-02 ASSESSMENT — PAIN DESCRIPTION - DESCRIPTORS
DESCRIPTORS: ACHING
DESCRIPTORS: ACHING
DESCRIPTORS: ACHING;DISCOMFORT;SHARP;SORE
DESCRIPTORS: SHARP
DESCRIPTORS: ACHING;DISCOMFORT;SHARP;SORE

## 2023-12-02 ASSESSMENT — ENCOUNTER SYMPTOMS: SHORTNESS OF BREATH: 1

## 2023-12-02 ASSESSMENT — PAIN SCALES - GENERAL
PAINLEVEL_OUTOF10: 6
PAINLEVEL_OUTOF10: 8
PAINLEVEL_OUTOF10: 7
PAINLEVEL_OUTOF10: 3
PAINLEVEL_OUTOF10: 7
PAINLEVEL_OUTOF10: 8
PAINLEVEL_OUTOF10: 7
PAINLEVEL_OUTOF10: 7

## 2023-12-02 ASSESSMENT — PAIN DESCRIPTION - ONSET
ONSET: ON-GOING

## 2023-12-02 ASSESSMENT — LIFESTYLE VARIABLES: SMOKING_STATUS: 1

## 2023-12-02 NOTE — PLAN OF CARE
Problem: Discharge Planning  Goal: Discharge to home or other facility with appropriate resources  12/2/2023 1002 by Cleo Forman RN  Outcome: Progressing  12/2/2023 0107 by Moises Loo RN  Outcome: Progressing     Problem: Pain  Goal: Verbalizes/displays adequate comfort level or baseline comfort level  12/2/2023 1002 by Cleo Forman RN  Outcome: Progressing  12/2/2023 0107 by Moises Loo RN  Outcome: Progressing     Problem: Safety - Adult  Goal: Free from fall injury  12/2/2023 1002 by Cleo Forman RN  Outcome: Progressing  12/2/2023 0107 by Moises Loo RN  Outcome: Progressing     Problem: ABCDS Injury Assessment  Goal: Absence of physical injury  12/2/2023 1002 by Cleo Forman RN  Outcome: Progressing  12/2/2023 0107 by Moises Loo RN  Outcome: Progressing

## 2023-12-02 NOTE — PROGRESS NOTES
Patient awake in bed. Bedside report given. Patient c/o Right leg pain. Patient w/ active vomiting. No Zofran available at this time. Perfect Serve sent to MD for a secondary med to treat nausea. Awaiting orders. PRN Dilaudid given. No other needs noted at this time. Bed alarm in place. Call light and bedside table within reach.

## 2023-12-02 NOTE — PROGRESS NOTES
4 Eyes Skin Assessment     NAME:  Navi Pederson OF BIRTH:  1955  MEDICAL RECORD NUMBER:  [id number]    The patient is being assessed for  Admission    I agree that at least one RN has performed a thorough Head to Toe Skin Assessment on the patient. ALL assessment sites listed below have been assessed. Areas assessed by both nurses:    Head, Face, Ears, Shoulders, Back, Chest, Arms, Elbows, Hands, Sacrum. Buttock, Coccyx, Ischium, Legs. Feet and Heels, and Under Medical Devices   Scattered bruises. Does the Patient have a Wound?  No noted wound(s)       Fidel Prevention initiated by RN: No  Wound Care Orders initiated by RN: No    Pressure Injury (Stage 3,4, Unstageable, DTI, NWPT, and Complex wounds) if present, place Wound referral order by RN under : No    New Ostomies, if present place, Ostomy referral order under : No     Nurse 1 eSignature: Electronically signed by Corbin Delgado RN on 12/2/23 at 1:28 AM EST    **SHARE this note so that the co-signing nurse can place an eSignature**    Nurse 2 eSignature: Electronically signed by Shayna Castro RN on 12/2/23 at 1:29 AM EST

## 2023-12-02 NOTE — BRIEF OP NOTE
[id number] Right 1 Implanted   Implants: Synthes retrograde femoral nail 11x380      Findings: see op note      Electronically signed by Breonna Honeycutt DO on 12/2/2023 at 1:16 PM

## 2023-12-02 NOTE — ANESTHESIA PRE PROCEDURE
Department of Anesthesiology  Preprocedure Note       Name:  Angy Veloz   Age:  70 y.o.  :  1955                                          MRN:  [id number]         Date:  2023      Surgeon: Agapito Allen):  Kathe Perkins DO    Procedure: Procedure(s): FEMORAL NAIL RIGHT RETROGRADE    Medications prior to admission:   Prior to Admission medications    Medication Sig Start Date End Date Taking? Authorizing Provider   methocarbamol (ROBAXIN) 500 MG tablet Take 1 tablet by mouth 4 times daily   Yes aMribel Cornelius MD   mirtazapine (REMERON) 15 MG tablet Take 1 tablet by mouth nightly Follow-up with your primary care doctor to clarify your correct home medications. 3/26/23   Rebekah Mahan DO   carvedilol (COREG) 6.25 MG tablet Take 1 tablet by mouth 2 times daily (with meals) 3/26/23   Rebekah Mahan DO   doxazosin (CARDURA) 4 MG tablet Take 1 tablet by mouth nightly 3/26/23   Rebekah Mahan DO   aspirin 81 MG EC tablet Take 1 tablet by mouth daily 23   Francisco Erickson MD   hydrALAZINE (APRESOLINE) 50 MG tablet Take 1 tablet by mouth every 8 hours 23   Francisco Erickson MD   NIFEdipine (PROCARDIA XL) 30 MG extended release tablet Take 1 tablet by mouth in the morning and at bedtime 23   Francisco Erickson MD   oxyCODONE-acetaminophen (PERCOCET) 7.5-325 MG per tablet Take 1 tablet by mouth 4 times daily as needed for Pain. Maribel Cornelius MD   albuterol sulfate HFA (PROVENTIL;VENTOLIN;PROAIR) 108 (90 Base) MCG/ACT inhaler Inhale 2 puffs into the lungs 4 times daily as needed for Wheezing or Shortness of Breath    Maribel Cornelius MD   DULoxetine (CYMBALTA) 60 MG extended release capsule Take 60 mg by mouth at bedtime    Maribel Cornelius MD   gabapentin (NEURONTIN) 300 MG capsule Take 300 mg by mouth 4 times daily.  3 capsules per dose to total 900mg 4 times a day    Maribel Cornelius MD   omeprazole (PRILOSEC) 20 MG delayed release capsule Take 20 mg by mouth 2 times daily

## 2023-12-02 NOTE — DISCHARGE INSTRUCTIONS
Ricky Harrell, DO                  Discharge Instructions    Weight bearing status: Weight bearing as tolerated for the right leg. Focus on knee bending and getting it fully straight to prevent stiffness. Will likely require a walker for the first few weeks after the surgery as you regain strength to help walk. Keep dressing clean and dry. Starting 3 days after surgery, Ok for daily dressing changes until wound is dry. Then leave open to air. Dress with plastic bag and rubber band to seal and repeat with second bag and rubber band when showering. \"Press & Seal\" wrap also works for sealing the rubber bag when showering until wound is clean, dry, and no longer draining. Physical Therapy for rehabilitation. Ice (20 minutes on and off 1 hour) and elevate as needed to reduce swelling and throbbing pain. Starting 3 days after surgery, if wound is no longer leaking, Ok to shower but no soaks or baths. Advance diet as tolerated: begin with clear liquids. Drink plenty of fluids. Should urinate within 8 hours of surgery. Call the office or come to Emergency Room if signs of infection appear (hot, swollen, red, draining pus, fever)  Take medications as prescribed. Wean off narcotics (percocet/norco) as soon as possible. Do not take tylenol if still taking narcotics. Follow up with Dr. Coy Rizo 10-14 days after surgery. Call [phone] to schedule.

## 2023-12-02 NOTE — CONSULTS
Take 1 tablet by mouth in the morning and at bedtime 1/7/23   Renetta Holden MD   oxyCODONE-acetaminophen (PERCOCET) 7.5-325 MG per tablet Take 1 tablet by mouth 4 times daily as needed for Pain. Maribel Cornelius MD   albuterol sulfate HFA (PROVENTIL;VENTOLIN;PROAIR) 108 (90 Base) MCG/ACT inhaler Inhale 2 puffs into the lungs 4 times daily as needed for Wheezing or Shortness of Breath    Maribel Cornelius MD   DULoxetine (CYMBALTA) 60 MG extended release capsule Take 60 mg by mouth at bedtime    Maribel Cornelius MD   gabapentin (NEURONTIN) 300 MG capsule Take 300 mg by mouth 4 times daily. 3 capsules per dose to total 900mg 4 times a day    Maribel Cornelius MD   omeprazole (PRILOSEC) 20 MG delayed release capsule Take 20 mg by mouth 2 times daily    Maribel Cornelius MD   traZODone (DESYREL) 50 MG tablet Take 75 mg by mouth nightly    Maribel Cornelius MD       Allergies:    Patient has no known allergies. Social History:   Social History     Socioeconomic History    Marital status:       Spouse name: None    Number of children: None    Years of education: None    Highest education level: None   Tobacco Use    Smoking status: Every Day     Packs/day: 1     Types: Cigarettes   Vaping Use    Vaping Use: Never used   Substance and Sexual Activity    Alcohol use: Yes     Comment: 2-3 shots every other day(vodka 100 proof)    Drug use: Never    Sexual activity: Not Currently     Social Determinants of Health     Food Insecurity: No Food Insecurity (12/1/2023)    Hunger Vital Sign     Worried About Running Out of Food in the Last Year: Never true     Ran Out of Food in the Last Year: Never true   Transportation Needs: No Transportation Needs (12/1/2023)    PRAPARE - Transportation     Lack of Transportation (Medical): No     Lack of Transportation (Non-Medical): No   Housing Stability: Low Risk  (12/1/2023)    Housing Stability Vital Sign     Unable to Pay for Housing in the Last

## 2023-12-02 NOTE — ANESTHESIA POSTPROCEDURE EVALUATION
Department of Anesthesiology  Postprocedure Note    Patient: Kerri Tavares  MRN: [id number]  YOB: 1955  Date of evaluation: 12/2/2023      Procedure Summary       Date: 12/02/23 Room / Location: [address] [place] [address] / [facility]    Anesthesia Start: 9069 Anesthesia Stop: 7407    Procedure: FEMORAL NAIL RIGHT RETROGRADE (Right: Leg Upper) Diagnosis:       Closed fracture of right femur, unspecified fracture morphology, unspecified portion of femur, initial encounter ([address])      (Closed fracture of right femur, unspecified fracture morphology, unspecified portion of femur, initial encounter ([address]) Shraddha Prior)    Surgeons: Soumya Sam DO Responsible Provider: Esme De La Fuente MD    Anesthesia Type: general ASA Status: 3            Anesthesia Type: No value filed. Liana Phase I: Liana Score: 8    Liana Phase II:        Anesthesia Post Evaluation    Patient location during evaluation: PACU  Patient participation: complete - patient participated  Level of consciousness: awake and alert  Airway patency: patent  Nausea & Vomiting: no nausea and no vomiting  Complications: no  Cardiovascular status: blood pressure returned to baseline  Respiratory status: acceptable  Hydration status: euvolemic  Comments: VSS on transfer to phase 2 recovery. No anesthetic complications.   Pain management: adequate

## 2023-12-02 NOTE — OP NOTE
Operative Note      Patient: Rosalee Agarwal  YOB: 1955  MRN: [id number]    Date of Procedure: 12/2/2023    Pre-Op Diagnosis Codes:     * Closed fracture of right femur, unspecified fracture morphology, unspecified portion of femur, initial encounter ([facility]) [S72.91XA]    Post-Op Diagnosis: Right mid to distal third femoral shaft fracture with comminution       Procedure(s): FEMORAL NAIL RIGHT RETROGRADE    Surgeon(s):  Luis Angel Burris DO    Assistant:  Surgical Assistant: Genny Valdivia PA-C    Anesthesia: General and local marcaine    Estimated Blood Loss (mL): 881     Complications: None    Specimens:   * No specimens in log *    Implants:  Implant Name Type Inv.  Item Serial No.  Lot No. LRB No. Used Action   SCREW LCK F/IM NAIL 5X46MM XL25 STR - [id number]  SCREW LCK F/IM NAIL 5X46MM XL25 STR  [employer] USA-WD [id number] Right 1 Explanted   NAIL IM RETROGRADE 5 DEG 11X380 MM FEM BEND RFNADVANCED - [id number]  NAIL IM RETROGRADE 5 DEG 11X380 MM FEM BEND RFNADVANCED  DEPUY SYNTHES [facility] [id number] Right 1 Implanted   SCREW BNE LCK 5X95 MM TI STRL RFNADVANCED [id number] - [id number]  SCREW BNE LCK 5X95 MM TI STRL RFNADVANCED [id number]  DEPUY SYNTHES [facility] [id number] Right 1 Implanted   SCREW LOCKING FOR IM NAIL 5X66MM XL25 SILX - [id number]  SCREW LOCKING FOR IM NAIL 5X66MM XL25 SILX  [employer] USA-WD [id number] Right 1 Implanted   SCREW BNE LCK 5X95 MM LP TI STRL RFNADVANCED [id number] - [id number]  SCREW BNE LCK 5X95 MM LP TI STRL RFNADVANCED [id number]  DEPUY SYNTHES USA- [id number] Right 1 Implanted   SCREW LK F/IM NAIL 5X38MM XL25 SILE - [id number]  SCREW LK F/IM NAIL 5X38MM XL25 SILE  [employer] USA-WD [id number] Right 1 Implanted   SCREW LCK F/IM NAIL 5X40MM XL25 STR - [id number]  SCREW LCK F/IM NAIL 5X40MM XL25 STR  DEPUY SYNTHES [facility] [id number] Right 1 Implanted   CAP END F/RFNA 0MM STERILE - [id number]  CAP END F/RFNA 0MM STERILE  DEPUY SYNTHES USA- [id number] Right 1 meniscus. A guidewire was placed in the appropriate position and confirmed on AP and lateral C-arm. The opening reamer was then placed without difficulty. A ball-tipped guidewire was then passed up to the proximal femur across the fracture site and confirmed reduction on C arm. In order to prevent apex posterior deformity, a drill bit was placed as a blocking device anteriorly in the short segment. An attempt at a blocking screw was made, but due to the distal spiral fracture morphology, this was abandoned and a large K-wire was placed in the previous drill bit hole for reaming. Sequential reaming was performed up to a 12-1/2 reamer to accommodate an 11 nail. The nail was measured to be 380 mm in length. Once the reaming was performed and reduction was maintained and monitored on C arm, a Synthes 11x380 retrograde nail was then placed. Distal locking screws were then placed through the lateral aiming arm percutaneously utilizing C-arm imaging. The same process was repeated proximally from in A to P direction for the proximal locking screws. Percutaneous technique was utilized for perfect Zuni technique with C-arm. Final images were obtained demonstrating appropriate alignment of the nail and the fracture. A 0mm endcap was applied. The small percutaneous holes were closed with 2-0 Vicryl and 4-0 monocryl with prineo. The knee arthrotomy was closed with #1 vicryl and the deep tissue with 2-0 vicryl in the subcutaneous tissue, and 4-0 monocryl and prineo on the skin. Sterile dressing was applied consisting of nonstick dressing, gauze, ABDs, web roll, Ace wrap. The patient was then awoken from general anesthesia without complication and transferred back to the PACU in stable condition. Surgical Assist Requirement:  The assistance of Jamil Varela PA-C for the case was necessary due to several factors.  There was no other qualified surgical resident or assistant available to assist

## 2023-12-03 LAB
ALBUMIN SERPL-MCNC: 2.7 G/DL (ref 3.4–5)
ALBUMIN/GLOB SERPL: 1 {RATIO} (ref 1.1–2.2)
ALP SERPL-CCNC: 135 U/L (ref 40–129)
ALT SERPL-CCNC: 26 U/L (ref 10–40)
ANION GAP SERPL CALCULATED.3IONS-SCNC: 9 MMOL/L (ref 3–16)
AST SERPL-CCNC: 69 U/L (ref 15–37)
BILIRUB SERPL-MCNC: 0.7 MG/DL (ref 0–1)
BUN SERPL-MCNC: 15 MG/DL (ref 7–20)
CALCIUM SERPL-MCNC: 7.5 MG/DL (ref 8.3–10.6)
CHLORIDE SERPL-SCNC: 101 MMOL/L (ref 99–110)
CO2 SERPL-SCNC: 25 MMOL/L (ref 21–32)
CREAT SERPL-MCNC: 0.6 MG/DL (ref 0.8–1.3)
GFR SERPLBLD CREATININE-BSD FMLA CKD-EPI: >60 ML/MIN/{1.73_M2}
GLUCOSE SERPL-MCNC: 201 MG/DL (ref 70–99)
POTASSIUM SERPL-SCNC: 4.3 MMOL/L (ref 3.5–5.1)
PROT SERPL-MCNC: 5.3 G/DL (ref 6.4–8.2)
SODIUM SERPL-SCNC: 135 MMOL/L (ref 136–145)

## 2023-12-03 PROCEDURE — 6360000002 HC RX W HCPCS: Performed by: STUDENT IN AN ORGANIZED HEALTH CARE EDUCATION/TRAINING PROGRAM

## 2023-12-03 PROCEDURE — 99232 SBSQ HOSP IP/OBS MODERATE 35: CPT | Performed by: INTERNAL MEDICINE

## 2023-12-03 PROCEDURE — 1200000000 HC SEMI PRIVATE

## 2023-12-03 PROCEDURE — 97162 PT EVAL MOD COMPLEX 30 MIN: CPT

## 2023-12-03 PROCEDURE — 99024 POSTOP FOLLOW-UP VISIT: CPT | Performed by: STUDENT IN AN ORGANIZED HEALTH CARE EDUCATION/TRAINING PROGRAM

## 2023-12-03 PROCEDURE — 6370000000 HC RX 637 (ALT 250 FOR IP): Performed by: STUDENT IN AN ORGANIZED HEALTH CARE EDUCATION/TRAINING PROGRAM

## 2023-12-03 PROCEDURE — 2700000000 HC OXYGEN THERAPY PER DAY

## 2023-12-03 PROCEDURE — 36415 COLL VENOUS BLD VENIPUNCTURE: CPT

## 2023-12-03 PROCEDURE — 94761 N-INVAS EAR/PLS OXIMETRY MLT: CPT

## 2023-12-03 PROCEDURE — 97535 SELF CARE MNGMENT TRAINING: CPT

## 2023-12-03 PROCEDURE — 6370000000 HC RX 637 (ALT 250 FOR IP): Performed by: INTERNAL MEDICINE

## 2023-12-03 PROCEDURE — 97530 THERAPEUTIC ACTIVITIES: CPT

## 2023-12-03 PROCEDURE — 2580000003 HC RX 258: Performed by: STUDENT IN AN ORGANIZED HEALTH CARE EDUCATION/TRAINING PROGRAM

## 2023-12-03 PROCEDURE — 97166 OT EVAL MOD COMPLEX 45 MIN: CPT

## 2023-12-03 PROCEDURE — 80053 COMPREHEN METABOLIC PANEL: CPT

## 2023-12-03 RX ORDER — CYCLOBENZAPRINE HCL 10 MG
10 TABLET ORAL 3 TIMES DAILY PRN
Status: DISCONTINUED | OUTPATIENT
Start: 2023-12-03 | End: 2023-12-04

## 2023-12-03 RX ADMIN — CARVEDILOL 6.25 MG: 6.25 TABLET, FILM COATED ORAL at 10:17

## 2023-12-03 RX ADMIN — MIRTAZAPINE 15 MG: 15 TABLET, FILM COATED ORAL at 22:20

## 2023-12-03 RX ADMIN — HYDROMORPHONE HYDROCHLORIDE 0.5 MG: 1 INJECTION, SOLUTION INTRAMUSCULAR; INTRAVENOUS; SUBCUTANEOUS at 22:21

## 2023-12-03 RX ADMIN — NIFEDIPINE 30 MG: 30 TABLET, FILM COATED, EXTENDED RELEASE ORAL at 10:18

## 2023-12-03 RX ADMIN — Medication 10 ML: at 22:20

## 2023-12-03 RX ADMIN — DULOXETINE HYDROCHLORIDE 60 MG: 60 CAPSULE, DELAYED RELEASE ORAL at 22:21

## 2023-12-03 RX ADMIN — NIFEDIPINE 30 MG: 30 TABLET, FILM COATED, EXTENDED RELEASE ORAL at 22:20

## 2023-12-03 RX ADMIN — OXYCODONE HYDROCHLORIDE AND ACETAMINOPHEN 1 TABLET: 7.5; 325 TABLET ORAL at 01:45

## 2023-12-03 RX ADMIN — GABAPENTIN 300 MG: 300 CAPSULE ORAL at 22:20

## 2023-12-03 RX ADMIN — PANTOPRAZOLE SODIUM 40 MG: 40 TABLET, DELAYED RELEASE ORAL at 05:11

## 2023-12-03 RX ADMIN — OXYCODONE HYDROCHLORIDE AND ACETAMINOPHEN 1 TABLET: 7.5; 325 TABLET ORAL at 08:02

## 2023-12-03 RX ADMIN — GABAPENTIN 300 MG: 300 CAPSULE ORAL at 13:29

## 2023-12-03 RX ADMIN — CYCLOBENZAPRINE 10 MG: 10 TABLET, FILM COATED ORAL at 22:28

## 2023-12-03 RX ADMIN — ASPIRIN 81 MG: 81 TABLET, COATED ORAL at 10:17

## 2023-12-03 RX ADMIN — TRAZODONE HYDROCHLORIDE 75 MG: 50 TABLET ORAL at 22:20

## 2023-12-03 RX ADMIN — CEFAZOLIN 2000 MG: 2 INJECTION, POWDER, FOR SOLUTION INTRAMUSCULAR; INTRAVENOUS at 01:49

## 2023-12-03 RX ADMIN — HYDRALAZINE HYDROCHLORIDE 50 MG: 50 TABLET, FILM COATED ORAL at 14:57

## 2023-12-03 RX ADMIN — ENOXAPARIN SODIUM 40 MG: 100 INJECTION SUBCUTANEOUS at 10:16

## 2023-12-03 RX ADMIN — Medication 100 MG: at 10:17

## 2023-12-03 RX ADMIN — CARVEDILOL 6.25 MG: 6.25 TABLET, FILM COATED ORAL at 17:17

## 2023-12-03 RX ADMIN — DOXAZOSIN 4 MG: 2 TABLET ORAL at 22:21

## 2023-12-03 RX ADMIN — GABAPENTIN 300 MG: 300 CAPSULE ORAL at 10:17

## 2023-12-03 RX ADMIN — OXYCODONE HYDROCHLORIDE AND ACETAMINOPHEN 1 TABLET: 7.5; 325 TABLET ORAL at 14:56

## 2023-12-03 RX ADMIN — HYDRALAZINE HYDROCHLORIDE 50 MG: 50 TABLET, FILM COATED ORAL at 22:20

## 2023-12-03 RX ADMIN — HYDROMORPHONE HYDROCHLORIDE 0.5 MG: 1 INJECTION, SOLUTION INTRAMUSCULAR; INTRAVENOUS; SUBCUTANEOUS at 18:32

## 2023-12-03 RX ADMIN — HYDROMORPHONE HYDROCHLORIDE 0.5 MG: 1 INJECTION, SOLUTION INTRAMUSCULAR; INTRAVENOUS; SUBCUTANEOUS at 10:18

## 2023-12-03 RX ADMIN — GABAPENTIN 300 MG: 300 CAPSULE ORAL at 17:18

## 2023-12-03 ASSESSMENT — PAIN DESCRIPTION - DESCRIPTORS
DESCRIPTORS: ACHING;CRAMPING;DISCOMFORT;SPASM
DESCRIPTORS: ACHING
DESCRIPTORS: ACHING;SHARP
DESCRIPTORS: ACHING;DISCOMFORT;SHARP;SORE
DESCRIPTORS: SHARP;STABBING
DESCRIPTORS: SHARP
DESCRIPTORS: SHARP;SHOOTING
DESCRIPTORS: SHARP

## 2023-12-03 ASSESSMENT — PAIN SCALES - GENERAL
PAINLEVEL_OUTOF10: 8
PAINLEVEL_OUTOF10: 9
PAINLEVEL_OUTOF10: 8
PAINLEVEL_OUTOF10: 9
PAINLEVEL_OUTOF10: 8

## 2023-12-03 ASSESSMENT — PAIN DESCRIPTION - ORIENTATION
ORIENTATION: RIGHT
ORIENTATION: RIGHT
ORIENTATION: RIGHT;LOWER
ORIENTATION: RIGHT
ORIENTATION: RIGHT;LOWER
ORIENTATION: RIGHT

## 2023-12-03 ASSESSMENT — PAIN DESCRIPTION - FREQUENCY
FREQUENCY: CONTINUOUS

## 2023-12-03 ASSESSMENT — PAIN DESCRIPTION - LOCATION
LOCATION: LEG
LOCATION: KNEE
LOCATION: KNEE
LOCATION: LEG
LOCATION: KNEE
LOCATION: LEG
LOCATION: KNEE
LOCATION: KNEE;FOOT
LOCATION: LEG

## 2023-12-03 ASSESSMENT — PAIN DESCRIPTION - ONSET
ONSET: ON-GOING
ONSET: ON-GOING

## 2023-12-03 ASSESSMENT — PAIN DESCRIPTION - PAIN TYPE
TYPE: SURGICAL PAIN

## 2023-12-03 ASSESSMENT — PAIN - FUNCTIONAL ASSESSMENT
PAIN_FUNCTIONAL_ASSESSMENT: PREVENTS OR INTERFERES SOME ACTIVE ACTIVITIES AND ADLS
PAIN_FUNCTIONAL_ASSESSMENT: ACTIVITIES ARE NOT PREVENTED
PAIN_FUNCTIONAL_ASSESSMENT: PREVENTS OR INTERFERES SOME ACTIVE ACTIVITIES AND ADLS
PAIN_FUNCTIONAL_ASSESSMENT: PREVENTS OR INTERFERES SOME ACTIVE ACTIVITIES AND ADLS

## 2023-12-03 NOTE — PROGRESS NOTES
When flushing IV prior to pain medication, IV would not flush. IV removed, and new IV established. Time changed on dilaudid administration to match when dilaudid administered.

## 2023-12-03 NOTE — PLAN OF CARE
Problem: Discharge Planning  Goal: Discharge to home or other facility with appropriate resources  12/2/2023 2056 by Kunal Mane RN  Outcome: Progressing  12/2/2023 1002 by Alex Beard RN  Outcome: Progressing     Problem: Pain  Goal: Verbalizes/displays adequate comfort level or baseline comfort level  12/2/2023 2056 by Kunal Mane RN  Outcome: Progressing  12/2/2023 1002 by Alex Beard RN  Outcome: Progressing     Problem: Safety - Adult  Goal: Free from fall injury  12/2/2023 2056 by Kunal Mane RN  Outcome: Progressing  12/2/2023 1002 by Alex Beard RN  Outcome: Progressing     Problem: ABCDS Injury Assessment  Goal: Absence of physical injury  12/2/2023 2056 by Kunal Mane RN  Outcome: Progressing  12/2/2023 1002 by Alex Beard RN  Outcome: Progressing     Problem: Skin/Tissue Integrity  Goal: Absence of new skin breakdown  Description: 1. Monitor for areas of redness and/or skin breakdown  2. Assess vascular access sites hourly  3. Every 4-6 hours minimum:  Change oxygen saturation probe site  4. Every 4-6 hours:  If on nasal continuous positive airway pressure, respiratory therapy assess nares and determine need for appliance change or resting period.   Outcome: Progressing     Problem: Chronic Conditions and Co-morbidities  Goal: Patient's chronic conditions and co-morbidity symptoms are monitored and maintained or improved  Outcome: Progressing

## 2023-12-03 NOTE — PROGRESS NOTES
Inpatient Physical Therapy Evaluation & Treatment    Unit: 2 [address] [address]  Date:  12/3/2023  Patient Name:    Hair Woods  Admitting diagnosis:  Hypokalemia [E87.6]  Hypomagnesemia [E83.42]  Closed displaced comminuted fracture of shaft of right femur, initial encounter ([address]) [S72.351A]  Fracture of unsp part of neck of right femur, init ([address]) Jennifer Horn  Admit Date:  12/1/2023  Precautions/Restrictions/WB Status/ Lines/ Wounds/ Oxygen: Fall risk, Bed/chair alarm, Lines (IV and Supplemental O2 (2L)), Telemetry, and Specific Ortho Precautions: WBAT RLE,no restrictions      Pt seen for cotreatment this date due to patient safety, patient endurance, acute illness/injury, and need for the assistance of 2 skilled therapists    Treatment Time:  9:10-10:00  Treatment Number:  1   Timed Code Treatment Minutes: 40 minutes  Total Treatment Minutes:  50  minutes    Patient Stated Goals for Therapy: \" to go home \"          Discharge Recommendations: SNF  DME needs for discharge: Defer to facility       Therapy recommendation for EMS Transport: requires transport by cot due to pt needs lift equipment for safe transfers    Therapy recommendations for staff:   Assist of 2 for transfers with use of JESSICA STEDY and gait belt to/from [facility]  to/from chair    History of Present Illness: per hospitalist:  \"The patient is a 70 y.o. male with depression, neuropathy, BPH, GERD, hepatitis C, COPD, HTN, CVA who presented to [facility] ED with complaint of fall. Patient reports slipping on wet grass and falling to the ground, his right leg went out behind him. He reports hearing a \"pop\" in the leg with immediate pain. He was unable to ambulate and called EMS. No head injury or LOC. Denies blood thinner use. Denies numbness or tingling of the extremities. Admitted for further evaluation and management in consultation with orthopedic surgery.  \"    PMH   Arthritis      Asthma      Cerebral artery occlusion with cerebral infarction [facility])      COPD (chronic

## 2023-12-03 NOTE — PROGRESS NOTES
Bedside Mobility Assessment Tool (BMAT):     Assessment Level 1- Sit and Shake    1. From a semi-reclined position, ask patient to sit up and rotate to a seated position at the side of the bed. Can use the bedrail. 2. Ask patient to reach out and grab your hand and shake making sure patient reaches across his/her midline. Fail- Patient is unable to perform tasks, patient is MOBILITY LEVEL 1. Assessment Level 2- Stretch and Point   1. With patient in seated position at the side of the bed, have patient place both feet on the floor (or stool) with knees no higher than hips. 2. Ask patient to stretch one leg and straighten the knee, then bend the ankle/flex and point the toes. If appropriate, repeat with the other leg. Fail- Patient is unable to complete task. Patient is MOBILITY LEVEL 2. Assessment Level 3- Stand   1. Ask patient to elevate off the bed or chair (seated to standing) using an assistive device (cane, bedrail). 2. Patient should be able to raise buttocks off be and hold for a count of five. May repeat once. Pass- Patient maintains standing stability for at least 5 seconds, proceed to assessment level 4. Assessment Level 4- Walk   1. Ask patient to march in place at bedside. 2. Then ask patient to advance step and return each foot. Some medical conditions may render a patient from stepping backwards, use your best clinical judgement. Fail- Patient not able to complete tasks OR requires use of assistive device. Patient is MOBILITY LEVEL 3.        Mobility Level- 2

## 2023-12-03 NOTE — PROGRESS NOTES
Progress Note    Patient:  Rani Holley  YOB: 1955     70 y.o. male    Subjective:  Patient seen and examined  No complaints or concerns  No issue overnight  Pain controlled but reports discomfort to the leg and knee  Denies fever or chills    Objective:   Vitals:    12/03/23 0802   BP: 115/74   Pulse: 96   Resp: 16   Temp:    SpO2:      Gen: NAD, cooperative   Right lower extremity: Surgical dressing in place, clean, dry, intact. Compartments soft. EHL/FHL/TA/GS complex motor intact. Sural, saphenous, superificial/deep peroneal, and plantar nerve distribution sensation grossly intact. Dorsalis pedis/posterior tibial pulses 2+ with BCR.       Recent Labs     12/02/23  0831 12/03/23  0621   WBC 5.8  --    HGB 12.8*  --    HCT 37.8*  --    *  --    * 135*   K 3.9 4.3   BUN 7 15   CREATININE <0.5* 0.6*   GLUCOSE 128* 201*      Meds: post op ancef, lovenox   See rec for complete list    Impression/plan: 70 y.o. male s/p R femur retrograde nail, POD#1 (DOS: 12/2/23)    -Dressing change tomorrow  -WB status: weight bearing as tolerated right lower extremity   -Pain control  -DVT ppx: Lovenox x 4 weeks  -Ice/Elevate  -Incentive Spirometry use  -PT/OT eval with social work evaluation for placement  -Follow up with me in 10-14 days.  -Please call with any questions    Tyron Varela,   Orthopedic Surgery and Sports Medicine  8:50 AM 12/3/2023

## 2023-12-03 NOTE — PLAN OF CARE
Problem: Discharge Planning  Goal: Discharge to home or other facility with appropriate resources  12/3/2023 1647 by Jerome Kearney RN  Outcome: Progressing  12/3/2023 1500 by Jerome Kearney RN  Outcome: Progressing  Flowsheets (Taken 12/3/2023 1031)  Discharge to home or other facility with appropriate resources: Identify barriers to discharge with patient and caregiver     Problem: Pain  Goal: Verbalizes/displays adequate comfort level or baseline comfort level  12/3/2023 1647 by Jerome Kearney RN  Outcome: Progressing  12/3/2023 1500 by Jerome Kearney RN  Outcome: Progressing  Flowsheets (Taken 12/3/2023 1327)  Verbalizes/displays adequate comfort level or baseline comfort level: Encourage patient to monitor pain and request assistance     Problem: Safety - Adult  Goal: Free from fall injury  12/3/2023 1647 by Jerome Kearney RN  Outcome: Progressing  12/3/2023 1500 by Jerome Kearney RN  Outcome: Progressing     Problem: Chronic Conditions and Co-morbidities  Goal: Patient's chronic conditions and co-morbidity symptoms are monitored and maintained or improved  12/3/2023 1647 by Jerome Kearney RN  Outcome: Progressing  12/3/2023 1500 by Jerome Kearney RN  Outcome: Progressing  Flowsheets (Taken 12/3/2023 1031)  Care Plan - Patient's Chronic Conditions and Co-Morbidity Symptoms are Monitored and Maintained or Improved:   Monitor and assess patient's chronic conditions and comorbid symptoms for stability, deterioration, or improvement   Collaborate with multidisciplinary team to address chronic and comorbid conditions and prevent exacerbation or deterioration

## 2023-12-03 NOTE — PLAN OF CARE
Problem: Discharge Planning  Goal: Discharge to home or other facility with appropriate resources  Outcome: Progressing     Problem: Pain  Goal: Verbalizes/displays adequate comfort level or baseline comfort level  Outcome: Progressing  Flowsheets (Taken 12/3/2023 1327)  Verbalizes/displays adequate comfort level or baseline comfort level: Encourage patient to monitor pain and request assistance     Problem: Safety - Adult  Goal: Free from fall injury  Outcome: Progressing     Problem: Chronic Conditions and Co-morbidities  Goal: Patient's chronic conditions and co-morbidity symptoms are monitored and maintained or improved  Outcome: Progressing

## 2023-12-04 LAB
ALBUMIN SERPL-MCNC: 2.9 G/DL (ref 3.4–5)
ALBUMIN/GLOB SERPL: 1.2 {RATIO} (ref 1.1–2.2)
ALP SERPL-CCNC: 125 U/L (ref 40–129)
ALT SERPL-CCNC: 26 U/L (ref 10–40)
ANION GAP SERPL CALCULATED.3IONS-SCNC: 8 MMOL/L (ref 3–16)
AST SERPL-CCNC: 73 U/L (ref 15–37)
BILIRUB SERPL-MCNC: 0.8 MG/DL (ref 0–1)
BUN SERPL-MCNC: 13 MG/DL (ref 7–20)
CALCIUM SERPL-MCNC: 7.2 MG/DL (ref 8.3–10.6)
CHLORIDE SERPL-SCNC: 100 MMOL/L (ref 99–110)
CO2 SERPL-SCNC: 28 MMOL/L (ref 21–32)
CREAT SERPL-MCNC: <0.5 MG/DL (ref 0.8–1.3)
GFR SERPLBLD CREATININE-BSD FMLA CKD-EPI: >60 ML/MIN/{1.73_M2}
GLUCOSE SERPL-MCNC: 178 MG/DL (ref 70–99)
MAGNESIUM SERPL-MCNC: 1.6 MG/DL (ref 1.8–2.4)
POTASSIUM SERPL-SCNC: 3.3 MMOL/L (ref 3.5–5.1)
PROT SERPL-MCNC: 5.4 G/DL (ref 6.4–8.2)
SODIUM SERPL-SCNC: 136 MMOL/L (ref 136–145)

## 2023-12-04 PROCEDURE — 1200000000 HC SEMI PRIVATE

## 2023-12-04 PROCEDURE — 80053 COMPREHEN METABOLIC PANEL: CPT

## 2023-12-04 PROCEDURE — 6370000000 HC RX 637 (ALT 250 FOR IP): Performed by: STUDENT IN AN ORGANIZED HEALTH CARE EDUCATION/TRAINING PROGRAM

## 2023-12-04 PROCEDURE — 83735 ASSAY OF MAGNESIUM: CPT

## 2023-12-04 PROCEDURE — 97110 THERAPEUTIC EXERCISES: CPT

## 2023-12-04 PROCEDURE — APPNB60 APP NON BILLABLE TIME 46-60 MINS: Performed by: PHYSICIAN ASSISTANT

## 2023-12-04 PROCEDURE — 94640 AIRWAY INHALATION TREATMENT: CPT

## 2023-12-04 PROCEDURE — 6360000002 HC RX W HCPCS: Performed by: INTERNAL MEDICINE

## 2023-12-04 PROCEDURE — 36415 COLL VENOUS BLD VENIPUNCTURE: CPT

## 2023-12-04 PROCEDURE — 6360000002 HC RX W HCPCS: Performed by: STUDENT IN AN ORGANIZED HEALTH CARE EDUCATION/TRAINING PROGRAM

## 2023-12-04 PROCEDURE — 97530 THERAPEUTIC ACTIVITIES: CPT

## 2023-12-04 PROCEDURE — 97535 SELF CARE MNGMENT TRAINING: CPT

## 2023-12-04 PROCEDURE — 2580000003 HC RX 258: Performed by: STUDENT IN AN ORGANIZED HEALTH CARE EDUCATION/TRAINING PROGRAM

## 2023-12-04 PROCEDURE — 6370000000 HC RX 637 (ALT 250 FOR IP): Performed by: INTERNAL MEDICINE

## 2023-12-04 PROCEDURE — 2700000000 HC OXYGEN THERAPY PER DAY

## 2023-12-04 PROCEDURE — 99232 SBSQ HOSP IP/OBS MODERATE 35: CPT | Performed by: INTERNAL MEDICINE

## 2023-12-04 RX ORDER — ENOXAPARIN SODIUM 100 MG/ML
30 INJECTION SUBCUTANEOUS 2 TIMES DAILY
Status: DISCONTINUED | OUTPATIENT
Start: 2023-12-04 | End: 2023-12-05 | Stop reason: HOSPADM

## 2023-12-04 RX ORDER — OXYCODONE HYDROCHLORIDE 5 MG/1
5 TABLET ORAL EVERY 4 HOURS PRN
Status: DISCONTINUED | OUTPATIENT
Start: 2023-12-04 | End: 2023-12-05 | Stop reason: HOSPADM

## 2023-12-04 RX ORDER — LANOLIN ALCOHOL/MO/W.PET/CERES
400 CREAM (GRAM) TOPICAL DAILY
Status: DISCONTINUED | OUTPATIENT
Start: 2023-12-05 | End: 2023-12-05 | Stop reason: HOSPADM

## 2023-12-04 RX ORDER — OXYCODONE AND ACETAMINOPHEN 7.5; 325 MG/1; MG/1
1 TABLET ORAL EVERY 4 HOURS PRN
Qty: 20 TABLET | Refills: 0 | Status: SHIPPED | OUTPATIENT
Start: 2023-12-04 | End: 2023-12-05 | Stop reason: HOSPADM

## 2023-12-04 RX ORDER — CYCLOBENZAPRINE HCL 10 MG
10 TABLET ORAL 3 TIMES DAILY
Status: DISCONTINUED | OUTPATIENT
Start: 2023-12-04 | End: 2023-12-05 | Stop reason: HOSPADM

## 2023-12-04 RX ORDER — OXYCODONE AND ACETAMINOPHEN 10; 325 MG/1; MG/1
1 TABLET ORAL EVERY 6 HOURS SCHEDULED
Status: DISCONTINUED | OUTPATIENT
Start: 2023-12-04 | End: 2023-12-04

## 2023-12-04 RX ORDER — OXYCODONE AND ACETAMINOPHEN 10; 325 MG/1; MG/1
1 TABLET ORAL EVERY 6 HOURS SCHEDULED
Status: DISCONTINUED | OUTPATIENT
Start: 2023-12-04 | End: 2023-12-05 | Stop reason: HOSPADM

## 2023-12-04 RX ORDER — POTASSIUM CHLORIDE 20 MEQ/1
20 TABLET, EXTENDED RELEASE ORAL
Status: DISCONTINUED | OUTPATIENT
Start: 2023-12-05 | End: 2023-12-05 | Stop reason: HOSPADM

## 2023-12-04 RX ORDER — ENOXAPARIN SODIUM 100 MG/ML
40 INJECTION SUBCUTANEOUS DAILY
Qty: 11.2 ML | Refills: 0 | Status: SHIPPED | OUTPATIENT
Start: 2023-12-05 | End: 2024-01-02

## 2023-12-04 RX ADMIN — MAGNESIUM SULFATE HEPTAHYDRATE 2000 MG: 40 INJECTION, SOLUTION INTRAVENOUS at 11:54

## 2023-12-04 RX ADMIN — CARVEDILOL 6.25 MG: 6.25 TABLET, FILM COATED ORAL at 08:47

## 2023-12-04 RX ADMIN — ASPIRIN 81 MG: 81 TABLET, COATED ORAL at 08:47

## 2023-12-04 RX ADMIN — DOXAZOSIN 4 MG: 2 TABLET ORAL at 20:24

## 2023-12-04 RX ADMIN — PANTOPRAZOLE SODIUM 40 MG: 40 TABLET, DELAYED RELEASE ORAL at 05:47

## 2023-12-04 RX ADMIN — OXYCODONE HYDROCHLORIDE AND ACETAMINOPHEN 1 TABLET: 7.5; 325 TABLET ORAL at 11:59

## 2023-12-04 RX ADMIN — MIRTAZAPINE 15 MG: 15 TABLET, FILM COATED ORAL at 20:23

## 2023-12-04 RX ADMIN — TRAZODONE HYDROCHLORIDE 75 MG: 50 TABLET ORAL at 20:24

## 2023-12-04 RX ADMIN — NIFEDIPINE 30 MG: 30 TABLET, FILM COATED, EXTENDED RELEASE ORAL at 08:47

## 2023-12-04 RX ADMIN — OXYCODONE HYDROCHLORIDE AND ACETAMINOPHEN 1 TABLET: 10; 325 TABLET ORAL at 16:45

## 2023-12-04 RX ADMIN — GABAPENTIN 300 MG: 300 CAPSULE ORAL at 20:23

## 2023-12-04 RX ADMIN — HYDRALAZINE HYDROCHLORIDE 50 MG: 50 TABLET, FILM COATED ORAL at 14:20

## 2023-12-04 RX ADMIN — SODIUM CHLORIDE, PRESERVATIVE FREE 10 ML: 5 INJECTION INTRAVENOUS at 20:29

## 2023-12-04 RX ADMIN — GABAPENTIN 300 MG: 300 CAPSULE ORAL at 17:52

## 2023-12-04 RX ADMIN — CARVEDILOL 6.25 MG: 6.25 TABLET, FILM COATED ORAL at 17:52

## 2023-12-04 RX ADMIN — ENOXAPARIN SODIUM 30 MG: 100 INJECTION SUBCUTANEOUS at 20:23

## 2023-12-04 RX ADMIN — Medication 100 MG: at 08:47

## 2023-12-04 RX ADMIN — HYDROMORPHONE HYDROCHLORIDE 0.5 MG: 1 INJECTION, SOLUTION INTRAMUSCULAR; INTRAVENOUS; SUBCUTANEOUS at 08:44

## 2023-12-04 RX ADMIN — HYDRALAZINE HYDROCHLORIDE 50 MG: 50 TABLET, FILM COATED ORAL at 20:23

## 2023-12-04 RX ADMIN — POTASSIUM CHLORIDE 40 MEQ: 1500 TABLET, EXTENDED RELEASE ORAL at 11:51

## 2023-12-04 RX ADMIN — GABAPENTIN 300 MG: 300 CAPSULE ORAL at 08:47

## 2023-12-04 RX ADMIN — GABAPENTIN 300 MG: 300 CAPSULE ORAL at 14:20

## 2023-12-04 RX ADMIN — Medication 2 PUFF: at 09:08

## 2023-12-04 RX ADMIN — Medication 10 ML: at 20:23

## 2023-12-04 RX ADMIN — OXYCODONE HYDROCHLORIDE AND ACETAMINOPHEN 1 TABLET: 7.5; 325 TABLET ORAL at 05:30

## 2023-12-04 RX ADMIN — ENOXAPARIN SODIUM 40 MG: 100 INJECTION SUBCUTANEOUS at 08:47

## 2023-12-04 RX ADMIN — OXYCODONE 5 MG: 5 TABLET ORAL at 20:24

## 2023-12-04 RX ADMIN — DULOXETINE HYDROCHLORIDE 60 MG: 60 CAPSULE, DELAYED RELEASE ORAL at 20:23

## 2023-12-04 RX ADMIN — CYCLOBENZAPRINE 10 MG: 10 TABLET, FILM COATED ORAL at 20:24

## 2023-12-04 RX ADMIN — NIFEDIPINE 30 MG: 30 TABLET, FILM COATED, EXTENDED RELEASE ORAL at 20:23

## 2023-12-04 ASSESSMENT — PAIN DESCRIPTION - LOCATION
LOCATION: KNEE
LOCATION: KNEE
LOCATION: LEG
LOCATION: LEG
LOCATION: KNEE

## 2023-12-04 ASSESSMENT — PAIN DESCRIPTION - ORIENTATION
ORIENTATION: RIGHT

## 2023-12-04 ASSESSMENT — PAIN DESCRIPTION - DESCRIPTORS
DESCRIPTORS: SHARP;STABBING
DESCRIPTORS: ACHING
DESCRIPTORS: ACHING;DISCOMFORT;SHARP;STABBING

## 2023-12-04 ASSESSMENT — PAIN SCALES - GENERAL
PAINLEVEL_OUTOF10: 7
PAINLEVEL_OUTOF10: 8
PAINLEVEL_OUTOF10: 9
PAINLEVEL_OUTOF10: 9
PAINLEVEL_OUTOF10: 7

## 2023-12-04 ASSESSMENT — PAIN - FUNCTIONAL ASSESSMENT
PAIN_FUNCTIONAL_ASSESSMENT: PREVENTS OR INTERFERES SOME ACTIVE ACTIVITIES AND ADLS
PAIN_FUNCTIONAL_ASSESSMENT: ACTIVITIES ARE NOT PREVENTED

## 2023-12-04 ASSESSMENT — PAIN DESCRIPTION - PAIN TYPE: TYPE: SURGICAL PAIN

## 2023-12-04 NOTE — ACP (ADVANCE CARE PLANNING)
Advance Care Planning     General Advance Care Planning (ACP) Conversation    Date of Conversation: 12/1/2023  Conducted with: Patient with Decision Making Capacity    Healthcare Decision Maker:  No healthcare decision makers have been documented.   Click here to complete [address] including selection of the Healthcare Decision Maker Relationship (ie \"Primary\")       Content/Action Overview:     Reviewed DNR/DNI and patient elects Full Code (Attempt Resuscitation)  No next of kin or medical decision maker     Length of [place] ACP Conversation in minutes:  <16 minutes (Non-Billable)    Harmeet Puente RN

## 2023-12-04 NOTE — PROGRESS NOTES
Inpatient Occupational Therapy Treatment    Unit: 2 [place]  Date:  12/4/2023  Patient Name:    Vita Willingham  Admitting diagnosis:  Hypokalemia [E87.6]  Hypomagnesemia [E83.42]  Closed displaced comminuted fracture of shaft of right femur, initial encounter ([address]) [S72.351A]  Fracture of unsp part of neck of right femur, init ([address]) Joselyn Quinones  Admit Date:  12/1/2023  Precautions/Restrictions/WB Status/ Lines/ Wounds/ Oxygen: Fall risk, Bed/chair alarm, Lines (IV and Supplemental O2 (2L)), Telemetry, and WB Restrictions (WBAT R LE)    Pt seen for cotreatment this date due to patient safety, patient endurance, limited functional status information, and need for the assistance of 2 skilled therapists    Treatment Time:  2326-8377  Treatment Number:  1  Timed Code Treatment Minutes: 25 minutes  Total Treatment Minutes:  25  minutes    Patient Goals for Therapy: \"go home\"          Discharge Recommendations: SNF  DME needs for discharge: Defer to facility       Therapy recommendations for staff:   Assist of 2 for transfers with use of JESSICA STEDY and gait belt to/from [facility]  to/from chair    History of Present Illness: per NASIR COOK H&P 12/1/23:  \"The patient is a 70 y.o. male with depression, neuropathy, BPH, GERD, hepatitis C, COPD, HTN, CVA who presented to [facility] ED with complaint of fall. Patient reports slipping on wet grass and falling to the ground, his right leg went out behind him. He reports hearing a \"pop\" in the leg with immediate pain. He was unable to ambulate and called EMS. No head injury or LOC. Denies blood thinner use. Denies numbness or tingling of the extremities. Admitted for further evaluation and management in consultation with orthopedic surgery. \"    Per Dr Nuris Obrien note 12/2/23:  \"Date of Procedure: 12/2/2023     Pre-Op Diagnosis Codes:     * Closed fracture of right femur, unspecified fracture morphology, unspecified portion of femur, initial encounter ([address]) [S72.91XA]     Post-Op Diagnosis:

## 2023-12-04 NOTE — PLAN OF CARE
Problem: Discharge Planning  Goal: Discharge to home or other facility with appropriate resources  12/4/2023 1009 by Flower Max RN  Outcome: Progressing  12/3/2023 2237 by Perla Rodriguez RN  Outcome: Progressing     Problem: Pain  Goal: Verbalizes/displays adequate comfort level or baseline comfort level  12/4/2023 1009 by Flower Max RN  Outcome: Progressing  12/3/2023 2237 by Perla Rodriguez RN  Outcome: Progressing     Problem: Safety - Adult  Goal: Free from fall injury  12/4/2023 1009 by Flower Max RN  Outcome: Progressing  12/3/2023 2237 by Perla Rodriguez RN  Outcome: Progressing

## 2023-12-04 NOTE — PROGRESS NOTES
Progress Note    Patient:  Radha Dose  YOB: 1955     70 y.o. male    Subjective:  Patient seen and examined  No complaints or concerns. Sitting in chair today. No issue overnight  Pain controlled but reports discomfort to the leg and knee and that the ACE is very tight it feels. Denies fever or chills    Objective:   Vitals:    12/04/23 1229   BP:    Pulse:    Resp: 16   Temp:    SpO2:      Gen: NAD, cooperative   Right lower extremity: Surgical dressing in place, clean, dry, intact. Compartments soft. EHL/FHL/TA/GS complex motor intact. Sural, saphenous, superificial/deep peroneal, and plantar nerve distribution sensation grossly intact. Dorsalis pedis/posterior tibial pulses 2+ with BCR. Recent Labs     12/02/23  0831 12/03/23  0621 12/04/23  0528   WBC 5.8  --   --    HGB 12.8*  --   --    HCT 37.8*  --   --    *  --   --    *   < > 136   K 3.9   < > 3.3*   BUN 7   < > 13   CREATININE <0.5*   < > <0.5*   GLUCOSE 128*   < > 178*    < > = values in this interval not displayed. Meds: post op ancef, lovenox   See rec for complete list    Impression/plan: 70 y.o. male s/p R femur retrograde nail, POD#2 (DOS: 12/2/23)    -Will take down ACE today and change dressing. -WB status: weight bearing as tolerated right lower extremity   -Pain control  -DVT ppx: Lovenox x 4 weeks  -Ice/Elevate  -Incentive Spirometry use  -PT/OT eval with social work evaluation for placement  -Follow up with Dr Rubi Ritchie in 10-14 days.       Helder Jackson, [address] [place] [address] jm sahu

## 2023-12-04 NOTE — CARE COORDINATION
Case Management Assessment  Initial Evaluation    Date/Time of Evaluation: 12/4/2023 9:15 AM  Assessment Completed by: Arsenio Turcios RN    If patient is discharged prior to next notation, then this note serves as note for discharge by case management. Patient Name: Heather Hsu                   YOB: 1955  Diagnosis: Hypokalemia [E87.6]  Hypomagnesemia [E83.42]  Closed displaced comminuted fracture of shaft of right femur, initial encounter ([address]) [S72.351A]  Fracture of unsp part of neck of right femur, init ([address]) Cathy Martines                   Date / Time: 12/1/2023  1:28 PM    Patient Admission Status: Inpatient   Readmission Risk (Low < 19, Mod (19-27), High > 27): Readmission Risk Score: 18.9    Current PCP: Damian Fuentes MD  PCP verified by CM? (P) Yes    Chart Reviewed: Yes      History Provided by: (P) Patient  Patient Orientation: (P) Alert and Oriented, Person, Place, Situation    Patient Cognition: (P) Alert    Hospitalization in the last 30 days (Readmission):  No    If yes, Readmission Assessment in CM Navigator will be completed.     Advance Directives:      Code Status: Full Code   Patient's Primary Decision Maker is: (P)  (No Next orf kin or medical decsion maker)      Discharge Planning:    Patient lives with: (P) Alone Type of Home: (P) House  Primary Care Giver: (P) Self  Patient Support Systems include: (P) Friends/Neighbors   Current Financial resources: (P) Medicare, [place]  Current community resources: (P) None  Current services prior to admission: (P) Durable Medical Equipment            Current DME: (P) Kenny Flatter, Other (Comment) (Walk in shower)            Type of Home Care services:  (P) None    ADLS  Prior functional level: (P) Independent in ADLs/IADLs  Current functional level: (P) Independent in ADLs/IADLs    PT AM-PAC: 11 /24  OT AM-PAC: 13 /24    Family can provide assistance at DC: (P) Yes  Would you like Case Management to discuss the discharge plan with any other family members/significant others, and if so, who? (P) No  Plans to Return to Present Housing: (P) Yes (may need rehab)  Other Identified Issues/Barriers to RETURNING to current housing: NONE  Potential Assistance needed at discharge: (P) N/A            Potential DME:    Patient expects to discharge to: (P) [address] [facility] for transportation at discharge:      Financial    Payor: Herbert Campa / Plan: [address] / Product Type: *No Product type* /     Does insurance require precert for SNF: Yes    Potential assistance Purchasing Medications: (P) No (uses [facility])  Meds-to-Beds request: Yes      [address], [address] [phone]  [address] [place] [address]  [address]  Phone: [phone] Fax: [phone] [facility] [address], [address] [facility] [address] [place] [address] Road [phone]  [address] [place] [address]   [address] [phone]  Phone: [phone] Fax: [phone]      Notes:    Factors facilitating achievement of predicted outcomes: Cooperative, Pleasant, and Has needed Durable Medical Equipment at home    Barriers to discharge: Pain, Decreased endurance, Lower extremity weakness, and Medical complications    Additional Case Management Notes: Chart reviewed. Met with pt at bedside and explained the role of the CM. Lives alone. No [facility] services at this time. Plan: Will likely need rehab. [place] of choice list provided. Will require pre-cert.  +CM following      The Plan for Transition of Care is related to the following treatment goals of Hypokalemia [E87.6]  Hypomagnesemia [E83.42]  Closed displaced comminuted fracture of shaft of right femur, initial encounter ([address]) [S72.351A]  Fracture of unsp part of neck of right femur, init ([address]) [P56.567K]    IF APPLICABLE: The Patient and/or patient representative [address] and his family were provided with a choice of provider and agrees with

## 2023-12-04 NOTE — PROGRESS NOTES
Physician Progress Note      Doretha Melendez  [facility] #:                  [id number]  :                       1955  ADMIT DATE:       2023 1:28 PM  [address] [facility] DATE:  RESPONDING  PROVIDER #:        Lalo Gibbs MD          QUERY TEXT:    Patient admitted with femur fracture. Pt noted to have BPH and was treated   with cardura. ?If possible, please document in progress  notes and discharge summary if you are evaluating and/or treating any of the   following: The medical record reflects the following:  Risk Factors: advanced age, male  Clinical Indicators: BPH on Cardura  Treatment:  Cardura, monitor UOP, supportive care    Thank you,  Santos Finch RN, CDS  [email or web address]  Options provided:  -- BPH with partial/complete urinary obstruction  -- BPH with urinary retention without obstruction  -- Other - I will add my own diagnosis  -- Disagree - Not applicable / Not valid  -- Disagree - Clinically unable to determine / Unknown  -- Refer to Clinical Documentation Reviewer    PROVIDER RESPONSE TEXT:    This patient has BPH with urinary retention without obstruction.     Query created by: Santos Finch on 2023 12:34 PM      Electronically signed by:  Lalo Gibbs MD 2023 2:19 PM

## 2023-12-04 NOTE — PROGRESS NOTES
environment:                            Travel trailer  Steps to enter first floor:                     2 steps to enter and Hand rail unilateral  Steps to second floor/basement:        N/A  Laundry:                                              Goes in garage to [employer]:                                           tub/shower unit and standard height toilet  Pt sleeps in a:                                     Flat bed  Equipment owned:                               and [facility] [place]     Preadmission Status:  Pt. Able to drive:                                 Yes  Pt. Fully independent with ADLs:       Yes  Pt. Required assistance for: Independent PTA  Pt. independent for functional transfers and utilized No Device for mobility in home and No Device out in community  History of falls:                                                No  Home Health Services:                       None     Objective  Does this pt have an acute or acute on chronic diagnosis of CHF? No    Upper Extremity ROM/Strength  Please see OT evaluation. Lower Extremity ROM / Strength   AROM WFL: No  ROM limitations: R le: hip and knee limited by pain and ace wrap    Strength Assessment (measured on a 0-5 scale):  R LE   Quad   3-   Ant Tib  3   Hamstring Not tested   Iliopsoas 3-  L LE  Quad   WFL   Ant Tib  WFL   Hamstring WFL   Iliopsoas WFL    Lower Extremity Sensation    Impaired,neuropathy B Feet    Coordination  NT    Tone  WNL    Balance  Static Sitting:  Good ; SBA  Dynamic Sitting:  Good ; SBA   Comments: donning L sock    Static Standing: Good - ; CGA  Dynamic Standing: Not tested; Not Tested  Comments: unable to move outside JOSE and WB through RLE to step    Posture  Seated:  WFL  Standing:  Forward head and neck    Bed Mobility   Supine to Sit:    CGA  Sit to Supine:   Not Tested  Rolling:   Not Tested   Scooting in sitting: CGA   Scooting in supine:  Not Tested   Bridging:  Not Tested    Transfer Training     Sit below baseline, demonstrates good rehab potential and unable to return home due to inability to negotiate stairs to enter home/bedroom/bathroom, burden of care beyond caregiver ability, and home environment not conducive to patient recovery. Goals : To be met in 3 visits:  1). Independent with LE Ex x 10 reps  2). Sit to/from stand: Min A   3). Bed to chair: Min A  with RW  4). CHF goal: N/A    To be met in 6 visits:  1). Supine to/from sit: Independent  2). Sit to/from stand: Supervision  3). Bed to chair: SBA  4). Gait: Ambulate 150 ft.  with SBA and use of rolling walker (RW)  5). Tolerate B LE exercises 3 sets of 10-15 reps  6). Ascend/descend 2 steps with SBA with use of hand rail unilateral and LRAD (least restrictive assistive device)    Rehabilitation Potential: Good  Strengths for achieving goals include:   Pt motivated, PLOF, Family Support, and Pt cooperative   Barriers to achieving goals include:    Pain and Weakness    Plan    To be seen 5-7x / week while in acute care setting for therapeutic exercises, bed mobility, transfers, progressive gait training, balance training, and family/patient education. Signature: Cory Coles, PT #[id number]     If patient discharges from this facility prior to next visit, this note will serve as the Discharge Summary.     CHF Education  N/A

## 2023-12-05 VITALS
HEART RATE: 79 BPM | BODY MASS INDEX: 34.27 KG/M2 | TEMPERATURE: 97.6 F | WEIGHT: 244.8 LBS | SYSTOLIC BLOOD PRESSURE: 123 MMHG | OXYGEN SATURATION: 95 % | RESPIRATION RATE: 18 BRPM | DIASTOLIC BLOOD PRESSURE: 72 MMHG | HEIGHT: 71 IN

## 2023-12-05 LAB
ANION GAP SERPL CALCULATED.3IONS-SCNC: 8 MMOL/L (ref 3–16)
BUN SERPL-MCNC: 10 MG/DL (ref 7–20)
CALCIUM SERPL-MCNC: 7.5 MG/DL (ref 8.3–10.6)
CHLORIDE SERPL-SCNC: 97 MMOL/L (ref 99–110)
CO2 SERPL-SCNC: 26 MMOL/L (ref 21–32)
CREAT SERPL-MCNC: <0.5 MG/DL (ref 0.8–1.3)
DEPRECATED RDW RBC AUTO: 20.4 % (ref 12.4–15.4)
GFR SERPLBLD CREATININE-BSD FMLA CKD-EPI: >60 ML/MIN/{1.73_M2}
GLUCOSE SERPL-MCNC: 311 MG/DL (ref 70–99)
HCT VFR BLD AUTO: 31.6 % (ref 40.5–52.5)
HGB BLD-MCNC: 10.2 G/DL (ref 13.5–17.5)
MCH RBC QN AUTO: 29.7 PG (ref 26–34)
MCHC RBC AUTO-ENTMCNC: 32.4 G/DL (ref 31–36)
MCV RBC AUTO: 91.8 FL (ref 80–100)
PLATELET # BLD AUTO: 112 K/UL (ref 135–450)
PMV BLD AUTO: 10.2 FL (ref 5–10.5)
POTASSIUM SERPL-SCNC: 3.8 MMOL/L (ref 3.5–5.1)
RBC # BLD AUTO: 3.44 M/UL (ref 4.2–5.9)
SODIUM SERPL-SCNC: 131 MMOL/L (ref 136–145)
WBC # BLD AUTO: 4 K/UL (ref 4–11)

## 2023-12-05 PROCEDURE — 2700000000 HC OXYGEN THERAPY PER DAY

## 2023-12-05 PROCEDURE — 6360000002 HC RX W HCPCS: Performed by: INTERNAL MEDICINE

## 2023-12-05 PROCEDURE — APPNB60 APP NON BILLABLE TIME 46-60 MINS: Performed by: PHYSICIAN ASSISTANT

## 2023-12-05 PROCEDURE — 94761 N-INVAS EAR/PLS OXIMETRY MLT: CPT

## 2023-12-05 PROCEDURE — 6370000000 HC RX 637 (ALT 250 FOR IP): Performed by: STUDENT IN AN ORGANIZED HEALTH CARE EDUCATION/TRAINING PROGRAM

## 2023-12-05 PROCEDURE — 6370000000 HC RX 637 (ALT 250 FOR IP): Performed by: INTERNAL MEDICINE

## 2023-12-05 PROCEDURE — 85027 COMPLETE CBC AUTOMATED: CPT

## 2023-12-05 PROCEDURE — 36415 COLL VENOUS BLD VENIPUNCTURE: CPT

## 2023-12-05 PROCEDURE — 80048 BASIC METABOLIC PNL TOTAL CA: CPT

## 2023-12-05 PROCEDURE — 99239 HOSP IP/OBS DSCHRG MGMT >30: CPT | Performed by: INTERNAL MEDICINE

## 2023-12-05 RX ORDER — OXYCODONE HYDROCHLORIDE 5 MG/1
5 TABLET ORAL EVERY 4 HOURS PRN
Qty: 10 TABLET | Refills: 0 | Status: SHIPPED | OUTPATIENT
Start: 2023-12-05 | End: 2023-12-08

## 2023-12-05 RX ORDER — OXYCODONE AND ACETAMINOPHEN 10; 325 MG/1; MG/1
1 TABLET ORAL 4 TIMES DAILY
Qty: 20 TABLET | Refills: 0 | Status: SHIPPED | OUTPATIENT
Start: 2023-12-05 | End: 2023-12-10

## 2023-12-05 RX ORDER — CYCLOBENZAPRINE HCL 10 MG
10 TABLET ORAL 3 TIMES DAILY
DISCHARGE
Start: 2023-12-05

## 2023-12-05 RX ADMIN — Medication 100 MG: at 08:11

## 2023-12-05 RX ADMIN — ENOXAPARIN SODIUM 30 MG: 100 INJECTION SUBCUTANEOUS at 08:11

## 2023-12-05 RX ADMIN — CARVEDILOL 6.25 MG: 6.25 TABLET, FILM COATED ORAL at 08:11

## 2023-12-05 RX ADMIN — Medication 400 MG: at 08:11

## 2023-12-05 RX ADMIN — OXYCODONE HYDROCHLORIDE AND ACETAMINOPHEN 1 TABLET: 10; 325 TABLET ORAL at 06:53

## 2023-12-05 RX ADMIN — OXYCODONE 5 MG: 5 TABLET ORAL at 16:10

## 2023-12-05 RX ADMIN — PANTOPRAZOLE SODIUM 40 MG: 40 TABLET, DELAYED RELEASE ORAL at 06:53

## 2023-12-05 RX ADMIN — HYDRALAZINE HYDROCHLORIDE 50 MG: 50 TABLET, FILM COATED ORAL at 06:53

## 2023-12-05 RX ADMIN — POTASSIUM CHLORIDE 20 MEQ: 1500 TABLET, EXTENDED RELEASE ORAL at 08:11

## 2023-12-05 RX ADMIN — HYDRALAZINE HYDROCHLORIDE 50 MG: 50 TABLET, FILM COATED ORAL at 15:13

## 2023-12-05 RX ADMIN — CYCLOBENZAPRINE 10 MG: 10 TABLET, FILM COATED ORAL at 15:13

## 2023-12-05 RX ADMIN — CYCLOBENZAPRINE 10 MG: 10 TABLET, FILM COATED ORAL at 08:11

## 2023-12-05 RX ADMIN — OXYCODONE 5 MG: 5 TABLET ORAL at 03:49

## 2023-12-05 RX ADMIN — GABAPENTIN 300 MG: 300 CAPSULE ORAL at 08:10

## 2023-12-05 RX ADMIN — OXYCODONE HYDROCHLORIDE AND ACETAMINOPHEN 1 TABLET: 10; 325 TABLET ORAL at 00:17

## 2023-12-05 RX ADMIN — OXYCODONE HYDROCHLORIDE AND ACETAMINOPHEN 1 TABLET: 10; 325 TABLET ORAL at 13:24

## 2023-12-05 RX ADMIN — GABAPENTIN 300 MG: 300 CAPSULE ORAL at 15:13

## 2023-12-05 RX ADMIN — ASPIRIN 81 MG: 81 TABLET, COATED ORAL at 08:11

## 2023-12-05 ASSESSMENT — PAIN DESCRIPTION - ORIENTATION: ORIENTATION: RIGHT

## 2023-12-05 ASSESSMENT — PAIN DESCRIPTION - LOCATION
LOCATION: LEG
LOCATION: KNEE;LEG

## 2023-12-05 ASSESSMENT — PAIN DESCRIPTION - DESCRIPTORS: DESCRIPTORS: STABBING;SHARP

## 2023-12-05 ASSESSMENT — PAIN DESCRIPTION - ONSET: ONSET: ON-GOING

## 2023-12-05 ASSESSMENT — PAIN - FUNCTIONAL ASSESSMENT: PAIN_FUNCTIONAL_ASSESSMENT: ACTIVITIES ARE NOT PREVENTED

## 2023-12-05 ASSESSMENT — PAIN SCALES - GENERAL
PAINLEVEL_OUTOF10: 8
PAINLEVEL_OUTOF10: 7
PAINLEVEL_OUTOF10: 8

## 2023-12-05 ASSESSMENT — PAIN DESCRIPTION - PAIN TYPE: TYPE: SURGICAL PAIN

## 2023-12-05 ASSESSMENT — PAIN DESCRIPTION - FREQUENCY: FREQUENCY: CONTINUOUS

## 2023-12-05 NOTE — PLAN OF CARE
Problem: Discharge Planning  Goal: Discharge to home or other facility with appropriate resources  12/4/2023 2036 by Teo Higgins RN  Outcome: Progressing  12/4/2023 1009 by Romeo Nguyen RN  Outcome: Progressing     Problem: Pain  Goal: Verbalizes/displays adequate comfort level or baseline comfort level  12/4/2023 2036 by Teo Higgins RN  Outcome: Progressing  12/4/2023 1009 by Romeo Nguyen RN  Outcome: Progressing     Problem: Safety - Adult  Goal: Free from fall injury  12/4/2023 2036 by Teo Higgins RN  Outcome: Progressing  12/4/2023 1009 by Romeo Nguyen RN  Outcome: Progressing     Problem: ABCDS Injury Assessment  Goal: Absence of physical injury  12/4/2023 2036 by Teo Higgins RN  Outcome: Progressing  12/4/2023 1009 by Romeo Nguyen RN  Outcome: Progressing     Problem: Skin/Tissue Integrity  Goal: Absence of new skin breakdown  Description: 1. Monitor for areas of redness and/or skin breakdown  2. Assess vascular access sites hourly  3. Every 4-6 hours minimum:  Change oxygen saturation probe site  4. Every 4-6 hours:  If on nasal continuous positive airway pressure, respiratory therapy assess nares and determine need for appliance change or resting period.   12/4/2023 2036 by Teo Higgins RN  Outcome: Progressing  12/4/2023 1009 by Romeo Nguyen RN  Outcome: Progressing     Problem: Chronic Conditions and Co-morbidities  Goal: Patient's chronic conditions and co-morbidity symptoms are monitored and maintained or improved  12/4/2023 2036 by Teo Higgins RN  Outcome: Progressing  12/4/2023 1009 by Romeo Nguyen RN  Outcome: Progressing

## 2023-12-05 NOTE — PROGRESS NOTES
Shift report given to Deysi at bedside. Patient is stable. All end of shift needs have been met. No further assistance needed at this time.

## 2023-12-05 NOTE — DISCHARGE INSTR - COC
Continuity of Care Form    Patient Name: Kristina Rabago   :  1955  MRN:  [id number]    Admit date:  2023  Discharge date:  2023    Code Status Order: Full Code   Advance Directives:   Advance Care Flowsheet Documentation       Date/Time Healthcare Directive Type of Healthcare Directive Copy in [address] Agent's Name Healthcare Agent's Phone Number    23 1032 No, patient does not have an advance directive for healthcare treatment -- -- -- -- --            Admitting Physician:  Savage Parks MD  PCP: Tyrone Lizarraga MD    Discharging Nurse: [facility] Unit/Room#: 6107/0621-10  Discharging Unit Phone Number: ***    Emergency Contact:   Extended Emergency Contact Information  Primary Emergency Contact: [address] [place] [address] Phone: [phone]  Mobile Phone: [phone]  Relation: Other Relative  Preferred language: English   needed? No    Past Surgical History:  Past Surgical History:   Procedure Laterality Date    CHOLECYSTECTOMY      COLONOSCOPY      CT GUIDED CHEST TUBE  2022    CT GUIDED CHEST TUBE 3/1/2022 [address] [place] [address] SCAN    FRACTURE SURGERY      JOINT REPLACEMENT      OTHER SURGICAL HISTORY Right 2023    OTHER SURGICAL HISTORY Right 2023    femoral nail right retrograde       Immunization History:   Immunization History   Administered Date(s) Administered    COVID-19, MODERNA Bivalent, (age 12y+), IM, 48 mcg/0.5 mL 11/10/2022    COVID-19, US Vaccine, Vaccine Unspecified 2022    Hepatitis B 10/04/2023, 2023       Active Problems:  Patient Active Problem List   Diagnosis Code    Multiple closed fractures of ribs of right side S22.41XA    Chronic bilateral low back pain without sciatica M54.50, G89.29    Accelerated essential hypertension I10    Accident due to mechanical fall without injury W19. XXXA    Closed fracture of multiple ribs of right side with routine healing S22.41XD    Tobacco abuse

## 2023-12-05 NOTE — PROGRESS NOTES
Chart accessed to assist primary RN. VSS see flowsheet, and scheduled pain medication given, see MAR.

## 2023-12-05 NOTE — CARE COORDINATION
SNF referrals:    #1 [facility]-  ACCEPTED. Pre-cert instantly approved. Pt will have a private room. #2 [facility] - Not in network    #3 [facility] - await review.

## 2023-12-05 NOTE — DISCHARGE SUMMARY
Name:  Romario Boyer  Room:  5254/6686-55  MRN:    [id number]    Discharge Summary      This discharge summary is in conjunction with a complete physical exam done on the day of discharge. Discharging Provider: Dr. Medina Murillo MD      Admit: 12/1/2023  Discharge: 12/05/23      HPI taken from admission H&P:    The patient is a 70 y.o. male with depression, neuropathy, BPH, GERD, hepatitis C, COPD, HTN, CVA who presented to [facility] ED with complaint of fall. Patient reports slipping on wet grass and falling to the ground, his right leg went out behind him. He reports hearing a \"pop\" in the leg with immediate pain. He was unable to ambulate and called EMS. No head injury or LOC. Denies blood thinner use. Denies numbness or tingling of the extremities. Admitted for further evaluation and management in consultation with orthopedic surgery. Diagnoses this Admission and Hospital Course   #Mechanical fall  #Comminuted displaced mid to distal right femur fracture   -ortho consulted ; s/p right distal femur IM nailing  12/2. POD #3   -Pain is better controlled today. meds adjusted   - Seen by Ortho  - patient can be discharged to SNF   -continue Lovenox for DVT prophylaxis.   -pain meds - on  scheduled perc 10, prn perc 5, added flexeril for muscle spasms     #EKG changes  - no CP ; trend troponin's - neg   -monitored on  telemetry      #Hypomagnesemia  #Hypokalemia  -prn electrolyte replacement      #Hx of ischemic CVA  -continue aspirin and statin     #HTN  -continue coreg, nifedipine, hydralazine      #COPD  -stable, no AE  -no home inhaler regimen     #History of hepatitis C  #Elevated LFTs  -AST and alk phos elevated  -repeat LFTs in AM  -patient denies RUQ pain currently      #Alcohol abuse  #Concern for alcohol withdrawal   -reports drinking 1 pint liquor daily  -ETOH level pending  -[facility] protocol  -fall and seizure precautions  -multivitamin and thiamine  -CM for outpatient resources release tablet  Commonly known as: ROXICODONE  Take 1 tablet by mouth every 4 hours as needed for Pain for up to 3 days. Max Daily Amount: 30 mg     oxyCODONE-acetaminophen  MG per tablet  Commonly known as: PERCOCET  Take 1 tablet by mouth in the morning, at noon, in the evening, and at bedtime for 5 days. Max Daily Amount: 4 tablets  Replaces: oxyCODONE-acetaminophen 7.5-325 MG per tablet            CONTINUE taking these medications      albuterol sulfate  (90 Base) MCG/ACT inhaler  Commonly known as: PROVENTIL;VENTOLIN;PROAIR     aspirin 81 MG EC tablet  Take 1 tablet by mouth daily     carvedilol 6.25 MG tablet  Commonly known as: COREG  Take 1 tablet by mouth 2 times daily (with meals)     doxazosin 4 MG tablet  Commonly known as: CARDURA  Take 1 tablet by mouth nightly     DULoxetine 60 MG extended release capsule  Commonly known as: CYMBALTA     gabapentin 300 MG capsule  Commonly known as: NEURONTIN     hydrALAZINE 50 MG tablet  Commonly known as: APRESOLINE  Take 1 tablet by mouth every 8 hours     mirtazapine 15 MG tablet  Commonly known as: REMERON  Take 1 tablet by mouth nightly Follow-up with your primary care doctor to clarify your correct home medications.      NIFEdipine 30 MG extended release tablet  Commonly known as: PROCARDIA XL  Take 1 tablet by mouth in the morning and at bedtime     omeprazole 20 MG delayed release capsule  Commonly known as: PRILOSEC     traZODone 50 MG tablet  Commonly known as: DESYREL            STOP taking these medications      methocarbamol 500 MG tablet  Commonly known as: ROBAXIN     oxyCODONE-acetaminophen 7.5-325 MG per tablet  Commonly known as: PERCOCET  Replaced by: oxyCODONE-acetaminophen  MG per tablet               Where to Get Your Medications        You can get these medications from any pharmacy    Bring a paper prescription for each of these medications  enoxaparin 40 MG/0.4ML  oxyCODONE 5 MG immediate release

## 2023-12-05 NOTE — CARE COORDINATION
12/05/23 1456   IMM Letter   IMM Letter given to Patient/Family/Significant other/Guardian/POA/by: Keysha Pena RN   IMM Letter date given: 12/05/23   IMM Letter time given: 1000      CM delivered 2nd IMM delivered within 4 hours for DC, verbal explanation of patient rights at bedside. Pt voiced understanding of discharge MCR rights and is agreeable to discharge.

## 2023-12-05 NOTE — PROGRESS NOTES
Bedside report given to [facility]. Pt denies needs at this time. No s/s of distress. Respirations easy and unlabored. Pt stable. Bed in low position call light within reach. No further needs at this time.

## 2023-12-12 ENCOUNTER — HOSPITAL ENCOUNTER (INPATIENT)
Age: 68
LOS: 1 days | Discharge: NURSING, REHAB OR LONG-TERM CARE | DRG: 190 | End: 2023-12-13
Attending: EMERGENCY MEDICINE | Admitting: INTERNAL MEDICINE
Payer: MEDICARE

## 2023-12-12 ENCOUNTER — APPOINTMENT (OUTPATIENT)
Dept: VASCULAR LAB | Age: 68
DRG: 190 | End: 2023-12-12
Payer: MEDICARE

## 2023-12-12 ENCOUNTER — APPOINTMENT (OUTPATIENT)
Dept: GENERAL RADIOLOGY | Age: 68
DRG: 190 | End: 2023-12-12
Payer: MEDICARE

## 2023-12-12 ENCOUNTER — APPOINTMENT (OUTPATIENT)
Dept: CT IMAGING | Age: 68
DRG: 190 | End: 2023-12-12
Payer: MEDICARE

## 2023-12-12 DIAGNOSIS — R09.02 HYPOXIA: Primary | ICD-10-CM

## 2023-12-12 DIAGNOSIS — S62.617A CLOSED DISPLACED FRACTURE OF PROXIMAL PHALANX OF LEFT LITTLE FINGER, INITIAL ENCOUNTER: ICD-10-CM

## 2023-12-12 DIAGNOSIS — I50.9 CONGESTIVE HEART FAILURE, UNSPECIFIED HF CHRONICITY, UNSPECIFIED HEART FAILURE TYPE (HCC): ICD-10-CM

## 2023-12-12 PROBLEM — J96.02 ACUTE RESPIRATORY FAILURE WITH HYPOXIA AND HYPERCAPNIA (HCC): Status: ACTIVE | Noted: 2023-12-12

## 2023-12-12 PROBLEM — J96.01 ACUTE RESPIRATORY FAILURE WITH HYPOXIA AND HYPERCAPNIA (HCC): Status: ACTIVE | Noted: 2023-12-12

## 2023-12-12 PROBLEM — J18.9 COMMUNITY ACQUIRED PNEUMONIA: Status: ACTIVE | Noted: 2023-12-12

## 2023-12-12 PROBLEM — J44.1 COPD EXACERBATION (HCC): Status: ACTIVE | Noted: 2023-12-12

## 2023-12-12 PROBLEM — J96.01 ACUTE HYPOXIC RESPIRATORY FAILURE (HCC): Status: ACTIVE | Noted: 2023-12-12

## 2023-12-12 PROBLEM — I27.82 CHRONIC PULMONARY EMBOLISM (HCC): Status: ACTIVE | Noted: 2023-12-12

## 2023-12-12 LAB
ALBUMIN SERPL-MCNC: 3.5 G/DL (ref 3.4–5)
ALBUMIN/GLOB SERPL: 0.9 {RATIO} (ref 1.1–2.2)
ALP SERPL-CCNC: 187 U/L (ref 40–129)
ALT SERPL-CCNC: 35 U/L (ref 10–40)
ANION GAP SERPL CALCULATED.3IONS-SCNC: 12 MMOL/L (ref 3–16)
ANION GAP SERPL CALCULATED.3IONS-SCNC: 9 MMOL/L (ref 3–16)
APTT BLD: 35.4 SEC (ref 22.7–35.9)
AST SERPL-CCNC: 86 U/L (ref 15–37)
BASE EXCESS BLDV CALC-SCNC: 2.8 MMOL/L (ref -3–3)
BASOPHILS # BLD: 0 K/UL (ref 0–0.2)
BASOPHILS NFR BLD: 0 %
BILIRUB SERPL-MCNC: 1.5 MG/DL (ref 0–1)
BUN SERPL-MCNC: 10 MG/DL (ref 7–20)
BUN SERPL-MCNC: 13 MG/DL (ref 7–20)
CALCIUM SERPL-MCNC: 8.7 MG/DL (ref 8.3–10.6)
CALCIUM SERPL-MCNC: 9.2 MG/DL (ref 8.3–10.6)
CHLORIDE SERPL-SCNC: 94 MMOL/L (ref 99–110)
CHLORIDE SERPL-SCNC: 97 MMOL/L (ref 99–110)
CO2 BLDV-SCNC: 31 MMOL/L
CO2 SERPL-SCNC: 28 MMOL/L (ref 21–32)
CO2 SERPL-SCNC: 29 MMOL/L (ref 21–32)
COHGB MFR BLDV: 3.7 % (ref 0–1.5)
CREAT SERPL-MCNC: <0.5 MG/DL (ref 0.8–1.3)
CREAT SERPL-MCNC: <0.5 MG/DL (ref 0.8–1.3)
DEPRECATED RDW RBC AUTO: 20 % (ref 12.4–15.4)
EKG ATRIAL RATE: 85 BPM
EKG DIAGNOSIS: NORMAL
EKG P AXIS: 57 DEGREES
EKG P-R INTERVAL: 170 MS
EKG Q-T INTERVAL: 388 MS
EKG QRS DURATION: 80 MS
EKG QTC CALCULATION (BAZETT): 461 MS
EKG R AXIS: 50 DEGREES
EKG T AXIS: 59 DEGREES
EKG VENTRICULAR RATE: 85 BPM
EOSINOPHIL # BLD: 0 K/UL (ref 0–0.6)
EOSINOPHIL NFR BLD: 0 %
FLUAV RNA UPPER RESP QL NAA+PROBE: NEGATIVE
FLUBV AG NPH QL: NEGATIVE
GFR SERPLBLD CREATININE-BSD FMLA CKD-EPI: >60 ML/MIN/{1.73_M2}
GFR SERPLBLD CREATININE-BSD FMLA CKD-EPI: >60 ML/MIN/{1.73_M2}
GLUCOSE SERPL-MCNC: 148 MG/DL (ref 70–99)
GLUCOSE SERPL-MCNC: 177 MG/DL (ref 70–99)
HCO3 BLDV-SCNC: 29.4 MMOL/L (ref 23–29)
HCT VFR BLD AUTO: 33.4 % (ref 40.5–52.5)
HGB BLD-MCNC: 11.1 G/DL (ref 13.5–17.5)
LYMPHOCYTES # BLD: 0.9 K/UL (ref 1–5.1)
LYMPHOCYTES NFR BLD: 8 %
MAGNESIUM SERPL-MCNC: 1.6 MG/DL (ref 1.8–2.4)
MAGNESIUM SERPL-MCNC: 1.8 MG/DL (ref 1.8–2.4)
MCH RBC QN AUTO: 28.7 PG (ref 26–34)
MCHC RBC AUTO-ENTMCNC: 33.3 G/DL (ref 31–36)
MCV RBC AUTO: 86.4 FL (ref 80–100)
METHGB MFR BLDV: 0.3 %
MONOCYTES # BLD: 0.7 K/UL (ref 0–1.3)
MONOCYTES NFR BLD: 6 %
NEUTROPHILS # BLD: 9.4 K/UL (ref 1.7–7.7)
NEUTROPHILS NFR BLD: 80 %
NEUTS BAND NFR BLD MANUAL: 6 % (ref 0–7)
NT-PROBNP SERPL-MCNC: 645 PG/ML (ref 0–124)
O2 THERAPY: ABNORMAL
PCO2 BLDV: 53.8 MMHG (ref 40–50)
PH BLDV: 7.36 [PH] (ref 7.35–7.45)
PLATELET # BLD AUTO: 297 K/UL (ref 135–450)
PLATELET BLD QL SMEAR: ADEQUATE
PMV BLD AUTO: 9.8 FL (ref 5–10.5)
PO2 BLDV: 83.3 MMHG (ref 25–40)
POTASSIUM SERPL-SCNC: 3.6 MMOL/L (ref 3.5–5.1)
POTASSIUM SERPL-SCNC: 3.7 MMOL/L (ref 3.5–5.1)
PROT SERPL-MCNC: 7.3 G/DL (ref 6.4–8.2)
RBC # BLD AUTO: 3.87 M/UL (ref 4.2–5.9)
SAO2 % BLDV: 96 %
SARS-COV-2 RDRP RESP QL NAA+PROBE: NOT DETECTED
SLIDE REVIEW: ABNORMAL
SODIUM SERPL-SCNC: 134 MMOL/L (ref 136–145)
SODIUM SERPL-SCNC: 135 MMOL/L (ref 136–145)
TROPONIN, HIGH SENSITIVITY: 10 NG/L (ref 0–22)
TROPONIN, HIGH SENSITIVITY: 9 NG/L (ref 0–22)
WBC # BLD AUTO: 10.9 K/UL (ref 4–11)

## 2023-12-12 PROCEDURE — 2700000000 HC OXYGEN THERAPY PER DAY

## 2023-12-12 PROCEDURE — 6370000000 HC RX 637 (ALT 250 FOR IP): Performed by: NURSE PRACTITIONER

## 2023-12-12 PROCEDURE — 99285 EMERGENCY DEPT VISIT HI MDM: CPT

## 2023-12-12 PROCEDURE — 73590 X-RAY EXAM OF LOWER LEG: CPT

## 2023-12-12 PROCEDURE — 6360000002 HC RX W HCPCS: Performed by: EMERGENCY MEDICINE

## 2023-12-12 PROCEDURE — 05HC33Z INSERTION OF INFUSION DEVICE INTO LEFT BASILIC VEIN, PERCUTANEOUS APPROACH: ICD-10-PCS | Performed by: INTERNAL MEDICINE

## 2023-12-12 PROCEDURE — 36415 COLL VENOUS BLD VENIPUNCTURE: CPT

## 2023-12-12 PROCEDURE — 87804 INFLUENZA ASSAY W/OPTIC: CPT

## 2023-12-12 PROCEDURE — 71275 CT ANGIOGRAPHY CHEST: CPT

## 2023-12-12 PROCEDURE — 96375 TX/PRO/DX INJ NEW DRUG ADDON: CPT

## 2023-12-12 PROCEDURE — 6370000000 HC RX 637 (ALT 250 FOR IP)

## 2023-12-12 PROCEDURE — 94761 N-INVAS EAR/PLS OXIMETRY MLT: CPT

## 2023-12-12 PROCEDURE — 87635 SARS-COV-2 COVID-19 AMP PRB: CPT

## 2023-12-12 PROCEDURE — 73130 X-RAY EXAM OF HAND: CPT

## 2023-12-12 PROCEDURE — 6370000000 HC RX 637 (ALT 250 FOR IP): Performed by: EMERGENCY MEDICINE

## 2023-12-12 PROCEDURE — 96372 THER/PROPH/DIAG INJ SC/IM: CPT

## 2023-12-12 PROCEDURE — 2580000003 HC RX 258

## 2023-12-12 PROCEDURE — 6360000002 HC RX W HCPCS: Performed by: INTERNAL MEDICINE

## 2023-12-12 PROCEDURE — 6360000002 HC RX W HCPCS

## 2023-12-12 PROCEDURE — 85025 COMPLETE CBC W/AUTO DIFF WBC: CPT

## 2023-12-12 PROCEDURE — 71045 X-RAY EXAM CHEST 1 VIEW: CPT

## 2023-12-12 PROCEDURE — 96374 THER/PROPH/DIAG INJ IV PUSH: CPT

## 2023-12-12 PROCEDURE — 84484 ASSAY OF TROPONIN QUANT: CPT

## 2023-12-12 PROCEDURE — 83880 ASSAY OF NATRIURETIC PEPTIDE: CPT

## 2023-12-12 PROCEDURE — 93005 ELECTROCARDIOGRAM TRACING: CPT | Performed by: EMERGENCY MEDICINE

## 2023-12-12 PROCEDURE — 94669 MECHANICAL CHEST WALL OSCILL: CPT

## 2023-12-12 PROCEDURE — 6360000004 HC RX CONTRAST MEDICATION: Performed by: EMERGENCY MEDICINE

## 2023-12-12 PROCEDURE — 70450 CT HEAD/BRAIN W/O DYE: CPT

## 2023-12-12 PROCEDURE — 93010 ELECTROCARDIOGRAM REPORT: CPT | Performed by: INTERNAL MEDICINE

## 2023-12-12 PROCEDURE — 94010 BREATHING CAPACITY TEST: CPT

## 2023-12-12 PROCEDURE — 83735 ASSAY OF MAGNESIUM: CPT

## 2023-12-12 PROCEDURE — 80053 COMPREHEN METABOLIC PANEL: CPT

## 2023-12-12 PROCEDURE — 6370000000 HC RX 637 (ALT 250 FOR IP): Performed by: INTERNAL MEDICINE

## 2023-12-12 PROCEDURE — 99223 1ST HOSP IP/OBS HIGH 75: CPT | Performed by: INTERNAL MEDICINE

## 2023-12-12 PROCEDURE — 99223 1ST HOSP IP/OBS HIGH 75: CPT | Performed by: NURSE PRACTITIONER

## 2023-12-12 PROCEDURE — 73552 X-RAY EXAM OF FEMUR 2/>: CPT

## 2023-12-12 PROCEDURE — 2580000003 HC RX 258: Performed by: NURSE PRACTITIONER

## 2023-12-12 PROCEDURE — 85730 THROMBOPLASTIN TIME PARTIAL: CPT

## 2023-12-12 PROCEDURE — 94640 AIRWAY INHALATION TREATMENT: CPT

## 2023-12-12 PROCEDURE — 93970 EXTREMITY STUDY: CPT

## 2023-12-12 PROCEDURE — 2060000000 HC ICU INTERMEDIATE R&B

## 2023-12-12 PROCEDURE — 82803 BLOOD GASES ANY COMBINATION: CPT

## 2023-12-12 PROCEDURE — 2580000003 HC RX 258: Performed by: EMERGENCY MEDICINE

## 2023-12-12 PROCEDURE — 2580000003 HC RX 258: Performed by: INTERNAL MEDICINE

## 2023-12-12 PROCEDURE — 36410 VNPNXR 3YR/> PHY/QHP DX/THER: CPT

## 2023-12-12 PROCEDURE — 6360000002 HC RX W HCPCS: Performed by: NURSE PRACTITIONER

## 2023-12-12 PROCEDURE — 1200000000 HC SEMI PRIVATE

## 2023-12-12 RX ORDER — FERROUS SULFATE 325(65) MG
325 TABLET ORAL
COMMUNITY

## 2023-12-12 RX ORDER — HEPARIN SODIUM 10000 [USP'U]/100ML
18 INJECTION, SOLUTION INTRAVENOUS CONTINUOUS
Status: DISCONTINUED | OUTPATIENT
Start: 2023-12-12 | End: 2023-12-13

## 2023-12-12 RX ORDER — FERROUS SULFATE 325(65) MG
325 TABLET ORAL
Status: DISCONTINUED | OUTPATIENT
Start: 2023-12-13 | End: 2023-12-13 | Stop reason: HOSPADM

## 2023-12-12 RX ORDER — FUROSEMIDE 20 MG/1
20 TABLET ORAL SEE ADMIN INSTRUCTIONS
COMMUNITY

## 2023-12-12 RX ORDER — ACETAMINOPHEN 650 MG/1
650 SUPPOSITORY RECTAL EVERY 6 HOURS PRN
Status: DISCONTINUED | OUTPATIENT
Start: 2023-12-12 | End: 2023-12-13 | Stop reason: HOSPADM

## 2023-12-12 RX ORDER — HEPARIN SODIUM 1000 [USP'U]/ML
3600 INJECTION, SOLUTION INTRAVENOUS; SUBCUTANEOUS PRN
Status: DISCONTINUED | OUTPATIENT
Start: 2023-12-13 | End: 2023-12-13 | Stop reason: ALTCHOICE

## 2023-12-12 RX ORDER — MORPHINE SULFATE 4 MG/ML
4 INJECTION, SOLUTION INTRAMUSCULAR; INTRAVENOUS
Status: DISCONTINUED | OUTPATIENT
Start: 2023-12-12 | End: 2023-12-12

## 2023-12-12 RX ORDER — HEPARIN SODIUM 1000 [USP'U]/ML
7200 INJECTION, SOLUTION INTRAVENOUS; SUBCUTANEOUS ONCE
Status: COMPLETED | OUTPATIENT
Start: 2023-12-12 | End: 2023-12-12

## 2023-12-12 RX ORDER — ACETAMINOPHEN 325 MG/1
650 TABLET ORAL EVERY 6 HOURS PRN
Status: DISCONTINUED | OUTPATIENT
Start: 2023-12-12 | End: 2023-12-13 | Stop reason: HOSPADM

## 2023-12-12 RX ORDER — ENOXAPARIN SODIUM 150 MG/ML
1 INJECTION SUBCUTANEOUS 2 TIMES DAILY
Status: DISCONTINUED | OUTPATIENT
Start: 2023-12-12 | End: 2023-12-12 | Stop reason: DRUGHIGH

## 2023-12-12 RX ORDER — NIFEDIPINE 30 MG/1
30 TABLET, EXTENDED RELEASE ORAL 2 TIMES DAILY
Status: DISCONTINUED | OUTPATIENT
Start: 2023-12-12 | End: 2023-12-13 | Stop reason: HOSPADM

## 2023-12-12 RX ORDER — ASPIRIN 81 MG/1
81 TABLET ORAL DAILY
Status: DISCONTINUED | OUTPATIENT
Start: 2023-12-12 | End: 2023-12-13 | Stop reason: HOSPADM

## 2023-12-12 RX ORDER — OXYCODONE HYDROCHLORIDE 5 MG/1
5 CAPSULE ORAL EVERY 4 HOURS PRN
Status: ON HOLD | COMMUNITY
End: 2023-12-13 | Stop reason: SDUPTHER

## 2023-12-12 RX ORDER — ALBUTEROL SULFATE 90 UG/1
2 AEROSOL, METERED RESPIRATORY (INHALATION) 4 TIMES DAILY PRN
Status: DISCONTINUED | OUTPATIENT
Start: 2023-12-12 | End: 2023-12-12

## 2023-12-12 RX ORDER — ALBUTEROL SULFATE 90 UG/1
2 AEROSOL, METERED RESPIRATORY (INHALATION) EVERY 4 HOURS PRN
Status: DISCONTINUED | OUTPATIENT
Start: 2023-12-12 | End: 2023-12-13 | Stop reason: HOSPADM

## 2023-12-12 RX ORDER — OXYCODONE HYDROCHLORIDE 5 MG/1
5 TABLET ORAL EVERY 4 HOURS PRN
Status: DISCONTINUED | OUTPATIENT
Start: 2023-12-12 | End: 2023-12-13 | Stop reason: HOSPADM

## 2023-12-12 RX ORDER — IPRATROPIUM BROMIDE AND ALBUTEROL SULFATE 2.5; .5 MG/3ML; MG/3ML
1 SOLUTION RESPIRATORY (INHALATION) ONCE
Status: COMPLETED | OUTPATIENT
Start: 2023-12-12 | End: 2023-12-12

## 2023-12-12 RX ORDER — OXYCODONE AND ACETAMINOPHEN 10; 325 MG/1; MG/1
1 TABLET ORAL 4 TIMES DAILY
Status: DISCONTINUED | OUTPATIENT
Start: 2023-12-12 | End: 2023-12-13 | Stop reason: HOSPADM

## 2023-12-12 RX ORDER — GABAPENTIN 300 MG/1
300 CAPSULE ORAL 4 TIMES DAILY
Status: DISCONTINUED | OUTPATIENT
Start: 2023-12-12 | End: 2023-12-13 | Stop reason: HOSPADM

## 2023-12-12 RX ORDER — HYDRALAZINE HYDROCHLORIDE 50 MG/1
50 TABLET, FILM COATED ORAL EVERY 8 HOURS SCHEDULED
Status: DISCONTINUED | OUTPATIENT
Start: 2023-12-12 | End: 2023-12-13 | Stop reason: HOSPADM

## 2023-12-12 RX ORDER — OXYCODONE AND ACETAMINOPHEN 10; 325 MG/1; MG/1
1 TABLET ORAL 4 TIMES DAILY
Status: ON HOLD | COMMUNITY
End: 2023-12-13 | Stop reason: HOSPADM

## 2023-12-12 RX ORDER — MAGNESIUM SULFATE 1 G/100ML
1000 INJECTION INTRAVENOUS PRN
Status: DISCONTINUED | OUTPATIENT
Start: 2023-12-12 | End: 2023-12-13 | Stop reason: HOSPADM

## 2023-12-12 RX ORDER — CYCLOBENZAPRINE HCL 10 MG
10 TABLET ORAL 3 TIMES DAILY
Status: DISCONTINUED | OUTPATIENT
Start: 2023-12-12 | End: 2023-12-13 | Stop reason: HOSPADM

## 2023-12-12 RX ORDER — GUAIFENESIN 600 MG/1
600 TABLET, EXTENDED RELEASE ORAL 2 TIMES DAILY
Status: DISCONTINUED | OUTPATIENT
Start: 2023-12-12 | End: 2023-12-13 | Stop reason: HOSPADM

## 2023-12-12 RX ORDER — SODIUM CHLORIDE 9 MG/ML
INJECTION, SOLUTION INTRAVENOUS PRN
Status: DISCONTINUED | OUTPATIENT
Start: 2023-12-12 | End: 2023-12-13 | Stop reason: HOSPADM

## 2023-12-12 RX ORDER — FUROSEMIDE 20 MG/1
20 TABLET ORAL 2 TIMES DAILY
Status: DISCONTINUED | OUTPATIENT
Start: 2023-12-12 | End: 2023-12-13 | Stop reason: HOSPADM

## 2023-12-12 RX ORDER — POTASSIUM CHLORIDE 7.45 MG/ML
10 INJECTION INTRAVENOUS PRN
Status: DISCONTINUED | OUTPATIENT
Start: 2023-12-12 | End: 2023-12-13 | Stop reason: HOSPADM

## 2023-12-12 RX ORDER — LANOLIN ALCOHOL/MO/W.PET/CERES
400 CREAM (GRAM) TOPICAL DAILY
Status: DISCONTINUED | OUTPATIENT
Start: 2023-12-12 | End: 2023-12-12

## 2023-12-12 RX ORDER — POLYETHYLENE GLYCOL 3350 17 G/17G
17 POWDER, FOR SOLUTION ORAL DAILY PRN
Status: DISCONTINUED | OUTPATIENT
Start: 2023-12-12 | End: 2023-12-13 | Stop reason: HOSPADM

## 2023-12-12 RX ORDER — CARVEDILOL 6.25 MG/1
6.25 TABLET ORAL 2 TIMES DAILY WITH MEALS
Status: DISCONTINUED | OUTPATIENT
Start: 2023-12-12 | End: 2023-12-13 | Stop reason: HOSPADM

## 2023-12-12 RX ORDER — OXYCODONE AND ACETAMINOPHEN 10; 325 MG/1; MG/1
1 TABLET ORAL EVERY 4 HOURS PRN
Status: DISCONTINUED | OUTPATIENT
Start: 2023-12-12 | End: 2023-12-12

## 2023-12-12 RX ORDER — POTASSIUM CHLORIDE 20 MEQ/1
40 TABLET, EXTENDED RELEASE ORAL PRN
Status: DISCONTINUED | OUTPATIENT
Start: 2023-12-12 | End: 2023-12-13 | Stop reason: HOSPADM

## 2023-12-12 RX ORDER — HEPARIN SODIUM 1000 [USP'U]/ML
7200 INJECTION, SOLUTION INTRAVENOUS; SUBCUTANEOUS PRN
Status: DISCONTINUED | OUTPATIENT
Start: 2023-12-13 | End: 2023-12-13 | Stop reason: ALTCHOICE

## 2023-12-12 RX ORDER — DULOXETIN HYDROCHLORIDE 60 MG/1
60 CAPSULE, DELAYED RELEASE ORAL NIGHTLY
Status: DISCONTINUED | OUTPATIENT
Start: 2023-12-12 | End: 2023-12-13 | Stop reason: HOSPADM

## 2023-12-12 RX ORDER — MIRTAZAPINE 15 MG/1
15 TABLET, FILM COATED ORAL NIGHTLY
Status: DISCONTINUED | OUTPATIENT
Start: 2023-12-12 | End: 2023-12-13 | Stop reason: HOSPADM

## 2023-12-12 RX ORDER — FUROSEMIDE 10 MG/ML
40 INJECTION INTRAMUSCULAR; INTRAVENOUS ONCE
Status: COMPLETED | OUTPATIENT
Start: 2023-12-12 | End: 2023-12-12

## 2023-12-12 RX ORDER — SODIUM CHLORIDE 0.9 % (FLUSH) 0.9 %
10 SYRINGE (ML) INJECTION PRN
Status: DISCONTINUED | OUTPATIENT
Start: 2023-12-12 | End: 2023-12-13 | Stop reason: HOSPADM

## 2023-12-12 RX ORDER — ONDANSETRON 2 MG/ML
4 INJECTION INTRAMUSCULAR; INTRAVENOUS EVERY 6 HOURS PRN
Status: DISCONTINUED | OUTPATIENT
Start: 2023-12-12 | End: 2023-12-13 | Stop reason: HOSPADM

## 2023-12-12 RX ORDER — ENOXAPARIN SODIUM 150 MG/ML
1 INJECTION SUBCUTANEOUS ONCE
Status: COMPLETED | OUTPATIENT
Start: 2023-12-12 | End: 2023-12-12

## 2023-12-12 RX ORDER — HALOPERIDOL 5 MG/ML
5 INJECTION INTRAMUSCULAR ONCE
Status: COMPLETED | OUTPATIENT
Start: 2023-12-12 | End: 2023-12-12

## 2023-12-12 RX ORDER — ONDANSETRON 4 MG/1
4 TABLET, ORALLY DISINTEGRATING ORAL EVERY 8 HOURS PRN
Status: DISCONTINUED | OUTPATIENT
Start: 2023-12-12 | End: 2023-12-13 | Stop reason: HOSPADM

## 2023-12-12 RX ORDER — TRAZODONE HYDROCHLORIDE 50 MG/1
75 TABLET ORAL NIGHTLY
Status: DISCONTINUED | OUTPATIENT
Start: 2023-12-12 | End: 2023-12-13 | Stop reason: HOSPADM

## 2023-12-12 RX ORDER — LANOLIN ALCOHOL/MO/W.PET/CERES
400 CREAM (GRAM) TOPICAL ONCE
Status: COMPLETED | OUTPATIENT
Start: 2023-12-12 | End: 2023-12-12

## 2023-12-12 RX ORDER — ALBUTEROL SULFATE 90 UG/1
2 AEROSOL, METERED RESPIRATORY (INHALATION) 3 TIMES DAILY
Status: DISCONTINUED | OUTPATIENT
Start: 2023-12-12 | End: 2023-12-13 | Stop reason: HOSPADM

## 2023-12-12 RX ORDER — PANTOPRAZOLE SODIUM 40 MG/1
40 TABLET, DELAYED RELEASE ORAL
Status: DISCONTINUED | OUTPATIENT
Start: 2023-12-12 | End: 2023-12-13 | Stop reason: HOSPADM

## 2023-12-12 RX ORDER — SODIUM CHLORIDE 0.9 % (FLUSH) 0.9 %
5-40 SYRINGE (ML) INJECTION EVERY 12 HOURS SCHEDULED
Status: DISCONTINUED | OUTPATIENT
Start: 2023-12-12 | End: 2023-12-13 | Stop reason: HOSPADM

## 2023-12-12 RX ORDER — DOXAZOSIN 2 MG/1
4 TABLET ORAL NIGHTLY
Status: DISCONTINUED | OUTPATIENT
Start: 2023-12-12 | End: 2023-12-13 | Stop reason: HOSPADM

## 2023-12-12 RX ADMIN — Medication 10 ML: at 20:54

## 2023-12-12 RX ADMIN — HEPARIN SODIUM 7200 UNITS: 1000 INJECTION INTRAVENOUS; SUBCUTANEOUS at 21:43

## 2023-12-12 RX ADMIN — WATER 125 MG: 1 INJECTION INTRAMUSCULAR; INTRAVENOUS; SUBCUTANEOUS at 08:15

## 2023-12-12 RX ADMIN — IPRATROPIUM BROMIDE AND ALBUTEROL SULFATE 1 DOSE: 2.5; .5 SOLUTION RESPIRATORY (INHALATION) at 07:51

## 2023-12-12 RX ADMIN — GUAIFENESIN 600 MG: 600 TABLET, EXTENDED RELEASE ORAL at 21:00

## 2023-12-12 RX ADMIN — DULOXETINE HYDROCHLORIDE 60 MG: 60 CAPSULE, DELAYED RELEASE ORAL at 21:00

## 2023-12-12 RX ADMIN — GABAPENTIN 300 MG: 300 CAPSULE ORAL at 18:18

## 2023-12-12 RX ADMIN — CEFTRIAXONE SODIUM 1000 MG: 1 INJECTION, POWDER, FOR SOLUTION INTRAMUSCULAR; INTRAVENOUS at 20:59

## 2023-12-12 RX ADMIN — METHYLPREDNISOLONE SODIUM SUCCINATE 40 MG: 40 INJECTION INTRAMUSCULAR; INTRAVENOUS at 20:54

## 2023-12-12 RX ADMIN — IOMEPROL INJECTION 75 ML: 714 INJECTION, SOLUTION INTRAVASCULAR at 06:50

## 2023-12-12 RX ADMIN — CARVEDILOL 6.25 MG: 6.25 TABLET, FILM COATED ORAL at 15:58

## 2023-12-12 RX ADMIN — Medication 2 PUFF: at 16:36

## 2023-12-12 RX ADMIN — AZITHROMYCIN MONOHYDRATE 500 MG: 500 INJECTION, POWDER, LYOPHILIZED, FOR SOLUTION INTRAVENOUS at 15:49

## 2023-12-12 RX ADMIN — CYCLOBENZAPRINE 10 MG: 10 TABLET, FILM COATED ORAL at 21:00

## 2023-12-12 RX ADMIN — HYDRALAZINE HYDROCHLORIDE 50 MG: 50 TABLET, FILM COATED ORAL at 22:18

## 2023-12-12 RX ADMIN — MIRTAZAPINE 15 MG: 15 TABLET, FILM COATED ORAL at 21:00

## 2023-12-12 RX ADMIN — OXYCODONE HYDROCHLORIDE AND ACETAMINOPHEN 1 TABLET: 10; 325 TABLET ORAL at 15:37

## 2023-12-12 RX ADMIN — Medication 400 MG: at 06:15

## 2023-12-12 RX ADMIN — ENOXAPARIN SODIUM 105 MG: 150 INJECTION SUBCUTANEOUS at 09:27

## 2023-12-12 RX ADMIN — OXYCODONE HYDROCHLORIDE AND ACETAMINOPHEN 1 TABLET: 10; 325 TABLET ORAL at 21:00

## 2023-12-12 RX ADMIN — Medication 2 PUFF: at 19:16

## 2023-12-12 RX ADMIN — OXYCODONE 5 MG: 5 TABLET ORAL at 18:18

## 2023-12-12 RX ADMIN — HYDRALAZINE HYDROCHLORIDE 50 MG: 50 TABLET, FILM COATED ORAL at 15:58

## 2023-12-12 RX ADMIN — HALOPERIDOL LACTATE 5 MG: 5 INJECTION, SOLUTION INTRAMUSCULAR at 05:13

## 2023-12-12 RX ADMIN — FUROSEMIDE 40 MG: 10 INJECTION, SOLUTION INTRAMUSCULAR; INTRAVENOUS at 06:09

## 2023-12-12 RX ADMIN — HEPARIN SODIUM 18 UNITS/KG/HR: 10000 INJECTION, SOLUTION INTRAVENOUS at 21:43

## 2023-12-12 RX ADMIN — TRAZODONE HYDROCHLORIDE 75 MG: 50 TABLET ORAL at 22:03

## 2023-12-12 RX ADMIN — DOXAZOSIN 4 MG: 2 TABLET ORAL at 21:00

## 2023-12-12 RX ADMIN — GABAPENTIN 300 MG: 300 CAPSULE ORAL at 21:01

## 2023-12-12 RX ADMIN — NIFEDIPINE 30 MG: 30 TABLET, FILM COATED, EXTENDED RELEASE ORAL at 21:00

## 2023-12-12 ASSESSMENT — PAIN SCALES - GENERAL
PAINLEVEL_OUTOF10: 8
PAINLEVEL_OUTOF10: 10
PAINLEVEL_OUTOF10: 7
PAINLEVEL_OUTOF10: 8
PAINLEVEL_OUTOF10: 8
PAINLEVEL_OUTOF10: 9

## 2023-12-12 ASSESSMENT — PAIN DESCRIPTION - LOCATION
LOCATION: LEG

## 2023-12-12 ASSESSMENT — PAIN DESCRIPTION - ORIENTATION
ORIENTATION: RIGHT
ORIENTATION: RIGHT

## 2023-12-12 ASSESSMENT — PAIN DESCRIPTION - FREQUENCY
FREQUENCY: CONTINUOUS
FREQUENCY: CONTINUOUS

## 2023-12-12 ASSESSMENT — PAIN DESCRIPTION - ONSET
ONSET: ON-GOING
ONSET: ON-GOING

## 2023-12-12 ASSESSMENT — PAIN DESCRIPTION - DESCRIPTORS
DESCRIPTORS: SHARP
DESCRIPTORS: SHARP

## 2023-12-12 ASSESSMENT — COPD QUESTIONNAIRES
QUESTION1_COUGHFREQUENCY: 2
CAT_TOTALSCORE: 23
QUESTION7_SLEEPQUALITY: 3
QUESTION2_CHESTPHLEGM: 2
QUESTION6_LEAVINGHOUSE: 3
QUESTION8_ENERGYLEVEL: 3
QUESTION5_HOMEACTIVITIES: 3
QUESTION3_CHESTTIGHTNESS: 3
QUESTION4_WALKINCLINE: 4

## 2023-12-12 ASSESSMENT — PULMONARY FUNCTION TESTS: PIF_VALUE: 80

## 2023-12-12 ASSESSMENT — PAIN DESCRIPTION - PAIN TYPE
TYPE: SURGICAL PAIN
TYPE: SURGICAL PAIN

## 2023-12-12 ASSESSMENT — PAIN - FUNCTIONAL ASSESSMENT
PAIN_FUNCTIONAL_ASSESSMENT: 0-10
PAIN_FUNCTIONAL_ASSESSMENT: ACTIVITIES ARE NOT PREVENTED
PAIN_FUNCTIONAL_ASSESSMENT: PREVENTS OR INTERFERES SOME ACTIVE ACTIVITIES AND ADLS

## 2023-12-12 ASSESSMENT — PAIN DESCRIPTION - DIRECTION: RADIATING_TOWARDS: NON

## 2023-12-12 ASSESSMENT — LIFESTYLE VARIABLES
HOW OFTEN DO YOU HAVE A DRINK CONTAINING ALCOHOL: NEVER
HOW MANY STANDARD DRINKS CONTAINING ALCOHOL DO YOU HAVE ON A TYPICAL DAY: PATIENT DOES NOT DRINK

## 2023-12-12 NOTE — CONSULTS
Pharmacy to manage High dose Heparin infusion protocol:  Patient had a full dose of Lovenox at ~0930 this AM.  Will discontinue Lovenox. Once baseline aPTT back and reasonable to begin protocol give a 7,200 unit IV bolus dose of heparin and begin the infusion at 1,611 units/ Hr. Dosing based on ABW of 89.5kg. Check an anti-Xa 6hrs after starting protocol. Desired range is 0.3-0.7IU/mL. Pharmacy to manage Heparin - contact for questions. Heparin 80 units/kg IV x 1 (max 10,000 units), then 18 units/kg/hr (recommended max  initial rate 2100 units/hr). Adjust infusion rate based off anti-Xa results below. anti-Xa < 0.1  Heparin 80 units/kg bolus  Increase infusion by 4 units/kg/hr  anti-Xa 0.1-0.29    Heparin 40 units/kg bolus Increase infusion by 2 units/kg/hr  anti-Xa 0.3-0.7  No bolus No change   anti-Xa 0.71-0.8    No bolus Decrease infusion by 1 units/kg/hr  anti-Xa 0.81-0.99   No bolus Decrease infusion by 2 units/kg/hr  anti-Xa 1 or more  Hold heparin for 1 hour Decrease infusion by 3 units/kg/hr    Obtain anti-Xa 6 hours after bolus and 6 hours after any dose change until two consecutive therapeutic anti-Xa are achieved- then daily.   TANISHA Boothe [facility] PharmD 12/12/2023 5:09 PM

## 2023-12-12 NOTE — PROGRESS NOTES
New Telemetry box number assigned to Patient      To be filled out by [facility]    Patient assigned to tele box number: ________[id number]__________               (to be written in by [facility] when telemetry box is assigned to patient)    ___________________________________________________________________________      Bedside RN confirming that the box listed above is in fact the telemetry box number being placed on the patient listed above.         X________________________________________ RN signature        __________________________________________RN assigned to Patient (please print)    _______________ Date    ____________ Time

## 2023-12-12 NOTE — PROGRESS NOTES
Patient admitted to room 314 from [place] [place] [facility]. Patient oriented to room, call light, bed rails, phone, lights and bathroom. Patient instructed about the schedule of the day including: vital sign frequency, lab draws, possible tests, frequency of MD and staff rounds, daily weights, I &O's and prescribed diet. PCU 36 Telemetry box in place, patient aware of placement and reason. Bed locked, in lowest position, side rails up 2/4, call light within reach. Recliner Assessment  Patient is not able to demonstrate the ability to move from a reclining position to an upright position within the recliner due to broken finger to left hand. 4 Eyes Skin Assessment     NAME:  Preethi Choudhury OF BIRTH:  1955  MEDICAL RECORD NUMBER:  [id number]    The patient is being assessed for  Admission    I agree that at least one RN has performed a thorough Head to Toe Skin Assessment on the patient. ALL assessment sites listed below have been assessed. Areas assessed by both nurses:  Reyes's and Thompson Parkinson    Charito redness and moisture maceration, bruising and swelling to left hand and right dorsal leg, bruising from right hip to sacrum on left, numerous puncture wounds related to surgery on right leg    Head, Face, Ears, Shoulders, Back, Chest, Arms, Elbows, Hands, Sacrum. Buttock, Coccyx, Ischium, Legs. Feet and Heels, and Under Medical Devices         Does the Patient have a Wound? Yes wound(s) were present on assessment.  LDA wound assessment was Initiated and completed by JESUS       Fidel Prevention initiated by RN: No  Wound Care Orders initiated by RN: No    Pressure Injury (Stage 3,4, Unstageable, DTI, NWPT, and Complex wounds) if present, place Wound referral order by RN under : No    New Ostomies, if present place, Ostomy referral order under : No     Nurse 1 eSignature: Electronically signed by Mikhail Rowley RN on 12/12/23 at 4:07 PM EST    **SHARE this note so that the co-signing nurse can place an eSignature**      Nurse 2 eSignature:   Rusty Eric RN

## 2023-12-12 NOTE — PROGRESS NOTES
Pain medication given per [facility] for surgical pain 8 out of 10 on scale in right leg, will continue to monitor.  Marck Roper RN

## 2023-12-12 NOTE — PROGRESS NOTES
One IV line started in left A/C, antibiotics infusing, waiting on midline to run continuous heparin per order.  Luz Joyner RN

## 2023-12-12 NOTE — DISCHARGE INSTRUCTIONS
Keep splint in place on the left hand. Ice for swelling  F/U with Dr Luis Alfredo Baez for post op check of the right leg and with the new left hand fracture  Call [facility],[address] [place] [address] and Sports Medicine for appointment time and date for follow up at [phone].

## 2023-12-12 NOTE — PROGRESS NOTES
Consult received for \"run of vtach at [facility] [place] [place] [facility]. \" Reviewed monitor strip with Dr. Ubaldo Mojica. Sinus rhythm with artifact, no ventricular tachycardia or arrhythmia noted. Continue monitoring on telemetry and re consult cardiology if needed or arrhythmia identified.

## 2023-12-12 NOTE — ED PROVIDER NOTES
Patient was initially seen by Dr. Autumn Barron. She informed me patient initially came in for evaluation after recurrent fall but was found to be hypoxic with an O2 sat in the mid to 80s. History of COPD but not on home O2. She suspected some degree of CHF and he received a dose of Lasix. She also ordered a CT of the chest to rule out PE. She requested I follow-up on the CT result. Upon my evaluation:  Patient not in respiratory distress O2 sat 94 to 96% on room air complains of chronic back pain  And also pain right lower extremity. He is status post distal femur fracture status post ORIF 1 week ago. Cardiovascular is regular rhythm  Lungs overall good aeration with mild expiratory wheezing no tachypnea. O2 sat 94 to 96% on 2 L. Abdomen soft nontender distended  Extremity exam:  Right lower extremity thigh is extremely ecchymotic but not firm. Mild to moderate tenderness on palpation. Distal pulses are intact no pallor no signs of compartment syndrome. Neuro exam is awake alert and oriented not somnolent. Cranial nerves II through XII are intact. Plan:    Hypoxia: No respiratory distress. Patient complains of mild dyspnea. No chest pain. Chest x-ray shows some increased vascularization no pneumonia no pneumothorax. VBG shows mild CO2 retention at 53 but no respiratory acidosis. CT of the chest shows small subsegmental PE right-sided. Chronic appearing PE. Tree-in-bud opacities throughout the bilateral lungs. Plan:  -Patient received bronchodilators DuoNeb and Solu-Medrol for COPD exacerbation with wheezing on exam  -Diuresed with Lasix for CHF. No brie pulmonary edema but some increased vascularization on chest x-ray, slightly elevated BNP and bilateral lower extremity swelling  -Chronic appearing right some segmental small pulmonary embolism. A do not think that this is contributing to the patient's symptoms. He is 1 week postop.   Anticoagulation discussed with his orthopedist  Herrera. Thigh hematoma swelling discussed. He states this is to be expected postop ORIF of his fracture. He is comfortable with anticoagulation whether it be Lovenox or Eliquis.   Patient received Lovenox 1 mg/kg dosing to be done every 12 hours    [facility] discussed with Deondre Funes on-call for hospitalist service covering for Dr. Mariama Klein MD  12/12/23 8222

## 2023-12-12 NOTE — ED PROVIDER NOTES
[facility]      Pt Name: Kelly Salgado  MRN: [id number]  [facility] 1955  Date of evaluation: 12/12/2023  Provider: Zamzam Melgar MD    CHIEF COMPLAINT       Chief Complaint   Patient presents with    Fall     Patient fell about a hour prior to arrival. Patient has broken finger on left hand by getting caught in the wheels of wheelchair. Patient is complaining of low back pain and right leg pain. Just had surgery on broken right femur a week ago. HISTORY OF PRESENT ILLNESS   (Location/Symptom, Timing/Onset, Context/Setting, Quality, Duration, Modifying Factors, Severity)  Note limiting factors. Kelly Salgado is a 70 y.o. male who presents to the emergency department patient is 1 week postop from right femur surgery after a break. He states that he also has a left little finger \"broken\" after getting it caught in the wheels of his wheelchair. He states that he was walking and slipped, with fall. No hitting his head. No loss of consciousness. No neck pain. He is complaining of right leg pain. No postoperative changes. Bilateral lower extremity swelling since the surgery. Patient has a history of COPD but no history of congestive heart failure. He states his O2 saturation is usually around 90% on room but he does not have oxygen at home        Nursing Notes were reviewed. REVIEW OF SYSTEMS    (2-9 systems for level 4, 10 or more for level 5)   10 systems reviewed and unless noted in the HPI, are otherwise negative    Except as noted above the remainder of the review of systems was reviewed and negative.        PAST MEDICAL HISTORY     Past Medical History:   Diagnosis Date    Alcohol abuse     Arthritis     Asthma     BPH (benign prostatic hyperplasia)     Cerebral artery occlusion with cerebral infarction (HCC)     Closed disp articular fx of head of right femur with routine healing     COPD (chronic obstructive pulmonary x-ray shows pulmonary vascular congestion and patient does have significant bilateral lower extremity edema, do suspect an underlying congestive heart failure        REASSESSMENT     ED Course as of 12/12/23 0705   Tue Dec 12, 2023   0508 Patient had morphine ordered because he was complaining of pain but then patient refused morphine. Haldol ordered [AM]   3336 Patient's O2 down to 82% on arrival.  This is, up to 93% on 2 L [AM]   7310 Chest x-ray appears to have increased vascular congestion concerning for congestive heart failure. [AM]   2362 Patient had a choking episode with coughing. This resolved after about a minute [AM]   2738 The rhythm strip approximating the time of the choking episode does show approximately 7 beats wide-complex rhythm, but there do appear to be other ventricular beats. It is unclear if this is artifact as the staff was watching him at the time and there did not appear to be any additional extra movement. It is unclear at this point if this is artifact [AM]   8646 Please note that though the rhythm strip is timed at 6:15 AM, the cardiac monitor is 13 minutes difference from the actual time and the event occurred at 6:26 AM actual time [AM]   0271 Finger fracture identified on x-ray. And splint applied [AM]   0701 Will sign the patient out to the oncoming physician pending CT results. [AM]      ED Course User Index  [AM] Lee Jackson MD         CRITICAL CARE TIME   Total Critical Care time was 35minutes, excluding separately reportable procedures. There was a high probability of clinically significant/life threatening deterioration in the patient's condition which required my urgent intervention. CONSULTS:  None    PROCEDURES:  Unless otherwise noted below, none     Procedures  Splint applied by  FINAL IMPRESSION      1. Hypoxia    2.  Congestive heart failure, unspecified HF chronicity, unspecified heart failure type ([address])    3. Closed displaced fracture of proximal

## 2023-12-12 NOTE — ED NOTES
Patient reports that morphine does not work for him, he's had it countless times and it has never worked for him. Dr. Hayden Costa made aware.       Lashonda Connolly RN  12/12/23 3904

## 2023-12-12 NOTE — ED NOTES
Patient had run of potential Vtach, rhythm strip was printed and given to Dr. Jenny Martinez. Patient was not moving at time 7 beats of Vtach were discovered as occurring.       Jack Goldstein RN  12/12/23 4698

## 2023-12-12 NOTE — ED NOTES
Bedside report given to [facility]. VSS, O2 at 1L/NC, SL to left upper arm. No s/s of distress/discomfort. Report called to Trish Mohs, RN at [facility]. Patient being transferred at this time. Diana Motley aware of transfer plans.       Sheng Corado RN  12/12/23 9092

## 2023-12-12 NOTE — ED NOTES
Patient had episode of emesis/choking after water consumption. Patient sat upright and was able to expel water contents on own. Suction was set up but unused. Dr. Luciano Arellano to bedside.       Kita Perez RN  12/12/23 1174

## 2023-12-12 NOTE — PLAN OF CARE
Admit from [place]. [place] - unsure of arrival time    Acute resp failure  Bronchiolitis  PE (chronic?)  - therapeutic lovenox  - zithromax  - pulmonology consult    Fractured finger  Hx of recent femur fracture  - ortho consulted    [facility], ELIZABETH  12/12/23     Addendum 12:38 PM   Pt reportedly had a run of vtach. Will admit to PCU instead. Cardiology consulted.     Jada Vázquez PA-C  12/12/23

## 2023-12-12 NOTE — ED NOTES
Attempted PIV twice, was unsuccessful at PIV. Successfully got bloodwork. Gallo Barr RN to attempt PIV.       Eileen Guy RN  12/12/23 5626

## 2023-12-12 NOTE — CONSULTS
Pulmonary & Critical Care Consultation Note    Patient is being seen at the request of Imelda Jacome PA-C  for a consultation for hypoxia PE    HISTORY OF PRESENT ILLNESS:   70years old with hepatitis C, COPD presented to ED after a fall. Lower extremity swelling since his recent surgery. In ED found to be moderately hypoxemic saturation in the 80s. Worse with exertion better with resting. Associated with shortness of breath. Worsening cough and wheezes for the past week. CT chest showed bronchiolitis with small chronic PE. No history of PE or DVT. No family history for PE or DVT. Sedentary lifestyle in general.  No recent travel. No history of cancer. No hormonal medications. Smoker for 42 years less than a pack a day. No home O2. On inhaled bronchodilators at home.     PAST MEDICAL HISTORY:  Past Medical History:   Diagnosis Date    Alcohol abuse     Arthritis     Asthma     BPH (benign prostatic hyperplasia)     Cerebral artery occlusion with cerebral infarction (HCC)     CHF (congestive heart failure) (HCC)     Closed disp articular fx of head of right femur with routine healing     COPD (chronic obstructive pulmonary disease) (HCC)     Depression     Diabetic peripheral neuropathy (HCC)     GERD (gastroesophageal reflux disease)     Hepatitis C     Hypertension     Hypokalemia     Hypomagnesemia     Osteoarthritis     Polyneuropathy      PAST SURGICAL HISTORY:  Past Surgical History:   Procedure Laterality Date    CHOLECYSTECTOMY      COLONOSCOPY      CT GUIDED CHEST TUBE  03/01/2022    CT GUIDED CHEST TUBE 3/1/2022 [address] SCAN    FEMUR FRACTURE SURGERY Right 12/2/2023    FEMORAL NAIL RIGHT RETROGRADE performed by Chelsi Brunner DO at [address] [place] [address] Right 12/02/2023    OTHER SURGICAL HISTORY Right 12/02/2023    femoral nail right retrograde       FAMILY HISTORY:  No family history for DVT or PE    SOCIAL HISTORY:   reports that

## 2023-12-12 NOTE — ED NOTES
Patient bed linen changed and depends placed on patient, patient has external catheter in place now at this time.       Rolando Rolle RN  12/12/23 1293

## 2023-12-13 ENCOUNTER — APPOINTMENT (OUTPATIENT)
Dept: INTERVENTIONAL RADIOLOGY/VASCULAR | Age: 68
DRG: 190 | End: 2023-12-13
Payer: MEDICARE

## 2023-12-13 VITALS
WEIGHT: 236.1 LBS | TEMPERATURE: 98 F | OXYGEN SATURATION: 93 % | DIASTOLIC BLOOD PRESSURE: 70 MMHG | HEART RATE: 81 BPM | SYSTOLIC BLOOD PRESSURE: 146 MMHG | BODY MASS INDEX: 32.93 KG/M2 | RESPIRATION RATE: 18 BRPM

## 2023-12-13 LAB
ANION GAP SERPL CALCULATED.3IONS-SCNC: 8 MMOL/L (ref 3–16)
ANTI-XA UNFRAC HEPARIN: 0.35 IU/ML (ref 0.3–0.7)
BASOPHILS # BLD: 0 K/UL (ref 0–0.2)
BASOPHILS NFR BLD: 0.2 %
BUN SERPL-MCNC: 12 MG/DL (ref 7–20)
CALCIUM SERPL-MCNC: 8.4 MG/DL (ref 8.3–10.6)
CHLORIDE SERPL-SCNC: 95 MMOL/L (ref 99–110)
CO2 SERPL-SCNC: 31 MMOL/L (ref 21–32)
CREAT SERPL-MCNC: <0.5 MG/DL (ref 0.8–1.3)
DEPRECATED RDW RBC AUTO: 20.6 % (ref 12.4–15.4)
EOSINOPHIL # BLD: 0 K/UL (ref 0–0.6)
EOSINOPHIL NFR BLD: 0 %
GFR SERPLBLD CREATININE-BSD FMLA CKD-EPI: >60 ML/MIN/{1.73_M2}
GLUCOSE SERPL-MCNC: 187 MG/DL (ref 70–99)
HCT VFR BLD AUTO: 30.6 % (ref 40.5–52.5)
HGB BLD-MCNC: 10.2 G/DL (ref 13.5–17.5)
LYMPHOCYTES # BLD: 0.8 K/UL (ref 1–5.1)
LYMPHOCYTES NFR BLD: 8.2 %
MAGNESIUM SERPL-MCNC: 1.9 MG/DL (ref 1.8–2.4)
MCH RBC QN AUTO: 29.4 PG (ref 26–34)
MCHC RBC AUTO-ENTMCNC: 33.1 G/DL (ref 31–36)
MCV RBC AUTO: 88.6 FL (ref 80–100)
MONOCYTES # BLD: 0.6 K/UL (ref 0–1.3)
MONOCYTES NFR BLD: 6.5 %
NEUTROPHILS # BLD: 8 K/UL (ref 1.7–7.7)
NEUTROPHILS NFR BLD: 85.1 %
PLATELET # BLD AUTO: 270 K/UL (ref 135–450)
PMV BLD AUTO: 9.6 FL (ref 5–10.5)
POTASSIUM SERPL-SCNC: 3.4 MMOL/L (ref 3.5–5.1)
RBC # BLD AUTO: 3.46 M/UL (ref 4.2–5.9)
SODIUM SERPL-SCNC: 134 MMOL/L (ref 136–145)
WBC # BLD AUTO: 9.4 K/UL (ref 4–11)

## 2023-12-13 PROCEDURE — 6360000002 HC RX W HCPCS: Performed by: INTERNAL MEDICINE

## 2023-12-13 PROCEDURE — 2700000000 HC OXYGEN THERAPY PER DAY

## 2023-12-13 PROCEDURE — 76937 US GUIDE VASCULAR ACCESS: CPT

## 2023-12-13 PROCEDURE — 97530 THERAPEUTIC ACTIVITIES: CPT

## 2023-12-13 PROCEDURE — 93308 TTE F-UP OR LMTD: CPT

## 2023-12-13 PROCEDURE — 94761 N-INVAS EAR/PLS OXIMETRY MLT: CPT

## 2023-12-13 PROCEDURE — 85025 COMPLETE CBC W/AUTO DIFF WBC: CPT

## 2023-12-13 PROCEDURE — C1751 CATH, INF, PER/CENT/MIDLINE: HCPCS

## 2023-12-13 PROCEDURE — 97161 PT EVAL LOW COMPLEX 20 MIN: CPT

## 2023-12-13 PROCEDURE — 94640 AIRWAY INHALATION TREATMENT: CPT

## 2023-12-13 PROCEDURE — 97110 THERAPEUTIC EXERCISES: CPT

## 2023-12-13 PROCEDURE — 6370000000 HC RX 637 (ALT 250 FOR IP): Performed by: INTERNAL MEDICINE

## 2023-12-13 PROCEDURE — 2580000003 HC RX 258

## 2023-12-13 PROCEDURE — 80048 BASIC METABOLIC PNL TOTAL CA: CPT

## 2023-12-13 PROCEDURE — 36410 VNPNXR 3YR/> PHY/QHP DX/THER: CPT

## 2023-12-13 PROCEDURE — 6370000000 HC RX 637 (ALT 250 FOR IP): Performed by: NURSE PRACTITIONER

## 2023-12-13 PROCEDURE — 2580000003 HC RX 258: Performed by: INTERNAL MEDICINE

## 2023-12-13 PROCEDURE — 99222 1ST HOSP IP/OBS MODERATE 55: CPT | Performed by: PHYSICIAN ASSISTANT

## 2023-12-13 PROCEDURE — 99233 SBSQ HOSP IP/OBS HIGH 50: CPT | Performed by: INTERNAL MEDICINE

## 2023-12-13 PROCEDURE — 94669 MECHANICAL CHEST WALL OSCILL: CPT

## 2023-12-13 PROCEDURE — 6370000000 HC RX 637 (ALT 250 FOR IP)

## 2023-12-13 PROCEDURE — 83735 ASSAY OF MAGNESIUM: CPT

## 2023-12-13 PROCEDURE — 97166 OT EVAL MOD COMPLEX 45 MIN: CPT

## 2023-12-13 PROCEDURE — 97535 SELF CARE MNGMENT TRAINING: CPT

## 2023-12-13 PROCEDURE — 85520 HEPARIN ASSAY: CPT

## 2023-12-13 RX ORDER — PREDNISONE 20 MG/1
TABLET ORAL
Qty: 8 TABLET | Refills: 0
Start: 2023-12-13

## 2023-12-13 RX ORDER — OXYCODONE HYDROCHLORIDE 5 MG/1
5 CAPSULE ORAL EVERY 6 HOURS PRN
Qty: 8 CAPSULE | Refills: 0 | Status: SHIPPED | OUTPATIENT
Start: 2023-12-13 | End: 2023-12-15

## 2023-12-13 RX ORDER — LEVOFLOXACIN 500 MG/1
500 TABLET, FILM COATED ORAL DAILY
Qty: 4 TABLET | Refills: 0
Start: 2023-12-13 | End: 2023-12-17

## 2023-12-13 RX ADMIN — HYDRALAZINE HYDROCHLORIDE 50 MG: 50 TABLET, FILM COATED ORAL at 05:10

## 2023-12-13 RX ADMIN — OXYCODONE HYDROCHLORIDE AND ACETAMINOPHEN 1 TABLET: 10; 325 TABLET ORAL at 12:59

## 2023-12-13 RX ADMIN — APIXABAN 10 MG: 5 TABLET, FILM COATED ORAL at 10:16

## 2023-12-13 RX ADMIN — CARVEDILOL 6.25 MG: 6.25 TABLET, FILM COATED ORAL at 08:07

## 2023-12-13 RX ADMIN — Medication 10 ML: at 08:07

## 2023-12-13 RX ADMIN — CYCLOBENZAPRINE 10 MG: 10 TABLET, FILM COATED ORAL at 08:07

## 2023-12-13 RX ADMIN — OXYCODONE 5 MG: 5 TABLET ORAL at 10:21

## 2023-12-13 RX ADMIN — GABAPENTIN 300 MG: 300 CAPSULE ORAL at 08:07

## 2023-12-13 RX ADMIN — GUAIFENESIN 600 MG: 600 TABLET, EXTENDED RELEASE ORAL at 08:06

## 2023-12-13 RX ADMIN — GABAPENTIN 300 MG: 300 CAPSULE ORAL at 13:00

## 2023-12-13 RX ADMIN — METHYLPREDNISOLONE SODIUM SUCCINATE 40 MG: 40 INJECTION INTRAMUSCULAR; INTRAVENOUS at 08:07

## 2023-12-13 RX ADMIN — OXYCODONE HYDROCHLORIDE AND ACETAMINOPHEN 1 TABLET: 10; 325 TABLET ORAL at 08:06

## 2023-12-13 RX ADMIN — NIFEDIPINE 30 MG: 30 TABLET, FILM COATED, EXTENDED RELEASE ORAL at 08:07

## 2023-12-13 RX ADMIN — HYDRALAZINE HYDROCHLORIDE 50 MG: 50 TABLET, FILM COATED ORAL at 13:00

## 2023-12-13 RX ADMIN — Medication 2 PUFF: at 07:53

## 2023-12-13 RX ADMIN — ASPIRIN 81 MG: 81 TABLET, COATED ORAL at 08:07

## 2023-12-13 RX ADMIN — CYCLOBENZAPRINE 10 MG: 10 TABLET, FILM COATED ORAL at 13:00

## 2023-12-13 RX ADMIN — PANTOPRAZOLE SODIUM 40 MG: 40 TABLET, DELAYED RELEASE ORAL at 05:10

## 2023-12-13 RX ADMIN — POTASSIUM CHLORIDE 40 MEQ: 1500 TABLET, EXTENDED RELEASE ORAL at 05:10

## 2023-12-13 RX ADMIN — FERROUS SULFATE TAB 325 MG (65 MG ELEMENTAL FE) 325 MG: 325 (65 FE) TAB at 08:07

## 2023-12-13 ASSESSMENT — PAIN DESCRIPTION - DESCRIPTORS
DESCRIPTORS: STABBING

## 2023-12-13 ASSESSMENT — PAIN DESCRIPTION - LOCATION
LOCATION: LEG

## 2023-12-13 ASSESSMENT — PAIN - FUNCTIONAL ASSESSMENT
PAIN_FUNCTIONAL_ASSESSMENT: ACTIVITIES ARE NOT PREVENTED

## 2023-12-13 ASSESSMENT — PAIN SCALES - GENERAL
PAINLEVEL_OUTOF10: 7
PAINLEVEL_OUTOF10: 8
PAINLEVEL_OUTOF10: 9

## 2023-12-13 ASSESSMENT — PAIN DESCRIPTION - ORIENTATION
ORIENTATION: RIGHT

## 2023-12-13 NOTE — PROGRESS NOTES
Inpatient Occupational Therapy Evaluation and Treatment    Unit: PCU  Date:  12/13/2023  Patient Name:    Sherry Israel  Admitting diagnosis:  Hypoxia [R09.02]  Closed displaced fracture of proximal phalanx of left little finger, initial encounter [S62.617A]  Acute respiratory failure with hypoxia and hypercapnia ([address]) [J96.01, J96.02]  Congestive heart failure, unspecified HF chronicity, unspecified heart failure type ([address]) [I50.9]  Admit Date:  12/12/2023  Precautions/Restrictions/WB Status/ Lines/ Wounds/ Oxygen: Fall risk, Bed/chair alarm, Lines (IV), Telemetry, Continuous pulse oximetry, and WB Restrictions (WBAT RLE), L digit 5 splint (proximal phalanx fx)    Ortho consult pending    Per Dr Angelika Berman note 12/2/23:  \"Date of Procedure: 12/2/2023     Pre-Op Diagnosis Codes:     * Closed fracture of right femur, unspecified fracture morphology, unspecified portion of femur, initial encounter ([address]) [S72.91XA]     Post-Op Diagnosis: Right mid to distal third femoral shaft fracture with comminution       Procedure(s): FEMORAL NAIL RIGHT RETROGRADE\"     \"Postoperative plan:  1. Weightbearing as tolerated to the operative extremity. No restrictions in motion or weightbearing tolerance. 2.  Lovenox for 4 weeks for DVT prophylaxis unless contraindicated by medicine team.  Postoperative antibiotics for 24 hours. 3.  Physical therapy evaluation and social work evaluation for potential placement. 4.  Follow-up with me in 10 to 14 days to check wounds. \"    Pt seen for cotreatment this date due to patient safety, patient endurance, complexity of condition, acute illness/injury, and limited functional status information    Treatment Time:  763-904  Treatment Number:  1  Timed Code Treatment Minutes: 30 minutes  Total Treatment Minutes:  40  minutes    Patient Goals for Therapy: \"go back to [facility] and finish my rehab\"          Discharge Recommendations: SNF  DME needs for discharge: Defer to Defer to facility       Therapy recommendations for staff:   Assist of 2 for transfers with use of rolling walker (RW) and gait belt to/from [facility]  to/from chair    History of Present Illness: per Austyn Hay NP H&P 23:  \"The patient is a 70 y.o. male with PMH of recent right femur surgery s/p  IM nailing, alochol abuse, BPH, COPD, depression, hepatitis C, HTN, CVA, HTN  who presents to [facility] with fall at [facility]. Per report patient's BLE with edema since post op. He stated that he doesn't remember falling today. He stated he remembers going to the ED from the ambulance. He has chronic pain and does complain of pain to right leg, no worse than usual.  He has shortness of breath, worse with exertion. He continues to smoke. He is awake and alert x3 at this time. He stated he has been participating in PT/OT at the facility  He stated he did get his left hand caught in his wheelchair. He has extensive bruising to his RLE post surgery,  History obtained from the patient and review of EMR. \"     Home Health S4 Level Recommendation:  NA    AM-PAC Score: AM-PAC Inpatient Daily Activity Raw Score: 15     Subjective:  Patient lying reclined in bed with no family present. Pt agreeable to this OT session. Cognition:    A&O x4   Able to follow 2 step commands    Pain:   Yes  Location: R knee  Ratin /10  Pain Medicine Status: RN notified    Preadmission Environment:   Admitted from [facility] SNF stay following 23, ORIF 23; fell at [facility] and readmitted.     Pt. Lives                                              with family (in a travel trailer, his brother is in the house) has a small dog   Home environment:                            Travel trailer  Steps to enter first floor:                     2 steps to enter and Hand rail unilateral  Steps to second floor/basement:        N/A  Laundry:                                              Goes in garage to [employer]   Bathroom:

## 2023-12-13 NOTE — PROGRESS NOTES
Arrived to place midline with bedside RN bill. Pre-procedure and timeout done with RN, discussed limitations of placement and allergies. Pt's basilic, brachial, and cephalic are all easily collapsible with no indication for a clot. Vein selected is large enough for catheter (please see image below). Pt tolerated sterile procedure well, with no difficulty accessing left basilic vein, when accessed - blood was free flowing and non-pulsatile. Guidewire, introducer, and catheter went in smoothly. ML placement verification with blood return and flushes easily. OK to use ML. PLEASE USE NEW TUBING WITH NEW LINE     Post procedure - reorganized pt table, placed pt in lowest position, with call light and educated on line care. Reported off to bedside RN. If you have any questions please call number below and dispatch will direct you to the PICC RN that is on call.     [phone]

## 2023-12-13 NOTE — PROGRESS NOTES
Hand off report given to Pranay Goetz RN. Patient is stable showing no signs of distress and has no current needs at this time. Call light is in reach and bed is in lowest position. Care is transferred at this time.

## 2023-12-13 NOTE — PROGRESS NOTES
Inpatient Physical Therapy Evaluation & Treatment    Unit: PCU  Date:  12/13/2023  Patient Name:    Danna Vora  Admitting diagnosis:  Hypoxia [R09.02]  Closed displaced fracture of proximal phalanx of left little finger, initial encounter [S64.977T]  Acute respiratory failure with hypoxia and hypercapnia ([address]) [J96.01, J96.02]  Congestive heart failure, unspecified HF chronicity, unspecified heart failure type ([address]) [I50.9]  Admit Date:  12/12/2023  Precautions/Restrictions/WB Status/ Lines/ Wounds/ Oxygen: Fall risk, Bed/chair alarm, and Lines (IV)      Pt seen for cotreatment this date due to patient safety, acute illness/injury, and limited functional status information    Treatment Time:  0908 - 0948  Treatment Number:  1   Timed Code Treatment Minutes: 30 minutes  Total Treatment Minutes:  40  minutes    Patient Stated Goals for Therapy: \" to go back to skilled nursing \"          Discharge Recommendations: SNF  DME needs for discharge: Defer to facility       Therapy recommendation for EMS Transport: can transport by wheelchair    Therapy recommendations for staff:   Assist of 2 for transfers with use of rolling walker (RW) and gait belt to/from [facility]  to/from chair    History of Present Illness:   per Kishore Ireland NP H&P 12/12/23:  \"The patient is a 70 y.o. male with PMH of recent right femur surgery s/p  IM nailing, alochol abuse, BPH, COPD, depression, hepatitis C, HTN, CVA, HTN  who presents to [facility] with fall at [facility]. Per report patient's BLE with edema since post op. He stated that he doesn't remember falling today. He stated he remembers going to the ED from the ambulance. He has chronic pain and does complain of pain to right leg, no worse than usual.  He has shortness of breath, worse with exertion. He continues to smoke. He is awake and alert x3 at this time. He stated he has been participating in PT/OT at the facility  He stated he did get his left hand caught in his wheelchair.   He has

## 2023-12-13 NOTE — PROGRESS NOTES
Pharmacy - RE:  High-dose Heparin drip  Current rate = 18 units/kg/hr  Anti-Xa drawn @ 0416 = 0.35 IU/ml  Goal Anti-Xa = 0.3 - 0.7 IU/ml  Per protocol, continue current rate and obtain another Anti-Xa 12/13 @ 1130.     Ashely Rabago, PharmD, [facility], 12/13/2023 5:25 AM

## 2023-12-13 NOTE — PLAN OF CARE
Problem: Respiratory - Adult  Goal: Achieves optimal ventilation and oxygenation  Outcome: Progressing     Problem: Chronic Conditions and Co-morbidities  Goal: Patient's chronic conditions and co-morbidity symptoms are monitored and maintained or improved  Outcome: Progressing  Flowsheets (Taken 12/12/2023 2046 by Jose Maldonado RN)  Care Plan - Patient's Chronic Conditions and Co-Morbidity Symptoms are Monitored and Maintained or Improved: Monitor and assess patient's chronic conditions and comorbid symptoms for stability, deterioration, or improvement     Problem: Safety - Adult  Goal: Free from fall injury  Outcome: Progressing     Problem: Discharge Planning  Goal: Discharge to home or other facility with appropriate resources  Outcome: Progressing  Flowsheets (Taken 12/12/2023 2046 by Jose Maldonado RN)  Discharge to home or other facility with appropriate resources: Identify barriers to discharge with patient and caregiver     Problem: Pain  Goal: Verbalizes/displays adequate comfort level or baseline comfort level  Outcome: Progressing     Problem: ABCDS Injury Assessment  Goal: Absence of physical injury  Outcome: Progressing

## 2023-12-13 NOTE — CARE COORDINATION
[facility] pre-cert request submitted via [facility] Access Portal.    Requested Facility:  [facility]  Anticipated Admit Date to SNF:  12/13/2023  [facility] #: [id number]

## 2023-12-13 NOTE — CARE COORDINATION
DC order noted. Pt to return to [facility]. [facility] at [address] [facility] is aware pt is returning. Pt does not need precert if returning to [facility] today. Transport being arranged.      Spoke to pt, rn, cm, and [facility]

## 2023-12-13 NOTE — PROGRESS NOTES
Shift report given to nurse Iglesias at bedside. Patient care handed off in stable condition at this time.  Wesley Bustamante RN

## 2023-12-13 NOTE — PLAN OF CARE
HEART FAILURE CARE PLAN:    Comorbidities Reviewed: Yes   Patient has a past medical history of Alcohol abuse, Arthritis, Asthma, BPH (benign prostatic hyperplasia), Cerebral artery occlusion with cerebral infarction ([address]), CHF (congestive heart failure) ([address]), Closed disp articular fx of head of right femur with routine healing, COPD (chronic obstructive pulmonary disease) ([address]), Depression, Diabetic peripheral neuropathy ([address]), GERD (gastroesophageal reflux disease), Hepatitis C, Hypertension, Hypokalemia, Hypomagnesemia, Osteoarthritis, and Polyneuropathy. ECHOCARDIOGRAM Reviewed: Yes   Patient's Ejection Fraction (EF) is greater than 40%    Weights Reviewed:  Yes   Admission weight: 110.7 kg (244 lb)   Wt Readings from Last 3 Encounters:   12/13/23 107.1 kg (236 lb 1.6 oz)   12/05/23 111 kg (244 lb 12.8 oz)   03/26/23 92.4 kg (203 lb 11.2 oz)     Intake & Output Reviewed: Yes     Intake/Output Summary (Last 24 hours) at 12/13/2023 0558  Last data filed at 12/13/2023 0511  Gross per 24 hour   Intake 789.47 ml   Output 2400 ml   Net -1610.53 ml     Medications Reviewed: Yes   SCHEDULED HOSPITAL MEDICATIONS:   aspirin  81 mg Oral Daily    carvedilol  6.25 mg Oral BID WC    traZODone  75 mg Oral Nightly    pantoprazole  40 mg Oral BID AC    NIFEdipine  30 mg Oral BID    mirtazapine  15 mg Oral Nightly    hydrALAZINE  50 mg Oral 3 times per day    gabapentin  300 mg Oral 4x Daily    [Held by provider] furosemide  20 mg Oral BID    ferrous sulfate  325 mg Oral Daily with breakfast    DULoxetine  60 mg Oral Nightly    doxazosin  4 mg Oral Nightly    cyclobenzaprine  10 mg Oral TID    sodium chloride flush  5-40 mL IntraVENous 2 times per day    azithromycin  500 mg IntraVENous Q24H    oxyCODONE-acetaminophen  1 tablet Oral 4x daily    methylPREDNISolone  40 mg IntraVENous Q12H    guaiFENesin  600 mg Oral BID    albuterol sulfate HFA  2 puff Inhalation TID    cefTRIAXone (ROCEPHIN) IV  1,000 mg IntraVENous Q24H ACE/ARB/ARNI is REQUIRED for EF </= 94% SYSTOLIC FAILURE:   ACE[de-identified] None  ARB[de-identified] None  ARNI[de-identified] None    Evidenced-Based Beta Blocker is REQUIRED for EF </= 39% SYSTOLIC FAILURE:   [de-identified] Carvedilol- Coreg    Diuretics:  [de-identified] Furosemide held     Diet Reviewed: Yes   ADULT DIET; Regular    Goal of Care Reviewed: Yes   Patient and/or Family's stated Goal of Care this Admission: reduce shortness of breath, increase activity tolerance, better understand heart failure and disease management, be more comfortable, and reduce lower extremity edema prior to discharge.

## 2023-12-13 NOTE — CONSULTS
Department of Orthopedic Surgery  Physician Assistant   Consult Note        Reason for Consult:  left hand pain after fall. S/P right femur IM nail  Requesting Physician: Heather Patel DO  Date of Service: 12/13/2023 1:57 PM    CHIEF COMPLAINT:  As Above    History Obtained From:  patient    HISTORY OF PRESENT ILLNESS:                The patient is a 70 y.o. male who presents with above chief complaint. Pt recently underwent ORIF of the right femur for a distal femur fracture. Yesterday got his left hand stuck in a wheelchair and felt his 5th finger crack. Was unable to move the finger after. No prior injury to the finger. States that his right leg pain is about the same and has been doing okay overall following surgery.     Past Medical History:        Diagnosis Date    Alcohol abuse     Arthritis     Asthma     BPH (benign prostatic hyperplasia)     Cerebral artery occlusion with cerebral infarction (HCC)     CHF (congestive heart failure) (HCC)     Closed disp articular fx of head of right femur with routine healing     COPD (chronic obstructive pulmonary disease) (HCC)     Depression     Diabetic peripheral neuropathy (HCC)     GERD (gastroesophageal reflux disease)     Hepatitis C     Hypertension     Hypokalemia     Hypomagnesemia     Osteoarthritis     Polyneuropathy      Past Surgical History:        Procedure Laterality Date    CHOLECYSTECTOMY      COLONOSCOPY      CT GUIDED CHEST TUBE  03/01/2022    CT GUIDED CHEST TUBE 3/1/2022 [address] SCAN    FEMUR FRACTURE SURGERY Right 12/2/2023    FEMORAL NAIL RIGHT RETROGRADE performed by Yumiko Melara DO at [address] [facility] CATH  12/13/2023     MIDLINE CATH 12/13/2023 [address] SPECIAL PROCEDURES    JOINT REPLACEMENT      OTHER SURGICAL HISTORY Right 12/02/2023    OTHER SURGICAL HISTORY Right 12/02/2023    femoral nail right retrograde         Medications Prior to Admission:   Prior to Admission medications    Medication Sig Start

## 2023-12-13 NOTE — PROGRESS NOTES
Midline removed at this time per protocol. No complications noted. Catheter intact, dressing applied. Pt denies any further needs at this time. Call light in reach and bed in lowest position.

## 2023-12-13 NOTE — PROGRESS NOTES
Patient in bed resting. Patient wallet on bedside today not wanting it sent to security or locked up. States he only has about $20 in it. Per day rn clinical has called picc team to come in and place picc line. Pharmacy made aware on us holding off on hep gtt until picc is placed.

## 2023-12-13 NOTE — PROGRESS NOTES
Pt awake in bed. Assessment completed and medications given per MAR. VS as charted in flowsheet. A&Ox4. Pt currently on 1L of oxygen per NC at this time. Pt denies any further needs at this time. Call light in reach and bed in lowest position.

## 2023-12-13 NOTE — PROGRESS NOTES
Patient educated on discharge instructions as well as new medications use, dosage, administration and possible side effects. Patient verified knowledge. IV removed without difficulty and dry dressing in place. Telemetry monitor removed and returned to [facility]. Pt left facility in stable condition to Skilled nursing facility with all of their personal belongings. Discharging to Facility/ Agency   Name: [facility]  Address:  Phone: [phone]  Fax:    Report called to Susan LEE [place] [address] [facility] at this time. Report given to transport at this time.

## 2023-12-13 NOTE — DISCHARGE INSTR - COC
Bowel: Yes  Bladder: Yes  Urinary Catheter: None   Colostomy/Ileostomy/Ileal Conduit: No       Date of Last BM: Prior to admission    Intake/Output Summary (Last 24 hours) at 12/13/2023 0941  Last data filed at 12/13/2023 0806  Gross per 24 hour   Intake 799.47 ml   Output 1600 ml   Net -800.53 ml     I/O last 3 completed shifts: In: 789.5 [P.O.:582; IV Piggyback:207.5]  Out: 2400 [Urine:2400]    Safety Concerns: At Risk for Falls    Impairments/Disabilities:      None    Nutrition Therapy:  Current Nutrition Therapy:   - Oral Diet:  General    Routes of Feeding: Oral  Liquids: No Restrictions  Daily Fluid Restriction: no  Last Modified Barium Swallow with Video (Video Swallowing Test): not done    Treatments at the Time of Hospital Discharge:   Respiratory Treatments: Albuterol sulfate  Oxygen Therapy:  is not on home oxygen therapy. Ventilator:    - No ventilator support    Rehab Therapies: Physical Therapy and Occupational Therapy  Weight Bearing Status/Restrictions: No weight bearing restrictions  Other Medical Equipment (for information only, NOT a DME order):  walker  Other Treatments: Assist of 2 for transfers with use of rolling walker (RW) and gait belt to/from [facility]  to/from chair    Patient's personal belongings (please select all that are sent with patient):   All belongings sent with patient    RN SIGNATURE:  Electronically signed by Jens Tamez RN on 12/13/23 at 1:44 PM EST    CASE MANAGEMENT/SOCIAL WORK SECTION    Inpatient Status Date: 12/12    Readmission Risk Assessment Score:  Readmission Risk              Risk of Unplanned Readmission:  23           Discharging to Facility/ Agency   Name: [facility]  Address:  Phone: [phone]  Fax:        / signature: Electronically signed by Spencer Shaikh RN on 12/13/23 at 12:47 PM EST    PHYSICIAN SECTION    Prognosis: Good    Condition at Discharge: Stable    Rehab Potential (if transferring to Rehab): Good    Recommended Labs or

## 2023-12-20 NOTE — PROGRESS NOTES
Physician Progress Note      Brissa Boyd  CSN #:                  [id number]  :                       1955  ADMIT DATE:       2023 4:17 AM  DISCH DATE:        2023 2:50 PM  RESPONDING  PROVIDER #:        Britney Sullivan MD          QUERY TEXT:    Pt admitted with ae COPD. Pt noted to have CAP. If possible, please document   in the progress notes and discharge summary if you are evaluating and/or   treating any of the following:      Note: CAP and HCAP indicate where the pneumonia was acquired, not a specific   type. The medical record reflects the following:  Risk Factors: community-acquired pneumonia  Clinical Indicators: pulmonology consult-   Acute   bronchiolitis/community-acquired pneumonia  Treatment: Dc on prednisone and levauqin, pulmonology consult    Thank You Dontrell Cleaning RN, CDS [email or web address]  Options provided:  -- Gram negative pneumonia present on admission  -- Gram negative pneumonia not present on admission  -- Gram positive pneumonia present on admission  -- Gram positive pneumonia not present on admission  -- Other - I will add my own diagnosis  -- Disagree - Not applicable / Not valid  -- Disagree - Clinically unable to determine / Unknown  -- Refer to Clinical Documentation Reviewer    PROVIDER RESPONSE TEXT:    This patient has gram negative pneumonia not present on admission.     Query created by: Dontrell Cleaning on 2023 8:42 AM      Electronically signed by:  Britney Sullivan MD 2023 2:36 PM

## 2024-01-02 ENCOUNTER — CARE COORDINATION (OUTPATIENT)
Dept: CARE COORDINATION | Age: 69
End: 2024-01-02

## 2024-01-02 ENCOUNTER — CARE COORDINATION (OUTPATIENT)
Dept: CASE MANAGEMENT | Age: 69
End: 2024-01-02

## 2024-01-02 NOTE — CARE COORDINATION
Care Transitions Post-Acute Facility Discharge Call    2024    Patient: Hiro Esteban Patient : 1955   MRN: [id number]  Reason for Admission: fall and a R femur fx with nailing on   Discharge Date: 23 RARS: Readmission Risk Score: 25.8         Discharge Facility: [facility] Care Course:    to  [place] s/p fall R femur fx with IM nailing on  to  [place] with fall at SNF with 5th L eft digit fx   to  [facility] to home with [facility]    HFU made:   Dr Butler ortho - [facility] and to continue PT. Wei tape hand   Dr Giang    Sig Hx:   CVA, PE, GERD, Cirrohosis, hep C, DMII, COPD, BPH, coacaine, smoker, OA, neuropathy, EtOH    DME:     Conversation:   Left HIPPA compliant message regarding the nature of the call and a request for a return call with my contact information      EMILY Lomas, [phone]  Jens Sanches / [facility] Care Transition Nurse         Follow up plan:  Will re-attempt            Care Transitions Post Acute Facility Transition               Care Transitions Interventions         Future Appointments   Date Time Provider Department Center   2024 10:45 AM Chai Butler DO [facility]   2024 10:15 AM Chai Butler DO [facility] Nancy Collins, BSN, RN   Jens Sanches/ [facility] Care Transition Nurse  [phone]

## 2024-01-02 NOTE — CARE COORDINATION
Contacted L[facility] Baptiste and confirmed patient did not go home with [facility] services.  Will notify CTN at this time.

## 2024-01-03 ENCOUNTER — CARE COORDINATION (OUTPATIENT)
Dept: CASE MANAGEMENT | Age: 69
End: 2024-01-03

## 2024-01-03 DIAGNOSIS — S72.001A: Primary | ICD-10-CM

## 2024-01-03 PROCEDURE — 1111F DSCHRG MED/CURRENT MED MERGE: CPT | Performed by: FAMILY MEDICINE

## 2024-01-03 NOTE — CARE COORDINATION
Care Transitions Initial Follow Up Call    Call within 2 business days of discharge: Yes    Patient Current Location:  Home: [address] [place] [id number]    Care Transition Nurse contacted the patient by telephone to perform post hospital discharge assessment. Verified name and  with patient as identifiers. Provided introduction to self, and explanation of the Care Transition Nurse role.     Patient: Hiro Esteban Patient : 1955   MRN: [id number]  Reason for Admission: fall with aR femur fx requiring IM nailing on   Discharge Date: 23 RARS: Readmission Risk Score: 25.8      Last Discharge Facility       Date Complaint Diagnosis Description Type Department Provider    23 Fall Hypoxia ... ED to Hosp-Admission (Discharged) (ADMITTED) [facility] Rebekah Mahan DO; Jayshree Stacy.            Was this an external facility discharge? Yes, 23  Discharge Facility: [facility]    Challenges to be reviewed by the provider   Additional needs identified to be addressed with provider: No  medications-pt states he will run out of percocet               Method of communication with provider: none. [facility]    Acute Care Course:    to  [place] s/p fall R femur fx with IM nailing on  to  [place] with fall at SNF with 5th L eft digit fx   to  [facility] to home with [facility]     HFU made:   Dr Butler ortho - [facility] and to continue PT. Wei tape hand   Dr Giang     Sig Hx:   CVA, PE, GERD, Cirrohosis, hep C, DMII, COPD, BPH, coacaine, smoker, OA, neuropathy, EtOH     DME: not using walker in home d/t size. He lives in a travel trailer.      Conversation:   Spoke to Hiro after 2 IDs. He states he is not doing well as he lives alone and difficult to care for self. He is \"suffering more each day\". HHC came out today and he will be getting OT, PT. PT tomorrow and OT on Friday. States from [place]. He is worried about running out of

## 2024-01-04 ENCOUNTER — CARE COORDINATION (OUTPATIENT)
Dept: CARE COORDINATION | Age: 69
End: 2024-01-04

## 2024-01-09 ENCOUNTER — TELEPHONE (OUTPATIENT)
Dept: ORTHOPEDIC SURGERY | Age: 69
End: 2024-01-09

## 2024-01-09 NOTE — TELEPHONE ENCOUNTER
Prescription Refill     Medication Name:  OXYCODONE  Pharmacy: [facility] [id number] - [place], [place] - [address] - P [phone] - F [phone]  Patient Contact Number:  [phone]    PATIENT STATES THAT PAIN NOT TOLERABLE

## 2024-01-10 ENCOUNTER — TELEPHONE (OUTPATIENT)
Dept: ORTHOPEDIC SURGERY | Age: 69
End: 2024-01-10

## 2024-01-10 DIAGNOSIS — S72.351A CLOSED DISPLACED COMMINUTED FRACTURE OF SHAFT OF RIGHT FEMUR, INITIAL ENCOUNTER (HCC): ICD-10-CM

## 2024-01-10 DIAGNOSIS — S62.617A CLOSED DISPLACED FRACTURE OF PROXIMAL PHALANX OF LEFT LITTLE FINGER, INITIAL ENCOUNTER: ICD-10-CM

## 2024-01-10 RX ORDER — OXYCODONE HYDROCHLORIDE 5 MG/1
5 CAPSULE ORAL EVERY 6 HOURS PRN
Qty: 8 CAPSULE | Refills: 0 | Status: SHIPPED | OUTPATIENT
Start: 2024-01-10 | End: 2024-01-12

## 2024-01-10 RX ORDER — OXYCODONE HYDROCHLORIDE 5 MG/1
5 TABLET ORAL EVERY 4 HOURS PRN
Qty: 10 TABLET | Refills: 0 | Status: SHIPPED | OUTPATIENT
Start: 2024-01-10 | End: 2024-01-17 | Stop reason: SDUPTHER

## 2024-01-10 NOTE — TELEPHONE ENCOUNTER
Medical Facility Question     [facility]  Contact Name: SCOTT  Contact Number: [phone]  Request or Information: PHARMACY ADV THEY DON'T CARRY OXYCODONE IN CAPSULES REQ TO RESEND SCRIPT WITH TABLETS  PLEASE RESEND

## 2024-01-17 ENCOUNTER — TELEPHONE (OUTPATIENT)
Dept: ORTHOPEDIC SURGERY | Age: 69
End: 2024-01-17

## 2024-01-17 ENCOUNTER — CARE COORDINATION (OUTPATIENT)
Dept: CARE COORDINATION | Age: 69
End: 2024-01-17

## 2024-01-17 DIAGNOSIS — S72.351A CLOSED DISPLACED COMMINUTED FRACTURE OF SHAFT OF RIGHT FEMUR, INITIAL ENCOUNTER (HCC): ICD-10-CM

## 2024-01-17 RX ORDER — OXYCODONE HYDROCHLORIDE 5 MG/1
5 TABLET ORAL EVERY 4 HOURS PRN
Qty: 10 TABLET | Refills: 0 | Status: SHIPPED | OUTPATIENT
Start: 2024-01-17 | End: 2024-01-20

## 2024-01-17 NOTE — CARE COORDINATION
ACM called but patient did not answer. ACM unable to leave voicemail at this time. ACM will follow up at a later date.

## 2024-01-17 NOTE — TELEPHONE ENCOUNTER
Unable to get into his car rescheduled wants a refill on pain meds asking for percocet this time.

## 2024-01-17 NOTE — TELEPHONE ENCOUNTER
Prescription Refill     Medication Name:  OXYCODONE 5MG  Pharmacy: [facility] [address] [facility] [address]  Patient Contact Number:  [phone]    PT CALLED TO CANCEL HIS POST OP APPT FOR TODAY, HAVING CAR TROUBLES AND NOT ABLE TO MAKE IT. PT R/S HIS APPT FOR 1/30/24, HE IS WANTING TO KNOW IF HE CAN GET AN OXYCODONE 5MG REFILL BEFORE HIS APPT. PT USES [facility] IN [facility] [address] [facility].    PLEASE CALL PT BACK AT THE ABOVE NUMBER.

## 2024-01-24 ENCOUNTER — CARE COORDINATION (OUTPATIENT)
Dept: CARE COORDINATION | Age: 69
End: 2024-01-24

## 2024-01-24 ENCOUNTER — TELEPHONE (OUTPATIENT)
Dept: ORTHOPEDIC SURGERY | Age: 69
End: 2024-01-24

## 2024-01-24 NOTE — TELEPHONE ENCOUNTER
Reached out to the patient to remind him of his upcoming appointment with us tomorrow and let him know we needed to see him for Xrs in office to continue to send over medication for him. He verbalized understanding and confirmed his appointment for tomorrow.

## 2024-01-24 NOTE — CARE COORDINATION
ACM completed follow up call with patient who said he is still having pain control issues. Patient said his Oxycodone lasted 4 days and patient has been out for a week. Patient said he would like another refill. ACM said that patient should be weaning down on medication. Patient said he does not have ice so he is not able to ice to help with pain control. Patient reports pain is a 7 out of 10. Patient reports only taking aspirin which has not helped. ACM will route to Ortho but patient was becoming agitated on the phone with ACM during conversation.

## 2024-01-25 ENCOUNTER — OFFICE VISIT (OUTPATIENT)
Dept: ORTHOPEDIC SURGERY | Age: 69
End: 2024-01-25

## 2024-01-25 DIAGNOSIS — S72.351D CLOSED DISPLACED COMMINUTED FRACTURE OF SHAFT OF RIGHT FEMUR WITH ROUTINE HEALING, SUBSEQUENT ENCOUNTER: Primary | ICD-10-CM

## 2024-01-25 DIAGNOSIS — M79.642 LEFT HAND PAIN: ICD-10-CM

## 2024-01-25 DIAGNOSIS — S62.617A DISPLACED FRACTURE OF PROXIMAL PHALANX OF LEFT LITTLE FINGER, INITIAL ENCOUNTER FOR CLOSED FRACTURE: ICD-10-CM

## 2024-01-25 PROCEDURE — 99024 POSTOP FOLLOW-UP VISIT: CPT | Performed by: STUDENT IN AN ORGANIZED HEALTH CARE EDUCATION/TRAINING PROGRAM

## 2024-01-25 RX ORDER — OXYCODONE HYDROCHLORIDE AND ACETAMINOPHEN 5; 325 MG/1; MG/1
1 TABLET ORAL EVERY 6 HOURS PRN
Qty: 28 TABLET | Refills: 0 | Status: SHIPPED | OUTPATIENT
Start: 2024-01-25 | End: 2024-02-01

## 2024-01-25 NOTE — PROGRESS NOTES
paresthesias in the feet.        Left hand exam    No swelling or ecchymosis noted to the fifth digit today.  Nontender to palpation or deep percussion.  Full fist noted without rotational deformity      Radiology:       4 views of the right femur taken in the office today demonstrate maintained alignment of a distal third femur fracture with interval bony formation noted.  No hardware complication appreciated.    3 views of the left hand taken in the office today demonstrate interval healing without displacement of a complex fracture to the fifth proximal phalanx.         Assessment : 68-year-old male 7 weeks status post right leg retrograde femoral nailing, date of procedure 12/2/2023, left hand minimally displaced proximal phalanx fracture fifth digit date of injury 12/12/2023    Impression:  Encounter Diagnoses   Name Primary?    Closed displaced comminuted fracture of shaft of right femur with routine healing, subsequent encounter Yes    Left hand pain     Displaced fracture of proximal phalanx of left little finger, initial encounter for closed fracture        Office Procedures:  Orders Placed This Encounter   Procedures    XR FEMUR RIGHT (MIN 2 VIEWS)     Standing Status:   Future     Number of Occurrences:   1     Standing Expiration Date:   1/25/2025     Order Specific Question:   Reason for exam:     Answer:   pain    XR HAND LEFT (MIN 3 VIEWS)     Standing Status:   Future     Number of Occurrences:   1     Standing Expiration Date:   1/25/2025         Plan:     The patient is demonstrating robust healing.  He does still have some tenderness around the thigh from his fracture which likely will require another few weeks of healing.  He is progressing slowly.  I will give him another pain medicine refill but that should be the last 1 unless he has a major setback.  His hand has healed fully and we can follow-up with that as needed.  Follow-up in 4 to 6 weeks for final check with x-rays of the right femur

## 2024-01-31 ENCOUNTER — CARE COORDINATION (OUTPATIENT)
Dept: CARE COORDINATION | Age: 69
End: 2024-01-31

## 2024-01-31 NOTE — CARE COORDINATION
OLIVIA completed final outreach to patient who said he is healing well. Patient said his pain is controlled and still working with [facility]. Patient declined any additional follow up calls with GUSTAVO at this time.

## 2024-04-15 ENCOUNTER — APPOINTMENT (OUTPATIENT)
Dept: GENERAL RADIOLOGY | Age: 69
DRG: 438 | End: 2024-04-15
Payer: MEDICARE

## 2024-04-15 ENCOUNTER — APPOINTMENT (OUTPATIENT)
Dept: CT IMAGING | Age: 69
DRG: 438 | End: 2024-04-15
Payer: MEDICARE

## 2024-04-15 ENCOUNTER — HOSPITAL ENCOUNTER (INPATIENT)
Age: 69
LOS: 8 days | Discharge: NURSING, REHAB OR LONG-TERM CARE | DRG: 438 | End: 2024-04-24
Attending: STUDENT IN AN ORGANIZED HEALTH CARE EDUCATION/TRAINING PROGRAM | Admitting: INTERNAL MEDICINE
Payer: MEDICARE

## 2024-04-15 DIAGNOSIS — E83.42 HYPOMAGNESEMIA: ICD-10-CM
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
## 2024-04-15 DIAGNOSIS — E87.6 HYPOKALEMIA: ICD-10-CM

## 2024-04-15 DIAGNOSIS — E83.51 HYPOCALCEMIA: ICD-10-CM

## 2024-04-15 DIAGNOSIS — K85.90 ACUTE PANCREATITIS, UNSPECIFIED COMPLICATION STATUS, UNSPECIFIED PANCREATITIS TYPE: ICD-10-CM

## 2024-04-15 DIAGNOSIS — I95.9 HYPOTENSION, UNSPECIFIED HYPOTENSION TYPE: ICD-10-CM

## 2024-04-15 DIAGNOSIS — E86.0 DEHYDRATION: ICD-10-CM

## 2024-04-15 DIAGNOSIS — R79.89 ELEVATED LACTIC ACID LEVEL: ICD-10-CM

## 2024-04-15 LAB
ALBUMIN SERPL-MCNC: 2.7 G/DL (ref 3.4–5)
ALBUMIN/GLOB SERPL: 1 {RATIO} (ref 1.1–2.2)
ALP SERPL-CCNC: 223 U/L (ref 40–129)
ALT SERPL-CCNC: 28 U/L (ref 10–40)
ANION GAP SERPL CALCULATED.3IONS-SCNC: 27 MMOL/L (ref 3–16)
AST SERPL-CCNC: 129 U/L (ref 15–37)
BASE EXCESS BLDV CALC-SCNC: -1.3 MMOL/L (ref -3–3)
BASOPHILS # BLD: 0 K/UL (ref 0–0.2)
BASOPHILS NFR BLD: 0.3 %
BILIRUB SERPL-MCNC: 2 MG/DL (ref 0–1)
BILIRUB UR QL STRIP.AUTO: ABNORMAL
BUN SERPL-MCNC: 15 MG/DL (ref 7–20)
CALCIUM SERPL-MCNC: 5.9 MG/DL (ref 8.3–10.6)
CHLORIDE SERPL-SCNC: 92 MMOL/L (ref 99–110)
CK SERPL-CCNC: 595 U/L (ref 39–308)
CLARITY UR: CLEAR
CO2 BLDV-SCNC: 23 MMOL/L
CO2 SERPL-SCNC: 13 MMOL/L (ref 21–32)
COHGB MFR BLDV: 2.1 % (ref 0–1.5)
COLOR UR: YELLOW
CREAT SERPL-MCNC: 1 MG/DL (ref 0.8–1.3)
D DIMER: 7.99 UG/ML FEU (ref 0–0.6)
DEPRECATED RDW RBC AUTO: 21.3 % (ref 12.4–15.4)
EKG ATRIAL RATE: 80 BPM
EKG DIAGNOSIS: NORMAL
EKG P-R INTERVAL: 160 MS
EKG Q-T INTERVAL: 436 MS
EKG QRS DURATION: 86 MS
EKG QTC CALCULATION (BAZETT): 502 MS
EKG R AXIS: 144 DEGREES
EKG T AXIS: 153 DEGREES
EKG VENTRICULAR RATE: 80 BPM
EOSINOPHIL # BLD: 0 K/UL (ref 0–0.6)
EOSINOPHIL NFR BLD: 0 %
ETHANOLAMINE SERPL-MCNC: NORMAL MG/DL (ref 0–0.08)
FLUAV RNA RESP QL NAA+PROBE: NOT DETECTED
FLUBV RNA RESP QL NAA+PROBE: NOT DETECTED
GFR SERPLBLD CREATININE-BSD FMLA CKD-EPI: 81 ML/MIN/{1.73_M2}
GLUCOSE BLD-MCNC: 80 MG/DL (ref 70–99)
GLUCOSE BLD-MCNC: 86 MG/DL (ref 70–99)
GLUCOSE SERPL-MCNC: 70 MG/DL (ref 70–99)
GLUCOSE UR STRIP.AUTO-MCNC: NEGATIVE MG/DL
HCO3 BLDV-SCNC: 21.5 MMOL/L (ref 23–29)
HCT VFR BLD AUTO: 38.9 % (ref 40.5–52.5)
HGB BLD-MCNC: 13 G/DL (ref 13.5–17.5)
HGB UR QL STRIP.AUTO: NEGATIVE
KETONES UR STRIP.AUTO-MCNC: 40 MG/DL
LACTATE BLDV-SCNC: 4.8 MMOL/L (ref 0.4–1.9)
LACTATE BLDV-SCNC: 5.8 MMOL/L (ref 0.4–1.9)
LEUKOCYTE ESTERASE UR QL STRIP.AUTO: NEGATIVE
LIPASE SERPL-CCNC: 1052 U/L (ref 13–60)
LYMPHOCYTES # BLD: 0.3 K/UL (ref 1–5.1)
LYMPHOCYTES NFR BLD: 5.6 %
MAGNESIUM SERPL-MCNC: 1.3 MG/DL (ref 1.8–2.4)
MCH RBC QN AUTO: 29.8 PG (ref 26–34)
MCHC RBC AUTO-ENTMCNC: 33.4 G/DL (ref 31–36)
MCV RBC AUTO: 89.2 FL (ref 80–100)
METHGB MFR BLDV: 0.3 %
MONOCYTES # BLD: 0.4 K/UL (ref 0–1.3)
MONOCYTES NFR BLD: 6.8 %
NEUTROPHILS # BLD: 5.4 K/UL (ref 1.7–7.7)
NEUTROPHILS NFR BLD: 87.3 %
NITRITE UR QL STRIP.AUTO: NEGATIVE
NT-PROBNP SERPL-MCNC: 625 PG/ML (ref 0–124)
O2 CT VFR BLDV CALC: 11 VOL %
O2 THERAPY: ABNORMAL
PATH INTERP BLD-IMP: NO
PCO2 BLDV: 30.8 MMHG (ref 40–50)
PERFORMED ON: NORMAL
PERFORMED ON: NORMAL
PH BLDV: 7.46 [PH] (ref 7.35–7.45)
PH UR STRIP.AUTO: 6 [PH] (ref 5–8)
PLATELET # BLD AUTO: 119 K/UL (ref 135–450)
PLATELET BLD QL SMEAR: ABNORMAL
PMV BLD AUTO: 10.8 FL (ref 5–10.5)
PO2 BLDV: 32.2 MMHG (ref 25–40)
POTASSIUM SERPL-SCNC: 2.7 MMOL/L (ref 3.5–5.1)
PROCALCITONIN SERPL IA-MCNC: 0.19 NG/ML (ref 0–0.15)
PROT SERPL-MCNC: 5.5 G/DL (ref 6.4–8.2)
PROT UR STRIP.AUTO-MCNC: NEGATIVE MG/DL
RBC # BLD AUTO: 4.36 M/UL (ref 4.2–5.9)
SAO2 % BLDV: 67 %
SARS-COV-2 RNA RESP QL NAA+PROBE: NOT DETECTED
SLIDE REVIEW: ABNORMAL
SODIUM SERPL-SCNC: 132 MMOL/L (ref 136–145)
SP GR UR STRIP.AUTO: 1.02 (ref 1–1.03)
UA COMPLETE W REFLEX CULTURE PNL UR: ABNORMAL
UA DIPSTICK W REFLEX MICRO PNL UR: ABNORMAL
URN SPEC COLLECT METH UR: ABNORMAL
UROBILINOGEN UR STRIP-ACNC: 4 E.U./DL
WBC # BLD AUTO: 6.1 K/UL (ref 4–11)

## 2024-04-15 PROCEDURE — 85379 FIBRIN DEGRADATION QUANT: CPT

## 2024-04-15 PROCEDURE — 96361 HYDRATE IV INFUSION ADD-ON: CPT

## 2024-04-15 PROCEDURE — 96366 THER/PROPH/DIAG IV INF ADDON: CPT

## 2024-04-15 PROCEDURE — 82550 ASSAY OF CK (CPK): CPT

## 2024-04-15 PROCEDURE — 84145 PROCALCITONIN (PCT): CPT

## 2024-04-15 PROCEDURE — 93005 ELECTROCARDIOGRAM TRACING: CPT | Performed by: STUDENT IN AN ORGANIZED HEALTH CARE EDUCATION/TRAINING PROGRAM

## 2024-04-15 PROCEDURE — 87636 SARSCOV2 & INF A&B AMP PRB: CPT

## 2024-04-15 PROCEDURE — 74177 CT ABD & PELVIS W/CONTRAST: CPT

## 2024-04-15 PROCEDURE — 99285 EMERGENCY DEPT VISIT HI MDM: CPT

## 2024-04-15 PROCEDURE — 6370000000 HC RX 637 (ALT 250 FOR IP): Performed by: STUDENT IN AN ORGANIZED HEALTH CARE EDUCATION/TRAINING PROGRAM

## 2024-04-15 PROCEDURE — 80053 COMPREHEN METABOLIC PANEL: CPT

## 2024-04-15 PROCEDURE — 6360000004 HC RX CONTRAST MEDICATION: Performed by: STUDENT IN AN ORGANIZED HEALTH CARE EDUCATION/TRAINING PROGRAM

## 2024-04-15 PROCEDURE — 83735 ASSAY OF MAGNESIUM: CPT

## 2024-04-15 PROCEDURE — 70450 CT HEAD/BRAIN W/O DYE: CPT

## 2024-04-15 PROCEDURE — 96365 THER/PROPH/DIAG IV INF INIT: CPT

## 2024-04-15 PROCEDURE — 83880 ASSAY OF NATRIURETIC PEPTIDE: CPT

## 2024-04-15 PROCEDURE — 96372 THER/PROPH/DIAG INJ SC/IM: CPT

## 2024-04-15 PROCEDURE — 81003 URINALYSIS AUTO W/O SCOPE: CPT

## 2024-04-15 PROCEDURE — 36415 COLL VENOUS BLD VENIPUNCTURE: CPT

## 2024-04-15 PROCEDURE — 85025 COMPLETE CBC W/AUTO DIFF WBC: CPT

## 2024-04-15 PROCEDURE — 71045 X-RAY EXAM CHEST 1 VIEW: CPT

## 2024-04-15 PROCEDURE — 83605 ASSAY OF LACTIC ACID: CPT

## 2024-04-15 PROCEDURE — 73562 X-RAY EXAM OF KNEE 3: CPT

## 2024-04-15 PROCEDURE — 72125 CT NECK SPINE W/O DYE: CPT

## 2024-04-15 PROCEDURE — 96375 TX/PRO/DX INJ NEW DRUG ADDON: CPT

## 2024-04-15 PROCEDURE — 82803 BLOOD GASES ANY COMBINATION: CPT

## 2024-04-15 PROCEDURE — 71260 CT THORAX DX C+: CPT

## 2024-04-15 PROCEDURE — 82077 ASSAY SPEC XCP UR&BREATH IA: CPT

## 2024-04-15 PROCEDURE — 2580000003 HC RX 258: Performed by: STUDENT IN AN ORGANIZED HEALTH CARE EDUCATION/TRAINING PROGRAM

## 2024-04-15 PROCEDURE — 73552 X-RAY EXAM OF FEMUR 2/>: CPT

## 2024-04-15 PROCEDURE — 2500000003 HC RX 250 WO HCPCS: Performed by: STUDENT IN AN ORGANIZED HEALTH CARE EDUCATION/TRAINING PROGRAM

## 2024-04-15 PROCEDURE — 83690 ASSAY OF LIPASE: CPT

## 2024-04-15 PROCEDURE — 93010 ELECTROCARDIOGRAM REPORT: CPT | Performed by: INTERNAL MEDICINE

## 2024-04-15 PROCEDURE — 96368 THER/DIAG CONCURRENT INF: CPT

## 2024-04-15 PROCEDURE — 6360000002 HC RX W HCPCS: Performed by: STUDENT IN AN ORGANIZED HEALTH CARE EDUCATION/TRAINING PROGRAM

## 2024-04-15 RX ORDER — CALCIUM ACETATE 667 MG/1
1 CAPSULE ORAL ONCE
Status: COMPLETED | OUTPATIENT
Start: 2024-04-15 | End: 2024-04-15

## 2024-04-15 RX ORDER — DICYCLOMINE HYDROCHLORIDE 10 MG/ML
20 INJECTION INTRAMUSCULAR ONCE
Status: COMPLETED | OUTPATIENT
Start: 2024-04-15 | End: 2024-04-15

## 2024-04-15 RX ORDER — M-VIT,TX,IRON,MINS/CALC/FOLIC 27MG-0.4MG
1 TABLET ORAL DAILY
Status: DISCONTINUED | OUTPATIENT
Start: 2024-04-16 | End: 2024-04-16 | Stop reason: HOSPADM

## 2024-04-15 RX ORDER — POTASSIUM CHLORIDE 20 MEQ/1
40 TABLET, EXTENDED RELEASE ORAL ONCE
Status: COMPLETED | OUTPATIENT
Start: 2024-04-15 | End: 2024-04-15

## 2024-04-15 RX ORDER — POTASSIUM CHLORIDE 7.45 MG/ML
10 INJECTION INTRAVENOUS
Status: COMPLETED | OUTPATIENT
Start: 2024-04-15 | End: 2024-04-15

## 2024-04-15 RX ORDER — 0.9 % SODIUM CHLORIDE 0.9 %
700 INTRAVENOUS SOLUTION INTRAVENOUS ONCE
Status: COMPLETED | OUTPATIENT
Start: 2024-04-15 | End: 2024-04-15

## 2024-04-15 RX ORDER — LANOLIN ALCOHOL/MO/W.PET/CERES
100 CREAM (GRAM) TOPICAL DAILY
Status: DISCONTINUED | OUTPATIENT
Start: 2024-04-16 | End: 2024-04-16 | Stop reason: HOSPADM

## 2024-04-15 RX ORDER — 0.9 % SODIUM CHLORIDE 0.9 %
1000 INTRAVENOUS SOLUTION INTRAVENOUS ONCE
Status: COMPLETED | OUTPATIENT
Start: 2024-04-15 | End: 2024-04-15

## 2024-04-15 RX ORDER — SODIUM CHLORIDE 9 MG/ML
INJECTION, SOLUTION INTRAVENOUS PRN
Status: DISCONTINUED | OUTPATIENT
Start: 2024-04-15 | End: 2024-04-16 | Stop reason: HOSPADM

## 2024-04-15 RX ORDER — SODIUM CHLORIDE 0.9 % (FLUSH) 0.9 %
5-40 SYRINGE (ML) INJECTION PRN
Status: DISCONTINUED | OUTPATIENT
Start: 2024-04-15 | End: 2024-04-24 | Stop reason: HOSPADM

## 2024-04-15 RX ORDER — MAGNESIUM SULFATE IN WATER 40 MG/ML
2000 INJECTION, SOLUTION INTRAVENOUS ONCE
Status: COMPLETED | OUTPATIENT
Start: 2024-04-15 | End: 2024-04-16

## 2024-04-15 RX ORDER — POTASSIUM CHLORIDE 20 MEQ/1
20 TABLET, EXTENDED RELEASE ORAL DAILY
Status: ON HOLD | COMMUNITY
End: 2024-04-24 | Stop reason: HOSPADM

## 2024-04-15 RX ORDER — KETOROLAC TROMETHAMINE 15 MG/ML
15 INJECTION, SOLUTION INTRAMUSCULAR; INTRAVENOUS ONCE
Status: COMPLETED | OUTPATIENT
Start: 2024-04-15 | End: 2024-04-15

## 2024-04-15 RX ORDER — SODIUM CHLORIDE 0.9 % (FLUSH) 0.9 %
5-40 SYRINGE (ML) INJECTION EVERY 12 HOURS SCHEDULED
Status: DISCONTINUED | OUTPATIENT
Start: 2024-04-15 | End: 2024-04-24 | Stop reason: HOSPADM

## 2024-04-15 RX ORDER — LANOLIN ALCOHOL/MO/W.PET/CERES
400 CREAM (GRAM) TOPICAL DAILY
COMMUNITY

## 2024-04-15 RX ADMIN — POTASSIUM CHLORIDE 10 MEQ: 7.46 INJECTION, SOLUTION INTRAVENOUS at 20:13

## 2024-04-15 RX ADMIN — SODIUM BICARBONATE 50 MEQ: 84 INJECTION INTRAVENOUS at 18:35

## 2024-04-15 RX ADMIN — POTASSIUM CHLORIDE 40 MEQ: 1500 TABLET, EXTENDED RELEASE ORAL at 18:35

## 2024-04-15 RX ADMIN — KETOROLAC TROMETHAMINE 15 MG: 15 INJECTION, SOLUTION INTRAMUSCULAR; INTRAVENOUS at 22:18

## 2024-04-15 RX ADMIN — IOPAMIDOL 75 ML: 755 INJECTION, SOLUTION INTRAVENOUS at 19:36

## 2024-04-15 RX ADMIN — SODIUM CHLORIDE 700 ML: 9 INJECTION, SOLUTION INTRAVENOUS at 16:57

## 2024-04-15 RX ADMIN — SODIUM CHLORIDE 1000 ML: 9 INJECTION, SOLUTION INTRAVENOUS at 15:49

## 2024-04-15 RX ADMIN — MAGNESIUM SULFATE HEPTAHYDRATE 2000 MG: 40 INJECTION, SOLUTION INTRAVENOUS at 20:15

## 2024-04-15 RX ADMIN — DICYCLOMINE HYDROCHLORIDE 20 MG: 10 INJECTION, SOLUTION INTRAMUSCULAR at 22:19

## 2024-04-15 RX ADMIN — CALCIUM ACETATE 667 MG: 667 CAPSULE ORAL at 19:56

## 2024-04-15 RX ADMIN — SODIUM CHLORIDE 1000 ML: 9 INJECTION, SOLUTION INTRAVENOUS at 15:16

## 2024-04-15 RX ADMIN — Medication 10 ML: at 22:19

## 2024-04-15 RX ADMIN — POTASSIUM CHLORIDE 10 MEQ: 7.46 INJECTION, SOLUTION INTRAVENOUS at 18:38

## 2024-04-15 ASSESSMENT — PAIN DESCRIPTION - ORIENTATION: ORIENTATION: RIGHT;LEFT

## 2024-04-15 ASSESSMENT — PAIN - FUNCTIONAL ASSESSMENT: PAIN_FUNCTIONAL_ASSESSMENT: PREVENTS OR INTERFERES WITH MANY ACTIVE NOT PASSIVE ACTIVITIES

## 2024-04-15 ASSESSMENT — PAIN DESCRIPTION - DESCRIPTORS: DESCRIPTORS: ACHING

## 2024-04-15 ASSESSMENT — PAIN DESCRIPTION - LOCATION: LOCATION: CHEST

## 2024-04-15 ASSESSMENT — PAIN SCALES - GENERAL: PAINLEVEL_OUTOF10: 10

## 2024-04-15 NOTE — ED NOTES
Lab called about repeat state they are hemolyzed. Lab asked if they can send a Phlebotomist STAT. State they are sending them now

## 2024-04-15 NOTE — ED PROVIDER NOTES
EMS.  He did receive D10.  Current blood sugar 86. [ER]   1512 The Ekg independently interpreted by me shows  normal sinus rhythm with a rate of 80  Axis is   Normal  QTc is  502  Intervals and Durations are unremarkable.      ST Segments: nonspecific changes  Nonspecific change from prior EKG dated 12/12/23 [ER]   1515 Patient's blood pressure improving with fluids in the emergency department, most recent 93/54 [ER]   1534 Blood pressure again down-trending, recent EF WNL,. Will give another liter bolus [ER]   1606 COVID and flu swab negative [ER]   1626 CT Head: IMPRESSION:  No acute intracranial abnormality. [ER]   1626 XR R femur: IMPRESSION:  1. No acute fracture.  2. Old distal femoral shaft fracture with solid bridging callus. [ER]   1626 CXR:    IMPRESSION:  No acute process. [ER]   1636 CT C spine: IMPRESSION:  1. No acute finding of the cervical spine.  2. Stable postsurgical changes with no complication.  3. Chronic degenerative change persists throughout. [ER]   1636 XR R knee: IMPRESSION:  1. No evidence of acute fracture.  2. Intramedullary andrey in the right femur. [ER]   1707 Patient is not hypoglycemic [ER]   1732 Ddimer 7.99 [ER]   1732 Lactate is 5.8.  Patient has received fluids. [ER]   1732 Significant delay in laboratory studies due to lab reporting hemolysis.  Still awaiting basic labs [ER]   1743 Repeat lactate drawn by lab is still elevated at 4.8.  Still awaiting rest of labs [ER]   1749 BNP elevated to 625 [ER]   1750 Procalcitonin mildly elevated at 0.19 [ER]   1828 Hyponatremia 132, hypokalemia to 2.7, hypomagnesemia to 1.3, hypochloremia 92.  Low bicarb at 13.  Anion gap of 27.  However glucose is 70.  Hypocalcemia to 5.9. [ER]   1828 Liver function testing shows malnourishment, alkaline phosphatase of 223, AST of 129 [ER]   1828 Ethanol level negative [ER]   1855 Blood gas shows no acidemia or hypercarbia [ER]   1935 , low suspicion for rhabdomyolysis [ER]   2111 UA shows no

## 2024-04-16 ENCOUNTER — APPOINTMENT (OUTPATIENT)
Dept: ULTRASOUND IMAGING | Age: 69
DRG: 438 | End: 2024-04-16
Payer: MEDICARE

## 2024-04-16 PROBLEM — R45.1 AGITATION: Status: ACTIVE | Noted: 2024-04-16

## 2024-04-16 PROBLEM — K85.90 PANCREATITIS, UNSPECIFIED PANCREATITIS TYPE: Status: ACTIVE | Noted: 2024-04-16

## 2024-04-16 LAB
AMMONIA PLAS-SCNC: 42 UMOL/L (ref 16–60)
ANION GAP SERPL CALCULATED.3IONS-SCNC: 23 MMOL/L (ref 3–16)
ANION GAP SERPL CALCULATED.3IONS-SCNC: 24 MMOL/L (ref 3–16)
BUN SERPL-MCNC: 19 MG/DL (ref 7–20)
BUN SERPL-MCNC: 20 MG/DL (ref 7–20)
CA-I BLD-SCNC: 0.85 MMOL/L (ref 1.12–1.32)
CALCIUM SERPL-MCNC: 7 MG/DL (ref 8.3–10.6)
CALCIUM SERPL-MCNC: 7.7 MG/DL (ref 8.3–10.6)
CHLORIDE SERPL-SCNC: 90 MMOL/L (ref 99–110)
CHLORIDE SERPL-SCNC: 91 MMOL/L (ref 99–110)
CO2 SERPL-SCNC: 18 MMOL/L (ref 21–32)
CO2 SERPL-SCNC: 18 MMOL/L (ref 21–32)
CREAT SERPL-MCNC: 1 MG/DL (ref 0.8–1.3)
CREAT SERPL-MCNC: 1 MG/DL (ref 0.8–1.3)
GFR SERPLBLD CREATININE-BSD FMLA CKD-EPI: 81 ML/MIN/{1.73_M2}
GFR SERPLBLD CREATININE-BSD FMLA CKD-EPI: 81 ML/MIN/{1.73_M2}
GLUCOSE BLD-MCNC: 152 MG/DL (ref 70–99)
GLUCOSE BLD-MCNC: 201 MG/DL (ref 70–99)
GLUCOSE BLD-MCNC: 54 MG/DL (ref 70–99)
GLUCOSE BLD-MCNC: 58 MG/DL (ref 70–99)
GLUCOSE BLD-MCNC: 62 MG/DL (ref 70–99)
GLUCOSE BLD-MCNC: 63 MG/DL (ref 70–99)
GLUCOSE BLD-MCNC: 73 MG/DL (ref 70–99)
GLUCOSE BLD-MCNC: 75 MG/DL (ref 70–99)
GLUCOSE BLD-MCNC: 82 MG/DL (ref 70–99)
GLUCOSE BLD-MCNC: 96 MG/DL (ref 70–99)
GLUCOSE SERPL-MCNC: 57 MG/DL (ref 70–99)
GLUCOSE SERPL-MCNC: 76 MG/DL (ref 70–99)
LACTATE BLDV-SCNC: 3.1 MMOL/L (ref 0.4–2)
MAGNESIUM SERPL-MCNC: 2.1 MG/DL (ref 1.8–2.4)
MAGNESIUM SERPL-MCNC: 2.2 MG/DL (ref 1.8–2.4)
PERFORMED ON: ABNORMAL
PERFORMED ON: NORMAL
PH BLDV: 7.44 [PH] (ref 7.35–7.45)
POTASSIUM SERPL-SCNC: 3.2 MMOL/L (ref 3.5–5.1)
POTASSIUM SERPL-SCNC: 3.6 MMOL/L (ref 3.5–5.1)
SODIUM SERPL-SCNC: 132 MMOL/L (ref 136–145)
SODIUM SERPL-SCNC: 132 MMOL/L (ref 136–145)
TRIGL SERPL-MCNC: 171 MG/DL (ref 0–150)

## 2024-04-16 PROCEDURE — 82746 ASSAY OF FOLIC ACID SERUM: CPT

## 2024-04-16 PROCEDURE — 83735 ASSAY OF MAGNESIUM: CPT

## 2024-04-16 PROCEDURE — 36415 COLL VENOUS BLD VENIPUNCTURE: CPT

## 2024-04-16 PROCEDURE — 84478 ASSAY OF TRIGLYCERIDES: CPT

## 2024-04-16 PROCEDURE — 6370000000 HC RX 637 (ALT 250 FOR IP): Performed by: INTERNAL MEDICINE

## 2024-04-16 PROCEDURE — 82140 ASSAY OF AMMONIA: CPT

## 2024-04-16 PROCEDURE — 2580000003 HC RX 258: Performed by: INTERNAL MEDICINE

## 2024-04-16 PROCEDURE — 82330 ASSAY OF CALCIUM: CPT

## 2024-04-16 PROCEDURE — 6360000002 HC RX W HCPCS: Performed by: INTERNAL MEDICINE

## 2024-04-16 PROCEDURE — 76705 ECHO EXAM OF ABDOMEN: CPT

## 2024-04-16 PROCEDURE — 6360000002 HC RX W HCPCS

## 2024-04-16 PROCEDURE — 2580000003 HC RX 258: Performed by: STUDENT IN AN ORGANIZED HEALTH CARE EDUCATION/TRAINING PROGRAM

## 2024-04-16 PROCEDURE — 6370000000 HC RX 637 (ALT 250 FOR IP)

## 2024-04-16 PROCEDURE — 2580000003 HC RX 258

## 2024-04-16 PROCEDURE — 80048 BASIC METABOLIC PNL TOTAL CA: CPT

## 2024-04-16 PROCEDURE — 99233 SBSQ HOSP IP/OBS HIGH 50: CPT | Performed by: INTERNAL MEDICINE

## 2024-04-16 PROCEDURE — 82607 VITAMIN B-12: CPT

## 2024-04-16 PROCEDURE — 83605 ASSAY OF LACTIC ACID: CPT

## 2024-04-16 PROCEDURE — 6360000002 HC RX W HCPCS: Performed by: STUDENT IN AN ORGANIZED HEALTH CARE EDUCATION/TRAINING PROGRAM

## 2024-04-16 PROCEDURE — 96367 TX/PROPH/DG ADDL SEQ IV INF: CPT

## 2024-04-16 PROCEDURE — 2500000003 HC RX 250 WO HCPCS: Performed by: INTERNAL MEDICINE

## 2024-04-16 PROCEDURE — 1200000000 HC SEMI PRIVATE

## 2024-04-16 RX ORDER — LORAZEPAM 1 MG/1
1 TABLET ORAL
Status: DISCONTINUED | OUTPATIENT
Start: 2024-04-16 | End: 2024-04-18

## 2024-04-16 RX ORDER — LORAZEPAM 2 MG/ML
3 INJECTION INTRAMUSCULAR
Status: DISCONTINUED | OUTPATIENT
Start: 2024-04-16 | End: 2024-04-18

## 2024-04-16 RX ORDER — TRAZODONE HYDROCHLORIDE 50 MG/1
75 TABLET ORAL NIGHTLY
Status: DISCONTINUED | OUTPATIENT
Start: 2024-04-16 | End: 2024-04-20

## 2024-04-16 RX ORDER — DEXTROSE MONOHYDRATE 25 G/50ML
25 INJECTION, SOLUTION INTRAVENOUS PRN
Status: DISCONTINUED | OUTPATIENT
Start: 2024-04-16 | End: 2024-04-16 | Stop reason: CLARIF

## 2024-04-16 RX ORDER — ACETAMINOPHEN 650 MG/1
650 SUPPOSITORY RECTAL EVERY 6 HOURS PRN
Status: DISCONTINUED | OUTPATIENT
Start: 2024-04-16 | End: 2024-04-24 | Stop reason: HOSPADM

## 2024-04-16 RX ORDER — DEXTROSE MONOHYDRATE 100 MG/ML
INJECTION, SOLUTION INTRAVENOUS CONTINUOUS PRN
Status: DISCONTINUED | OUTPATIENT
Start: 2024-04-16 | End: 2024-04-24 | Stop reason: HOSPADM

## 2024-04-16 RX ORDER — LORAZEPAM 2 MG/ML
1 INJECTION INTRAMUSCULAR
Status: DISCONTINUED | OUTPATIENT
Start: 2024-04-16 | End: 2024-04-18

## 2024-04-16 RX ORDER — HYDRALAZINE HYDROCHLORIDE 50 MG/1
50 TABLET, FILM COATED ORAL EVERY 8 HOURS SCHEDULED
Status: DISCONTINUED | OUTPATIENT
Start: 2024-04-16 | End: 2024-04-24 | Stop reason: HOSPADM

## 2024-04-16 RX ORDER — ONDANSETRON 4 MG/1
4 TABLET, ORALLY DISINTEGRATING ORAL EVERY 8 HOURS PRN
Status: DISCONTINUED | OUTPATIENT
Start: 2024-04-16 | End: 2024-04-16

## 2024-04-16 RX ORDER — LORAZEPAM 2 MG/1
2 TABLET ORAL
Status: DISCONTINUED | OUTPATIENT
Start: 2024-04-16 | End: 2024-04-18

## 2024-04-16 RX ORDER — PROCHLORPERAZINE EDISYLATE 5 MG/ML
10 INJECTION INTRAMUSCULAR; INTRAVENOUS EVERY 6 HOURS PRN
Status: DISCONTINUED | OUTPATIENT
Start: 2024-04-16 | End: 2024-04-24 | Stop reason: HOSPADM

## 2024-04-16 RX ORDER — GLUCAGON 1 MG/ML
1 KIT INJECTION PRN
Status: DISCONTINUED | OUTPATIENT
Start: 2024-04-16 | End: 2024-04-24 | Stop reason: HOSPADM

## 2024-04-16 RX ORDER — PANTOPRAZOLE SODIUM 40 MG/1
40 TABLET, DELAYED RELEASE ORAL
Status: DISCONTINUED | OUTPATIENT
Start: 2024-04-16 | End: 2024-04-24 | Stop reason: HOSPADM

## 2024-04-16 RX ORDER — POTASSIUM CHLORIDE 7.45 MG/ML
10 INJECTION INTRAVENOUS PRN
Status: DISCONTINUED | OUTPATIENT
Start: 2024-04-16 | End: 2024-04-16

## 2024-04-16 RX ORDER — ALBUTEROL SULFATE 90 UG/1
2 AEROSOL, METERED RESPIRATORY (INHALATION) 4 TIMES DAILY PRN
Status: DISCONTINUED | OUTPATIENT
Start: 2024-04-16 | End: 2024-04-24 | Stop reason: HOSPADM

## 2024-04-16 RX ORDER — DULOXETIN HYDROCHLORIDE 60 MG/1
60 CAPSULE, DELAYED RELEASE ORAL NIGHTLY
Status: DISCONTINUED | OUTPATIENT
Start: 2024-04-16 | End: 2024-04-21

## 2024-04-16 RX ORDER — SODIUM CHLORIDE 0.9 % (FLUSH) 0.9 %
5-40 SYRINGE (ML) INJECTION PRN
Status: DISCONTINUED | OUTPATIENT
Start: 2024-04-16 | End: 2024-04-24 | Stop reason: HOSPADM

## 2024-04-16 RX ORDER — GABAPENTIN 300 MG/1
600 CAPSULE ORAL 3 TIMES DAILY
Status: DISCONTINUED | OUTPATIENT
Start: 2024-04-16 | End: 2024-04-18

## 2024-04-16 RX ORDER — SODIUM CHLORIDE 0.9 % (FLUSH) 0.9 %
5-40 SYRINGE (ML) INJECTION EVERY 12 HOURS SCHEDULED
Status: DISCONTINUED | OUTPATIENT
Start: 2024-04-16 | End: 2024-04-17

## 2024-04-16 RX ORDER — MORPHINE SULFATE 4 MG/ML
4 INJECTION, SOLUTION INTRAMUSCULAR; INTRAVENOUS
Status: DISCONTINUED | OUTPATIENT
Start: 2024-04-16 | End: 2024-04-18

## 2024-04-16 RX ORDER — M-VIT,TX,IRON,MINS/CALC/FOLIC 27MG-0.4MG
1 TABLET ORAL DAILY
Status: DISCONTINUED | OUTPATIENT
Start: 2024-04-16 | End: 2024-04-24 | Stop reason: HOSPADM

## 2024-04-16 RX ORDER — MAGNESIUM SULFATE IN WATER 40 MG/ML
2000 INJECTION, SOLUTION INTRAVENOUS PRN
Status: DISCONTINUED | OUTPATIENT
Start: 2024-04-16 | End: 2024-04-16 | Stop reason: SDUPTHER

## 2024-04-16 RX ORDER — LANOLIN ALCOHOL/MO/W.PET/CERES
100 CREAM (GRAM) TOPICAL DAILY
Status: DISCONTINUED | OUTPATIENT
Start: 2024-04-16 | End: 2024-04-24 | Stop reason: HOSPADM

## 2024-04-16 RX ORDER — NICOTINE 21 MG/24HR
1 PATCH, TRANSDERMAL 24 HOURS TRANSDERMAL DAILY
Status: DISCONTINUED | OUTPATIENT
Start: 2024-04-16 | End: 2024-04-24 | Stop reason: HOSPADM

## 2024-04-16 RX ORDER — LORAZEPAM 2 MG/ML
2 INJECTION INTRAMUSCULAR ONCE
Status: COMPLETED | OUTPATIENT
Start: 2024-04-16 | End: 2024-04-16

## 2024-04-16 RX ORDER — CALCIUM GLUCONATE 20 MG/ML
2000 INJECTION, SOLUTION INTRAVENOUS ONCE
Status: COMPLETED | OUTPATIENT
Start: 2024-04-16 | End: 2024-04-16

## 2024-04-16 RX ORDER — POTASSIUM CHLORIDE 20 MEQ/1
20 TABLET, EXTENDED RELEASE ORAL DAILY
Status: DISCONTINUED | OUTPATIENT
Start: 2024-04-16 | End: 2024-04-24 | Stop reason: HOSPADM

## 2024-04-16 RX ORDER — POLYETHYLENE GLYCOL 3350 17 G/17G
17 POWDER, FOR SOLUTION ORAL DAILY PRN
Status: DISCONTINUED | OUTPATIENT
Start: 2024-04-16 | End: 2024-04-24 | Stop reason: HOSPADM

## 2024-04-16 RX ORDER — SODIUM CHLORIDE 9 MG/ML
INJECTION, SOLUTION INTRAVENOUS PRN
Status: DISCONTINUED | OUTPATIENT
Start: 2024-04-16 | End: 2024-04-24 | Stop reason: HOSPADM

## 2024-04-16 RX ORDER — ENOXAPARIN SODIUM 100 MG/ML
40 INJECTION SUBCUTANEOUS DAILY
Status: DISCONTINUED | OUTPATIENT
Start: 2024-04-16 | End: 2024-04-16

## 2024-04-16 RX ORDER — LORAZEPAM 2 MG/1
4 TABLET ORAL
Status: DISCONTINUED | OUTPATIENT
Start: 2024-04-16 | End: 2024-04-18

## 2024-04-16 RX ORDER — ACETAMINOPHEN 325 MG/1
650 TABLET ORAL EVERY 6 HOURS PRN
Status: DISCONTINUED | OUTPATIENT
Start: 2024-04-16 | End: 2024-04-24 | Stop reason: HOSPADM

## 2024-04-16 RX ORDER — LORAZEPAM 2 MG/ML
2 INJECTION INTRAMUSCULAR
Status: DISCONTINUED | OUTPATIENT
Start: 2024-04-16 | End: 2024-04-18

## 2024-04-16 RX ORDER — LORAZEPAM 2 MG/ML
4 INJECTION INTRAMUSCULAR
Status: DISCONTINUED | OUTPATIENT
Start: 2024-04-16 | End: 2024-04-18

## 2024-04-16 RX ORDER — CARVEDILOL 3.12 MG/1
3.12 TABLET ORAL 2 TIMES DAILY WITH MEALS
Status: DISCONTINUED | OUTPATIENT
Start: 2024-04-16 | End: 2024-04-24 | Stop reason: HOSPADM

## 2024-04-16 RX ORDER — POTASSIUM CHLORIDE 20 MEQ/1
40 TABLET, EXTENDED RELEASE ORAL PRN
Status: DISCONTINUED | OUTPATIENT
Start: 2024-04-16 | End: 2024-04-16

## 2024-04-16 RX ORDER — ONDANSETRON 2 MG/ML
4 INJECTION INTRAMUSCULAR; INTRAVENOUS EVERY 6 HOURS PRN
Status: DISCONTINUED | OUTPATIENT
Start: 2024-04-16 | End: 2024-04-16

## 2024-04-16 RX ORDER — LANOLIN ALCOHOL/MO/W.PET/CERES
400 CREAM (GRAM) TOPICAL DAILY
Status: DISCONTINUED | OUTPATIENT
Start: 2024-04-16 | End: 2024-04-24 | Stop reason: HOSPADM

## 2024-04-16 RX ORDER — POTASSIUM CHLORIDE 29.8 MG/ML
20 INJECTION INTRAVENOUS PRN
Status: DISCONTINUED | OUTPATIENT
Start: 2024-04-16 | End: 2024-04-24 | Stop reason: HOSPADM

## 2024-04-16 RX ORDER — NIFEDIPINE 30 MG/1
30 TABLET, EXTENDED RELEASE ORAL 2 TIMES DAILY
Status: DISCONTINUED | OUTPATIENT
Start: 2024-04-16 | End: 2024-04-21

## 2024-04-16 RX ORDER — SODIUM CHLORIDE 9 MG/ML
INJECTION, SOLUTION INTRAVENOUS PRN
Status: DISCONTINUED | OUTPATIENT
Start: 2024-04-16 | End: 2024-04-16 | Stop reason: SDUPTHER

## 2024-04-16 RX ORDER — MAGNESIUM SULFATE IN WATER 40 MG/ML
2000 INJECTION, SOLUTION INTRAVENOUS PRN
Status: DISCONTINUED | OUTPATIENT
Start: 2024-04-16 | End: 2024-04-24 | Stop reason: HOSPADM

## 2024-04-16 RX ORDER — POTASSIUM CHLORIDE 7.45 MG/ML
10 INJECTION INTRAVENOUS PRN
Status: DISCONTINUED | OUTPATIENT
Start: 2024-04-16 | End: 2024-04-24 | Stop reason: HOSPADM

## 2024-04-16 RX ORDER — MORPHINE SULFATE 2 MG/ML
2 INJECTION, SOLUTION INTRAMUSCULAR; INTRAVENOUS
Status: DISCONTINUED | OUTPATIENT
Start: 2024-04-16 | End: 2024-04-18

## 2024-04-16 RX ORDER — SODIUM CHLORIDE, SODIUM LACTATE, POTASSIUM CHLORIDE, CALCIUM CHLORIDE 600; 310; 30; 20 MG/100ML; MG/100ML; MG/100ML; MG/100ML
INJECTION, SOLUTION INTRAVENOUS CONTINUOUS
Status: ACTIVE | OUTPATIENT
Start: 2024-04-16 | End: 2024-04-18

## 2024-04-16 RX ADMIN — POTASSIUM CHLORIDE 20 MEQ: 1500 TABLET, EXTENDED RELEASE ORAL at 08:26

## 2024-04-16 RX ADMIN — CARVEDILOL 3.12 MG: 3.12 TABLET, FILM COATED ORAL at 08:26

## 2024-04-16 RX ADMIN — APIXABAN 5 MG: 5 TABLET, FILM COATED ORAL at 21:12

## 2024-04-16 RX ADMIN — SODIUM CHLORIDE, POTASSIUM CHLORIDE, SODIUM LACTATE AND CALCIUM CHLORIDE: 600; 310; 30; 20 INJECTION, SOLUTION INTRAVENOUS at 20:02

## 2024-04-16 RX ADMIN — POTASSIUM CHLORIDE 10 MEQ: 7.46 INJECTION, SOLUTION INTRAVENOUS at 02:37

## 2024-04-16 RX ADMIN — TRAZODONE HYDROCHLORIDE 75 MG: 50 TABLET ORAL at 21:12

## 2024-04-16 RX ADMIN — GABAPENTIN 600 MG: 300 CAPSULE ORAL at 21:12

## 2024-04-16 RX ADMIN — POTASSIUM CHLORIDE 10 MEQ: 7.46 INJECTION, SOLUTION INTRAVENOUS at 04:11

## 2024-04-16 RX ADMIN — GABAPENTIN 600 MG: 300 CAPSULE ORAL at 08:26

## 2024-04-16 RX ADMIN — SODIUM CHLORIDE, POTASSIUM CHLORIDE, SODIUM LACTATE AND CALCIUM CHLORIDE: 600; 310; 30; 20 INJECTION, SOLUTION INTRAVENOUS at 01:53

## 2024-04-16 RX ADMIN — PIPERACILLIN AND TAZOBACTAM 3375 MG: 3; .375 INJECTION, POWDER, FOR SOLUTION INTRAVENOUS at 00:01

## 2024-04-16 RX ADMIN — ENOXAPARIN SODIUM 40 MG: 100 INJECTION SUBCUTANEOUS at 08:26

## 2024-04-16 RX ADMIN — LORAZEPAM 1 MG: 2 INJECTION INTRAMUSCULAR; INTRAVENOUS at 13:28

## 2024-04-16 RX ADMIN — NIFEDIPINE 30 MG: 30 TABLET, FILM COATED, EXTENDED RELEASE ORAL at 01:57

## 2024-04-16 RX ADMIN — MORPHINE SULFATE 4 MG: 4 INJECTION, SOLUTION INTRAMUSCULAR; INTRAVENOUS at 01:53

## 2024-04-16 RX ADMIN — DULOXETINE HYDROCHLORIDE 60 MG: 60 CAPSULE, DELAYED RELEASE ORAL at 21:14

## 2024-04-16 RX ADMIN — NIFEDIPINE 30 MG: 30 TABLET, FILM COATED, EXTENDED RELEASE ORAL at 08:26

## 2024-04-16 RX ADMIN — LORAZEPAM 2 MG: 2 INJECTION INTRAMUSCULAR; INTRAVENOUS at 09:33

## 2024-04-16 RX ADMIN — CALCIUM GLUCONATE 2000 MG: 20 INJECTION, SOLUTION INTRAVENOUS at 02:43

## 2024-04-16 RX ADMIN — DEXTROSE MONOHYDRATE 250 ML: 10 INJECTION, SOLUTION INTRAVENOUS at 09:32

## 2024-04-16 RX ADMIN — Medication 16 G: at 08:27

## 2024-04-16 RX ADMIN — DEXTROSE MONOHYDRATE 125 ML: 10 INJECTION, SOLUTION INTRAVENOUS at 05:40

## 2024-04-16 RX ADMIN — HYDROMORPHONE HYDROCHLORIDE 0.5 MG: 1 INJECTION, SOLUTION INTRAMUSCULAR; INTRAVENOUS; SUBCUTANEOUS at 04:09

## 2024-04-16 RX ADMIN — DEXTROSE MONOHYDRATE 125 ML: 10 INJECTION, SOLUTION INTRAVENOUS at 06:21

## 2024-04-16 RX ADMIN — Medication 400 MG: at 08:26

## 2024-04-16 RX ADMIN — POTASSIUM CHLORIDE 10 MEQ: 7.46 INJECTION, SOLUTION INTRAVENOUS at 05:42

## 2024-04-16 ASSESSMENT — PAIN - FUNCTIONAL ASSESSMENT: PAIN_FUNCTIONAL_ASSESSMENT: PREVENTS OR INTERFERES SOME ACTIVE ACTIVITIES AND ADLS

## 2024-04-16 ASSESSMENT — PAIN DESCRIPTION - PAIN TYPE: TYPE: ACUTE PAIN

## 2024-04-16 ASSESSMENT — PAIN DESCRIPTION - LOCATION
LOCATION: RIB CAGE
LOCATION: CHEST;RIB CAGE
LOCATION: CHEST;BACK

## 2024-04-16 ASSESSMENT — PAIN DESCRIPTION - DESCRIPTORS
DESCRIPTORS: SHARP

## 2024-04-16 ASSESSMENT — PAIN DESCRIPTION - ONSET: ONSET: ON-GOING

## 2024-04-16 ASSESSMENT — PAIN DESCRIPTION - ORIENTATION
ORIENTATION: MID
ORIENTATION: ANTERIOR;POSTERIOR
ORIENTATION: UPPER

## 2024-04-16 ASSESSMENT — PAIN DESCRIPTION - FREQUENCY: FREQUENCY: CONTINUOUS

## 2024-04-16 ASSESSMENT — PAIN SCALES - GENERAL
PAINLEVEL_OUTOF10: 10
PAINLEVEL_OUTOF10: 10
PAINLEVEL_OUTOF10: 9

## 2024-04-16 NOTE — FLOWSHEET NOTE
04/16/24 0244   Vital Signs   Temp 97.6 °F (36.4 °C)   Temp Source Oral   Pulse 80   Heart Rate Source Monitor   Respirations 20   BP (!) 90/53   MAP (Calculated) 65   Oxygen Therapy   SpO2 99 %   O2 Device None (Room air)     Vitals remain as above. Pt still c/o pain but now describing more towards rib cage. MD notified of morphine not working, pt requesting dilaudid. No new orders per MD at this time.

## 2024-04-16 NOTE — H&P
97.7 °F (36.5 °C) (Oral)   Resp 18   Ht 1.803 m (5' 11\")   Wt 89.8 kg (198 lb)   SpO2 98%   BMI 27.62 kg/m²     General appearance:  Awake, alert, no apparent distress  HEENT:  Normocephalic, atraumatic   Neck: Supple  Respiratory:  Clear to auscultation bilaterally   Cardiovascular:  Regular rate and rhythm without murmurs, rubs or gallops.   Abdomen: Soft, epigastric tenderness on palpation normal bowel sounds.  Extremities:  No clubbing, cyanosis, or edema bilaterally.    Skin: Multiple skin laceration upper and lower extremities, laceration over his skull.  Neurologic:   grossly non-focal. Alert and oriented x 3. Normal speech.  Psychiatric:  Thought content appropriate, normal insight.    Labs:   CBC   Recent Labs     04/15/24  1500   WBC 6.1   HGB 13.0*   HCT 38.9*   *        RENAL  Recent Labs     04/15/24  1657   *   K 2.7*   CL 92*   CO2 13*   BUN 15   CREATININE 1.0       LFTS  Recent Labs     04/15/24  1657   *   ALT 28   BILITOT 2.0*   ALKPHOS 223*       COAG  No results for input(s): \"INR\" in the last 72 hours.    CARDIAC ENZYMES  No results for input(s): \"TROPONINI\" in the last 72 hours.    LIPIDS  Cholesterol, Total   Date/Time Value Ref Range Status   01/06/2023 06:58  0 - 199 mg/dL Final     Triglycerides   Date/Time Value Ref Range Status   01/06/2023 06:58  (H) 0 - 150 mg/dL Final     HDL   Date/Time Value Ref Range Status   01/06/2023 06:58 AM 32 (L) 40 - 60 mg/dL Final     Comment:     An HDL cholesterol less than 40 mg/dL is low and  constitutes a coronary heart disease risk factor.  An HDL cholesterol greater than 60 mg/dL is a  negative risk factor for coronary heart disease.       LDL Calculated   Date/Time Value Ref Range Status   01/06/2023 06:58 AM 98 <100 mg/dL Final         Radiology:     CT ABDOMEN PELVIS W IV CONTRAST Additional Contrast? None   Final Result   CTA CHEST:      1. Negative for pulmonary embolus.   2. Small right pleural effusion and

## 2024-04-16 NOTE — FLOWSHEET NOTE
04/16/24 1315   Vital Signs   Temp 98 °F (36.7 °C)   Temp Source Oral   Pulse 72   Heart Rate Source Monitor   Respirations 20   /64   MAP (Calculated) 84   Care Plan - Pain Goals   Verbalizes/displays adequate comfort level or baseline comfort level Encourage patient to monitor pain and request assistance;Assess pain using appropriate pain scale;Administer analgesics based on type and severity of pain and evaluate response;Implement non-pharmacological measures as appropriate and evaluate response;Consider cultural and social influences on pain and pain management;Notify Licensed Independent Practitioner if interventions unsuccessful or patient reports new pain   Oxygen Therapy   SpO2 95 %   O2 Device None (Room air)     Patient is awake, sitting up in bed, wanting to know where someone is hunting and when asked where he is asked he stated [place], Patient NPO for ultrasound today. VSS. Skin pink warm and dry. CIWA 8, ativan given as ordered. Son called and stated is Dad drinks a lot not sure how much but just emptied his trash and there were quite a few of beer cans there. Patient confused, sitter at bedside.

## 2024-04-16 NOTE — FLOWSHEET NOTE
04/16/24 0757   Vital Signs   Temp 97.8 °F (36.6 °C)   Temp Source Oral   Pulse 80   Heart Rate Source Monitor   Respirations 16   /80   MAP (Calculated) 95   BP Location Right upper arm   BP Method Automatic   Patient Position Semi cora   Pain Assessment   Pain Assessment None - Denies Pain   Care Plan - Pain Goals   Verbalizes/displays adequate comfort level or baseline comfort level Encourage patient to monitor pain and request assistance;Assess pain using appropriate pain scale;Administer analgesics based on type and severity of pain and evaluate response;Implement non-pharmacological measures as appropriate and evaluate response;Consider cultural and social influences on pain and pain management;Notify Licensed Independent Practitioner if interventions unsuccessful or patient reports new pain   Oxygen Therapy   SpO2 96 %   O2 Device None (Room air)     Patient sitting up in bed, confused to situation, FSBS 63 medicated as ordered. Patient is asymptomatic. Dr. Swain aware. Will continue to monitor. Bed in low position, call bell and bedside table within reach. Bed alarm on. Telesitter in room

## 2024-04-16 NOTE — FLOWSHEET NOTE
04/16/24 0102   Vital Signs   Temp 97.7 °F (36.5 °C)   Temp Source Oral   Pulse 78   Heart Rate Source Monitor   Respirations 22   /80   MAP (Calculated) 94   BP Location Left upper arm   BP Method Automatic   Patient Position Semi fowlers   Pain Assessment   Pain Assessment 0-10   Pain Level 10   Patient's Stated Pain Goal 0 - No pain   Pain Location Rib cage   Pain Orientation Anterior;Posterior   Pain Descriptors Sharp   Functional Pain Assessment Prevents or interferes some active activities and ADLs   Pain Type Acute pain   Pain Frequency Continuous   Pain Onset On-going   Oxygen Therapy   SpO2 100 %   O2 Device None (Room air)     Pt moved to room 19 in ER as ER hold. Orders released. Updated on plan of care. Bed bath with soap and water for caked blood from lacerations to right head and left hand. Urinal at bedside. MD messaged for PRN pain medication as pt reporting above pain to rib cage. New orders for morphine

## 2024-04-17 ENCOUNTER — APPOINTMENT (OUTPATIENT)
Dept: GENERAL RADIOLOGY | Age: 69
DRG: 438 | End: 2024-04-17
Payer: MEDICARE

## 2024-04-17 LAB
ALBUMIN SERPL-MCNC: 2.9 G/DL (ref 3.4–5)
ALBUMIN/GLOB SERPL: 0.9 {RATIO} (ref 1.1–2.2)
ALP SERPL-CCNC: 254 U/L (ref 40–129)
ALT SERPL-CCNC: 39 U/L (ref 10–40)
ANION GAP SERPL CALCULATED.3IONS-SCNC: 13 MMOL/L (ref 3–16)
AST SERPL-CCNC: 217 U/L (ref 15–37)
BASOPHILS # BLD: 0 K/UL (ref 0–0.2)
BASOPHILS NFR BLD: 0.3 %
BILIRUB SERPL-MCNC: 1.4 MG/DL (ref 0–1)
BUN SERPL-MCNC: 18 MG/DL (ref 7–20)
CALCIUM SERPL-MCNC: 7.6 MG/DL (ref 8.3–10.6)
CHLORIDE SERPL-SCNC: 96 MMOL/L (ref 99–110)
CO2 SERPL-SCNC: 24 MMOL/L (ref 21–32)
CREAT SERPL-MCNC: 0.7 MG/DL (ref 0.8–1.3)
DEPRECATED RDW RBC AUTO: 20.7 % (ref 12.4–15.4)
EOSINOPHIL # BLD: 0 K/UL (ref 0–0.6)
EOSINOPHIL NFR BLD: 0.1 %
FOLATE SERPL-MCNC: 4.36 NG/ML (ref 4.78–24.2)
GFR SERPLBLD CREATININE-BSD FMLA CKD-EPI: >90 ML/MIN/{1.73_M2}
GLUCOSE BLD-MCNC: 101 MG/DL (ref 70–99)
GLUCOSE BLD-MCNC: 103 MG/DL (ref 70–99)
GLUCOSE BLD-MCNC: 121 MG/DL (ref 70–99)
GLUCOSE BLD-MCNC: 129 MG/DL (ref 70–99)
GLUCOSE BLD-MCNC: 148 MG/DL (ref 70–99)
GLUCOSE SERPL-MCNC: 118 MG/DL (ref 70–99)
HCT VFR BLD AUTO: 35.7 % (ref 40.5–52.5)
HGB BLD-MCNC: 11.7 G/DL (ref 13.5–17.5)
LYMPHOCYTES # BLD: 0.4 K/UL (ref 1–5.1)
LYMPHOCYTES NFR BLD: 4.8 %
MAGNESIUM SERPL-MCNC: 1.8 MG/DL (ref 1.8–2.4)
MCH RBC QN AUTO: 29.5 PG (ref 26–34)
MCHC RBC AUTO-ENTMCNC: 32.8 G/DL (ref 31–36)
MCV RBC AUTO: 89.9 FL (ref 80–100)
MONOCYTES # BLD: 0.6 K/UL (ref 0–1.3)
MONOCYTES NFR BLD: 6.5 %
NEUTROPHILS # BLD: 8 K/UL (ref 1.7–7.7)
NEUTROPHILS NFR BLD: 88.3 %
PERFORMED ON: ABNORMAL
PLATELET # BLD AUTO: 101 K/UL (ref 135–450)
PMV BLD AUTO: 9.8 FL (ref 5–10.5)
POTASSIUM SERPL-SCNC: 3 MMOL/L (ref 3.5–5.1)
PROT SERPL-MCNC: 6 G/DL (ref 6.4–8.2)
RBC # BLD AUTO: 3.97 M/UL (ref 4.2–5.9)
SODIUM SERPL-SCNC: 133 MMOL/L (ref 136–145)
VIT B12 SERPL-MCNC: 1212 PG/ML (ref 211–911)
WBC # BLD AUTO: 9 K/UL (ref 4–11)

## 2024-04-17 PROCEDURE — 97530 THERAPEUTIC ACTIVITIES: CPT

## 2024-04-17 PROCEDURE — 80053 COMPREHEN METABOLIC PANEL: CPT

## 2024-04-17 PROCEDURE — 6370000000 HC RX 637 (ALT 250 FOR IP): Performed by: INTERNAL MEDICINE

## 2024-04-17 PROCEDURE — 99232 SBSQ HOSP IP/OBS MODERATE 35: CPT | Performed by: INTERNAL MEDICINE

## 2024-04-17 PROCEDURE — 1200000000 HC SEMI PRIVATE

## 2024-04-17 PROCEDURE — 6370000000 HC RX 637 (ALT 250 FOR IP)

## 2024-04-17 PROCEDURE — 85025 COMPLETE CBC W/AUTO DIFF WBC: CPT

## 2024-04-17 PROCEDURE — 97166 OT EVAL MOD COMPLEX 45 MIN: CPT

## 2024-04-17 PROCEDURE — 2580000003 HC RX 258

## 2024-04-17 PROCEDURE — 6360000002 HC RX W HCPCS: Performed by: INTERNAL MEDICINE

## 2024-04-17 PROCEDURE — 36415 COLL VENOUS BLD VENIPUNCTURE: CPT

## 2024-04-17 PROCEDURE — 6370000000 HC RX 637 (ALT 250 FOR IP): Performed by: STUDENT IN AN ORGANIZED HEALTH CARE EDUCATION/TRAINING PROGRAM

## 2024-04-17 PROCEDURE — 51798 US URINE CAPACITY MEASURE: CPT

## 2024-04-17 PROCEDURE — 71045 X-RAY EXAM CHEST 1 VIEW: CPT

## 2024-04-17 PROCEDURE — 83735 ASSAY OF MAGNESIUM: CPT

## 2024-04-17 PROCEDURE — 97162 PT EVAL MOD COMPLEX 30 MIN: CPT

## 2024-04-17 RX ORDER — LIDOCAINE 4 G/G
1 PATCH TOPICAL DAILY
Status: DISCONTINUED | OUTPATIENT
Start: 2024-04-17 | End: 2024-04-24 | Stop reason: HOSPADM

## 2024-04-17 RX ORDER — OXYCODONE HYDROCHLORIDE 5 MG/1
5 TABLET ORAL ONCE
Status: COMPLETED | OUTPATIENT
Start: 2024-04-17 | End: 2024-04-17

## 2024-04-17 RX ADMIN — GABAPENTIN 600 MG: 300 CAPSULE ORAL at 09:22

## 2024-04-17 RX ADMIN — Medication 100 MG: at 09:22

## 2024-04-17 RX ADMIN — PANTOPRAZOLE SODIUM 40 MG: 40 TABLET, DELAYED RELEASE ORAL at 05:14

## 2024-04-17 RX ADMIN — CARVEDILOL 3.12 MG: 3.12 TABLET, FILM COATED ORAL at 09:22

## 2024-04-17 RX ADMIN — APIXABAN 5 MG: 5 TABLET, FILM COATED ORAL at 09:22

## 2024-04-17 RX ADMIN — Medication 10 ML: at 09:22

## 2024-04-17 RX ADMIN — Medication 400 MG: at 09:22

## 2024-04-17 RX ADMIN — OXYCODONE 5 MG: 5 TABLET ORAL at 05:14

## 2024-04-17 RX ADMIN — Medication 1 TABLET: at 09:22

## 2024-04-17 RX ADMIN — POTASSIUM CHLORIDE 20 MEQ: 1500 TABLET, EXTENDED RELEASE ORAL at 09:22

## 2024-04-17 RX ADMIN — SODIUM CHLORIDE, POTASSIUM CHLORIDE, SODIUM LACTATE AND CALCIUM CHLORIDE: 600; 310; 30; 20 INJECTION, SOLUTION INTRAVENOUS at 16:08

## 2024-04-17 RX ADMIN — MORPHINE SULFATE 2 MG: 2 INJECTION, SOLUTION INTRAMUSCULAR; INTRAVENOUS at 02:03

## 2024-04-17 RX ADMIN — ACETAMINOPHEN 650 MG: 325 TABLET ORAL at 04:50

## 2024-04-17 RX ADMIN — SODIUM CHLORIDE, POTASSIUM CHLORIDE, SODIUM LACTATE AND CALCIUM CHLORIDE: 600; 310; 30; 20 INJECTION, SOLUTION INTRAVENOUS at 06:29

## 2024-04-17 ASSESSMENT — PAIN SCALES - GENERAL
PAINLEVEL_OUTOF10: 0
PAINLEVEL_OUTOF10: 8
PAINLEVEL_OUTOF10: 7
PAINLEVEL_OUTOF10: 3
PAINLEVEL_OUTOF10: 6

## 2024-04-17 ASSESSMENT — PAIN - FUNCTIONAL ASSESSMENT
PAIN_FUNCTIONAL_ASSESSMENT: PREVENTS OR INTERFERES SOME ACTIVE ACTIVITIES AND ADLS
PAIN_FUNCTIONAL_ASSESSMENT: PREVENTS OR INTERFERES SOME ACTIVE ACTIVITIES AND ADLS

## 2024-04-17 ASSESSMENT — PAIN DESCRIPTION - DESCRIPTORS
DESCRIPTORS: SHARP

## 2024-04-17 ASSESSMENT — PAIN DESCRIPTION - PAIN TYPE
TYPE: ACUTE PAIN
TYPE: ACUTE PAIN

## 2024-04-17 ASSESSMENT — PAIN DESCRIPTION - LOCATION
LOCATION: RIB CAGE

## 2024-04-17 ASSESSMENT — PAIN DESCRIPTION - ORIENTATION
ORIENTATION: UPPER
ORIENTATION: UPPER

## 2024-04-17 ASSESSMENT — PAIN DESCRIPTION - DIRECTION: RADIATING_TOWARDS: DENIES

## 2024-04-17 ASSESSMENT — PAIN DESCRIPTION - FREQUENCY: FREQUENCY: INTERMITTENT

## 2024-04-17 ASSESSMENT — PAIN DESCRIPTION - ONSET: ONSET: ON-GOING

## 2024-04-17 NOTE — SIGNIFICANT EVENT
Patient sitter called out stating the patient was choking.    RN presented to room, instructed sitter to press staff assist and called for help. Heimlich initiated, after a couple compressions, the patient utter \"please stop\" in soft tone. RN stopped the meneuver, instructed a nasal cannula to be place. Patient was 80% on RA afterwards. On 3l, the patient was 93%.     MD notified, orders for STAT chest xray and SLP and NPO orders placed.     Patient instructed on updated plan of care and was in agreeance.

## 2024-04-17 NOTE — CARE COORDINATION
CM attempted to do assessment and patient not responding to questions. CM left  with emergency contact for call back

## 2024-04-17 NOTE — FLOWSHEET NOTE
04/17/24 0416   Vital Signs   Pulse 87   Heart Rate Source Monitor   BP 99/65   MAP (Calculated) 76   BP Location Left upper arm   BP Method Automatic   Patient Position Semi fowlers   Pain Assessment   Pain Assessment 0-10   Pain Level 7   Pain Location Rib cage   Pain Orientation Upper   Pain Descriptors Sharp   Functional Pain Assessment Prevents or interferes some active activities and ADLs   Pain Type Acute pain     Pt states he has rib pain as shown above. The PRN Morphine was not given due to Pt BP has been trending down and is now 99/65. MD made aware of his pain and his request for something for pain.

## 2024-04-18 ENCOUNTER — APPOINTMENT (OUTPATIENT)
Dept: GENERAL RADIOLOGY | Age: 69
DRG: 438 | End: 2024-04-18
Payer: MEDICARE

## 2024-04-18 PROBLEM — J69.0 ASPIRATION PNEUMONIA (HCC): Status: ACTIVE | Noted: 2024-04-18

## 2024-04-18 LAB
ALBUMIN SERPL-MCNC: 2.7 G/DL (ref 3.4–5)
ALBUMIN/GLOB SERPL: 0.8 {RATIO} (ref 1.1–2.2)
ALP SERPL-CCNC: 253 U/L (ref 40–129)
ALT SERPL-CCNC: 38 U/L (ref 10–40)
ANION GAP SERPL CALCULATED.3IONS-SCNC: 9 MMOL/L (ref 3–16)
AST SERPL-CCNC: 180 U/L (ref 15–37)
BASOPHILS # BLD: 0.1 K/UL (ref 0–0.2)
BASOPHILS NFR BLD: 1.7 %
BILIRUB SERPL-MCNC: 1.5 MG/DL (ref 0–1)
BUN SERPL-MCNC: 18 MG/DL (ref 7–20)
CALCIUM SERPL-MCNC: 7.6 MG/DL (ref 8.3–10.6)
CHLORIDE SERPL-SCNC: 99 MMOL/L (ref 99–110)
CO2 SERPL-SCNC: 27 MMOL/L (ref 21–32)
CREAT SERPL-MCNC: <0.5 MG/DL (ref 0.8–1.3)
DEPRECATED RDW RBC AUTO: 20.9 % (ref 12.4–15.4)
EOSINOPHIL # BLD: 0 K/UL (ref 0–0.6)
EOSINOPHIL NFR BLD: 0.3 %
GFR SERPLBLD CREATININE-BSD FMLA CKD-EPI: >90 ML/MIN/{1.73_M2}
GLUCOSE BLD-MCNC: 114 MG/DL (ref 70–99)
GLUCOSE BLD-MCNC: 63 MG/DL (ref 70–99)
GLUCOSE BLD-MCNC: 70 MG/DL (ref 70–99)
GLUCOSE BLD-MCNC: 85 MG/DL (ref 70–99)
GLUCOSE BLD-MCNC: 91 MG/DL (ref 70–99)
GLUCOSE SERPL-MCNC: 68 MG/DL (ref 70–99)
HCT VFR BLD AUTO: 34.7 % (ref 40.5–52.5)
HGB BLD-MCNC: 11.5 G/DL (ref 13.5–17.5)
LYMPHOCYTES # BLD: 0.5 K/UL (ref 1–5.1)
LYMPHOCYTES NFR BLD: 8.8 %
MAGNESIUM SERPL-MCNC: 1.7 MG/DL (ref 1.8–2.4)
MCH RBC QN AUTO: 30.1 PG (ref 26–34)
MCHC RBC AUTO-ENTMCNC: 33.2 G/DL (ref 31–36)
MCV RBC AUTO: 90.6 FL (ref 80–100)
MONOCYTES # BLD: 0.6 K/UL (ref 0–1.3)
MONOCYTES NFR BLD: 9.1 %
NEUTROPHILS # BLD: 4.9 K/UL (ref 1.7–7.7)
NEUTROPHILS NFR BLD: 80.1 %
PERFORMED ON: ABNORMAL
PERFORMED ON: ABNORMAL
PERFORMED ON: NORMAL
PLATELET # BLD AUTO: 79 K/UL (ref 135–450)
PMV BLD AUTO: 9.4 FL (ref 5–10.5)
POTASSIUM SERPL-SCNC: 3.1 MMOL/L (ref 3.5–5.1)
PROT SERPL-MCNC: 6 G/DL (ref 6.4–8.2)
RBC # BLD AUTO: 3.83 M/UL (ref 4.2–5.9)
SODIUM SERPL-SCNC: 135 MMOL/L (ref 136–145)
WBC # BLD AUTO: 6.1 K/UL (ref 4–11)

## 2024-04-18 PROCEDURE — 2580000003 HC RX 258: Performed by: STUDENT IN AN ORGANIZED HEALTH CARE EDUCATION/TRAINING PROGRAM

## 2024-04-18 PROCEDURE — 6370000000 HC RX 637 (ALT 250 FOR IP): Performed by: STUDENT IN AN ORGANIZED HEALTH CARE EDUCATION/TRAINING PROGRAM

## 2024-04-18 PROCEDURE — 6370000000 HC RX 637 (ALT 250 FOR IP): Performed by: INTERNAL MEDICINE

## 2024-04-18 PROCEDURE — 2580000003 HC RX 258

## 2024-04-18 PROCEDURE — 92526 ORAL FUNCTION THERAPY: CPT

## 2024-04-18 PROCEDURE — 6360000002 HC RX W HCPCS: Performed by: INTERNAL MEDICINE

## 2024-04-18 PROCEDURE — 36415 COLL VENOUS BLD VENIPUNCTURE: CPT

## 2024-04-18 PROCEDURE — 92610 EVALUATE SWALLOWING FUNCTION: CPT

## 2024-04-18 PROCEDURE — 2580000003 HC RX 258: Performed by: INTERNAL MEDICINE

## 2024-04-18 PROCEDURE — 1200000000 HC SEMI PRIVATE

## 2024-04-18 PROCEDURE — 85025 COMPLETE CBC W/AUTO DIFF WBC: CPT

## 2024-04-18 PROCEDURE — 6370000000 HC RX 637 (ALT 250 FOR IP)

## 2024-04-18 PROCEDURE — 92611 MOTION FLUOROSCOPY/SWALLOW: CPT

## 2024-04-18 PROCEDURE — 83735 ASSAY OF MAGNESIUM: CPT

## 2024-04-18 PROCEDURE — 80053 COMPREHEN METABOLIC PANEL: CPT

## 2024-04-18 PROCEDURE — 99233 SBSQ HOSP IP/OBS HIGH 50: CPT | Performed by: INTERNAL MEDICINE

## 2024-04-18 PROCEDURE — 74230 X-RAY XM SWLNG FUNCJ C+: CPT

## 2024-04-18 RX ORDER — GABAPENTIN 300 MG/1
900 CAPSULE ORAL 3 TIMES DAILY
Status: DISCONTINUED | OUTPATIENT
Start: 2024-04-18 | End: 2024-04-20

## 2024-04-18 RX ORDER — DEXTROSE AND SODIUM CHLORIDE 5; .45 G/100ML; G/100ML
INJECTION, SOLUTION INTRAVENOUS CONTINUOUS
Status: DISCONTINUED | OUTPATIENT
Start: 2024-04-18 | End: 2024-04-21

## 2024-04-18 RX ADMIN — DULOXETINE HYDROCHLORIDE 60 MG: 60 CAPSULE, DELAYED RELEASE ORAL at 22:36

## 2024-04-18 RX ADMIN — DEXTROSE AND SODIUM CHLORIDE: 5; 450 INJECTION, SOLUTION INTRAVENOUS at 12:12

## 2024-04-18 RX ADMIN — SODIUM CHLORIDE: 9 INJECTION, SOLUTION INTRAVENOUS at 10:05

## 2024-04-18 RX ADMIN — GABAPENTIN 900 MG: 300 CAPSULE ORAL at 22:36

## 2024-04-18 RX ADMIN — HYDROMORPHONE HYDROCHLORIDE 0.5 MG: 1 INJECTION, SOLUTION INTRAMUSCULAR; INTRAVENOUS; SUBCUTANEOUS at 16:12

## 2024-04-18 RX ADMIN — APIXABAN 5 MG: 5 TABLET, FILM COATED ORAL at 22:36

## 2024-04-18 RX ADMIN — HYDROMORPHONE HYDROCHLORIDE 0.5 MG: 1 INJECTION, SOLUTION INTRAMUSCULAR; INTRAVENOUS; SUBCUTANEOUS at 23:29

## 2024-04-18 RX ADMIN — PIPERACILLIN AND TAZOBACTAM 4500 MG: 4; .5 INJECTION, POWDER, LYOPHILIZED, FOR SOLUTION INTRAVENOUS at 10:06

## 2024-04-18 RX ADMIN — MORPHINE SULFATE 2 MG: 2 INJECTION, SOLUTION INTRAMUSCULAR; INTRAVENOUS at 09:13

## 2024-04-18 RX ADMIN — Medication 10 ML: at 09:15

## 2024-04-18 RX ADMIN — POTASSIUM CHLORIDE 10 MEQ: 7.46 INJECTION, SOLUTION INTRAVENOUS at 11:42

## 2024-04-18 RX ADMIN — HYDROMORPHONE HYDROCHLORIDE 0.5 MG: 1 INJECTION, SOLUTION INTRAMUSCULAR; INTRAVENOUS; SUBCUTANEOUS at 12:45

## 2024-04-18 RX ADMIN — POTASSIUM CHLORIDE 10 MEQ: 7.46 INJECTION, SOLUTION INTRAVENOUS at 12:59

## 2024-04-18 RX ADMIN — PIPERACILLIN AND TAZOBACTAM 3375 MG: 3; .375 INJECTION, POWDER, LYOPHILIZED, FOR SOLUTION INTRAVENOUS at 22:45

## 2024-04-18 RX ADMIN — SODIUM CHLORIDE: 9 INJECTION, SOLUTION INTRAVENOUS at 10:03

## 2024-04-18 RX ADMIN — POTASSIUM CHLORIDE 10 MEQ: 7.46 INJECTION, SOLUTION INTRAVENOUS at 14:16

## 2024-04-18 RX ADMIN — PIPERACILLIN AND TAZOBACTAM 3375 MG: 3; .375 INJECTION, POWDER, LYOPHILIZED, FOR SOLUTION INTRAVENOUS at 15:12

## 2024-04-18 RX ADMIN — CARVEDILOL 3.12 MG: 3.12 TABLET, FILM COATED ORAL at 17:35

## 2024-04-18 RX ADMIN — POTASSIUM CHLORIDE 10 MEQ: 7.46 INJECTION, SOLUTION INTRAVENOUS at 10:04

## 2024-04-18 ASSESSMENT — PAIN DESCRIPTION - PAIN TYPE: TYPE: CHRONIC PAIN

## 2024-04-18 ASSESSMENT — PAIN SCALES - GENERAL
PAINLEVEL_OUTOF10: 7
PAINLEVEL_OUTOF10: 8
PAINLEVEL_OUTOF10: 8
PAINLEVEL_OUTOF10: 4
PAINLEVEL_OUTOF10: 7
PAINLEVEL_OUTOF10: 4
PAINLEVEL_OUTOF10: 7
PAINLEVEL_OUTOF10: 7

## 2024-04-18 ASSESSMENT — PAIN SCALES - WONG BAKER: WONGBAKER_NUMERICALRESPONSE: HURTS A LITTLE BIT

## 2024-04-18 ASSESSMENT — PAIN DESCRIPTION - DESCRIPTORS
DESCRIPTORS: ACHING;SORE
DESCRIPTORS: ACHING
DESCRIPTORS: ACHING;SORE
DESCRIPTORS: ACHING;SORE
DESCRIPTORS: ACHING;CRAMPING;DISCOMFORT

## 2024-04-18 ASSESSMENT — PAIN DESCRIPTION - ORIENTATION
ORIENTATION: RIGHT;LEFT;MID;OUTER
ORIENTATION: RIGHT;LEFT
ORIENTATION: LEFT
ORIENTATION: RIGHT;LEFT;MID
ORIENTATION: RIGHT;LOWER

## 2024-04-18 ASSESSMENT — PAIN DESCRIPTION - LOCATION
LOCATION: ABDOMEN;BACK;CHEST
LOCATION: ABDOMEN;BACK
LOCATION: BACK
LOCATION: ABDOMEN;BACK;CHEST
LOCATION: BACK;ABDOMEN

## 2024-04-18 ASSESSMENT — PAIN DESCRIPTION - ONSET: ONSET: ON-GOING

## 2024-04-18 ASSESSMENT — PAIN DESCRIPTION - FREQUENCY: FREQUENCY: CONTINUOUS

## 2024-04-18 NOTE — FLOWSHEET NOTE
04/18/24 0843   Vital Signs   Temp 97.5 °F (36.4 °C)   Temp Source Oral   Pulse 81   Respirations 18   /73   MAP (Calculated) 88   BP Location Left upper arm   BP Method Automatic   Patient Position Semi fowlers   Oxygen Therapy   SpO2 97 %   O2 Device Nasal cannula   O2 Flow Rate (L/min) 2 L/min     AM assessment completed, see flow sheet. Pt is alert and oriented at this time. Vital signs are WNL. Respirations are unlablored, shallow r/t pain caused with deeper breathing. Pain addressed, pt requesting different pain medication, hospitalist notified. CIWA 5. No complaints voiced. Pt denies needs at this time. SR up x 2, and bed in low position. Call light is within reach.

## 2024-04-18 NOTE — PROCEDURES
does not clear via cup and straw.  Impaired laryngeal vestibule closure   Impaired laryngeal elevation  Impaired laryngeal elevation resulting in incomplete duration and extent of UES opening as evidenced by residue in pyriforms post-swallow  Impaired tracheal/laryngeal sensation as evidenced by silent nature of aspiration    LARYNGEAL PENETRATION/ASPIRATION       Aspiration Scale   []  1 Material does not enter the airway   []  2 Material enters the airway, remains above the vocal folds, and is ejected from the airway   []  3 Material enters the airway, remains above the vocal folds, and is not ejected from the airway   []  4 Material enters the airway, contacts the vocal folds, an is ejected from the airway   []  5 Material enters the airway, contacts the vocal folds, and is not ejected from the airway   []  6 Material enters the airway, passes below the vocal folds and is ejected into the larynx or out of the airway   []  7 Material enters the airway, passes below the vocal folds, and is not ejected from the trachea despite effort   [x]  8 Material enters the airway, passes below the vocal folds, and no effort is made to eject.     Assessment: moderate oropharyngeal dysphagia, likely acute-on-chronic related to hx of dysphagia, reduced physical mobility, increased O2 demands, hx of CVA, and co-morbidities Swallow safety is impaired; swallow efficiency is preserved  Pt appears to be at moderate risk for aspiration PNA, and moderate risk for malnutrition/dehydration. Diet modification is not warranted . Swallow prognosis is fair given his age, good physical mobility, poor respiratory status, increased need for oxygen. Pt appears to be a fair candidate for swallow rehabilitation    Patient presents as high aspiration risk and high PNA / adverse pulmonary event risk given the following factors via BOLUS Framework (TENZIN Monson. & Jaciel, A.H. (2021):  Impaired respiratory status / weak cough  hx GERD / GI disease

## 2024-04-19 LAB
ANION GAP SERPL CALCULATED.3IONS-SCNC: 7 MMOL/L (ref 3–16)
BUN SERPL-MCNC: 13 MG/DL (ref 7–20)
CALCIUM SERPL-MCNC: 7.7 MG/DL (ref 8.3–10.6)
CHLORIDE SERPL-SCNC: 99 MMOL/L (ref 99–110)
CO2 SERPL-SCNC: 29 MMOL/L (ref 21–32)
CREAT SERPL-MCNC: <0.5 MG/DL (ref 0.8–1.3)
GFR SERPLBLD CREATININE-BSD FMLA CKD-EPI: >90 ML/MIN/{1.73_M2}
GLUCOSE BLD-MCNC: 110 MG/DL (ref 70–99)
GLUCOSE BLD-MCNC: 127 MG/DL (ref 70–99)
GLUCOSE BLD-MCNC: 132 MG/DL (ref 70–99)
GLUCOSE BLD-MCNC: 91 MG/DL (ref 70–99)
GLUCOSE SERPL-MCNC: 111 MG/DL (ref 70–99)
PERFORMED ON: ABNORMAL
PERFORMED ON: NORMAL
POTASSIUM SERPL-SCNC: 3.3 MMOL/L (ref 3.5–5.1)
POTASSIUM SERPL-SCNC: 3.7 MMOL/L (ref 3.5–5.1)
SODIUM SERPL-SCNC: 135 MMOL/L (ref 136–145)

## 2024-04-19 PROCEDURE — 94761 N-INVAS EAR/PLS OXIMETRY MLT: CPT

## 2024-04-19 PROCEDURE — 2700000000 HC OXYGEN THERAPY PER DAY

## 2024-04-19 PROCEDURE — 6370000000 HC RX 637 (ALT 250 FOR IP)

## 2024-04-19 PROCEDURE — 36415 COLL VENOUS BLD VENIPUNCTURE: CPT

## 2024-04-19 PROCEDURE — 6360000002 HC RX W HCPCS: Performed by: INTERNAL MEDICINE

## 2024-04-19 PROCEDURE — 6370000000 HC RX 637 (ALT 250 FOR IP): Performed by: INTERNAL MEDICINE

## 2024-04-19 PROCEDURE — 84132 ASSAY OF SERUM POTASSIUM: CPT

## 2024-04-19 PROCEDURE — 2580000003 HC RX 258: Performed by: INTERNAL MEDICINE

## 2024-04-19 PROCEDURE — 1200000000 HC SEMI PRIVATE

## 2024-04-19 PROCEDURE — 92526 ORAL FUNCTION THERAPY: CPT

## 2024-04-19 PROCEDURE — 80048 BASIC METABOLIC PNL TOTAL CA: CPT

## 2024-04-19 PROCEDURE — 2580000003 HC RX 258: Performed by: STUDENT IN AN ORGANIZED HEALTH CARE EDUCATION/TRAINING PROGRAM

## 2024-04-19 RX ORDER — MAGNESIUM SULFATE 1 G/100ML
1000 INJECTION INTRAVENOUS ONCE
Status: COMPLETED | OUTPATIENT
Start: 2024-04-19 | End: 2024-04-19

## 2024-04-19 RX ADMIN — HYDROMORPHONE HYDROCHLORIDE 0.5 MG: 1 INJECTION, SOLUTION INTRAMUSCULAR; INTRAVENOUS; SUBCUTANEOUS at 08:08

## 2024-04-19 RX ADMIN — PIPERACILLIN AND TAZOBACTAM 3375 MG: 3; .375 INJECTION, POWDER, LYOPHILIZED, FOR SOLUTION INTRAVENOUS at 08:11

## 2024-04-19 RX ADMIN — POTASSIUM CHLORIDE 10 MEQ: 7.46 INJECTION, SOLUTION INTRAVENOUS at 11:47

## 2024-04-19 RX ADMIN — DEXTROSE AND SODIUM CHLORIDE: 5; 450 INJECTION, SOLUTION INTRAVENOUS at 21:42

## 2024-04-19 RX ADMIN — DULOXETINE HYDROCHLORIDE 60 MG: 60 CAPSULE, DELAYED RELEASE ORAL at 20:17

## 2024-04-19 RX ADMIN — TRAZODONE HYDROCHLORIDE 75 MG: 50 TABLET ORAL at 20:17

## 2024-04-19 RX ADMIN — CARVEDILOL 3.12 MG: 3.12 TABLET, FILM COATED ORAL at 08:38

## 2024-04-19 RX ADMIN — Medication 100 MG: at 08:38

## 2024-04-19 RX ADMIN — PIPERACILLIN AND TAZOBACTAM 3375 MG: 3; .375 INJECTION, POWDER, LYOPHILIZED, FOR SOLUTION INTRAVENOUS at 22:21

## 2024-04-19 RX ADMIN — APIXABAN 5 MG: 5 TABLET, FILM COATED ORAL at 20:17

## 2024-04-19 RX ADMIN — CARVEDILOL 3.12 MG: 3.12 TABLET, FILM COATED ORAL at 17:17

## 2024-04-19 RX ADMIN — APIXABAN 5 MG: 5 TABLET, FILM COATED ORAL at 08:38

## 2024-04-19 RX ADMIN — MAGNESIUM SULFATE HEPTAHYDRATE 1000 MG: 1 INJECTION, SOLUTION INTRAVENOUS at 12:56

## 2024-04-19 RX ADMIN — HYDROMORPHONE HYDROCHLORIDE 0.5 MG: 1 INJECTION, SOLUTION INTRAMUSCULAR; INTRAVENOUS; SUBCUTANEOUS at 20:16

## 2024-04-19 RX ADMIN — POTASSIUM CHLORIDE 10 MEQ: 7.46 INJECTION, SOLUTION INTRAVENOUS at 08:39

## 2024-04-19 RX ADMIN — POTASSIUM CHLORIDE 10 MEQ: 7.46 INJECTION, SOLUTION INTRAVENOUS at 10:34

## 2024-04-19 RX ADMIN — GABAPENTIN 900 MG: 300 CAPSULE ORAL at 20:17

## 2024-04-19 RX ADMIN — POTASSIUM CHLORIDE 10 MEQ: 7.46 INJECTION, SOLUTION INTRAVENOUS at 09:32

## 2024-04-19 RX ADMIN — PIPERACILLIN AND TAZOBACTAM 3375 MG: 3; .375 INJECTION, POWDER, LYOPHILIZED, FOR SOLUTION INTRAVENOUS at 14:27

## 2024-04-19 RX ADMIN — Medication 1 TABLET: at 08:38

## 2024-04-19 RX ADMIN — Medication 400 MG: at 08:38

## 2024-04-19 RX ADMIN — Medication 10 ML: at 20:18

## 2024-04-19 RX ADMIN — PANTOPRAZOLE SODIUM 40 MG: 40 TABLET, DELAYED RELEASE ORAL at 08:38

## 2024-04-19 RX ADMIN — GABAPENTIN 900 MG: 300 CAPSULE ORAL at 14:23

## 2024-04-19 ASSESSMENT — PAIN SCALES - GENERAL
PAINLEVEL_OUTOF10: 8
PAINLEVEL_OUTOF10: 9
PAINLEVEL_OUTOF10: 2
PAINLEVEL_OUTOF10: 4

## 2024-04-19 ASSESSMENT — PAIN DESCRIPTION - ORIENTATION: ORIENTATION: LEFT;RIGHT

## 2024-04-19 ASSESSMENT — PAIN DESCRIPTION - LOCATION: LOCATION: ARM

## 2024-04-19 ASSESSMENT — PAIN DESCRIPTION - DESCRIPTORS: DESCRIPTORS: STABBING

## 2024-04-19 NOTE — CARE COORDINATION
Case Management Assessment  Initial Evaluation    Date/Time of Evaluation: 4/19/2024 5:32 PM  Assessment Completed by: Lizeth Guerin RN    If patient is discharged prior to next notation, then this note serves as note for discharge by case management.    Patient Name: Hiro Esteban                   YOB: 1955  Diagnosis: Hypocalcemia [E83.51]  Dehydration [E86.0]  Hypokalemia [E87.6]  Hypomagnesemia [E83.42]  Elevated lactic acid level [R79.89]  Fall, initial encounter [W19.XXXA]  Hypotension, unspecified hypotension type [I95.9]  Pancreatitis, unspecified pancreatitis type [K85.90]  Acute pancreatitis, unspecified complication status, unspecified pancreatitis type [K85.90]                   Date / Time: 4/15/2024  2:48 PM    Patient Admission Status: Inpatient   Readmission Risk (Low < 19, Mod (19-27), High > 27): Readmission Risk Score: 19.3    Current PCP: Moe Kumar MD  PCP verified by CM? Yes (José)    Chart Reviewed: Yes      History Provided by: Patient  Patient Orientation: Alert and Oriented    Patient Cognition: Alert    Hospitalization in the last 30 days (Readmission):  No    If yes, Readmission Assessment in CM Navigator will be completed.    Advance Directives:      Code Status: Full Code   Patient's Primary Decision Maker is: Legal Next of Kin      Discharge Planning:    Patient lives with: Alone Type of Home: Other (Comment) (Lives in travel trailer with 2 steps)  Primary Care Giver: Self  Patient Support Systems include: Family Members   Current Financial resources: Other (Comment) ([employer])  Current community resources: None  Current services prior to admission: None            Current DME:              Type of Home Care services:  None    ADLS  Prior functional level: Independent in ADLs/IADLs  Current functional level: Independent in ADLs/IADLs    PT AM-PAC: 8 /24  OT AM-PAC: 14 /24    Family can provide assistance at DC: No  Would you like Case Management

## 2024-04-19 NOTE — FLOWSHEET NOTE
04/18/24 2234   Vital Signs   Temp 97.8 °F (36.6 °C)   Temp Source Oral   Pulse 84   Heart Rate Source Monitor   Respirations 18   /68   MAP (Calculated) 86   MAP (mmHg) 86   BP Location Right upper arm   BP Method Automatic   Patient Position Semi fowlers   Pain Assessment   Pain Assessment 0-10   Pain Level 8   Pain Location Abdomen;Back   Pain Orientation Right;Left   Pain Descriptors Aching   Functional Pain Assessment Prevents or interferes some active activities and ADLs   Pain Type Chronic pain   Pain Frequency Continuous   Pain Onset On-going   Non-Pharmaceutical Pain Intervention(s) Cold pack;Declines;Emotional support   Opioid-Induced Sedation   POSS Score 2   RASS   Shah Agitation Sedation Scale (RASS) 0   Oxygen Therapy   SpO2 96 %   Pulse Oximetry Type Continuous   O2 Device None (Room air)   O2 Flow Rate (L/min) 2 L/min     Shift assessment complete, see flow sheet, schedule medications given, see MAR. IV infusing without difficulty at this time. Pt VSS. Pt alert/ oriented on 3L  NC. Sitter at bed side, tele sitter placed in room. Bed in lowest position, bed alarm activated for pt safety,Call light and bed side table within reach, pt educated on use of call light if needing assist., pt verbalized understanding, denies further questions at this time. Denies any needs at this time, continue to monitor.  Bedside Mobility Assessment Tool (BMAT):     Assessment Level 1- Sit and Shake    1. From a semi-reclined position, ask patient to sit up and rotate to a seated position at the side of the bed. Can use the bedrail.    2. Ask patient to reach out and grab your hand and shake making sure patient reaches across his/her midline.   Pass- Patient is able to come to a seated position, maintain core strength. Maintains seated balance while reaching across midline. Move on to Assessment Level 2.     Assessment Level 2- Stretch and Point   1. With patient in seated position at the side of the bed, have

## 2024-04-20 PROBLEM — R79.89 ELEVATED LACTIC ACID LEVEL: Status: ACTIVE | Noted: 2024-04-20

## 2024-04-20 PROBLEM — E83.51 HYPOCALCEMIA: Status: ACTIVE | Noted: 2024-04-20

## 2024-04-20 LAB
ALBUMIN SERPL-MCNC: 2.5 G/DL (ref 3.4–5)
ALBUMIN/GLOB SERPL: 0.8 {RATIO} (ref 1.1–2.2)
ALP SERPL-CCNC: 274 U/L (ref 40–129)
ALT SERPL-CCNC: 32 U/L (ref 10–40)
ANION GAP SERPL CALCULATED.3IONS-SCNC: 7 MMOL/L (ref 3–16)
AST SERPL-CCNC: 120 U/L (ref 15–37)
BILIRUB SERPL-MCNC: 1.7 MG/DL (ref 0–1)
BUN SERPL-MCNC: 8 MG/DL (ref 7–20)
CALCIUM SERPL-MCNC: 7.5 MG/DL (ref 8.3–10.6)
CHLORIDE SERPL-SCNC: 97 MMOL/L (ref 99–110)
CO2 SERPL-SCNC: 29 MMOL/L (ref 21–32)
CREAT SERPL-MCNC: <0.5 MG/DL (ref 0.8–1.3)
GFR SERPLBLD CREATININE-BSD FMLA CKD-EPI: >90 ML/MIN/{1.73_M2}
GLUCOSE BLD-MCNC: 126 MG/DL (ref 70–99)
GLUCOSE BLD-MCNC: 138 MG/DL (ref 70–99)
GLUCOSE BLD-MCNC: 139 MG/DL (ref 70–99)
GLUCOSE BLD-MCNC: 150 MG/DL (ref 70–99)
GLUCOSE SERPL-MCNC: 137 MG/DL (ref 70–99)
MAGNESIUM SERPL-MCNC: 1.7 MG/DL (ref 1.8–2.4)
PERFORMED ON: ABNORMAL
POTASSIUM SERPL-SCNC: 3.4 MMOL/L (ref 3.5–5.1)
POTASSIUM SERPL-SCNC: 4.7 MMOL/L (ref 3.5–5.1)
PROT SERPL-MCNC: 5.8 G/DL (ref 6.4–8.2)
SODIUM SERPL-SCNC: 133 MMOL/L (ref 136–145)

## 2024-04-20 PROCEDURE — 6370000000 HC RX 637 (ALT 250 FOR IP): Performed by: INTERNAL MEDICINE

## 2024-04-20 PROCEDURE — 2700000000 HC OXYGEN THERAPY PER DAY

## 2024-04-20 PROCEDURE — 2580000003 HC RX 258: Performed by: STUDENT IN AN ORGANIZED HEALTH CARE EDUCATION/TRAINING PROGRAM

## 2024-04-20 PROCEDURE — 6360000002 HC RX W HCPCS: Performed by: INTERNAL MEDICINE

## 2024-04-20 PROCEDURE — 1200000000 HC SEMI PRIVATE

## 2024-04-20 PROCEDURE — 83735 ASSAY OF MAGNESIUM: CPT

## 2024-04-20 PROCEDURE — 36415 COLL VENOUS BLD VENIPUNCTURE: CPT

## 2024-04-20 PROCEDURE — 2580000003 HC RX 258: Performed by: INTERNAL MEDICINE

## 2024-04-20 PROCEDURE — 99233 SBSQ HOSP IP/OBS HIGH 50: CPT | Performed by: INTERNAL MEDICINE

## 2024-04-20 PROCEDURE — 94761 N-INVAS EAR/PLS OXIMETRY MLT: CPT

## 2024-04-20 PROCEDURE — 97530 THERAPEUTIC ACTIVITIES: CPT

## 2024-04-20 PROCEDURE — 6370000000 HC RX 637 (ALT 250 FOR IP)

## 2024-04-20 PROCEDURE — 84132 ASSAY OF SERUM POTASSIUM: CPT

## 2024-04-20 PROCEDURE — 80053 COMPREHEN METABOLIC PANEL: CPT

## 2024-04-20 RX ORDER — TRAZODONE HYDROCHLORIDE 50 MG/1
75 TABLET ORAL NIGHTLY PRN
Status: DISCONTINUED | OUTPATIENT
Start: 2024-04-20 | End: 2024-04-20

## 2024-04-20 RX ORDER — HYDROCODONE BITARTRATE AND ACETAMINOPHEN 5; 325 MG/1; MG/1
1 TABLET ORAL EVERY 6 HOURS PRN
Status: DISCONTINUED | OUTPATIENT
Start: 2024-04-20 | End: 2024-04-24 | Stop reason: HOSPADM

## 2024-04-20 RX ORDER — GABAPENTIN 300 MG/1
600 CAPSULE ORAL 3 TIMES DAILY
Status: DISCONTINUED | OUTPATIENT
Start: 2024-04-20 | End: 2024-04-21

## 2024-04-20 RX ADMIN — POTASSIUM CHLORIDE 10 MEQ: 7.46 INJECTION, SOLUTION INTRAVENOUS at 08:48

## 2024-04-20 RX ADMIN — APIXABAN 5 MG: 5 TABLET, FILM COATED ORAL at 20:53

## 2024-04-20 RX ADMIN — PIPERACILLIN AND TAZOBACTAM 3375 MG: 3; .375 INJECTION, POWDER, LYOPHILIZED, FOR SOLUTION INTRAVENOUS at 05:45

## 2024-04-20 RX ADMIN — POTASSIUM CHLORIDE 10 MEQ: 7.46 INJECTION, SOLUTION INTRAVENOUS at 07:59

## 2024-04-20 RX ADMIN — POTASSIUM CHLORIDE 10 MEQ: 7.46 INJECTION, SOLUTION INTRAVENOUS at 10:05

## 2024-04-20 RX ADMIN — PIPERACILLIN AND TAZOBACTAM 3375 MG: 3; .375 INJECTION, POWDER, LYOPHILIZED, FOR SOLUTION INTRAVENOUS at 23:07

## 2024-04-20 RX ADMIN — HYDROMORPHONE HYDROCHLORIDE 0.5 MG: 1 INJECTION, SOLUTION INTRAMUSCULAR; INTRAVENOUS; SUBCUTANEOUS at 02:27

## 2024-04-20 RX ADMIN — Medication 100 MG: at 08:05

## 2024-04-20 RX ADMIN — Medication 400 MG: at 08:05

## 2024-04-20 RX ADMIN — APIXABAN 5 MG: 5 TABLET, FILM COATED ORAL at 08:05

## 2024-04-20 RX ADMIN — HYDROCODONE BITARTRATE AND ACETAMINOPHEN 1 TABLET: 5; 325 TABLET ORAL at 16:20

## 2024-04-20 RX ADMIN — Medication 1 TABLET: at 08:05

## 2024-04-20 RX ADMIN — CARVEDILOL 3.12 MG: 3.12 TABLET, FILM COATED ORAL at 08:05

## 2024-04-20 RX ADMIN — PANTOPRAZOLE SODIUM 40 MG: 40 TABLET, DELAYED RELEASE ORAL at 05:43

## 2024-04-20 RX ADMIN — POTASSIUM CHLORIDE 20 MEQ: 1500 TABLET, EXTENDED RELEASE ORAL at 08:05

## 2024-04-20 RX ADMIN — HYDROCODONE BITARTRATE AND ACETAMINOPHEN 1 TABLET: 5; 325 TABLET ORAL at 23:05

## 2024-04-20 RX ADMIN — HYDROCODONE BITARTRATE AND ACETAMINOPHEN 1 TABLET: 5; 325 TABLET ORAL at 08:05

## 2024-04-20 RX ADMIN — DEXTROSE AND SODIUM CHLORIDE: 5; 450 INJECTION, SOLUTION INTRAVENOUS at 10:06

## 2024-04-20 RX ADMIN — CARVEDILOL 3.12 MG: 3.12 TABLET, FILM COATED ORAL at 16:20

## 2024-04-20 RX ADMIN — Medication 10 ML: at 20:54

## 2024-04-20 RX ADMIN — PIPERACILLIN AND TAZOBACTAM 3375 MG: 3; .375 INJECTION, POWDER, LYOPHILIZED, FOR SOLUTION INTRAVENOUS at 15:07

## 2024-04-20 RX ADMIN — POTASSIUM CHLORIDE 10 MEQ: 7.46 INJECTION, SOLUTION INTRAVENOUS at 11:14

## 2024-04-20 RX ADMIN — GABAPENTIN 900 MG: 300 CAPSULE ORAL at 08:06

## 2024-04-20 ASSESSMENT — PAIN DESCRIPTION - DESCRIPTORS
DESCRIPTORS: ACHING
DESCRIPTORS: SHARP
DESCRIPTORS: ACHING
DESCRIPTORS: STABBING

## 2024-04-20 ASSESSMENT — PAIN SCALES - GENERAL
PAINLEVEL_OUTOF10: 7
PAINLEVEL_OUTOF10: 4
PAINLEVEL_OUTOF10: 4
PAINLEVEL_OUTOF10: 8
PAINLEVEL_OUTOF10: 7
PAINLEVEL_OUTOF10: 8
PAINLEVEL_OUTOF10: 5
PAINLEVEL_OUTOF10: 9

## 2024-04-20 ASSESSMENT — PAIN DESCRIPTION - DIRECTION: RADIATING_TOWARDS: 4

## 2024-04-20 ASSESSMENT — PAIN DESCRIPTION - ORIENTATION
ORIENTATION: MID;UPPER
ORIENTATION: LEFT;RIGHT

## 2024-04-20 ASSESSMENT — PAIN DESCRIPTION - FREQUENCY: FREQUENCY: CONTINUOUS

## 2024-04-20 ASSESSMENT — PAIN DESCRIPTION - LOCATION
LOCATION: RIB CAGE
LOCATION: RIB CAGE
LOCATION: GENERALIZED
LOCATION: GENERALIZED

## 2024-04-20 ASSESSMENT — PAIN DESCRIPTION - ONSET: ONSET: ON-GOING

## 2024-04-21 LAB
ALBUMIN SERPL-MCNC: 2.6 G/DL (ref 3.4–5)
ALBUMIN/GLOB SERPL: 0.7 {RATIO} (ref 1.1–2.2)
ALP SERPL-CCNC: 284 U/L (ref 40–129)
ALT SERPL-CCNC: 33 U/L (ref 10–40)
ANION GAP SERPL CALCULATED.3IONS-SCNC: 5 MMOL/L (ref 3–16)
AST SERPL-CCNC: 103 U/L (ref 15–37)
BILIRUB SERPL-MCNC: 2 MG/DL (ref 0–1)
BUN SERPL-MCNC: 6 MG/DL (ref 7–20)
CALCIUM SERPL-MCNC: 7.8 MG/DL (ref 8.3–10.6)
CHLORIDE SERPL-SCNC: 97 MMOL/L (ref 99–110)
CO2 SERPL-SCNC: 31 MMOL/L (ref 21–32)
CREAT SERPL-MCNC: <0.5 MG/DL (ref 0.8–1.3)
GFR SERPLBLD CREATININE-BSD FMLA CKD-EPI: >90 ML/MIN/{1.73_M2}
GLUCOSE BLD-MCNC: 131 MG/DL (ref 70–99)
GLUCOSE SERPL-MCNC: 154 MG/DL (ref 70–99)
MAGNESIUM SERPL-MCNC: 1.4 MG/DL (ref 1.8–2.4)
PERFORMED ON: ABNORMAL
POTASSIUM SERPL-SCNC: 3.5 MMOL/L (ref 3.5–5.1)
PROT SERPL-MCNC: 6.3 G/DL (ref 6.4–8.2)
REASON FOR REJECTION: NORMAL
REJECTED TEST: NORMAL
SODIUM SERPL-SCNC: 133 MMOL/L (ref 136–145)

## 2024-04-21 PROCEDURE — 80053 COMPREHEN METABOLIC PANEL: CPT

## 2024-04-21 PROCEDURE — 97530 THERAPEUTIC ACTIVITIES: CPT

## 2024-04-21 PROCEDURE — 92526 ORAL FUNCTION THERAPY: CPT

## 2024-04-21 PROCEDURE — 6370000000 HC RX 637 (ALT 250 FOR IP): Performed by: INTERNAL MEDICINE

## 2024-04-21 PROCEDURE — 36415 COLL VENOUS BLD VENIPUNCTURE: CPT

## 2024-04-21 PROCEDURE — 6370000000 HC RX 637 (ALT 250 FOR IP)

## 2024-04-21 PROCEDURE — 83735 ASSAY OF MAGNESIUM: CPT

## 2024-04-21 PROCEDURE — 6360000002 HC RX W HCPCS: Performed by: INTERNAL MEDICINE

## 2024-04-21 PROCEDURE — 2580000003 HC RX 258: Performed by: STUDENT IN AN ORGANIZED HEALTH CARE EDUCATION/TRAINING PROGRAM

## 2024-04-21 PROCEDURE — 1200000000 HC SEMI PRIVATE

## 2024-04-21 PROCEDURE — 2580000003 HC RX 258: Performed by: INTERNAL MEDICINE

## 2024-04-21 PROCEDURE — 99233 SBSQ HOSP IP/OBS HIGH 50: CPT | Performed by: INTERNAL MEDICINE

## 2024-04-21 PROCEDURE — 97535 SELF CARE MNGMENT TRAINING: CPT

## 2024-04-21 RX ORDER — GABAPENTIN 300 MG/1
300 CAPSULE ORAL 2 TIMES DAILY
Status: DISCONTINUED | OUTPATIENT
Start: 2024-04-22 | End: 2024-04-24 | Stop reason: HOSPADM

## 2024-04-21 RX ORDER — POLYETHYLENE GLYCOL 3350 17 G/17G
17 POWDER, FOR SOLUTION ORAL 2 TIMES DAILY
Status: DISCONTINUED | OUTPATIENT
Start: 2024-04-21 | End: 2024-04-24 | Stop reason: HOSPADM

## 2024-04-21 RX ORDER — NIFEDIPINE 30 MG/1
30 TABLET, EXTENDED RELEASE ORAL DAILY
Status: DISCONTINUED | OUTPATIENT
Start: 2024-04-22 | End: 2024-04-23

## 2024-04-21 RX ORDER — DULOXETIN HYDROCHLORIDE 30 MG/1
30 CAPSULE, DELAYED RELEASE ORAL NIGHTLY
Status: DISCONTINUED | OUTPATIENT
Start: 2024-04-22 | End: 2024-04-22

## 2024-04-21 RX ADMIN — PIPERACILLIN AND TAZOBACTAM 3375 MG: 3; .375 INJECTION, POWDER, LYOPHILIZED, FOR SOLUTION INTRAVENOUS at 06:05

## 2024-04-21 RX ADMIN — HYDROCODONE BITARTRATE AND ACETAMINOPHEN 1 TABLET: 5; 325 TABLET ORAL at 05:23

## 2024-04-21 RX ADMIN — DEXTROSE AND SODIUM CHLORIDE: 5; 450 INJECTION, SOLUTION INTRAVENOUS at 01:01

## 2024-04-21 RX ADMIN — POTASSIUM CHLORIDE 10 MEQ: 7.46 INJECTION, SOLUTION INTRAVENOUS at 10:11

## 2024-04-21 RX ADMIN — HYDROCODONE BITARTRATE AND ACETAMINOPHEN 1 TABLET: 5; 325 TABLET ORAL at 19:21

## 2024-04-21 RX ADMIN — POLYETHYLENE GLYCOL 3350 17 G: 17 POWDER, FOR SOLUTION ORAL at 20:52

## 2024-04-21 RX ADMIN — Medication 100 MG: at 08:11

## 2024-04-21 RX ADMIN — Medication 1 TABLET: at 08:05

## 2024-04-21 RX ADMIN — Medication 10 ML: at 20:52

## 2024-04-21 RX ADMIN — CARVEDILOL 3.12 MG: 3.12 TABLET, FILM COATED ORAL at 16:25

## 2024-04-21 RX ADMIN — APIXABAN 5 MG: 5 TABLET, FILM COATED ORAL at 20:51

## 2024-04-21 RX ADMIN — PIPERACILLIN AND TAZOBACTAM 3375 MG: 3; .375 INJECTION, POWDER, LYOPHILIZED, FOR SOLUTION INTRAVENOUS at 15:18

## 2024-04-21 RX ADMIN — CARVEDILOL 3.12 MG: 3.12 TABLET, FILM COATED ORAL at 08:05

## 2024-04-21 RX ADMIN — APIXABAN 5 MG: 5 TABLET, FILM COATED ORAL at 08:05

## 2024-04-21 RX ADMIN — PANTOPRAZOLE SODIUM 40 MG: 40 TABLET, DELAYED RELEASE ORAL at 05:23

## 2024-04-21 RX ADMIN — POTASSIUM CHLORIDE 20 MEQ: 1500 TABLET, EXTENDED RELEASE ORAL at 08:05

## 2024-04-21 RX ADMIN — Medication 400 MG: at 08:05

## 2024-04-21 RX ADMIN — DEXTROSE AND SODIUM CHLORIDE: 5; 450 INJECTION, SOLUTION INTRAVENOUS at 13:01

## 2024-04-21 RX ADMIN — PIPERACILLIN AND TAZOBACTAM 3375 MG: 3; .375 INJECTION, POWDER, LYOPHILIZED, FOR SOLUTION INTRAVENOUS at 22:46

## 2024-04-21 RX ADMIN — HYDROCODONE BITARTRATE AND ACETAMINOPHEN 1 TABLET: 5; 325 TABLET ORAL at 12:57

## 2024-04-21 ASSESSMENT — PAIN DESCRIPTION - LOCATION
LOCATION: GENERALIZED
LOCATION: GENERALIZED
LOCATION: RIB CAGE
LOCATION: GENERALIZED

## 2024-04-21 ASSESSMENT — PAIN DESCRIPTION - PAIN TYPE
TYPE: CHRONIC PAIN
TYPE: CHRONIC PAIN

## 2024-04-21 ASSESSMENT — PAIN DESCRIPTION - DESCRIPTORS
DESCRIPTORS: SHARP
DESCRIPTORS: ACHING
DESCRIPTORS: ACHING

## 2024-04-21 ASSESSMENT — PAIN SCALES - GENERAL
PAINLEVEL_OUTOF10: 7
PAINLEVEL_OUTOF10: 7
PAINLEVEL_OUTOF10: 4
PAINLEVEL_OUTOF10: 5
PAINLEVEL_OUTOF10: 8
PAINLEVEL_OUTOF10: 6

## 2024-04-21 ASSESSMENT — PAIN DESCRIPTION - ORIENTATION: ORIENTATION: MID;UPPER

## 2024-04-21 NOTE — CARE COORDINATION
CM Update: Call placed to [facility] admission to confirm bed placement. VADIM left w/ Isabella. Await return call. Will need cert if accepted.

## 2024-04-22 LAB
ALBUMIN SERPL-MCNC: 2.5 G/DL (ref 3.4–5)
ALBUMIN/GLOB SERPL: 0.7 {RATIO} (ref 1.1–2.2)
ALP SERPL-CCNC: 268 U/L (ref 40–129)
ALT SERPL-CCNC: 29 U/L (ref 10–40)
ANION GAP SERPL CALCULATED.3IONS-SCNC: 8 MMOL/L (ref 3–16)
AST SERPL-CCNC: 81 U/L (ref 15–37)
BILIRUB SERPL-MCNC: 1.9 MG/DL (ref 0–1)
BUN SERPL-MCNC: 5 MG/DL (ref 7–20)
CALCIUM SERPL-MCNC: 7.7 MG/DL (ref 8.3–10.6)
CHLORIDE SERPL-SCNC: 97 MMOL/L (ref 99–110)
CO2 SERPL-SCNC: 28 MMOL/L (ref 21–32)
CREAT SERPL-MCNC: <0.5 MG/DL (ref 0.8–1.3)
GFR SERPLBLD CREATININE-BSD FMLA CKD-EPI: >90 ML/MIN/{1.73_M2}
GLUCOSE BLD-MCNC: 112 MG/DL (ref 70–99)
GLUCOSE BLD-MCNC: 123 MG/DL (ref 70–99)
GLUCOSE BLD-MCNC: 134 MG/DL (ref 70–99)
GLUCOSE BLD-MCNC: 154 MG/DL (ref 70–99)
GLUCOSE SERPL-MCNC: 120 MG/DL (ref 70–99)
MAGNESIUM SERPL-MCNC: 1.3 MG/DL (ref 1.8–2.4)
PERFORMED ON: ABNORMAL
POTASSIUM SERPL-SCNC: 3.8 MMOL/L (ref 3.5–5.1)
PROT SERPL-MCNC: 6.1 G/DL (ref 6.4–8.2)
SODIUM SERPL-SCNC: 133 MMOL/L (ref 136–145)

## 2024-04-22 PROCEDURE — 6370000000 HC RX 637 (ALT 250 FOR IP): Performed by: INTERNAL MEDICINE

## 2024-04-22 PROCEDURE — 6360000002 HC RX W HCPCS: Performed by: INTERNAL MEDICINE

## 2024-04-22 PROCEDURE — 1200000000 HC SEMI PRIVATE

## 2024-04-22 PROCEDURE — 83735 ASSAY OF MAGNESIUM: CPT

## 2024-04-22 PROCEDURE — 36415 COLL VENOUS BLD VENIPUNCTURE: CPT

## 2024-04-22 PROCEDURE — 2580000003 HC RX 258: Performed by: INTERNAL MEDICINE

## 2024-04-22 PROCEDURE — 99233 SBSQ HOSP IP/OBS HIGH 50: CPT | Performed by: INTERNAL MEDICINE

## 2024-04-22 PROCEDURE — 80053 COMPREHEN METABOLIC PANEL: CPT

## 2024-04-22 PROCEDURE — 2580000003 HC RX 258: Performed by: STUDENT IN AN ORGANIZED HEALTH CARE EDUCATION/TRAINING PROGRAM

## 2024-04-22 PROCEDURE — 97535 SELF CARE MNGMENT TRAINING: CPT

## 2024-04-22 PROCEDURE — 97110 THERAPEUTIC EXERCISES: CPT

## 2024-04-22 PROCEDURE — 97530 THERAPEUTIC ACTIVITIES: CPT

## 2024-04-22 PROCEDURE — 6370000000 HC RX 637 (ALT 250 FOR IP)

## 2024-04-22 RX ORDER — ALBUTEROL SULFATE 2.5 MG/3ML
2.5 SOLUTION RESPIRATORY (INHALATION) EVERY 6 HOURS PRN
Status: DISCONTINUED | OUTPATIENT
Start: 2024-04-22 | End: 2024-04-24 | Stop reason: HOSPADM

## 2024-04-22 RX ORDER — DOCUSATE SODIUM 100 MG/1
100 CAPSULE, LIQUID FILLED ORAL 2 TIMES DAILY
Status: DISCONTINUED | OUTPATIENT
Start: 2024-04-22 | End: 2024-04-24 | Stop reason: HOSPADM

## 2024-04-22 RX ADMIN — Medication 10 ML: at 21:57

## 2024-04-22 RX ADMIN — MAGNESIUM SULFATE HEPTAHYDRATE 2000 MG: 40 INJECTION, SOLUTION INTRAVENOUS at 17:10

## 2024-04-22 RX ADMIN — Medication 1 TABLET: at 07:58

## 2024-04-22 RX ADMIN — HYDROCODONE BITARTRATE AND ACETAMINOPHEN 1 TABLET: 5; 325 TABLET ORAL at 02:46

## 2024-04-22 RX ADMIN — HYDROCODONE BITARTRATE AND ACETAMINOPHEN 1 TABLET: 5; 325 TABLET ORAL at 21:56

## 2024-04-22 RX ADMIN — APIXABAN 5 MG: 5 TABLET, FILM COATED ORAL at 08:30

## 2024-04-22 RX ADMIN — CARVEDILOL 3.12 MG: 3.12 TABLET, FILM COATED ORAL at 07:57

## 2024-04-22 RX ADMIN — WATER 40 MG: 1 INJECTION INTRAMUSCULAR; INTRAVENOUS; SUBCUTANEOUS at 09:28

## 2024-04-22 RX ADMIN — APIXABAN 5 MG: 5 TABLET, FILM COATED ORAL at 21:56

## 2024-04-22 RX ADMIN — GABAPENTIN 300 MG: 300 CAPSULE ORAL at 08:30

## 2024-04-22 RX ADMIN — PANTOPRAZOLE SODIUM 40 MG: 40 TABLET, DELAYED RELEASE ORAL at 06:16

## 2024-04-22 RX ADMIN — POTASSIUM CHLORIDE 20 MEQ: 1500 TABLET, EXTENDED RELEASE ORAL at 07:57

## 2024-04-22 RX ADMIN — PIPERACILLIN AND TAZOBACTAM 3375 MG: 3; .375 INJECTION, POWDER, LYOPHILIZED, FOR SOLUTION INTRAVENOUS at 06:34

## 2024-04-22 RX ADMIN — PIPERACILLIN AND TAZOBACTAM 3375 MG: 3; .375 INJECTION, POWDER, LYOPHILIZED, FOR SOLUTION INTRAVENOUS at 21:57

## 2024-04-22 RX ADMIN — DOCUSATE SODIUM 100 MG: 100 CAPSULE, LIQUID FILLED ORAL at 09:28

## 2024-04-22 RX ADMIN — Medication 100 MG: at 07:58

## 2024-04-22 RX ADMIN — Medication 10 ML: at 08:00

## 2024-04-22 RX ADMIN — GABAPENTIN 300 MG: 300 CAPSULE ORAL at 21:56

## 2024-04-22 RX ADMIN — CARVEDILOL 3.12 MG: 3.12 TABLET, FILM COATED ORAL at 17:13

## 2024-04-22 RX ADMIN — Medication 400 MG: at 07:58

## 2024-04-22 RX ADMIN — NIFEDIPINE 30 MG: 30 TABLET, FILM COATED, EXTENDED RELEASE ORAL at 07:58

## 2024-04-22 RX ADMIN — MAGNESIUM SULFATE HEPTAHYDRATE 2000 MG: 40 INJECTION, SOLUTION INTRAVENOUS at 19:09

## 2024-04-22 RX ADMIN — DOCUSATE SODIUM 100 MG: 100 CAPSULE, LIQUID FILLED ORAL at 21:56

## 2024-04-22 RX ADMIN — PIPERACILLIN AND TAZOBACTAM 3375 MG: 3; .375 INJECTION, POWDER, LYOPHILIZED, FOR SOLUTION INTRAVENOUS at 14:35

## 2024-04-22 ASSESSMENT — PAIN SCALES - GENERAL
PAINLEVEL_OUTOF10: 4
PAINLEVEL_OUTOF10: 8
PAINLEVEL_OUTOF10: 5
PAINLEVEL_OUTOF10: 8

## 2024-04-22 ASSESSMENT — PAIN DESCRIPTION - PAIN TYPE
TYPE: CHRONIC PAIN

## 2024-04-22 ASSESSMENT — PAIN DESCRIPTION - LOCATION
LOCATION: GENERALIZED
LOCATION: FLANK
LOCATION: GENERALIZED
LOCATION: FLANK
LOCATION: RIB CAGE

## 2024-04-22 ASSESSMENT — PAIN DESCRIPTION - ONSET: ONSET: ON-GOING

## 2024-04-22 ASSESSMENT — PAIN - FUNCTIONAL ASSESSMENT: PAIN_FUNCTIONAL_ASSESSMENT: ACTIVITIES ARE NOT PREVENTED

## 2024-04-22 ASSESSMENT — PAIN DESCRIPTION - ORIENTATION
ORIENTATION: RIGHT
ORIENTATION: RIGHT
ORIENTATION: RIGHT;LEFT

## 2024-04-22 ASSESSMENT — PAIN DESCRIPTION - FREQUENCY: FREQUENCY: CONTINUOUS

## 2024-04-22 ASSESSMENT — PAIN DESCRIPTION - DESCRIPTORS
DESCRIPTORS: ACHING;DISCOMFORT
DESCRIPTORS: ACHING
DESCRIPTORS: ACHING

## 2024-04-22 ASSESSMENT — PAIN SCALES - WONG BAKER: WONGBAKER_NUMERICALRESPONSE: NO HURT

## 2024-04-22 NOTE — FLOWSHEET NOTE
04/22/24 0745   Vital Signs   Temp 97.4 °F (36.3 °C)   Temp Source Oral   Pulse (!) 102   Heart Rate Source Monitor   Respirations 18   BP (!) 156/98   MAP (Calculated) 117   BP Location Right upper arm   BP Method Automatic   Patient Position Semi fowlers   Pain Assessment   Pain Assessment 0-10   Pain Level 5   Godfrey-Baker Pain Rating 0   Patient's Stated Pain Goal 0 - No pain   Pain Location Rib cage   Pain Orientation Right;Left   Pain Descriptors Aching   Functional Pain Assessment Activities are not prevented   Pain Type Chronic pain   Pain Frequency Continuous   Pain Onset On-going   Non-Pharmaceutical Pain Intervention(s) Distraction   Opioid-Induced Sedation   POSS Score 1   Oxygen Therapy   SpO2 96 %   O2 Device Nasal cannula   O2 Flow Rate (L/min) 2 L/min     AM assessment completed, see flow sheet. Pt is alert and oriented. Vital signs above. Respirations are even & easy. No complaints voiced. Pt denies needs at this time. SR up x 2, and bed in low position. Call light is within reach.

## 2024-04-22 NOTE — CARE COORDINATION
INTERDISCIPLINARY PLAN OF CARE CONFERENCE    Date/Time: 4/22/2024 4:12 PM  Completed by: Lizeth Guerin RN, Case Management      Patient Name:  Hiro Esteban  YOB: 1955  Admitting Diagnosis: Hypocalcemia [E83.51]  Dehydration [E86.0]  Hypokalemia [E87.6]  Hypomagnesemia [E83.42]  Elevated lactic acid level [R79.89]  Fall, initial encounter [W19.XXXA]  Hypotension, unspecified hypotension type [I95.9]  Pancreatitis, unspecified pancreatitis type [K85.90]  Acute pancreatitis, unspecified complication status, unspecified pancreatitis type [K85.90]     Admit Date/Time:  4/15/2024  2:48 PM    Chart reviewed. Interdisciplinary team contacted or reviewed plan related to patient progress and discharge plans.   Disciplines included Case Management, Nursing, and Dietitian.    Current Status:Stable  PT/OT recommendation for discharge plan of care: SNF    Expected D/C Disposition:  Rehab  Confirmed plan with patient  Yes :   Met with:patient  Discharge Plan Comments: Reviewed chart and met with pt who cont plan to go to [facility] for rehab.Spoke with Isabella at [facility] who states can accept once pt is sitter and telesitter have been off for 24 hrs. Will conr to follow.    Home O2 in place on admit: No  Pt informed of need to bring portable home O2 tank on day of discharge for nursing to connect prior to leaving:  Not Indicated  Verbalized agreement/Understanding:  Not Indicated

## 2024-04-23 LAB
ANION GAP SERPL CALCULATED.3IONS-SCNC: 11 MMOL/L (ref 3–16)
BUN SERPL-MCNC: 8 MG/DL (ref 7–20)
CALCIUM SERPL-MCNC: 7.7 MG/DL (ref 8.3–10.6)
CHLORIDE SERPL-SCNC: 95 MMOL/L (ref 99–110)
CO2 SERPL-SCNC: 24 MMOL/L (ref 21–32)
CREAT SERPL-MCNC: <0.5 MG/DL (ref 0.8–1.3)
GFR SERPLBLD CREATININE-BSD FMLA CKD-EPI: >90 ML/MIN/{1.73_M2}
GLUCOSE BLD-MCNC: 113 MG/DL (ref 70–99)
GLUCOSE BLD-MCNC: 189 MG/DL (ref 70–99)
GLUCOSE BLD-MCNC: 200 MG/DL (ref 70–99)
GLUCOSE SERPL-MCNC: 121 MG/DL (ref 70–99)
MAGNESIUM SERPL-MCNC: 1.9 MG/DL (ref 1.8–2.4)
PERFORMED ON: ABNORMAL
POTASSIUM SERPL-SCNC: 3.7 MMOL/L (ref 3.5–5.1)
SODIUM SERPL-SCNC: 130 MMOL/L (ref 136–145)

## 2024-04-23 PROCEDURE — 6360000002 HC RX W HCPCS: Performed by: INTERNAL MEDICINE

## 2024-04-23 PROCEDURE — 2580000003 HC RX 258: Performed by: STUDENT IN AN ORGANIZED HEALTH CARE EDUCATION/TRAINING PROGRAM

## 2024-04-23 PROCEDURE — 1200000000 HC SEMI PRIVATE

## 2024-04-23 PROCEDURE — 36415 COLL VENOUS BLD VENIPUNCTURE: CPT

## 2024-04-23 PROCEDURE — 2700000000 HC OXYGEN THERAPY PER DAY

## 2024-04-23 PROCEDURE — 6370000000 HC RX 637 (ALT 250 FOR IP): Performed by: INTERNAL MEDICINE

## 2024-04-23 PROCEDURE — 2580000003 HC RX 258: Performed by: INTERNAL MEDICINE

## 2024-04-23 PROCEDURE — 6370000000 HC RX 637 (ALT 250 FOR IP)

## 2024-04-23 PROCEDURE — 99233 SBSQ HOSP IP/OBS HIGH 50: CPT | Performed by: INTERNAL MEDICINE

## 2024-04-23 PROCEDURE — 94761 N-INVAS EAR/PLS OXIMETRY MLT: CPT

## 2024-04-23 PROCEDURE — 92526 ORAL FUNCTION THERAPY: CPT

## 2024-04-23 PROCEDURE — 80048 BASIC METABOLIC PNL TOTAL CA: CPT

## 2024-04-23 PROCEDURE — 83735 ASSAY OF MAGNESIUM: CPT

## 2024-04-23 RX ADMIN — Medication 10 ML: at 09:13

## 2024-04-23 RX ADMIN — APIXABAN 5 MG: 5 TABLET, FILM COATED ORAL at 20:47

## 2024-04-23 RX ADMIN — PIPERACILLIN AND TAZOBACTAM 3375 MG: 3; .375 INJECTION, POWDER, LYOPHILIZED, FOR SOLUTION INTRAVENOUS at 22:42

## 2024-04-23 RX ADMIN — HYDROCODONE BITARTRATE AND ACETAMINOPHEN 1 TABLET: 5; 325 TABLET ORAL at 05:43

## 2024-04-23 RX ADMIN — CARVEDILOL 3.12 MG: 3.12 TABLET, FILM COATED ORAL at 09:11

## 2024-04-23 RX ADMIN — CARVEDILOL 3.12 MG: 3.12 TABLET, FILM COATED ORAL at 15:50

## 2024-04-23 RX ADMIN — DOCUSATE SODIUM 100 MG: 100 CAPSULE, LIQUID FILLED ORAL at 09:10

## 2024-04-23 RX ADMIN — Medication 10 ML: at 20:47

## 2024-04-23 RX ADMIN — PANTOPRAZOLE SODIUM 40 MG: 40 TABLET, DELAYED RELEASE ORAL at 05:43

## 2024-04-23 RX ADMIN — HYDROCODONE BITARTRATE AND ACETAMINOPHEN 1 TABLET: 5; 325 TABLET ORAL at 15:50

## 2024-04-23 RX ADMIN — APIXABAN 5 MG: 5 TABLET, FILM COATED ORAL at 09:10

## 2024-04-23 RX ADMIN — PIPERACILLIN AND TAZOBACTAM 3375 MG: 3; .375 INJECTION, POWDER, LYOPHILIZED, FOR SOLUTION INTRAVENOUS at 14:15

## 2024-04-23 RX ADMIN — Medication 400 MG: at 09:17

## 2024-04-23 RX ADMIN — DOCUSATE SODIUM 100 MG: 100 CAPSULE, LIQUID FILLED ORAL at 20:47

## 2024-04-23 RX ADMIN — GABAPENTIN 300 MG: 300 CAPSULE ORAL at 09:10

## 2024-04-23 RX ADMIN — GABAPENTIN 300 MG: 300 CAPSULE ORAL at 20:47

## 2024-04-23 RX ADMIN — NIFEDIPINE 30 MG: 30 TABLET, FILM COATED, EXTENDED RELEASE ORAL at 09:10

## 2024-04-23 RX ADMIN — POLYETHYLENE GLYCOL 3350 17 G: 17 POWDER, FOR SOLUTION ORAL at 20:46

## 2024-04-23 RX ADMIN — HYDROCODONE BITARTRATE AND ACETAMINOPHEN 1 TABLET: 5; 325 TABLET ORAL at 22:38

## 2024-04-23 RX ADMIN — PIPERACILLIN AND TAZOBACTAM 3375 MG: 3; .375 INJECTION, POWDER, LYOPHILIZED, FOR SOLUTION INTRAVENOUS at 05:43

## 2024-04-23 RX ADMIN — Medication 100 MG: at 09:10

## 2024-04-23 RX ADMIN — WATER 40 MG: 1 INJECTION INTRAMUSCULAR; INTRAVENOUS; SUBCUTANEOUS at 09:11

## 2024-04-23 RX ADMIN — Medication 1 TABLET: at 09:10

## 2024-04-23 RX ADMIN — POTASSIUM CHLORIDE 20 MEQ: 1500 TABLET, EXTENDED RELEASE ORAL at 09:10

## 2024-04-23 ASSESSMENT — PAIN DESCRIPTION - PAIN TYPE
TYPE: CHRONIC PAIN
TYPE: ACUTE PAIN

## 2024-04-23 ASSESSMENT — PAIN SCALES - GENERAL
PAINLEVEL_OUTOF10: 8
PAINLEVEL_OUTOF10: 8
PAINLEVEL_OUTOF10: 7
PAINLEVEL_OUTOF10: 8

## 2024-04-23 ASSESSMENT — PAIN SCALES - WONG BAKER: WONGBAKER_NUMERICALRESPONSE: NO HURT

## 2024-04-23 ASSESSMENT — PAIN DESCRIPTION - FREQUENCY
FREQUENCY: INTERMITTENT
FREQUENCY: CONTINUOUS

## 2024-04-23 ASSESSMENT — PAIN DESCRIPTION - DESCRIPTORS
DESCRIPTORS: ACHING
DESCRIPTORS: SHARP
DESCRIPTORS: ACHING

## 2024-04-23 ASSESSMENT — PAIN - FUNCTIONAL ASSESSMENT: PAIN_FUNCTIONAL_ASSESSMENT: ACTIVITIES ARE NOT PREVENTED

## 2024-04-23 ASSESSMENT — PAIN DESCRIPTION - LOCATION
LOCATION: RIB CAGE
LOCATION: RIB CAGE
LOCATION: BACK

## 2024-04-23 ASSESSMENT — PAIN DESCRIPTION - ORIENTATION
ORIENTATION: RIGHT;LEFT
ORIENTATION: RIGHT;LEFT

## 2024-04-23 ASSESSMENT — PAIN DESCRIPTION - ONSET
ONSET: ON-GOING
ONSET: SUDDEN

## 2024-04-23 NOTE — CONSULTS
No CHF history noted this admission or previous admissions. Normal Echo with EF of 55% on 12/23. History of COPD. Patient has received folder. Going to [facility]. Will updated DINO.     Electronically signed by Becca Lovett RN on 4/23/2024 at 6:32 AM    
Pancreatitis, unspecified pancreatitis type  Active Problems:    Fall    Agitation  Resolved Problems:    * No resolved hospital problems. *    69 yo M w/ PMHx significant for COPD, CHF, Depression, Hep C, HTN, OA; GERD; DM type II. Abdominal surgeries include Cholecystectomy.  Presented s/p fall and knee pain found to have Elevated lipase and evidence of possible pancreatitis on recent CT Imaging. Small right pleural effusion noted as well. RUQ US w/ evidence of cholecystectomy, fatty liver. ; ALT normal/. Normal Ammonia Level.   Drinks about 1 pint of whiskey daily per patient report.     Plan/ Recommendations:     Fibroscan  IV Hydration/Supportive care  Abstain from ETOH  Check Viral Hepatitis labs.     Discussed plan w/ Dr. Vinson while at bedside with patient and sitter.       Thank you for allowing us to participate in this patient care.  Please feel free to contact me with any questions or concerns.     Zita THURSTON, BREANNE - CNP,  [facility]  [phone]. Also available via [employer]  \"Please note that some or all of this record was generated using voice recognition software. If there are any questions about the content of this document, please contact the author as some errors of transcription may have occurred.\"

## 2024-04-23 NOTE — FLOWSHEET NOTE
04/23/24 0415   Vital Signs   Temp 98 °F (36.7 °C)   Temp Source Oral   Pulse 99   Heart Rate Source Monitor   Respirations 18   /87   MAP (Calculated) 96   BP Location Right upper arm   BP Method Automatic   Patient Position Semi fowlers   Oxygen Therapy   SpO2 96 %   O2 Device Nasal cannula   O2 Flow Rate (L/min) 2 L/min     Pt resting in bed, no acute distress. Adry Whitaker RN

## 2024-04-23 NOTE — DISCHARGE INSTR - COC
Hypocalcemia E83.51       Isolation/Infection:   Isolation            No Isolation          Patient Infection Status       None to display                     Nurse Assessment:  Last Vital Signs: /87   Pulse 99   Temp 98 °F (36.7 °C) (Oral)   Resp 18   Ht 1.803 m (5' 11\")   Wt 102.8 kg (226 lb 9.6 oz)   SpO2 96%   BMI 31.60 kg/m²     Last documented pain score (0-10 scale): Pain Level: 7  Last Weight:   Wt Readings from Last 1 Encounters:   04/22/24 102.8 kg (226 lb 9.6 oz)     Mental Status:  oriented    IV Access:  - None    Nursing Mobility/ADLs:  Walking   Assisted  Transfer  Assisted  Bathing  Assisted  Dressing  Assisted  Toileting  Assisted  Feeding  Independent  Med Admin  Dependent  Med Delivery   whole    Wound Care Documentation and Therapy:  Incision 12/02/23 Knee Right;Anterior (Active)   Number of days: 142        Elimination:  Continence:   Bowel: Yes  Bladder: Yes  Urinary Catheter: None   Colostomy/Ileostomy/Ileal Conduit: No       Date of Last BM: 4/23/2024    Intake/Output Summary (Last 24 hours) at 4/23/2024 0633  Last data filed at 4/23/2024 0008  Gross per 24 hour   Intake 2404.15 ml   Output 100 ml   Net 2304.15 ml     I/O last 3 completed shifts:  In: 782 [P.O.:782]  Out: 330 [Urine:330]    Safety Concerns:     None    Impairments/Disabilities:      None    Nutrition Therapy:  Current Nutrition Therapy:   - Oral Diet:  Dysphagia - Soft and Bite Sized    Routes of Feeding: Oral  Liquids: Thin Liquids  Daily Fluid Restriction: no  Last Modified Barium Swallow with Video (Video Swallowing Test): not done    TCOPD Instructions for Daily Management    Patient was treated for chronic Chronic Obstructive Pulmonary Disease (COPD) exacerbation.  Hiro Esteban will require the following:  Please monitor for signs and symptoms of acute COPD exacerbation:   More coughing than usual  Wheezing  Increased mucus or a change in mucus color  Medications not helping or having to use them more

## 2024-04-23 NOTE — FLOWSHEET NOTE
04/23/24 0900   Vital Signs   Temp 97.9 °F (36.6 °C)   Temp Source Oral   Pulse 94   Heart Rate Source Monitor   Respirations 18   /80   MAP (Calculated) 90   BP Location Right upper arm   BP Method Automatic   Patient Position Semi fowlers   Pain Assessment   Pain Assessment None - Denies Pain   Opioid-Induced Sedation   POSS Score 1   Oxygen Therapy   SpO2 96 %   O2 Device None (Room air)     AM assessment completed, see flow sheet. Pt is alert and oriented. Vital signs are WNL. Respirations are even & easy. No complaints voiced. Pt denies needs at this time. SR up x 2, and bed in low position. Call light is within reach.

## 2024-04-23 NOTE — FLOWSHEET NOTE
04/22/24 2016   Vital Signs   Temp 97.5 °F (36.4 °C)   Temp Source Oral   Pulse 100   Heart Rate Source Monitor   Respirations 18   /80   MAP (Calculated) 90   BP Location Right upper arm   Pain Assessment   Pain Assessment None - Denies Pain   Oxygen Therapy   SpO2 95 %   O2 Device Nasal cannula   O2 Flow Rate (L/min) 2 L/min     Norco 1 po given for c/o generalized pain \"all over\". Pt tolerated all pills whole with no issues. Adry Whitaker, RN

## 2024-04-24 VITALS
BODY MASS INDEX: 31.72 KG/M2 | WEIGHT: 226.6 LBS | DIASTOLIC BLOOD PRESSURE: 84 MMHG | OXYGEN SATURATION: 94 % | RESPIRATION RATE: 16 BRPM | TEMPERATURE: 97.7 F | HEART RATE: 89 BPM | HEIGHT: 71 IN | SYSTOLIC BLOOD PRESSURE: 131 MMHG

## 2024-04-24 LAB
ANION GAP SERPL CALCULATED.3IONS-SCNC: 9 MMOL/L (ref 3–16)
BUN SERPL-MCNC: 10 MG/DL (ref 7–20)
CALCIUM SERPL-MCNC: 7.9 MG/DL (ref 8.3–10.6)
CHLORIDE SERPL-SCNC: 99 MMOL/L (ref 99–110)
CO2 SERPL-SCNC: 27 MMOL/L (ref 21–32)
CREAT SERPL-MCNC: <0.5 MG/DL (ref 0.8–1.3)
GFR SERPLBLD CREATININE-BSD FMLA CKD-EPI: >90 ML/MIN/{1.73_M2}
GLUCOSE BLD-MCNC: 136 MG/DL (ref 70–99)
GLUCOSE BLD-MCNC: 157 MG/DL (ref 70–99)
GLUCOSE SERPL-MCNC: 146 MG/DL (ref 70–99)
PERFORMED ON: ABNORMAL
PERFORMED ON: ABNORMAL
POTASSIUM SERPL-SCNC: 3.8 MMOL/L (ref 3.5–5.1)
SODIUM SERPL-SCNC: 135 MMOL/L (ref 136–145)

## 2024-04-24 PROCEDURE — 99239 HOSP IP/OBS DSCHRG MGMT >30: CPT | Performed by: INTERNAL MEDICINE

## 2024-04-24 PROCEDURE — 6370000000 HC RX 637 (ALT 250 FOR IP): Performed by: INTERNAL MEDICINE

## 2024-04-24 PROCEDURE — 80048 BASIC METABOLIC PNL TOTAL CA: CPT

## 2024-04-24 PROCEDURE — 2580000003 HC RX 258: Performed by: INTERNAL MEDICINE

## 2024-04-24 PROCEDURE — 97535 SELF CARE MNGMENT TRAINING: CPT

## 2024-04-24 PROCEDURE — 6360000002 HC RX W HCPCS: Performed by: INTERNAL MEDICINE

## 2024-04-24 PROCEDURE — 97530 THERAPEUTIC ACTIVITIES: CPT

## 2024-04-24 PROCEDURE — 97110 THERAPEUTIC EXERCISES: CPT

## 2024-04-24 PROCEDURE — 6370000000 HC RX 637 (ALT 250 FOR IP)

## 2024-04-24 PROCEDURE — 36415 COLL VENOUS BLD VENIPUNCTURE: CPT

## 2024-04-24 PROCEDURE — 97116 GAIT TRAINING THERAPY: CPT

## 2024-04-24 PROCEDURE — 2580000003 HC RX 258: Performed by: STUDENT IN AN ORGANIZED HEALTH CARE EDUCATION/TRAINING PROGRAM

## 2024-04-24 RX ORDER — PREDNISONE 10 MG/1
TABLET ORAL
Qty: 18 TABLET | Refills: 0
Start: 2024-04-24

## 2024-04-24 RX ORDER — PSEUDOEPHEDRINE HCL 30 MG
100 TABLET ORAL 2 TIMES DAILY
Qty: 20 CAPSULE | Refills: 0
Start: 2024-04-24

## 2024-04-24 RX ORDER — GABAPENTIN 300 MG/1
300 CAPSULE ORAL 3 TIMES DAILY
Qty: 30 CAPSULE | Refills: 0
Start: 2024-04-24 | End: 2024-05-04

## 2024-04-24 RX ORDER — LANOLIN ALCOHOL/MO/W.PET/CERES
100 CREAM (GRAM) TOPICAL DAILY
Qty: 30 TABLET | Refills: 3
Start: 2024-04-25

## 2024-04-24 RX ORDER — AMOXICILLIN AND CLAVULANATE POTASSIUM 875; 125 MG/1; MG/1
1 TABLET, FILM COATED ORAL 2 TIMES DAILY
Qty: 10 TABLET | Refills: 0
Start: 2024-04-24 | End: 2024-04-29

## 2024-04-24 RX ADMIN — APIXABAN 5 MG: 5 TABLET, FILM COATED ORAL at 08:45

## 2024-04-24 RX ADMIN — DOCUSATE SODIUM 100 MG: 100 CAPSULE, LIQUID FILLED ORAL at 08:45

## 2024-04-24 RX ADMIN — POTASSIUM CHLORIDE 20 MEQ: 1500 TABLET, EXTENDED RELEASE ORAL at 08:45

## 2024-04-24 RX ADMIN — PANTOPRAZOLE SODIUM 40 MG: 40 TABLET, DELAYED RELEASE ORAL at 06:32

## 2024-04-24 RX ADMIN — PIPERACILLIN AND TAZOBACTAM 3375 MG: 3; .375 INJECTION, POWDER, LYOPHILIZED, FOR SOLUTION INTRAVENOUS at 06:34

## 2024-04-24 RX ADMIN — Medication 1 TABLET: at 08:44

## 2024-04-24 RX ADMIN — Medication 100 MG: at 08:45

## 2024-04-24 RX ADMIN — GABAPENTIN 300 MG: 300 CAPSULE ORAL at 08:45

## 2024-04-24 RX ADMIN — WATER 40 MG: 1 INJECTION INTRAMUSCULAR; INTRAVENOUS; SUBCUTANEOUS at 08:44

## 2024-04-24 RX ADMIN — CARVEDILOL 3.12 MG: 3.12 TABLET, FILM COATED ORAL at 08:45

## 2024-04-24 RX ADMIN — Medication 400 MG: at 08:45

## 2024-04-24 RX ADMIN — HYDROCODONE BITARTRATE AND ACETAMINOPHEN 1 TABLET: 5; 325 TABLET ORAL at 06:36

## 2024-04-24 RX ADMIN — Medication 10 ML: at 08:45

## 2024-04-24 ASSESSMENT — PAIN DESCRIPTION - ORIENTATION
ORIENTATION: RIGHT
ORIENTATION: RIGHT

## 2024-04-24 ASSESSMENT — PAIN SCALES - GENERAL
PAINLEVEL_OUTOF10: 8
PAINLEVEL_OUTOF10: 7

## 2024-04-24 ASSESSMENT — PAIN DESCRIPTION - DESCRIPTORS
DESCRIPTORS: ACHING;STABBING
DESCRIPTORS: ACHING

## 2024-04-24 ASSESSMENT — PAIN - FUNCTIONAL ASSESSMENT: PAIN_FUNCTIONAL_ASSESSMENT: ACTIVITIES ARE NOT PREVENTED

## 2024-04-24 ASSESSMENT — PAIN DESCRIPTION - LOCATION
LOCATION: RIB CAGE
LOCATION: RIB CAGE

## 2024-04-24 ASSESSMENT — PAIN DESCRIPTION - PAIN TYPE: TYPE: ACUTE PAIN

## 2024-04-24 NOTE — CARE COORDINATION
DISCHARGE ORDER  Date/Time 2024 10:52 AM  Completed by: Adry Velarde, Case Management    Patient Name: Hiro Esteban    : 1955      Admit order Date and Status: 2024 Stable  Noted discharge order. (verify MD's last order for status of admission/Traditional Medicare 3 [place] Inpatient qualifying stay required for SNF)    Confirmed discharge plan with:              Patient:  Yes              When pt confirms DC plan does any support person need to be contacted by CM Yes if yes who__Hiro____                      Discharge to Facility: [facility]   Facility phone number for staff giving report: [phone]   Pre-certification completed: yes   Hospital Exemption Notification (HENS) completed: yes   Discharge orders and Continuity of Care faxed to facility:  can pull from [employer]      Transportation:               Medical Transport explained with choice list offered to pt/family.                Choice:(no preference)  Agency used: [facility]   time:   1145      Pt/family/Nursing/Facility aware of  time:   Yes Names: PT, CM, Vannessa RN, Isabella RUIZ, Laura KONG  Ambulance form completed:  Round trip      Date Last IMM Given: 2024     Comments: Reviewed chart, noted DC order, met with pt at bedside. Pt to be DC home [facility], [facility] providing transportation at 11:45 am.       Pt is being d/c'd to [facility] today. Pt's O2 sats are 94% on RA.      Discharge timeout done with PT, CLAUDIA, Vannessa RN, Isabella RUIZ, Laura KONG. All discharge needs and concerns addressed.    Discharging nurse to complete DINO, reconcile AVS, and place final copy with patient's discharge packet. Discharging RN to ensure that written prescriptions for  Level II medications are sent with patient to the facility as per protocol.

## 2024-04-24 NOTE — DISCHARGE SUMMARY
Name:  Hiro Esteban  Room:  0206/0206-01  MRN:    [id number]    IM Discharge Summary    Discharging Physician:  Jamar gomez MD    Admit: 4/15/2024    Discharge:      PCP      Moe Kumar MD    Diagnoses and hospital course  this Admission        #Acute pancreatitis   #Transaminitis -acute on chronic   #Hx of hepatitis C   -liapse 1052 and CT with evidence of pancreatitis   - suspect alcoholic pancreatitis   - conservative mx with NPO, IVF and pain control    -ethanol negative but does have hx of alcohol abuse  -TRG at 171  -RUQ US -status postcholecystectomy.  No biliary ductal dilatation.  Increased echogenicity throughout the liver  - started diet once pain improved, but developed aspiration pna   - see below  - GI consulted and need outpt f/w   -overall improving and tolerating diet well      #Agitation  - due to  alcohol withdrawal  syndrome   #Hx of alcohol abuse   -CT head nonacute   -ammonia normal   - got prn ativan per CIWA protocol   -thiamine and MV given  -social work consulted  - improved and off all sedative meds now   - mentation normal  - cessation discussed     # Aspiration pneumonia   # Dysphagia   Likely sec to severe drowsiness from meds   Started IV zosyn - continue day # 6  Started on pureed diet - held sedative meds and now improved breathing and mentation and swallowing. Adv diet as tolerating,   Weaned o2 to off  Added steroids for bronchospasm     #Mechanical fall   #Recent right femur fx s/p IM nailing   #Right knee pain   -follows with ortho outpatient   -imaging above negative for acute pathology   -PT/OT consulted  -fall precautions - recommendations for SNF noted      #Hyponatremia   #Electrolyte depletion   #HAGMA   -replacements   -given IVF , resolved     #  Hypocalcemia  - repleted  # Hypomagnesemia - repleted     #Hypotension   -held BP meds for now -coreg, hydralazine, procardia   - improved with IVF  -continue to monitor and resumed meds as tolerating      #Hx of VTE  no anxiety/no suicidal ideation/no depression

## 2024-04-24 NOTE — CARE COORDINATION
Reviewed chart, met with pt bedside. Spoke with pt and plan remains to DC to [facility]. Spoke with Isabella @ [facility] who states that pt needed to be restraint free and no telesitter free for 24 hours, then precert can be started.. This will be met at 12 pm today. Discussed with PT that we need updated PT notes for precert. Once notes are in Epic, will start precert.     1020 - Cielo with CM starting precert.    1030 - precert back.

## 2024-04-24 NOTE — PLAN OF CARE
Problem: Discharge Planning  Goal: Discharge to home or other facility with appropriate resources  Outcome: Progressing  Flowsheets (Taken 4/18/2024 2036)  Discharge to home or other facility with appropriate resources: Identify barriers to discharge with patient and caregiver     Problem: Pain  Goal: Verbalizes/displays adequate comfort level or baseline comfort level  Outcome: Progressing     Problem: ABCDS Injury Assessment  Goal: Absence of physical injury  Outcome: Progressing     Problem: Skin/Tissue Integrity  Goal: Absence of new skin breakdown  Description: 1.  Monitor for areas of redness and/or skin breakdown  2.  Assess vascular access sites hourly  3.  Every 4-6 hours minimum:  Change oxygen saturation probe site  4.  Every 4-6 hours:  If on nasal continuous positive airway pressure, respiratory therapy assess nares and determine need for appliance change or resting period.  Outcome: Progressing     Problem: Chronic Conditions and Co-morbidities  Goal: Patient's chronic conditions and co-morbidity symptoms are monitored and maintained or improved  Outcome: Progressing  Flowsheets (Taken 4/18/2024 2036)  Care Plan - Patient's Chronic Conditions and Co-Morbidity Symptoms are Monitored and Maintained or Improved: Monitor and assess patient's chronic conditions and comorbid symptoms for stability, deterioration, or improvement     Problem: Safety - Medical Restraint  Goal: Remains free of injury from restraints (Restraint for Interference with Medical Device)  Description: INTERVENTIONS:  1. Determine that other, less restrictive measures have been tried or would not be effective before applying the restraint  2. Evaluate the patient's condition at the time of restraint application  3. Inform patient/family regarding the reason for restraint  4. Q2H: Monitor safety, psychosocial status, comfort, nutrition and hydration  Outcome: Progressing     Problem: Cardiovascular - Adult  Goal: Maintains optimal 
  Problem: Pain  Goal: Verbalizes/displays adequate comfort level or baseline comfort level  4/23/2024 2315 by Courtney Rey RN  Outcome: Progressing  4/23/2024 0943 by Carla Wilburn RN  Outcome: Progressing     Problem: Skin/Tissue Integrity - Adult  Goal: Skin integrity remains intact  4/23/2024 0943 by Carla Wilburn RN  Outcome: Progressing  Goal: Oral mucous membranes remain intact  4/23/2024 0943 by Carla Wilburn RN  Outcome: Progressing     Problem: Gastrointestinal - Adult  Goal: Maintains adequate nutritional intake  4/23/2024 0943 by Carla Wilburn RN  Outcome: Progressing     
  Problem: SLP Adult - Impaired Swallowing  Goal: By Discharge: Advance to least restrictive diet without signs or symptoms of aspiration for planned discharge setting.  See evaluation for individualized goals.  Note: SLP completed evaluation. Please refer to notes in EMR.     Mena Whipple  SLP  Clinician     Supervisor: Linda Taveras MA, CCC-SLP    
  Problem: Skin/Tissue Integrity  Goal: Absence of new skin breakdown  4/16/2024 1108 by Carol Morales RN  Outcome: Progressing  4/16/2024 0528 by Aaliyah Sahu RN  Outcome: Progressing     Problem: ABCDS Injury Assessment  Goal: Absence of physical injury  4/16/2024 1108 by Carol Morales RN  Outcome: Progressing  4/16/2024 0528 by Aaliyah Sahu RN  Outcome: Progressing     Problem: Discharge Planning  Goal: Discharge to home or other facility with appropriate resources  4/16/2024 1108 by Carol Morales RN  Outcome: Progressing  Flowsheets (Taken 4/16/2024 0800)  Discharge to home or other facility with appropriate resources:   Identify barriers to discharge with patient and caregiver   Arrange for needed discharge resources and transportation as appropriate   Identify discharge learning needs (meds, wound care, etc)   Arrange for interpreters to assist at discharge as needed   Refer to discharge planning if patient needs post-hospital services based on physician order or complex needs related to functional status, cognitive ability or social support system  4/16/2024 0528 by Aaliyah Sahu RN  Outcome: Progressing     Problem: Safety - Adult  Goal: Free from fall injury  4/16/2024 1108 by Carol Morales RN  Outcome: Progressing  4/16/2024 0528 by Aaliyah Sahu RN  Outcome: Progressing     Problem: Pain  Goal: Verbalizes/displays adequate comfort level or baseline comfort level  4/16/2024 1108 by Carol Morales RN  Outcome: Progressing  Flowsheets (Taken 4/16/2024 0757)  Verbalizes/displays adequate comfort level or baseline comfort level:   Encourage patient to monitor pain and request assistance   Assess pain using appropriate pain scale   Administer analgesics based on type and severity of pain and evaluate response   Implement non-pharmacological measures as appropriate and evaluate response   Consider cultural and social influences on pain and pain management   Notify 
  Problem: Skin/Tissue Integrity  Goal: Absence of new skin breakdown  4/16/2024 1111 by Carol Morales RN  Outcome: Progressing  4/16/2024 1108 by Carol Morales RN  Outcome: Progressing  4/16/2024 0528 by Aaliyah Sahu RN  Outcome: Progressing     Problem: ABCDS Injury Assessment  Goal: Absence of physical injury  4/16/2024 1111 by Carol Morales RN  Outcome: Progressing  4/16/2024 1108 by Carol Morales RN  Outcome: Progressing  4/16/2024 0528 by Aaliyah Sahu RN  Outcome: Progressing     Problem: Discharge Planning  Goal: Discharge to home or other facility with appropriate resources  4/16/2024 1111 by Carol Morales RN  Outcome: Progressing  4/16/2024 1108 by Carol Morales RN  Outcome: Progressing  Flowsheets (Taken 4/16/2024 0800)  Discharge to home or other facility with appropriate resources:   Identify barriers to discharge with patient and caregiver   Arrange for needed discharge resources and transportation as appropriate   Identify discharge learning needs (meds, wound care, etc)   Arrange for interpreters to assist at discharge as needed   Refer to discharge planning if patient needs post-hospital services based on physician order or complex needs related to functional status, cognitive ability or social support system  4/16/2024 0528 by Aaliyah Sahu RN  Outcome: Progressing     Problem: Safety - Adult  Goal: Free from fall injury  4/16/2024 1111 by Carol Morales RN  Outcome: Progressing  4/16/2024 1108 by Carol Morales RN  Outcome: Progressing  4/16/2024 0528 by Aaliyah Sahu RN  Outcome: Progressing     Problem: Pain  Goal: Verbalizes/displays adequate comfort level or baseline comfort level  4/16/2024 1111 by Carol Morales RN  Outcome: Progressing  4/16/2024 1108 by Carol Morales RN  Outcome: Progressing  Flowsheets (Taken 4/16/2024 0757)  Verbalizes/displays adequate comfort level or baseline comfort level:   Encourage patient 
  Problem: Skin/Tissue Integrity  Goal: Absence of new skin breakdown  Description: 1.  Monitor for areas of redness and/or skin breakdown  2.  Assess vascular access sites hourly  3.  Every 4-6 hours minimum:  Change oxygen saturation probe site  4.  Every 4-6 hours:  If on nasal continuous positive airway pressure, respiratory therapy assess nares and determine need for appliance change or resting period.  4/17/2024 0002 by Felipe Canales RN  Outcome: Progressing     Problem: ABCDS Injury Assessment  Goal: Absence of physical injury  4/17/2024 0002 by Felipe Canales RN  Outcome: Progressing     Problem: Discharge Planning  Goal: Discharge to home or other facility with appropriate resources  4/17/2024 0002 by Felipe Canales RN  Outcome: Progressing     Problem: Safety - Adult  Goal: Free from fall injury  4/17/2024 0002 by Felipe Canales RN  Outcome: Progressing     Problem: Pain  Goal: Verbalizes/displays adequate comfort level or baseline comfort level  4/17/2024 0002 by Felipe Canales RN  Outcome: Progressing  Flowsheets  Taken 4/16/2024 1958 by Felipe Canales RN  Verbalizes/displays adequate comfort level or baseline comfort level: Assess pain using appropriate pain scale    Problem: Chronic Conditions and Co-morbidities  Goal: Patient's chronic conditions and co-morbidity symptoms are monitored and maintained or improved  4/17/2024 0002 by Felipe Canales RN  Outcome: Progressing         Problem: Skin/Tissue Integrity - Adult  Goal: Skin integrity remains intact  4/17/2024 0002 by Felipe Canales RN  Outcome: Progressing  Flowsheets (Taken 4/16/2024 2107)  Skin Integrity Remains Intact: Monitor for areas of redness and/or skin breakdown     Problem: Gastrointestinal - Adult  Goal: Maintains adequate nutritional intake  4/17/2024 0002 by Felipe Canales RN  Outcome: Progressing     Problem: Infection - Adult  Goal: Absence of infection at discharge  4/17/2024 0002 by Felipe Canales 
  Problem: Skin/Tissue Integrity  Goal: Absence of new skin breakdown  Description: 1.  Monitor for areas of redness and/or skin breakdown  2.  Assess vascular access sites hourly  3.  Every 4-6 hours minimum:  Change oxygen saturation probe site  4.  Every 4-6 hours:  If on nasal continuous positive airway pressure, respiratory therapy assess nares and determine need for appliance change or resting period.  4/19/2024 0949 by Cheryl Angeles, RN  Outcome: Progressing  4/19/2024 0059 by Denise Rivas RN  Outcome: Progressing     Problem: ABCDS Injury Assessment  Goal: Absence of physical injury  4/19/2024 0949 by Cheryl Angeles, RN  Outcome: Progressing  4/19/2024 0059 by Denise Rivas RN  Outcome: Progressing     
  Problem: Skin/Tissue Integrity  Goal: Absence of new skin breakdown  Description: 1.  Monitor for areas of redness and/or skin breakdown  2.  Assess vascular access sites hourly  3.  Every 4-6 hours minimum:  Change oxygen saturation probe site  4.  Every 4-6 hours:  If on nasal continuous positive airway pressure, respiratory therapy assess nares and determine need for appliance change or resting period.  4/19/2024 2137 by Alber Pérez RN  Outcome: Progressing  4/19/2024 0949 by Cheryl Angeles RN  Outcome: Progressing     Problem: ABCDS Injury Assessment  Goal: Absence of physical injury  4/19/2024 2137 by Alber Pérez RN  Outcome: Progressing  4/19/2024 0949 by Cheryl Angeles RN  Outcome: Progressing     Problem: Discharge Planning  Goal: Discharge to home or other facility with appropriate resources  Outcome: Progressing     Problem: Safety - Adult  Goal: Free from fall injury  Outcome: Progressing     Problem: Pain  Goal: Verbalizes/displays adequate comfort level or baseline comfort level  Outcome: Progressing     Problem: Chronic Conditions and Co-morbidities  Goal: Patient's chronic conditions and co-morbidity symptoms are monitored and maintained or improved  Outcome: Progressing     Problem: Safety - Medical Restraint  Goal: Remains free of injury from restraints (Restraint for Interference with Medical Device)  Description: INTERVENTIONS:  1. Determine that other, less restrictive measures have been tried or would not be effective before applying the restraint  2. Evaluate the patient's condition at the time of restraint application  3. Inform patient/family regarding the reason for restraint  4. Q2H: Monitor safety, psychosocial status, comfort, nutrition and hydration  Outcome: Progressing     Problem: Cardiovascular - Adult  Goal: Maintains optimal cardiac output and hemodynamic stability  Outcome: Progressing  Goal: Absence of cardiac dysrhythmias or at baseline  Outcome: Progressing   
  Problem: Skin/Tissue Integrity  Goal: Absence of new skin breakdown  Description: 1.  Monitor for areas of redness and/or skin breakdown  2.  Assess vascular access sites hourly  3.  Every 4-6 hours minimum:  Change oxygen saturation probe site  4.  Every 4-6 hours:  If on nasal continuous positive airway pressure, respiratory therapy assess nares and determine need for appliance change or resting period.  4/20/2024 0910 by Cheryl Angeles, RN  Outcome: Progressing  4/19/2024 2137 by Alber Pérez, RN  Outcome: Progressing     Problem: ABCDS Injury Assessment  Goal: Absence of physical injury  4/20/2024 0910 by Cheryl Angeles, RN  Outcome: Progressing  4/19/2024 2137 by Alber Pérez, RN  Outcome: Progressing     
  Problem: Skin/Tissue Integrity  Goal: Absence of new skin breakdown  Description: 1.  Monitor for areas of redness and/or skin breakdown  2.  Assess vascular access sites hourly  3.  Every 4-6 hours minimum:  Change oxygen saturation probe site  4.  Every 4-6 hours:  If on nasal continuous positive airway pressure, respiratory therapy assess nares and determine need for appliance change or resting period.  4/21/2024 0733 by Cheryl Angeles, RN  Outcome: Progressing  4/21/2024 0224 by Lola Mcgowan RN  Outcome: Progressing     Problem: ABCDS Injury Assessment  Goal: Absence of physical injury  4/21/2024 0733 by Cheryl Angeles, RN  Outcome: Progressing  4/21/2024 0224 by Lola Mcgowan RN  Outcome: Progressing     
  Problem: Skin/Tissue Integrity  Goal: Absence of new skin breakdown  Description: 1.  Monitor for areas of redness and/or skin breakdown  2.  Assess vascular access sites hourly  3.  Every 4-6 hours minimum:  Change oxygen saturation probe site  4.  Every 4-6 hours:  If on nasal continuous positive airway pressure, respiratory therapy assess nares and determine need for appliance change or resting period.  4/22/2024 0936 by Carla Wilburn RN  Outcome: Progressing  4/21/2024 2257 by Carol Sanchez RN  Outcome: Progressing     Problem: ABCDS Injury Assessment  Goal: Absence of physical injury  4/22/2024 0936 by Carla Wilburn RN  Outcome: Progressing  4/21/2024 2257 by aCrol Sanchez RN  Outcome: Progressing     Problem: Discharge Planning  Goal: Discharge to home or other facility with appropriate resources  4/22/2024 0936 by Carla Wilburn RN  Outcome: Progressing  4/21/2024 2257 by Carol Sanchez RN  Outcome: Progressing     Problem: Safety - Adult  Goal: Free from fall injury  4/22/2024 0936 by Carla Wilburn RN  Outcome: Progressing  4/21/2024 2257 by Carol Sanchez RN  Outcome: Progressing     Problem: Pain  Goal: Verbalizes/displays adequate comfort level or baseline comfort level  4/22/2024 0936 by Carla Wilburn RN  Outcome: Progressing  4/21/2024 2257 by Carol Sanchez RN  Outcome: Progressing     Problem: Chronic Conditions and Co-morbidities  Goal: Patient's chronic conditions and co-morbidity symptoms are monitored and maintained or improved  4/22/2024 0936 by Carla Wilburn RN  Outcome: Progressing  4/21/2024 2257 by Carol Sanchez RN  Outcome: Progressing     Problem: Safety - Medical Restraint  Goal: Remains free of injury from restraints (Restraint for Interference with Medical Device)  Description: INTERVENTIONS:  1. Determine that other, less restrictive measures have been tried or would not be effective before applying the restraint  2. 
  Problem: Skin/Tissue Integrity  Goal: Absence of new skin breakdown  Description: 1.  Monitor for areas of redness and/or skin breakdown  2.  Assess vascular access sites hourly  3.  Every 4-6 hours minimum:  Change oxygen saturation probe site  4.  Every 4-6 hours:  If on nasal continuous positive airway pressure, respiratory therapy assess nares and determine need for appliance change or resting period.  4/23/2024 0943 by Carla Wilburn RN  Outcome: Progressing  4/22/2024 2346 by Adry Whitaker RN  Outcome: Progressing     Problem: ABCDS Injury Assessment  Goal: Absence of physical injury  4/23/2024 0943 by Carla Wilburn RN  Outcome: Progressing  4/22/2024 2346 by Adry Whitaker RN  Outcome: Progressing     Problem: Discharge Planning  Goal: Discharge to home or other facility with appropriate resources  4/23/2024 0943 by Carla Wilburn RN  Outcome: Progressing  4/22/2024 2346 by Adry Whitaker RN  Outcome: Progressing     Problem: Safety - Adult  Goal: Free from fall injury  4/23/2024 0943 by Carla Wilburn RN  Outcome: Progressing  4/22/2024 2346 by Adry Whitaker RN  Outcome: Progressing     Problem: Pain  Goal: Verbalizes/displays adequate comfort level or baseline comfort level  4/23/2024 0943 by Carla Wilburn RN  Outcome: Progressing  4/22/2024 2346 by Adry Whitaker RN  Outcome: Progressing     Problem: Chronic Conditions and Co-morbidities  Goal: Patient's chronic conditions and co-morbidity symptoms are monitored and maintained or improved  4/23/2024 0943 by Carla Wilburn RN  Outcome: Progressing  4/22/2024 2346 by Adry Whitaker RN  Outcome: Progressing     Problem: Safety - Medical Restraint  Goal: Remains free of injury from restraints (Restraint for Interference with Medical Device)  Description: INTERVENTIONS:  1. Determine that other, less restrictive measures have been tried or would not be effective before applying the restraint  2. Evaluate the 
  Problem: Skin/Tissue Integrity  Goal: Absence of new skin breakdown  Description: 1.  Monitor for areas of redness and/or skin breakdown  2.  Assess vascular access sites hourly  3.  Every 4-6 hours minimum:  Change oxygen saturation probe site  4.  Every 4-6 hours:  If on nasal continuous positive airway pressure, respiratory therapy assess nares and determine need for appliance change or resting period.  Outcome: Progressing     Problem: ABCDS Injury Assessment  Goal: Absence of physical injury  Outcome: Progressing     Problem: Discharge Planning  Goal: Discharge to home or other facility with appropriate resources  Outcome: Progressing     Problem: Safety - Adult  Goal: Free from fall injury  Outcome: Progressing     
  Problem: Skin/Tissue Integrity  Goal: Absence of new skin breakdown  Description: 1.  Monitor for areas of redness and/or skin breakdown  2.  Assess vascular access sites hourly  3.  Every 4-6 hours minimum:  Change oxygen saturation probe site  4.  Every 4-6 hours:  If on nasal continuous positive airway pressure, respiratory therapy assess nares and determine need for appliance change or resting period.  Outcome: Progressing     Problem: ABCDS Injury Assessment  Goal: Absence of physical injury  Outcome: Progressing     Problem: Discharge Planning  Goal: Discharge to home or other facility with appropriate resources  Outcome: Progressing     Problem: Safety - Adult  Goal: Free from fall injury  Outcome: Progressing     Problem: Pain  Goal: Verbalizes/displays adequate comfort level or baseline comfort level  Outcome: Progressing     
  Problem: Skin/Tissue Integrity  Goal: Absence of new skin breakdown  Description: 1.  Monitor for areas of redness and/or skin breakdown  2.  Assess vascular access sites hourly  3.  Every 4-6 hours minimum:  Change oxygen saturation probe site  4.  Every 4-6 hours:  If on nasal continuous positive airway pressure, respiratory therapy assess nares and determine need for appliance change or resting period.  Outcome: Progressing     Problem: ABCDS Injury Assessment  Goal: Absence of physical injury  Outcome: Progressing     Problem: Discharge Planning  Goal: Discharge to home or other facility with appropriate resources  Outcome: Progressing     Problem: Safety - Adult  Goal: Free from fall injury  Outcome: Progressing     Problem: Pain  Goal: Verbalizes/displays adequate comfort level or baseline comfort level  Outcome: Progressing     Problem: Chronic Conditions and Co-morbidities  Goal: Patient's chronic conditions and co-morbidity symptoms are monitored and maintained or improved  Outcome: Progressing     Problem: Safety - Medical Restraint  Goal: Remains free of injury from restraints (Restraint for Interference with Medical Device)  Description: INTERVENTIONS:  1. Determine that other, less restrictive measures have been tried or would not be effective before applying the restraint  2. Evaluate the patient's condition at the time of restraint application  3. Inform patient/family regarding the reason for restraint  4. Q2H: Monitor safety, psychosocial status, comfort, nutrition and hydration  Outcome: Progressing     Problem: Cardiovascular - Adult  Goal: Maintains optimal cardiac output and hemodynamic stability  Outcome: Progressing  Goal: Absence of cardiac dysrhythmias or at baseline  Outcome: Progressing     Problem: Skin/Tissue Integrity - Adult  Goal: Skin integrity remains intact  Outcome: Progressing  Goal: Oral mucous membranes remain intact  Outcome: Progressing     Problem: Gastrointestinal - 
  Problem: Skin/Tissue Integrity  Goal: Absence of new skin breakdown  Description: 1.  Monitor for areas of redness and/or skin breakdown  2.  Assess vascular access sites hourly  3.  Every 4-6 hours minimum:  Change oxygen saturation probe site  4.  Every 4-6 hours:  If on nasal continuous positive airway pressure, respiratory therapy assess nares and determine need for appliance change or resting period.  Outcome: Progressing     Problem: Discharge Planning  Goal: Discharge to home or other facility with appropriate resources  Outcome: Progressing     Problem: Safety - Adult  Goal: Free from fall injury  Outcome: Progressing     
HEART FAILURE CARE PLAN:    Comorbidities Reviewed: Yes   Patient has a past medical history of Alcohol abuse, Arthritis, Asthma, BPH (benign prostatic hyperplasia), Cerebral artery occlusion with cerebral infarction (HCC), CHF (congestive heart failure) (HCC), Closed disp articular fx of head of right femur with routine healing, COPD (chronic obstructive pulmonary disease) (HCC), Depression, Diabetic peripheral neuropathy (HCC), GERD (gastroesophageal reflux disease), Hepatitis C, Hypertension, Hypokalemia, Hypomagnesemia, Osteoarthritis, and Polyneuropathy.     Weights Reviewed: Yes   Admission weight: 89.8 kg (198 lb)   Wt Readings from Last 3 Encounters:   04/16/24 92.3 kg (203 lb 6.4 oz)   12/13/23 107.1 kg (236 lb 1.6 oz)   12/05/23 111 kg (244 lb 12.8 oz)     Intake & Output Reviewed: Yes     Intake/Output Summary (Last 24 hours) at 4/17/2024 0729  Last data filed at 4/17/2024 0254  Gross per 24 hour   Intake 2509.09 ml   Output 400 ml   Net 2109.09 ml         ECHOCARDIOGRAM Reviewed: Yes   Patient's Ejection Fraction (EF) is greater than 40%     Medications Reviewed: Yes   SCHEDULED HOSPITAL MEDICATIONS:   lidocaine  1 patch TransDERmal Daily    traZODone  75 mg Oral Nightly    potassium chloride  20 mEq Oral Daily    pantoprazole  40 mg Oral QAM AC    [Held by provider] NIFEdipine  30 mg Oral BID    magnesium oxide  400 mg Oral Daily    [Held by provider] hydrALAZINE  50 mg Oral 3 times per day    gabapentin  600 mg Oral TID    DULoxetine  60 mg Oral Nightly    carvedilol  3.125 mg Oral BID WC    sodium chloride flush  5-40 mL IntraVENous 2 times per day    thiamine  100 mg Oral Daily    multivitamin  1 tablet Oral Daily    sodium chloride flush  5-40 mL IntraVENous 2 times per day    nicotine  1 patch TransDERmal Daily    apixaban  5 mg Oral BID    sodium chloride flush  5-40 mL IntraVENous 2 times per day     HOME MEDICATIONS:  Prior to Admission medications    Medication Sig Start Date End Date Taking? 
HEART FAILURE CARE PLAN:    Comorbidities Reviewed: Yes   Patient has a past medical history of Alcohol abuse, Arthritis, Asthma, BPH (benign prostatic hyperplasia), Cerebral artery occlusion with cerebral infarction (HCC), CHF (congestive heart failure) (HCC), Closed disp articular fx of head of right femur with routine healing, COPD (chronic obstructive pulmonary disease) (HCC), Depression, Diabetic peripheral neuropathy (HCC), GERD (gastroesophageal reflux disease), Hepatitis C, Hypertension, Hypokalemia, Hypomagnesemia, Osteoarthritis, and Polyneuropathy.     Weights Reviewed: Yes   Admission weight: 89.8 kg (198 lb)   Wt Readings from Last 3 Encounters:   04/16/24 92.3 kg (203 lb 6.4 oz)   12/13/23 107.1 kg (236 lb 1.6 oz)   12/05/23 111 kg (244 lb 12.8 oz)     Intake & Output Reviewed: Yes     Intake/Output Summary (Last 24 hours) at 4/19/2024 0543  Last data filed at 4/18/2024 1824  Gross per 24 hour   Intake 240 ml   Output 400 ml   Net -160 ml       ECHOCARDIOGRAM Reviewed: Yes   Patient's Ejection Fraction (EF) is greater than 40%     Medications Reviewed: Yes   SCHEDULED HOSPITAL MEDICATIONS:   piperacillin-tazobactam  3,375 mg IntraVENous Q8H    gabapentin  900 mg Oral TID    lidocaine  1 patch TransDERmal Daily    traZODone  75 mg Oral Nightly    potassium chloride  20 mEq Oral Daily    pantoprazole  40 mg Oral QAM AC    [Held by provider] NIFEdipine  30 mg Oral BID    magnesium oxide  400 mg Oral Daily    [Held by provider] hydrALAZINE  50 mg Oral 3 times per day    DULoxetine  60 mg Oral Nightly    carvedilol  3.125 mg Oral BID WC    thiamine  100 mg Oral Daily    multivitamin  1 tablet Oral Daily    nicotine  1 patch TransDERmal Daily    apixaban  5 mg Oral BID    sodium chloride flush  5-40 mL IntraVENous 2 times per day     HOME MEDICATIONS:  Prior to Admission medications    Medication Sig Start Date End Date Taking? Authorizing Provider   magnesium oxide (MAG-OX) 400 (240 Mg) MG tablet Take 1 
Patient provided a COPD Educational Folder that includes the following materials:     [x]  StartSpanish Booklet: Managing your COPD  [x]  ALA: Getting the Most Out of Medication Delivery Devices  [x]  ALA: My COPD Action Plan  [x]  [facility] [facility]: [facility]   [x]  Smoking Cessation Classes  [x]  Outpatient Spiritual Care Services  [x]  Magnet: Signs of COPD    PATIENT/CAREGIVER TEACHING:   Level of patient/caregiver understanding able to:   [] Verbalize understanding   [] Demonstrate understanding       [] Teach back        [x] Needs reinforcement     []  Other:     Electronically signed by Denise Rivas RN on 4/19/2024 at 5:42 AM    
for restraint  4. Q2H: Monitor safety, psychosocial status, comfort, nutrition and hydration  Outcome: Adequate for Discharge     Problem: Cardiovascular - Adult  Goal: Maintains optimal cardiac output and hemodynamic stability  Outcome: Adequate for Discharge  Goal: Absence of cardiac dysrhythmias or at baseline  Outcome: Adequate for Discharge     Problem: Skin/Tissue Integrity - Adult  Goal: Skin integrity remains intact  Outcome: Adequate for Discharge  Flowsheets (Taken 4/24/2024 3857)  Skin Integrity Remains Intact: Monitor for areas of redness and/or skin breakdown  Goal: Oral mucous membranes remain intact  Outcome: Adequate for Discharge     Problem: Gastrointestinal - Adult  Goal: Maintains adequate nutritional intake  Outcome: Adequate for Discharge     Problem: Infection - Adult  Goal: Absence of infection at discharge  Outcome: Adequate for Discharge  Goal: Absence of infection during hospitalization  Outcome: Adequate for Discharge  Goal: Absence of fever/infection during anticipated neutropenic period  Outcome: Adequate for Discharge     Problem: Musculoskeletal - Adult  Goal: Return mobility to safest level of function  Outcome: Adequate for Discharge

## 2024-04-24 NOTE — DISCHARGE INSTRUCTIONS
Your information:  Name: Hiro Esteban  : 1955    Your instructions:    Follow instructions below.    What to do after you leave the hospital:    Recommended diet:  dysphagia, diabetic diet    Recommended activity: activity as tolerated        The following personal items were collected during your admission and were returned to you:    Belongings  Dental Appliances: Uppers, Lowers  Vision - Corrective Lenses: Eyeglasses  Hearing Aid: None  Clothing: Footwear, Shirt, Socks, Pants  Jewelry: None  Body Piercings Removed: No  Electronic Devices: None  Weapons (Notify Protective Services/Security): None  Home Medications: None  Valuables Given To: Patient  Provide Name(s) of Who Valuable(s) Were Given To: n/a    Information obtained by:  By signing below, I understand that if any problems occur once I leave the hospital I am to contact MD.  I understand and acknowledge receipt of the instructions indicated above.

## 2024-04-24 NOTE — PROGRESS NOTES
Gastroenterology & Hepatology Progress Note      Patient Hiro Esteban  MRN: [id number]  YOB: 1955 Age: 68 y.o. Sex: male  Room: [address] [place] [address]       Admitting Physician: José Miguel Mccurdy MD   Date of Admission: 4/15/2024  2:48 PM   Primary Care Physician: Moe Kumar MD     Subjective:  Hiro Esteban was seen and examined. We are following for ETOH induced pancreatitis.  -- no significant events ovenright  -- Continues to have sitter in room     ROS:  Constitutional: Denies fever, no change in appetite   Respiratory: Denies cough or shortness of breath  Cardiovascular: Denies chest pain or edema    Objective:  Vital Signs:   Vitals:    04/19/24 1404   BP: 124/70   Pulse: 79   Resp: 16   Temp: 97.8 °F (36.6 °C)   SpO2: 95%         Physical Exam:  Constitutional: Alert and oriented .No acute distress.   Respiratory: Respirations nonlabored, no crepitus  GI: Abdomen  nondistended, soft, and nontender.    Extremities:  No Edema  Neurological: Alert  at present time. Answering questions appropriately.  No  asterixis.    Intake/Output:    Intake/Output Summary (Last 24 hours) at 4/19/2024 1427  Last data filed at 4/19/2024 1418  Gross per 24 hour   Intake 433 ml   Output 550 ml   Net -117 ml        Current Medications:  Current Facility-Administered Medications   Medication Dose Route Frequency Provider Last Rate Last Admin    piperacillin-tazobactam (ZOSYN) 3,375 mg in sodium chloride 0.9 % 50 mL extended IVPB (mini-bag)  3,375 mg IntraVENous Q8H Marry Jane MD 12.5 mL/hr at 04/19/24 1427 3,375 mg at 04/19/24 1427    dextrose 5 % and 0.45 % sodium chloride infusion   IntraVENous Continuous Marry Jane MD 75 mL/hr at 04/18/24 1212 New Bag at 04/18/24 1212    HYDROmorphone (DILAUDID) injection 0.5 mg  0.5 mg IntraVENous Q3H PRN Marry Jane MD   0.5 mg at 04/19/24 0808    gabapentin (NEURONTIN) capsule 900 mg  900 mg Oral TID Black, Rebekah, DO   900 
   04/18/24 1031   Encounter Summary   Encounter Overview/Reason  Initial Encounter;Attempted Encounter  (Patient not feeling well)   Service Provided For: Patient   Referral/Consult From: Rounding   Support System Unknown   Last Encounter  04/18/24  ( attempted to visit - patient not feeling well; requested prayers be said)   Complexity of Encounter Low   Begin Time 1015   End Time  1017   Total Time Calculated 2 min   Assessment/Intervention/Outcome   Assessment Unable to assess       
  *Not addended. This is original note.   Jinny De Los Santos M.A. SLP  Speech-Language Pathologist  [place] Lic #[id number]      Speech Language Pathology  Swallowing Disorders and Dysphagia     Dysphagia Treatment/Follow-Up Note  Facility/Department: [address] MEDICAL-SURGICAL     Recommendations: SLP recommends to continue IDDSI 6 soft and bite sized Solids; upgrade to IDDSI 0 Thin Liquids; Meds whole or crushed in puree as able  Risk Management: upright for all intake, stay upright for at least 30 mins after intake, small bites/sips, 1:1 supervision with intake, oral care 2-3x/day to reduce adverse affects in the event of aspiration, increase physical mobility as able, alternate bites/sips, slow rate of intake, general GERD precautions, STRICT aspiration precautions, and hold PO and contact SLP if s/s of aspiration or worsening respiratory status develop..      NAME:Hiro Esteban  : 1955 (68 y.o.)   MRN: [id number]  ROOM: [address] [place] [address]  ADMISSION DATE: 4/15/2024  PATIENT DIAGNOSIS(ES): Hypocalcemia [E83.51]  Dehydration [E86.0]  Hypokalemia [E87.6]  Hypomagnesemia [E83.42]  Elevated lactic acid level [R79.89]  Fall, initial encounter [W19.XXXA]  Hypotension, unspecified hypotension type [I95.9]  Pancreatitis, unspecified pancreatitis type [K85.90]  Acute pancreatitis, unspecified complication status, unspecified pancreatitis type [K85.90]  No Known Allergies     DATE ONSET: 4/15/2024     Pain: The patient does not complain of pain         Current Diet: ADULT DIET; Dysphagia - Pureed; 4 carb choices (60 gm/meal); Low Sodium (2 gm); Low Fat (less than or equal to 50 gm/day); Mildly Thick (Nectar); 2000 ml     Diet Tolerance:  Patient tolerating current diet level without signs/symptoms of aspiration per report.      Dysphagia Treatment and Impressions:  Subjective: Pt seen in room at bedside with RN Ana) permission  RN Report/Chart Review:  RN reported no concerns with swallowing on current diet 
  Speech Language Pathology  Attempt Note     Name: Hiro Esteban  : 1955  Medical Diagnosis: Hypocalcemia [E83.51]  Dehydration [E86.0]  Hypokalemia [E87.6]  Hypomagnesemia [E83.42]  Elevated lactic acid level [R79.89]  Fall, initial encounter [W19.XXXA]  Hypotension, unspecified hypotension type [I95.9]  Pancreatitis, unspecified pancreatitis type [K85.90]  Acute pancreatitis, unspecified complication status, unspecified pancreatitis type [K85.90]      SLP attempted to see pt for dysphagia tx x2. Treatment unable to be completed due to pt refusal. First attempt, pt states that he has eaten all of the breakfast that he cares to eat and requests SLP to try again later. Second attempt, pt awakens to voice and declines snack and dysphagia tx. SLP to re-attempt as pt's condition and schedule allows. RN aware and states no reported observations of swallowing difficulty since pt downgraded to mildly-thick liquids. No charges.    Thank you,    Jinny De Los Santos M.A. SLP  Speech-Language Pathologist  [place] Lic #[id number]  [phone]    
  Speech Language Pathology  Swallowing Disorders and Dysphagia    Dysphagia Treatment/Follow-Up Note  Facility/Department: [address] MEDICAL-SURGICAL    Recommendations: SLP recommends to downgrade to IDDSI 4 Puree Solids; IDDSI 0 Thin Liquids; Meds crushed in puree as able  Risk Management: upright for all intake, stay upright for at least 30 mins after intake, small bites/sips,  1:1 supervision with intake, downgrade to mildly thick if s/s of aspiration develop oral care 2-3x/day to reduce adverse affects in the event of aspiration, increase physical mobility as able, alternate bites/sips, slow rate of intake, general GERD precautions, STRICT aspiration precautions, and hold PO and contact SLP if s/s of aspiration or worsening respiratory status develop.  ST to continue to follow.    NAME:Hiro Esteban  : 1955 (68 y.o.)   MRN: [id number]  ROOM: 0206/0206-01  ADMISSION DATE: 4/15/2024  PATIENT DIAGNOSIS(ES): Hypocalcemia [E83.51]  Dehydration [E86.0]  Hypokalemia [E87.6]  Hypomagnesemia [E83.42]  Elevated lactic acid level [R79.89]  Fall, initial encounter [W19.XXXA]  Hypotension, unspecified hypotension type [I95.9]  Pancreatitis, unspecified pancreatitis type [K85.90]  Acute pancreatitis, unspecified complication status, unspecified pancreatitis type [K85.90]  No Known Allergies    DATE ONSET: 4/15/2024    Pain: The patient complains of pain 9/10       Current Diet: ADULT DIET; Regular; 4 carb choices (60 gm/meal); Low Sodium (2 gm); 2000 ml    Diet Tolerance:  Patient not tolerating current diet level without signs/symptoms of aspiration per report.     Dysphagia Treatment and Impressions:  Subjective: Pt seen in room at bedside with RN Lary) permission  RN Report/Chart Review: RN reports pt coughed with meds whole in puree. RN gave pt dilaudid for   Patient tolerance to current diet and treatment: Pt  coughing with solids  Noteworthy events since last seen: Pt aide reports pt coughing with 
 Patient admitted to room 235 from ER. Side rails up x2. Patient does have an order for telemetry. Bed is locked and in lowest position. Call light placed in patient reach. Patient explained the routine of the hospital, including but not limited to lab work, vital signs, hourly rounding, etc. Care plans and education updated, CHG wipes done at time of admission.     /68   Pulse 82   Temp 97.5 °F (36.4 °C) (Oral)   Resp 16   Ht 1.803 m (5' 11\")   Wt 92.3 kg (203 lb 6.4 oz)   SpO2 93%   BMI 28.37 kg/m²     Most recent set of vitals as shown.     Patient has an LDA that does not require CHG wipes, including possible a surgery incision, vilchis catheter, or a central line.     4 Eyes Skin Assessment     The patient is being assess for   Admission    I agree that 2 RN's have performed a thorough Head to Toe Skin Assessment on the patient. ALL assessment sites listed below have been assessed.      Pt has a  Areas assessed for pressure by both nurses:   [x]   Head, Face, and Ears   [x]   Shoulders, Back, and Chest, Abdomen  [x]   Arms, Elbows, and Hands   [x]   Coccyx, Sacrum, and Ischium  [x]   Legs, Feet, and Heels        Skin Assessed Under all Medical Devices by both nurses:  {Medical devices:36166}              All Mepilex Borders were peeled back and area peeked at by both nurses:  {Yes/No:13623}  Please list where Mepilex Borders are located:  ***             **SHARE this note so that the co-signing nurse is able to place an eSignature**    Co-signer eSignature: {Esignature:[id number]}    Does the Patient have Skin Breakdown related to pressure?  {Blank single:80456::\"No\",\"Yes LDA WOUND CARE was Initiated documentation include the Charito-wound, Wound Assessment, Measurements, Dressing Treatment, Drainage, and Color\"  , \"}     (Insert Photo here***)         Fidel Prevention initiated:  {YES/NO:19732}   Wound Care Orders initiated:  {YES/NO:19732}      WO nurse consulted for Pressure Injury (Stage 3,4, 
 Progress Note    Admit Date:  4/15/2024    Presented for fall 1 week ago and right knee pain   Pancreatitis   Agitation   Hx of alcohol abuse   - alcohol withdrawal-> Aggressive and agitated -requiring ativan   Choking episode- speech eval done     Subjective:  Mr. Esteban seen up in bed on 2 l   Mentation improving with holding sedative meds   Worked with PT   Remains with epigastric pain , minimal appetite but better than yesterday   Tolerating some diet  No more agitation       speech therapy eval complete.  He is currently on a dysphagia puréed diet.  He is taking some p.o. diet. .      Sitter at bedside      Objective:   Patient Vitals for the past 4 hrs:   BP Temp Temp src Pulse Resp SpO2 Weight   04/21/24 0553 -- -- -- -- 15 -- --   04/21/24 0523 -- -- -- -- 16 -- 100.2 kg (221 lb)   04/21/24 0336 (!) 157/90 97.5 °F (36.4 °C) Axillary 88 18 100 % --   04/21/24 0330 -- -- -- -- -- (!) 83 % --            Intake/Output Summary (Last 24 hours) at 4/21/2024 0729  Last data filed at 4/21/2024 0101  Gross per 24 hour   Intake 120 ml   Output 500 ml   Net -380 ml         Physical Exam:    Gen: No distress.  Fully awake, alert and  easily arousable.   Awakens, oriented to person .  Eyes: PERRL. No sclera icterus. No conjunctival injection.   ENT: No discharge. Pharynx clear.   Neck: No JVD.  Trachea midline.  Resp: improving air entry with tashia with scattered rhonchi tashia    CV: Regular rate. Regular rhythm. No murmur.  No rub. No edema.   Peripheral Pulses: +2 palpable, equal bilaterally   GI: + distended. Improving  epigastric tenderness elicited   Normal bowel sounds.  Skin: Warm and dry. No nodule on exposed extremities. No rash on exposed extremities.   M/S: No cyanosis. No joint deformity. No clubbing.   Neuro: Drowsy but easily arousable.  Oriented X3.  No focal signs.    Medications:  [Held by provider] gabapentin, 600 mg, TID  piperacillin-tazobactam, 3,375 mg, Q8H  lidocaine, 1 patch, Daily  potassium 
 Progress Note    Admit Date:  4/15/2024    Presented for fall 1 week ago and right knee pain   Pancreatitis   Agitation   Hx of alcohol abuse   - alcohol withdrawal-> Aggressive and agitated -requiring ativan   Choking episode- speech eval done     Subjective:  Mr. Esteban seen up in bed.  Not agitated.  -Patient still sleepy and confused but does awaken and does converse with you; has intermittent hallucinations     speech therapy eval complete.  He is currently on a dysphagia puréed diet.  He is taking some p.o. diet. .  Mag and potassium replaced    Sitter at bedside      Objective:   Patient Vitals for the past 4 hrs:   BP Temp Temp src Pulse Resp SpO2   04/19/24 1404 124/70 97.8 °F (36.6 °C) Oral 79 16 95 %            Intake/Output Summary (Last 24 hours) at 4/19/2024 1609  Last data filed at 4/19/2024 1524  Gross per 24 hour   Intake 507 ml   Output 550 ml   Net -43 ml         Physical Exam:    Gen: No distress.  Drowsy but easily arousable.   Awakens, oriented to person .  Eyes: PERRL. No sclera icterus. No conjunctival injection.   ENT: No discharge. Pharynx clear.   Neck: No JVD.  Trachea midline.  Resp: No accessory muscle use. No crackles. No wheezes. tashia mild rhonchi.   CV: Regular rate. Regular rhythm. No murmur.  No rub. No edema.   Peripheral Pulses: +2 palpable, equal bilaterally   GI: Non-tender. Non-distended.  Normal bowel sounds.  Skin: Warm and dry. No nodule on exposed extremities. No rash on exposed extremities.   M/S: No cyanosis. No joint deformity. No clubbing.   Neuro: Drowsy but easily arousable.  Oriented X1.  No focal signs.    Medications:  piperacillin-tazobactam, 3,375 mg, Q8H  gabapentin, 900 mg, TID  lidocaine, 1 patch, Daily  traZODone, 75 mg, Nightly  potassium chloride, 20 mEq, Daily  pantoprazole, 40 mg, QAM AC  [Held by provider] NIFEdipine, 30 mg, BID  magnesium oxide, 400 mg, Daily  [Held by provider] hydrALAZINE, 50 mg, 3 times per day  DULoxetine, 60 mg, 
/67   Pulse 81   Temp 97.9 °F (36.6 °C) (Axillary)   Resp 18   Ht 1.803 m (5' 11\")   Wt 92.3 kg (203 lb 6.4 oz)   SpO2 92%   BMI 28.37 kg/m²     Assessment complete. Meds passed. Pt denies needs at this time. Patient was up in bed, then up to chair with PT. CIWA 6 this morning, states he has some tingling/burning to BLE, however patient also states he has neuropathy and takes gabapentin. Unsure of date, otherwise alert. Sitter at bedside. Some drowsiness however is able to remain alert while conversing        Bedside Mobility Assessment Tool (BMAT):     Assessment Level 1- Sit and Shake    1. From a semi-reclined position, ask patient to sit up and rotate to a seated position at the side of the bed. Can use the bedrail.    2. Ask patient to reach out and grab your hand and shake making sure patient reaches across his/her midline.   Pass- Patient is able to come to a seated position, maintain core strength. Maintains seated balance while reaching across midline. Move on to Assessment Level 2.     Assessment Level 2- Stretch and Point   1. With patient in seated position at the side of the bed, have patient place both feet on the floor (or stool) with knees no higher than hips.    2. Ask patient to stretch one leg and straighten the knee, then bend the ankle/flex and point the toes. If appropriate, repeat with the other leg.   Pass- Patient is able to demonstrate appropriate quad strength on intended weight bearing limb(s). Move onto Assessment Level 3.     Assessment Level 3- Stand   1. Ask patient to elevate off the bed or chair (seated to standing) using an assistive device (cane, bedrail).    2. Patient should be able to raise buttocks off be and hold for a count of five. May repeat once.   Fail- Patient unable to demonstrate standing stability. Patient is MOBILITY LEVEL 3.     Assessment Level 4- Walk   1. Ask patient to march in place at bedside.    2. Then ask patient to advance step and return 
/71   Pulse 79   Temp 97.7 °F (36.5 °C) (Oral)   Resp 17   Ht 1.803 m (5' 11\")   Wt 92.3 kg (203 lb 6.4 oz)   SpO2 96%   BMI 28.37 kg/m²     Patient alert and oriented x3. Assessment completed. Respiration easy, even and unlabored. CIWA score 2. Assisted patient in feeding, patient consumed 1 cup of jelo, took few spoonful of soup. Patient tolerated night meds well. Call light and bedside table within reach. Bed at lowest position, locked, side rails x3. With virtual sitter in the room.  Pt denies needs at this time.        
Abdominal Ultrasound being done at this time.  
Additive QT interval prolongation may occur with ondansetron. Arrhythmias occur most commonly when QTC >500.  Patient's QTC is 502.  Changed ondansetron to Compazine per policy.     Zoie ColónD, [facility], 4/16/2024 1:08 AM      
Admission questions complete    Pt requests assistance with food \"vouchers\" or meals on wheels.     Expresses sometimes he doesn't eat for a week at a time, due to not wanting to cook, and just no appetite.       
Attempted to call report to CC with no answer. Confidential vm left with phone number for return call. Patient leaving with stretcher transport at this time.   
Bedside Mobility Assessment Tool (BMAT):     Assessment Level 1- Sit and Shake    1. From a semi-reclined position, ask patient to sit up and rotate to a seated position at the side of the bed. Can use the bedrail.    2. Ask patient to reach out and grab your hand and shake making sure patient reaches across his/her midline.   Pass- Patient is able to come to a seated position, maintain core strength. Maintains seated balance while reaching across midline. Move on to Assessment Level 2.     Assessment Level 2- Stretch and Point   1. With patient in seated position at the side of the bed, have patient place both feet on the floor (or stool) with knees no higher than hips.    2. Ask patient to stretch one leg and straighten the knee, then bend the ankle/flex and point the toes. If appropriate, repeat with the other leg.   Pass- Patient is able to demonstrate appropriate quad strength on intended weight bearing limb(s). Move onto Assessment Level 3.     Assessment Level 3- Stand   1. Ask patient to elevate off the bed or chair (seated to standing) using an assistive device (cane, bedrail).    2. Patient should be able to raise buttocks off be and hold for a count of five. May repeat once.   Pass- Patient maintains standing stability for at least 5 seconds, proceed to assessment level 4.    Assessment Level 4- Walk   1. Ask patient to march in place at bedside.    2. Then ask patient to advance step and return each foot. Some medical conditions may render a patient from stepping backwards, use your best clinical judgement.   Fail- Patient not able to complete tasks OR requires use of assistive device. Patient is MOBILITY LEVEL 3.       Mobility Level- 3    
FSBS 58. Patient sitting up on side of bed. Jackson complains of. Confused to situation. Stating he is going to leave AMA  Dr. Jane and Holly notified. Karla is in with patient now. Pulled heart monitor off, undressed himself, trying to pull IV out and leave, angry. Bed in low postion, call bell and bedside table within reach. Bed alarm on.  
Gabapentin Renal Dosing    Estimated Creatinine Clearance: 74 mL/min (based on SCr of 1 mg/dL).    For CrCl of 50-79 ml/min: Max of 1800 mg/day in 3 divided doses  For CrCl of 30-49 ml/min: Max of 900 mg/day in 2 to 3 divided doses  For CrCl of 15-29 ml/min: Max of 600 mg/day in 1 to 2 divided doses  For CrCl < 15 ml/min: Max of 300 mg/day in 1 dose    Pt home dose: 900 mg QID    Will dose reduce to 600 mg TID until renal function improves. Pharmacy will continue to monitor.      Meghan Esteban, [place], [facility], 4/16/2024 1:17 AM      
HEART FAILURE CARE PLAN:    Comorbidities Reviewed: Yes   Patient has a past medical history of Alcohol abuse, Arthritis, Asthma, BPH (benign prostatic hyperplasia), Cerebral artery occlusion with cerebral infarction (HCC), CHF (congestive heart failure) (HCC), Closed disp articular fx of head of right femur with routine healing, COPD (chronic obstructive pulmonary disease) (HCC), Depression, Diabetic peripheral neuropathy (HCC), GERD (gastroesophageal reflux disease), Hepatitis C, Hypertension, Hypokalemia, Hypomagnesemia, Osteoarthritis, and Polyneuropathy.     Weights Reviewed: Yes   Admission weight: 89.8 kg (198 lb)   Wt Readings from Last 3 Encounters:   04/16/24 92.3 kg (203 lb 6.4 oz)   12/13/23 107.1 kg (236 lb 1.6 oz)   12/05/23 111 kg (244 lb 12.8 oz)     Intake & Output Reviewed: Yes     Intake/Output Summary (Last 24 hours) at 4/16/2024 1114  Last data filed at 4/16/2024 0042  Gross per 24 hour   Intake 200 ml   Output 300 ml   Net -100 ml       ECHOCARDIOGRAM Reviewed: Yes   Patient's Ejection Fraction (EF) is greater than 40%     Medications Reviewed: Yes   SCHEDULED HOSPITAL MEDICATIONS:   traZODone  75 mg Oral Nightly    potassium chloride  20 mEq Oral Daily    pantoprazole  40 mg Oral QAM AC    NIFEdipine  30 mg Oral BID    magnesium oxide  400 mg Oral Daily    hydrALAZINE  50 mg Oral 3 times per day    gabapentin  600 mg Oral TID    DULoxetine  60 mg Oral Nightly    carvedilol  3.125 mg Oral BID WC    sodium chloride flush  5-40 mL IntraVENous 2 times per day    enoxaparin  40 mg SubCUTAneous Daily    sodium chloride flush  5-40 mL IntraVENous 2 times per day     HOME MEDICATIONS:  Prior to Admission medications    Medication Sig Start Date End Date Taking? Authorizing Provider   magnesium oxide (MAG-OX) 400 (240 Mg) MG tablet Take 1 tablet by mouth daily   Yes Provider, MD Maribel   potassium chloride (KLOR-CON M) 20 MEQ extended release tablet Take 1 tablet by mouth daily   Yes Provider, 
HEART FAILURE CARE PLAN:    Comorbidities Reviewed: Yes   Patient has a past medical history of Alcohol abuse, Arthritis, Asthma, BPH (benign prostatic hyperplasia), Cerebral artery occlusion with cerebral infarction (HCC), CHF (congestive heart failure) (HCC), Closed disp articular fx of head of right femur with routine healing, COPD (chronic obstructive pulmonary disease) (HCC), Depression, Diabetic peripheral neuropathy (HCC), GERD (gastroesophageal reflux disease), Hepatitis C, Hypertension, Hypokalemia, Hypomagnesemia, Osteoarthritis, and Polyneuropathy.     Weights Reviewed: Yes   Admission weight: 89.8 kg (198 lb)   Wt Readings from Last 3 Encounters:   04/16/24 92.3 kg (203 lb 6.4 oz)   12/13/23 107.1 kg (236 lb 1.6 oz)   12/05/23 111 kg (244 lb 12.8 oz)     Intake & Output Reviewed: Yes     Intake/Output Summary (Last 24 hours) at 4/17/2024 0304  Last data filed at 4/17/2024 0254  Gross per 24 hour   Intake 2509.09 ml   Output 400 ml   Net 2109.09 ml       ECHOCARDIOGRAM Reviewed: Yes   Patient's Ejection Fraction (EF) is greater than 40%     Medications Reviewed: Yes   SCHEDULED HOSPITAL MEDICATIONS:   traZODone  75 mg Oral Nightly    potassium chloride  20 mEq Oral Daily    pantoprazole  40 mg Oral QAM AC    [Held by provider] NIFEdipine  30 mg Oral BID    magnesium oxide  400 mg Oral Daily    [Held by provider] hydrALAZINE  50 mg Oral 3 times per day    gabapentin  600 mg Oral TID    DULoxetine  60 mg Oral Nightly    carvedilol  3.125 mg Oral BID WC    sodium chloride flush  5-40 mL IntraVENous 2 times per day    thiamine  100 mg Oral Daily    multivitamin  1 tablet Oral Daily    sodium chloride flush  5-40 mL IntraVENous 2 times per day    nicotine  1 patch TransDERmal Daily    apixaban  5 mg Oral BID    sodium chloride flush  5-40 mL IntraVENous 2 times per day     HOME MEDICATIONS:  Prior to Admission medications    Medication Sig Start Date End Date Taking? Authorizing Provider   magnesium oxide 
HEART FAILURE CARE PLAN:    Comorbidities Reviewed: Yes   Patient has a past medical history of Alcohol abuse, Arthritis, Asthma, BPH (benign prostatic hyperplasia), Cerebral artery occlusion with cerebral infarction (HCC), CHF (congestive heart failure) (HCC), Closed disp articular fx of head of right femur with routine healing, COPD (chronic obstructive pulmonary disease) (HCC), Depression, Diabetic peripheral neuropathy (HCC), GERD (gastroesophageal reflux disease), Hepatitis C, Hypertension, Hypokalemia, Hypomagnesemia, Osteoarthritis, and Polyneuropathy.     Weights Reviewed: Yes   Admission weight: 89.8 kg (198 lb)   Wt Readings from Last 3 Encounters:   04/19/24 99.2 kg (218 lb 12.8 oz)   12/13/23 107.1 kg (236 lb 1.6 oz)   12/05/23 111 kg (244 lb 12.8 oz)     Intake & Output Reviewed: Yes     Intake/Output Summary (Last 24 hours) at 4/21/2024 0608  Last data filed at 4/21/2024 0101  Gross per 24 hour   Intake 120 ml   Output 500 ml   Net -380 ml       ECHOCARDIOGRAM Reviewed: Yes   Patient's Ejection Fraction (EF) is greater than 40%     Medications Reviewed: Yes   SCHEDULED HOSPITAL MEDICATIONS:   [Held by provider] gabapentin  600 mg Oral TID    piperacillin-tazobactam  3,375 mg IntraVENous Q8H    lidocaine  1 patch TransDERmal Daily    potassium chloride  20 mEq Oral Daily    pantoprazole  40 mg Oral QAM AC    [Held by provider] NIFEdipine  30 mg Oral BID    magnesium oxide  400 mg Oral Daily    [Held by provider] hydrALAZINE  50 mg Oral 3 times per day    [Held by provider] DULoxetine  60 mg Oral Nightly    carvedilol  3.125 mg Oral BID WC    thiamine  100 mg Oral Daily    multivitamin  1 tablet Oral Daily    nicotine  1 patch TransDERmal Daily    apixaban  5 mg Oral BID    sodium chloride flush  5-40 mL IntraVENous 2 times per day     HOME MEDICATIONS:  Prior to Admission medications    Medication Sig Start Date End Date Taking? Authorizing Provider   magnesium oxide (MAG-OX) 400 (240 Mg) MG tablet Take 
Handoff report and transfer of care given at bedside to Cheryl RN.Patient in stable condition, denies needs/concerns at this time.  Call light within reach.     
IM Progress Note    Admit Date:  4/15/2024    Presented for fall 1 week ago and right knee pain   Pancreatitis     Hx of alcohol abuse   - alcohol withdrawal-> Aggressive and agitated -requiring ativan   Had aspiration pneumonia and mild hypoxia   Improving mentation and hypoxia      Subjective:  Mr. Esteban seen up in bed feels much better   Breathing better and off o2   Tolerating diet but not much appetite      he reports he does not take cymbalta at home    Planned for SNF       Objective:   Patient Vitals for the past 4 hrs:   BP Temp Temp src Pulse Resp SpO2   04/23/24 0415 115/87 98 °F (36.7 °C) Oral 99 18 96 %            Intake/Output Summary (Last 24 hours) at 4/23/2024 0711  Last data filed at 4/23/2024 0008  Gross per 24 hour   Intake 2404.15 ml   Output 100 ml   Net 2304.15 ml         Physical Exam:    Gen: No distress.  Fully awake, alert and improved mentation   Eyes: PERRL. No sclera icterus. No conjunctival injection.   ENT: No discharge. Pharynx clear.   Neck: No JVD.  Trachea midline.  Resp: improving air entry with tashia with scattered rhonchi tashia    CV: Regular rate. Regular rhythm. No murmur.  No rub. No edema.   Peripheral Pulses: +2 palpable, equal bilaterally   GI: less distended. Improved epigastric tenderness    Normal bowel sounds.  Skin: Warm and dry. No nodule on exposed extremities. No rash on exposed extremities.   M/S: No cyanosis. No joint deformity. No clubbing.   Neuro:.    Right frontal wound healing    Oriented X3.  No focal signs. Except for gen weakness    Medications:  docusate sodium, 100 mg, BID  methylPREDNISolone, 40 mg, Daily  gabapentin, 300 mg, BID  NIFEdipine, 30 mg, Daily  polyethylene glycol, 17 g, BID  piperacillin-tazobactam, 3,375 mg, Q8H  lidocaine, 1 patch, Daily  potassium chloride, 20 mEq, Daily  pantoprazole, 40 mg, QAM AC  magnesium oxide, 400 mg, Daily  [Held by provider] hydrALAZINE, 50 mg, 3 times per day  carvedilol, 3.125 mg, BID WC  thiamine, 100 mg, 
IM Progress Note    Admit Date:  4/15/2024    Presented for fall 1 week ago and right knee pain   Pancreatitis   Agitation   Hx of alcohol abuse   - alcohol withdrawal-> Aggressive and agitated -requiring ativan   Had aspiration pneumonia and mild hypoxia   Improving mentation and hypoxia      Subjective:  Mr. Esteban seen up in bed on 2 l     Mentation improved with holding sedative meds - he reports he does not take cymbalta at home    Worked with PT   Abd pain improved and tolerating some diet, no BM today   No more agitation         Objective:   No data found.         Intake/Output Summary (Last 24 hours) at 4/22/2024 0729  Last data filed at 4/22/2024 0246  Gross per 24 hour   Intake 542 ml   Output 230 ml   Net 312 ml         Physical Exam:    Gen: No distress.  Fully awake, alert and improved mentation   Eyes: PERRL. No sclera icterus. No conjunctival injection.   ENT: No discharge. Pharynx clear.   Neck: No JVD.  Trachea midline.  Resp: improving air entry with tashia with scattered rhonchi tashia    CV: Regular rate. Regular rhythm. No murmur.  No rub. No edema.   Peripheral Pulses: +2 palpable, equal bilaterally   GI: + distended. Improving  epigastric tenderness    Normal bowel sounds.  Skin: Warm and dry. No nodule on exposed extremities. No rash on exposed extremities.   M/S: No cyanosis. No joint deformity. No clubbing.   Neuro:.  Oriented X3.  No focal signs. Except for gen weakness    Medications:  gabapentin, 300 mg, BID  NIFEdipine, 30 mg, Daily  DULoxetine, 30 mg, Nightly  polyethylene glycol, 17 g, BID  piperacillin-tazobactam, 3,375 mg, Q8H  lidocaine, 1 patch, Daily  potassium chloride, 20 mEq, Daily  pantoprazole, 40 mg, QAM AC  magnesium oxide, 400 mg, Daily  [Held by provider] hydrALAZINE, 50 mg, 3 times per day  carvedilol, 3.125 mg, BID WC  thiamine, 100 mg, Daily  multivitamin, 1 tablet, Daily  nicotine, 1 patch, Daily  apixaban, 5 mg, BID  sodium chloride flush, 5-40 mL, 2 times per 
Inpatient Occupational Therapy Evaluation and Treatment    Unit: 2 [place]  Date:  4/17/2024  Patient Name:    Hiro Esteban  Admitting diagnosis:  Hypocalcemia [E83.51]  Dehydration [E86.0]  Hypokalemia [E87.6]  Hypomagnesemia [E83.42]  Elevated lactic acid level [R79.89]  Fall, initial encounter [W19.XXXA]  Hypotension, unspecified hypotension type [I95.9]  Pancreatitis, unspecified pancreatitis type [K85.90]  Acute pancreatitis, unspecified complication status, unspecified pancreatitis type [K85.90]  Admit Date:  4/15/2024  Precautions/Restrictions/WB Status/ Lines/ Wounds/ Oxygen: Fall risk, Bed/chair alarm, Lines (IV), and 1:1 Supervision    Pt seen for cotreatment this date due to patient safety, acute illness/injury, and cognitive status    Treatment Time:  9:52-10:25  Treatment Number:  1  Timed Code Treatment Minutes: 23 minutes  Total Treatment Minutes:  33  minutes    Patient Goals for Therapy: \"to go home \"          Discharge Recommendations: SNF  DME needs for discharge: Defer to facility       Therapy recommendations for staff:   Assist of 2 for transfers with use of JESSICA STEDY to/from chair    History of Present Illness:   68 y.o. male with a PMH of COPD, diabetes, GERD, hypertension, CHF, depression who presented to ED with complaint of fall     Patient states that he had a mechanical fall landing on his right side with associated pain in his leg.  Of note patient had fallen a few weeks ago.    # Multiple falls  X-rays of lower extremities show no acute fracture  CT head and cervical spine shows no acute process    #Acute pancreatitis  Patient endorses occasional alcohol intake  Presence of suprapubic tenderness on palpation    #Hypotension  Status post IV fluid    Home Health S4 Level Recommendation:  NA    AM-PAC Score: AM-PAC Inpatient Daily Activity Raw Score: 14     Subjective:  Patient lying reclined in bed with hospital staff in room.   Pt agreeable to this OT session.     Cognition:  
Inpatient Occupational Therapy Treatment    Unit: 2 [place]  Date:  4/22/2024  Patient Name:    Hiro Esteban  Admitting diagnosis:  Hypocalcemia [E83.51]  Dehydration [E86.0]  Hypokalemia [E87.6]  Hypomagnesemia [E83.42]  Elevated lactic acid level [R79.89]  Fall, initial encounter [W19.XXXA]  Hypotension, unspecified hypotension type [I95.9]  Pancreatitis, unspecified pancreatitis type [K85.90]  Acute pancreatitis, unspecified complication status, unspecified pancreatitis type [K85.90]  Admit Date:  4/15/2024  Precautions/Restrictions/WB Status/ Lines/ Wounds/ Oxygen: Fall risk, Bed/chair alarm, Lines (IV and Supplemental O2 (2L)), Confusion, and Telesitter      Treatment Time: 9:36-10:24  Treatment Number:  4  Timed Code Treatment Minutes: 48 minutes  Total Treatment Minutes: 48 minutes    Patient Goals for Therapy: \"to go home \"          Discharge Recommendations: SNF  DME needs for discharge: Defer to facility       Therapy recommendations for staff:   Assist of 1 for transfers with use of rolling walker (RW) and gait belt to/from BSC  to/from chair    History of Present Illness:   68 y.o. male with a PMH of COPD, diabetes, GERD, hypertension, CHF, depression who presented to ED with complaint of fall     Patient states that he had a mechanical fall landing on his right side with associated pain in his leg.  Of note patient had fallen a few weeks ago.    # Multiple falls  X-rays of lower extremities show no acute fracture  CT head and cervical spine shows no acute process    #Acute pancreatitis  Patient endorses occasional alcohol intake  Presence of suprapubic tenderness on palpation    #Hypotension  Status post IV fluid    Home Health S4 Level Recommendation:  NA    AM-PAC Score: AM-PAC Inpatient Daily Activity Raw Score: 17     Subjective:  Patient lying reclined in bed with no family present.   Pt agreeable to this OT session.     Cognition:    Patient with flat affect and in drowsy state. oriented x  x4 
Inpatient Occupational Therapy Treatment    Unit: 2 [place]  Date:  4/20/2024  Patient Name:    Hiro Esteban  Admitting diagnosis:  Hypocalcemia [E83.51]  Dehydration [E86.0]  Hypokalemia [E87.6]  Hypomagnesemia [E83.42]  Elevated lactic acid level [R79.89]  Fall, initial encounter [W19.XXXA]  Hypotension, unspecified hypotension type [I95.9]  Pancreatitis, unspecified pancreatitis type [K85.90]  Acute pancreatitis, unspecified complication status, unspecified pancreatitis type [K85.90]  Admit Date:  4/15/2024  Precautions/Restrictions/WB Status/ Lines/ Wounds/ Oxygen: Fall risk, Bed/chair alarm, Lines (IV and Supplemental O2 (2L)), Confusion, and 1:1 Supervision    Pt seen for cotreatment this date due to patient safety, acute illness/injury, and cognitive status    Treatment Time: 11:23 - 11:52  Treatment Number:  2  Timed Code Treatment Minutes: 29 minutes  Total Treatment Minutes: 29 minutes    Patient Goals for Therapy: \"to go home \"          Discharge Recommendations: SNF  DME needs for discharge: Defer to facility       Therapy recommendations for staff:   Assist of 2 for transfers with use of JESSICA STEDY to/from chair    History of Present Illness:   68 y.o. male with a PMH of COPD, diabetes, GERD, hypertension, CHF, depression who presented to ED with complaint of fall     Patient states that he had a mechanical fall landing on his right side with associated pain in his leg.  Of note patient had fallen a few weeks ago.    # Multiple falls  X-rays of lower extremities show no acute fracture  CT head and cervical spine shows no acute process    #Acute pancreatitis  Patient endorses occasional alcohol intake  Presence of suprapubic tenderness on palpation    #Hypotension  Status post IV fluid    Home Health S4 Level Recommendation:  NA    AM-PAC Score: AM-PAC Inpatient Daily Activity Raw Score: 14     Subjective:  Patient lying reclined in bed with hospital staff in room.   Pt agreeable to this OT session. 
Inpatient Physical Therapy Evaluation & Treatment    Unit: [facility]  Date:  4/17/2024  Patient Name:    Hiro Esteban  Admitting diagnosis:  Hypocalcemia [E83.51]  Dehydration [E86.0]  Hypokalemia [E87.6]  Hypomagnesemia [E83.42]  Elevated lactic acid level [R79.89]  Fall, initial encounter [W19.XXXA]  Hypotension, unspecified hypotension type [I95.9]  Pancreatitis, unspecified pancreatitis type [K85.90]  Acute pancreatitis, unspecified complication status, unspecified pancreatitis type [K85.90]  Admit Date:  4/15/2024  Precautions/Restrictions/WB Status/ Lines/ Wounds/ Oxygen: Fall risk, Bed/chair alarm, and Lines (IV)      Pt seen for cotreatment this date due to patient safety, patient endurance, and acute illness/injury    Treatment Time:  208- 3712  Treatment Number:  1   Timed Code Treatment Minutes: 23 minutes  Total Treatment Minutes:  33  minutes    Patient Stated Goals for Therapy: \" to go home \"          Discharge Recommendations: SNF  DME needs for discharge: Defer to facility       Therapy recommendation for EMS Transport: requires transport by cot due to pt needs lift equipment for safe transfers    Therapy recommendations for staff:   Assist of 2 for transfers with use of JESSICA STEDY to/from BSC  to/from chair    History of Present Illness:   Pr Dr. Mccurdy H&P:  Pt is a 68 y.o. male with a PMH of COPD, diabetes, GERD, hypertension, CHF, depression who presented to ED with complaint of fall     Patient states that he had a mechanical fall landing on his right side with associated pain in his leg.  Of note patient had fallen a few weeks ago.  On EMS arrival patient was found to be hypotensive and hypoglycemic.  Was given D50 prior to presentation to the ED.     Labs show sodium of 132 potassium of 2.7 chloride of 92 bicarbonate of 13 anion gap of 27 magnesium 1.3 lactate of 5.8/4.8 procalcitonin 0.19, calcium of 5.9, procalcitonin of 0.19.  Elevated CK of 595, pro BNP of 625.  LFT abnormal with elevated 
Inpatient Physical Therapy Treatment    Unit: 2 [place]  Date:  4/24/2024  Patient Name:    Hiro Esteban  Admitting diagnosis:  Hypocalcemia [E83.51]  Dehydration [E86.0]  Hypokalemia [E87.6]  Hypomagnesemia [E83.42]  Elevated lactic acid level [R79.89]  Fall, initial encounter [W19.XXXA]  Hypotension, unspecified hypotension type [I95.9]  Pancreatitis, unspecified pancreatitis type [K85.90]  Acute pancreatitis, unspecified complication status, unspecified pancreatitis type [K85.90]  Admit Date:  4/15/2024  Precautions/Restrictions/WB Status/ Lines/ Wounds/ Oxygen: Fall risk, Bed/chair alarm, and Lines (IV)      Pt seen for cotreatment this date due to patient safety, patient endurance, and acute illness/injury  No longer needs to be a co treat    Treatment Time:  858- 936  Treatment Number:  4   Timed Code Treatment Minutes: 38  minutes  Total Treatment Minutes: 38   minutes    Patient Stated Goals for Therapy: \" to go home \"          Discharge Recommendations: SNF  DME needs for discharge: Defer to facility       Therapy recommendation for EMS Transport: requires transport by cot due to pt needs lift equipment for safe transfers    Therapy recommendations for staff:   Assist of 1 for transfers with use of rolling walker (RW) to/from BSC  to/from chair    History of Present Illness:   Pr Dr. Mccurdy H&P:  Pt is a 68 y.o. male with a PMH of COPD, diabetes, GERD, hypertension, CHF, depression who presented to ED with complaint of fall     Patient states that he had a mechanical fall landing on his right side with associated pain in his leg.  Of note patient had fallen a few weeks ago.  On EMS arrival patient was found to be hypotensive and hypoglycemic.  Was given D50 prior to presentation to the ED.     Labs show sodium of 132 potassium of 2.7 chloride of 92 bicarbonate of 13 anion gap of 27 magnesium 1.3 lactate of 5.8/4.8 procalcitonin 0.19, calcium of 5.9, procalcitonin of 0.19.  Elevated CK of 595, pro BNP of 
Inpatient Physical Therapy Treatment    Unit: 2 [place]  Date:  4/20/2024  Patient Name:    Hiro Esteban  Admitting diagnosis:  Hypocalcemia [E83.51]  Dehydration [E86.0]  Hypokalemia [E87.6]  Hypomagnesemia [E83.42]  Elevated lactic acid level [R79.89]  Fall, initial encounter [W19.XXXA]  Hypotension, unspecified hypotension type [I95.9]  Pancreatitis, unspecified pancreatitis type [K85.90]  Acute pancreatitis, unspecified complication status, unspecified pancreatitis type [K85.90]  Admit Date:  4/15/2024  Precautions/Restrictions/WB Status/ Lines/ Wounds/ Oxygen: Fall risk, Bed/chair alarm, and Lines (IV and Supplemental O2 (2L))      Pt seen for cotreatment this date due to patient safety, patient endurance, and acute illness/injury    Treatment Time:  1126 - 1152  Treatment Number:  2   Timed Code Treatment Minutes: 26 minutes  Total Treatment Minutes:  26  minutes    Patient Stated Goals for Therapy: \" to go home \"          Discharge Recommendations: SNF  DME needs for discharge: Defer to facility       Therapy recommendation for EMS Transport: requires transport by cot due to pt needs lift equipment for safe transfers    Therapy recommendations for staff:   Assist of 2 for transfers with use of JESSICA STEDY to/from BSC  to/from chair    History of Present Illness:   Pr Dr. Mccurdy H&P:  Pt is a 68 y.o. male with a PMH of COPD, diabetes, GERD, hypertension, CHF, depression who presented to ED with complaint of fall     Patient states that he had a mechanical fall landing on his right side with associated pain in his leg.  Of note patient had fallen a few weeks ago.  On EMS arrival patient was found to be hypotensive and hypoglycemic.  Was given D50 prior to presentation to the ED.     Labs show sodium of 132 potassium of 2.7 chloride of 92 bicarbonate of 13 anion gap of 27 magnesium 1.3 lactate of 5.8/4.8 procalcitonin 0.19, calcium of 5.9, procalcitonin of 0.19.  Elevated CK of 595, pro BNP of 625.  LFT abnormal 
Inpatient Physical Therapy Treatment    Unit: 2 [place]  Date:  4/21/2024  Patient Name:    Hiro Esteban  Admitting diagnosis:  Hypocalcemia [E83.51]  Dehydration [E86.0]  Hypokalemia [E87.6]  Hypomagnesemia [E83.42]  Elevated lactic acid level [R79.89]  Fall, initial encounter [W19.XXXA]  Hypotension, unspecified hypotension type [I95.9]  Pancreatitis, unspecified pancreatitis type [K85.90]  Acute pancreatitis, unspecified complication status, unspecified pancreatitis type [K85.90]  Admit Date:  4/15/2024  Precautions/Restrictions/WB Status/ Lines/ Wounds/ Oxygen: Fall risk, Bed/chair alarm, and Lines (IV and Supplemental O2 (2L))      Pt seen for cotreatment this date due to patient safety, patient endurance, and acute illness/injury    Treatment Time:  6122-5211  Treatment Number:  3   Timed Code Treatment Minutes: 25 minutes  Total Treatment Minutes:  25  minutes    Patient Stated Goals for Therapy: \" to go home \"          Discharge Recommendations: SNF  DME needs for discharge: Defer to facility       Therapy recommendation for EMS Transport: requires transport by cot due to pt needs lift equipment for safe transfers    Therapy recommendations for staff:   Assist of 1 for transfers with use of rolling walker (RW) to/from BSC  to/from chair    History of Present Illness:   Pr Dr. Mccurdy H&P:  Pt is a 68 y.o. male with a PMH of COPD, diabetes, GERD, hypertension, CHF, depression who presented to ED with complaint of fall     Patient states that he had a mechanical fall landing on his right side with associated pain in his leg.  Of note patient had fallen a few weeks ago.  On EMS arrival patient was found to be hypotensive and hypoglycemic.  Was given D50 prior to presentation to the ED.     Labs show sodium of 132 potassium of 2.7 chloride of 92 bicarbonate of 13 anion gap of 27 magnesium 1.3 lactate of 5.8/4.8 procalcitonin 0.19, calcium of 5.9, procalcitonin of 0.19.  Elevated CK of 595, pro BNP of 625.  LFT 
MD notified of chest pain. Pt c/o upper chest pain, mid nipple line, not describing as upper abd pain. Given prn morphine. No new orders at this time. Vitals remains stable. NSR on telemetry. Rate 80  
Norco 1 po given for c/o back pain. Adry Whitaker RN    
Nurse spoke with Dr. Amezcua who stated to administer no more potassium if potassium is 3.5  
Patient FSBS 158, patient is resting in bed at this time, sitter at bedside. Will continue to monitor.  
Patient currently resting in bed with eyes closed. On oxygen NC 2 lpm and in no distress. Alert and oriented x 4. Speech is slow.  Pleasant and cooperative with care. Taking pills crushed in applesauce and tolerating well. Being turned and repositioned.  Oral care being provided. Using urinal with assistance. Has periods of incontinence. Sitter present at bedside. Bed alarm on for patient safety. Nurse will continue to monitor/reassess. Call light within reach.  
Patient currently resting in bed.  Drowsy, but able to answer questions appropriately.  On oxygen NC 3 lpm.  Using urinal at bedside. Telesitter present in room.  Sitter also present at bedside.  Diet changed to pureed, mild thickened liquids, and meds crushed in applesauce.  Patient refusing to be turned and repositioned.  Patient given Dilaudid with effective results.  Bed alarm on for patient safety.  Nurse will continue to monitor/reassess. Call light within reach.   
Patient provided a COPD Educational Folder that includes the following materials:     [x]  [employer] Booklet: Managing your COPD  [x]  ALA: Getting the Most Out of Medication Delivery Devices  [x]  ALA: My COPD Action Plan  [x]  [employer]: [facility]   [x]  Smoking Cessation Classes  [x]  Outpatient Spiritual Care Services  [x]  Magnet: Signs of COPD    PATIENT/CAREGIVER TEACHING:   Level of patient/caregiver understanding able to:   [] Verbalize understanding   [] Demonstrate understanding       [] Teach back        [x] Needs reinforcement     []  Other:     Electronically signed by Carol Cramer RN on 4/16/2024 at 11:16 AM   
Patient refuses dextrose reported to RAZIA Charles. States he will receive the dextrose if we give him a cigarette.  
Patient resting in bed with eyes closed. On oxygen NC 2 lpm and in no respiratory distress. Alert and oriented x 4. Takes meds crushed in applesauce and tolerating well.  Liquids advanced to mildly thick.  Patient tolerating liquids and diet well.  Potassium being replaced per PRN protocol.  Sitter present at bedside.  Bed alarm on for patient safety. Nurse will continue to monitor/reassess. Call light within reach.   
Patient sitting up in bed, putting on his gown, states that it is okay to give him the dextrose and ativan IV.   
Patient was never placed in restraints, is calm and has sitter at bedside. Patient placed in room 206 this is a private room.  
Physical and Occupational Therapy    Orders received and chart reviewed. RN asked to hold at this time due to pt with confusion and agitation. Will continue to follow and re attempt as appropriate and schedule permits.     Thanks  Becca Lopez, PT, DPT PT [id number]  Anay Walker OTR/L #[id number]      
Progress Note    Admit Date:  4/15/2024    Presented for fall 1 week ago and right knee pain   Pancreatitis   Agitation   Hx of alcohol abuse     Aggressive and agitated today requiring ativan     Subjective:  Mr. Esteban now seen resting in bed. Sleeping peacefully     Objective:   Patient Vitals for the past 4 hrs:   BP Temp Temp src Pulse Resp SpO2   04/16/24 1053 -- -- -- -- 18 --   04/16/24 0757 125/80 97.8 °F (36.6 °C) Oral 80 16 96 %          Intake/Output Summary (Last 24 hours) at 4/16/2024 1112  Last data filed at 4/16/2024 0042  Gross per 24 hour   Intake 200 ml   Output 300 ml   Net -100 ml       Physical Exam:    Gen: No distress. Alert.   Eyes: PERRL. No sclera icterus. No conjunctival injection.   ENT: No discharge. Pharynx clear.   Neck: No JVD.  Trachea midline.  Resp: No accessory muscle use. No crackles. No wheezes. No rhonchi.   CV: Regular rate. Regular rhythm. No murmur.  No rub. No edema.   Peripheral Pulses: +2 palpable, equal bilaterally   GI: Non-tender. Non-distended.  Normal bowel sounds.  Skin: Warm and dry. No nodule on exposed extremities. No rash on exposed extremities.   M/S: No cyanosis. No joint deformity. No clubbing.   Neuro: Awake. Grossly nonfocal    Psych: No anxiety or agitation.       I KELSEY ELLIOTT MD have reviewed the chart on Hiro Esteban and personally interviewed and examined patient, reviewed the data (labs and imaging) and after discussion with my PA formulated the plan.  Agree with note with the following edits.        I reviewed the patient's Past Medical History, Past Surgical History, Medications, and Allergies.     Physical exam:    /80   Pulse 80   Temp 97.8 °F (36.6 °C) (Oral)   Resp 18   Ht 1.803 m (5' 11\")   Wt 92.3 kg (203 lb 6.4 oz)   SpO2 96%   BMI 28.37 kg/m²     Gen: extremely agitated   Eyes: PERRL. No sclera icterus. No conjunctival injection.   ENT: No discharge. Pharynx clear.   Neck: Trachea midline. Normal thyroid. 
Progress Note    Admit Date:  4/15/2024    Presented for fall 1 week ago and right knee pain   Pancreatitis   Agitation   Hx of alcohol abuse     Aggressive and agitated today requiring ativan     Subjective:  Mr. Esteban now seen up in bed.  Not agitated.  Tolerating full liquid diet     Objective:   Patient Vitals for the past 4 hrs:   BP Temp Temp src Pulse Resp SpO2   04/17/24 0731 108/67 97.9 °F (36.6 °C) Axillary 81 18 92 %   04/17/24 0544 -- -- -- -- 18 --   04/17/24 0416 99/65 -- -- 87 -- --            Intake/Output Summary (Last 24 hours) at 4/17/2024 0806  Last data filed at 4/17/2024 0254  Gross per 24 hour   Intake 2509.09 ml   Output 400 ml   Net 2109.09 ml         Physical Exam:    Gen: No distress. Alert.   Eyes: PERRL. No sclera icterus. No conjunctival injection.   ENT: No discharge. Pharynx clear.   Neck: No JVD.  Trachea midline.  Resp: No accessory muscle use. No crackles. No wheezes. No rhonchi.   CV: Regular rate. Regular rhythm. No murmur.  No rub. No edema.   Peripheral Pulses: +2 palpable, equal bilaterally   GI: Non-tender. Non-distended.  Normal bowel sounds.  Skin: Warm and dry. No nodule on exposed extremities. No rash on exposed extremities.   M/S: No cyanosis. No joint deformity. No clubbing.   Neuro: Awake. Grossly nonfocal    Psych: No anxiety or agitation.   Medications:  lidocaine, 1 patch, Daily  traZODone, 75 mg, Nightly  potassium chloride, 20 mEq, Daily  pantoprazole, 40 mg, QAM AC  [Held by provider] NIFEdipine, 30 mg, BID  magnesium oxide, 400 mg, Daily  [Held by provider] hydrALAZINE, 50 mg, 3 times per day  gabapentin, 600 mg, TID  DULoxetine, 60 mg, Nightly  carvedilol, 3.125 mg, BID WC  sodium chloride flush, 5-40 mL, 2 times per day  thiamine, 100 mg, Daily  multivitamin, 1 tablet, Daily  sodium chloride flush, 5-40 mL, 2 times per day  nicotine, 1 patch, Daily  apixaban, 5 mg, BID  sodium chloride flush, 5-40 mL, 2 times per day      PRN Medications:  albuterol sulfate 
Pt NPO after choking incident on day shift. Oral meds on hold for now until SLP done on day shift. CIWA score about a 4. BP soft 99/ 65.    
Pt awake most of night. Medicated twice for R flank pain. Some relief noted. Pt not accepting that he has swallowing problems and aspiration pna. Pt takes off O2 frequently. Spot check on RA 88%. Pt was SOB. Reapplied the O2 at 2 LN sat came up to 94%.  
RT Inhaler-Nebulizer Bronchodilator Protocol Note    There is a bronchodilator order in the chart from a provider indicating to follow the RT Bronchodilator Protocol and there is an “Initiate RT Inhaler-Nebulizer Bronchodilator Protocol” order as well (see protocol at bottom of note).    CXR Findings:  XR CHEST PORTABLE    Result Date: 4/15/2024  No acute process.       The findings from the last RT Protocol Assessment were as follows:   History Pulmonary Disease: Chronic pulmonary disease  Respiratory Pattern: Regular pattern and RR 12-20 bpm  Breath Sounds: Clear breath sounds  Cough: Strong, spontaneous, non-productive  Indication for Bronchodilator Therapy: None  Bronchodilator Assessment Score: 2    Aerosolized bronchodilator medication orders have been revised according to the RT Inhaler-Nebulizer Bronchodilator Protocol below.    Respiratory Therapist to perform RT Therapy Protocol Assessment initially then follow the protocol.  Repeat RT Therapy Protocol Assessment PRN for score 0-3 or on second treatment, BID, and PRN for scores above 3.    No Indications - adjust the frequency to every 6 hours PRN wheezing or bronchospasm, if no treatments needed after 48 hours then discontinue using Per Protocol order mode.     If indication present, adjust the RT bronchodilator orders based on the Bronchodilator Assessment Score as indicated below.  Use Inhaler orders unless patient has one or more of the following: on home nebulizer, not able to hold breath for 10 seconds, is not alert and oriented, cannot activate and use MDI correctly, or respiratory rate 25 breaths per minute or more, then use the equivalent nebulizer order(s) with same Frequency and PRN reasons based on the score.  If a patient is on this medication at home then do not decrease Frequency below that used at home.    0-3 - enter or revise RT bronchodilator order(s) to equivalent RT Bronchodilator order with Frequency of every 4 hours PRN for wheezing 
Report to JESUS Welch at bedside. Pt left via bed with all belongings in stable condition.   
Resting in bed. Forgetful & reminded frequently place, time, situation. Pt took off O2 at one point and sat was 86% on RA. O2 replaced & sat came up to 92%.  
Transferred care to JESUS Fischer. Face to face bedside report given.  Patient awake in bed. With sitter in the room.  
Upon looking at labs after receiving pt at 1520 this writer noticed that mag was 1.4 yesterday am. Unable to find an order or a magnesium order that had been given in between that lab draw and currently. [employer] sent to Dr Amezcua to inform him of level and how there was no replacement given or recheck today. Ordered to recheck now and replace per protocol which pt currently has ordered. Lab returned with mag of 1.3. Will replace with prn.  
follow 1 step commands    Pain yes  Location: R hip  Rating:mild  Pain Medicine Status: No request made    Preadmission Environment:   Pt. Lives                                              with family (in a travel trailer, his brother is in the house), has a small dog   Home environment:                            Travel trailer  Steps to enter first floor:                     2 steps to enter and Hand rail unilateral  Steps to second floor/basement:        N/A  Laundry:                                              Goes in garage to do laundry   Bathroom:                                           tub/shower unit and standard height toilet  Pt sleeps in a:                                     Flat bed  Equipment owned:                              RW and SPC     Preadmission Status:  Pt. Able to drive:                                 Yes  Pt. Fully independent with ADLs:       Yes  Pt. Required assistance for:               Independent PTA  Pt. independent for functional transfers and utilized No Device for mobility in home and No Device out in community  History of falls:                                                No  Home Health Services:                       None    Balance:  Sitting:    SBA  Standing:    CGA with RW    Bed Mobility:   Supine to Sit:    SBA  Sit to Supine:   Not Tested  Rolling:   Not Tested  Scooting in sitting: SBA   Scooting in supine:  Not Tested    Transfers:    Sit to stand:    CGA  Stand to sit:    CGA  Bed to chair:     CGA and With RW and gait belt  Chair <> BSC    Not Tested   Functional Mobility:   CGA and With RW and gait belt    See PT note for gait analysis.    ADLs:  Dressing:      UE:   SBAfor line management  LE:    CGA to don socks and briefs    Bathing:    UE:  SBA  LE:  CGA    Eating:   Not Tested    Toileting:  CGA for pericare in standing    Grooming/hygiene: Modified Independent and With set up    Activity Tolerance:   Pt completed therapy session with fatigue     Pt 
liquids.  Noteworthy events since last seen: Pt more alert and sates he feels better. Less impulsive than in prior sessions. Pt states he dislikes thickened water.  Respiratory Status: Pt with SPO2% of 97 on 2 LPM NC with RR of 20.   Oral Care Status: Completed by SLP with pasted toothbrush.      Liquid PO Trials:   IDDSI 0 Thin:  Assessed via cup: no anterior bolus loss , swallow timing subjectively appears timely, 1 subtle throat clear after 1 of 5 swallows. No additional signs of aspiration observed.  IDDSI 2 Mildly Thick (nectar):  Assessed via cup: no anterior bolus loss , suspect functional A-P bolus transit, swallow timing subjectively appears timely, and no clinical s/s of aspiration        Solid PO Trials  IDDSI 6 Soft and Bite Sized:   no anterior bolus loss , suspect functional A-P bolus transit, grossly functional mastication, and oral clearance grossly WFL      Pt demonstrates effortful swallow with saliva swallows after demonstration by SLP to increase airway protection and pharyngeal clearance. Focus of tx sessions has not been on active exercise to this point d/t decreased endurance.  No cues required this day for small bites/sips.     Education: SLP edu pt re: Role of SLP, Rationale for dysphagia tx, Recommended compensatory strategies, Aspiration precautions, MBS results, POC, Evidenced based practice for current recommendations and treatment, and Importance of oral care to reduce adverse affects in the event of aspiration. Pt verbalized understanding, would benefit from ongoing education, and RN aware of recommendations  Assessment: Pt tolerating current diet without clinical s/s of aspiration. Good carryover of recommended compensatory strategies. Pt tolerating mildly thick liquids well, however, reports he does not care for them raising concern for dehydration d/t decreased liquid intake. Pt assessed with thin liquids with throat clear after 1 of 5 sips. SLP discussed  benefits and risks of use 
oxide (MAG-OX) 400 (240 Mg) MG tablet Take 1 tablet by mouth daily   Yes Maribel Cornelius MD   potassium chloride (KLOR-CON M) 20 MEQ extended release tablet Take 1 tablet by mouth daily   Yes Maribel Cornelius MD   apixaban (ELIQUIS) 5 MG TABS tablet 2 bid- 7 days, 1 bid thereafter  Patient not taking: Reported on 4/16/2024 12/13/23   Marry Jane MD   carvedilol (COREG) 6.25 MG tablet Take 1 tablet by mouth 2 times daily (with meals) 3/26/23   Rebekah Mahan DO   hydrALAZINE (APRESOLINE) 50 MG tablet Take 1 tablet by mouth every 8 hours 1/7/23   Shaun Garcia MD   NIFEdipine (PROCARDIA XL) 30 MG extended release tablet Take 1 tablet by mouth in the morning and at bedtime 1/7/23   Shaun Garcia MD   albuterol sulfate HFA (PROVENTIL;VENTOLIN;PROAIR) 108 (90 Base) MCG/ACT inhaler Inhale 2 puffs into the lungs 4 times daily as needed for Wheezing or Shortness of Breath    Maribel Cornelius MD   DULoxetine (CYMBALTA) 60 MG extended release capsule Take 1 capsule by mouth at bedtime    Maribel Cornelius MD   gabapentin (NEURONTIN) 300 MG capsule Take 3 capsules by mouth 4 times daily.    Maribel Cornelius MD   omeprazole (PRILOSEC) 20 MG delayed release capsule Take 1 capsule by mouth 2 times daily (before meals)    Maribel Cornelius MD   traZODone (DESYREL) 50 MG tablet Take 1.5 tablets by mouth nightly    ProviderMaribel MD      Diet Reviewed: Yes   ADULT DIET; Dysphagia - Soft and Bite Sized; 4 carb choices (60 gm/meal); Low Sodium (2 gm); Low Fat (less than or equal to 50 gm/day); Mildly Thick (Nectar); 2000 ml    Goal of Care Reviewed: Yes   Patient and/or Family's stated Goal of Care this Admission:   , Reduce shortness of breath, increase activity tolerance, better understand heart failure and disease management, be more comfortable, and reduce lower extremity edema prior to discharge.     Electronically signed by Adry Whitaker RN on 4/23/2024 at 2:05 AM   
Tested    See PT note for gait analysis.    ADLs:  Dressing:      UE:   Not Tested  LE:    Not Tested    Bathing:    UE:  Not Tested  LE:  Not Tested    Eating:   Not Tested    Toileting:  Not Tested    Grooming/hygiene: Not Tested    Activity Tolerance:   Pt completed therapy session with fatigue     BP (mmHg) HR (bpm) SpO2 (%) on 2L  Comments   Supine at rest   94%    Seated at EOB  92 91% on RA    After bed->chair transfer   89% on RA On RA; pt initially at 89% recovered to 90s within 30 seconds and VC for PLB   End of session   90-92% on RA      Notified RN of O2 saturation with mobility and end of treatment session. Cleared to have O2 doffed at end of treatment session.     Positioning Needs:   Pt reclined in chair, alarm set, call light provided and all needs within reach .   Sitter present in room    Ther Ex / Activities Initiated:   N/A    Patient/Family Education:   Pt educated on role of inpatient OT, plan of care, importance of continued activity, DC recommendations, Functional transfer/mobility safety, Safety awareness, Pursed lip breathing, Pacing activity, and Calling for assist with mobility.    CHF Education  N/A    Assessment:  Pt seen for Occupational therapy treatment in acute care setting.  Pt continues to demonstrate decreased Activity tolerance, ADLs, Balance , Bed mobility, Safety Awareness, Strength, Transfers, and Cognition. Pt continues to function below baseline and will likely benefit from skilled occupational therapy services to maximize safety and independence.     Pt agreeable to therapy session this date. Pt limited by deficits in balance, strength, and endurance necessary for safe completion of ADLs. Pt demonstrated some impulsivity, requiring moderate verbal cues for safe walker placement, sequencing, and pacing of activity.    Recommending SNF upon discharge as patient functioning well below baseline, demonstrates good rehab potential and unable to return home due to limited or no 
was performed using ASHLEY BIN SARS-CoV-2 and Influenza A/B  nucleic acid assay. This test is a multiplex Real-Time Reverse  Transcriptase Polymerase Chain Reaction (RT-PCR)-based in vitro  diagnostic test intended for the qualitative detection of nucleic  acids from SARS-CoV-2, influenza A, and influenza B in nasopharyngeal  and nasal swab specimens for use under the FDA’s Emergency Use  Authorization (EUA) only.    Patient Fact Sheet:  https://www.fda.gov/media/769073/download  Provider Fact Sheet: https://www.fda.gov/media/671216/download  EUA: https://www.fda.gov/media/856343/download  IFU: https://www.fda.gov/media/589565/download    Methodology:  RT-PCR          INFLUENZA A NOT DETECTED     INFLUENZA B NOT DETECTED               RADIOLOGY  FL MODIFIED BARIUM SWALLOW W VIDEO   Final Result   1. Positive silent tracheal aspiration of thin liquids.   2. Moderate to deep laryngeal penetration without tracheal aspiration of   nectar thickened liquids.   3. No evidence of laryngeal penetration or tracheal aspiration of purees and   solids.   Please see separate speech pathology report for full discussion of findings   and recommendations.         XR CHEST PORTABLE   Final Result   Developing airspace disease on the right and left, suspicious for pneumonia         US GALLBLADDER RUQ   Final Result   Surgically absent gallbladder.  No biliary ductal dilatation      Increased echogenicity throughout the liver, compatible with diffuse   hepatocellular disease such as fatty infiltration.         CT ABDOMEN PELVIS W IV CONTRAST Additional Contrast? None   Final Result   CTA CHEST:      1. Negative for pulmonary embolus.   2. Small right pleural effusion and basilar consolidation.   CT ABDOMEN AND PELVIS      1. Concern for acute pancreatitis.  Clinical correlation is needed.   2. Hepatic steatosis.   3. Diverticulosis with mild wall thickening of the sigmoid colon.  Negative   for obstruction         CT CHEST PULMONARY 
Goals: (7 days 04/25/2024)  Goal 1: The patient will tolerate least restrictive diet with no clinical s/s of aspiration or worsening respiratory/pulmonary status 4/21/2024 : Goal addressed, see above. Ongoing, progressing.          Recommendations:  Solid Consistency: IDDSI 6 Soft and Bite Sized Solids  Liquid Consistency: IDDSI 0 Thin Liquids  Medication: Meds whole or crushed in puree as able    Plan:    Continued Dysphagia treatment with goals per plan of care.    Discharge Recommendations: SLP at discharge is pending clinical progression    If pt discharges from hospital prior to Speech/Swallowing discharge, this note serves as tx and discharge summary.     Total Treatment Time / Charges     Time in Time out Total Time / units   Cognitive Tx      Speech Tx      Dysphagia Tx 0802 0838 36 minutes / 1 unit      Signature:  Jinny De Los Santos M.A. SLP  Speech-Language Pathologist  [place] Lic #[id number]    
requiring it     #Small pleural effusion with consolidation   -no s/s of pna     #Hx of VTE   -on eliquis -has not been taking, resumed     COPD without AE   -continue prn inhalers      Chronic pain  -on cymbalta and gabapentin      History of Ischemic CVA  -not on ASA, statin   -on eliquis     Anemia  -continue ferrous sulfate     Thrombocytopenia  2 to liver disease  Will monitor        Tobacco use  -counseled cessation  - continues to smoke about 1/2 PPD      DVT Prophylaxis: Eliquis  Diet: Diet NPO  Code Status: Full Code    KELSEY ELLIOTT MD  04/18/24  8:02 AM        
swallow study is indicated.  Given good oral care status, medical comorbidities, and overt clinical s/s of aspiration at bedside, pt is not safe for oral diet prior to instrumental study from SLP standpoint. Correlate with MD.     Instrumentation: Yes. MBSS is warranted to further assess oropharyngeal structures and functions. Order placed at this time.  Diet recommendation: NPO; Meds via alt means of nutrition  Risk management: Control risk factors for aspiration PNA by completing oral care 3-4x/day and increasing physical mobility as is medically feasible    Prognosis: Good    Recommended Intervention:   Dysphagia treatment  NPO  MBSS                 Dysphagia Therapeutic Intervention:   Oral care    Referrals:   Pulmonary   GI    Goals:  Short Term Goals:  Timeframe for Short Term Goals: (5 days 04/23/2024)  Goal 1: The patient will tolerate recommended diet with no clinical s/s of aspiration 5/5  Goal 2: The patient will tolerate therapeutic diet upgrade trials with no clinical s/s of aspiration 5/5  Goal 3: The patient will recall/perform recommended compensatory strategies given min cues  Goal 4: The patient will tolerate instrumental assessment when able     Long Term Goals:   Timeframe for Long Term Goals: (7 days 04/25/2024)  Goal 1: The patient will tolerate least restrictive diet with no clinical s/s of aspiration or worsening respiratory/pulmonary status    Treatment:  Skilled instruction completed with patient re: evidenced based practice regarding recommendations and POC, importance of oral care to reduce adverse affects in the event of aspiration, and instruction of recommended compensatory strategies developed based upon clinical exam. Pt able to recall/demonstrate compensatory strategies with min cues.      Pt Education: SLP educated the patient re: Role of SLP, rationale for completion of assessment, anatomical components of swallow structures as they pertain to airway protection, results of 
   Shortness of breath     Chest pain     Multiple closed fractures of ribs of right side 02/28/2022    Chronic bilateral low back pain without sciatica 02/28/2022    Accelerated essential hypertension 02/28/2022    Fall     Closed fracture of multiple ribs of right side with routine healing     Tobacco abuse                    Assessment:  Principal Problem:    Pancreatitis, unspecified pancreatitis type  Active Problems:    Fall    Agitation    Aspiration pneumonia (HCC)  Resolved Problems:    * No resolved hospital problems. *    67 yo M seen for f/u regarding acute alcoholic pancreatitis.  Patient with history of heavy alcohol abuse and drinks about 1 pint of whiskey each time.  Denied any GI bleed symptoms.  No known history of cirrhosis in the past.  On admission, CT chest abdomen and pelvis results noted.  Other than pancreatitis also noted mild colonic wall thickening in the sigmoid colon.  Lipase at 1052.  Hypokalemia.   Potassium 3.1, Sodium 135, Mg 1.7, Lft's similar to yesterday. Platelet ct 79.   Unable to complete Fibroscan inpatient, will complete as OP.   C/o Some CP today, Nursing has already addressed w/ Primary team  Noted aspiration thus NPO presently.       Plan:  Planned for MBS Esophagram to evaluate per primary team.   Once Cleared for Oral intake okay to advance to Low Fat/Full liquids.   Abstain from ETOH   Fibroscan and further Liver W/U OP.     Discussed plan with Dr. Vinson while at bedside with patient and nursing team.       Zita THURSTON, BREANNE - CNP  [facility]  [phone]. Also available via [employer]

## 2024-05-04 ENCOUNTER — HOSPITAL ENCOUNTER (EMERGENCY)
Age: 69
Discharge: HOME | End: 2024-05-04
Payer: MEDICARE

## 2024-05-04 VITALS
DIASTOLIC BLOOD PRESSURE: 100 MMHG | TEMPERATURE: 98.2 F | OXYGEN SATURATION: 94 % | HEART RATE: 104 BPM | SYSTOLIC BLOOD PRESSURE: 163 MMHG | RESPIRATION RATE: 18 BRPM | HEIGHT: 71 IN | WEIGHT: 210 LBS | BODY MASS INDEX: 29.4 KG/M2
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
## 2024-05-04 PROCEDURE — 99283 EMERGENCY DEPT VISIT LOW MDM: CPT

## 2024-05-04 ASSESSMENT — PAIN SCALES - GENERAL
PAINLEVEL_OUTOF10: 5
PAINLEVEL_OUTOF10: 7

## 2024-05-04 ASSESSMENT — PAIN DESCRIPTION - LOCATION
LOCATION: RIB CAGE
LOCATION: RIB CAGE

## 2024-05-04 ASSESSMENT — PAIN DESCRIPTION - PAIN TYPE
TYPE: ACUTE PAIN
TYPE: ACUTE PAIN

## 2024-05-04 ASSESSMENT — PAIN DESCRIPTION - ORIENTATION
ORIENTATION: RIGHT
ORIENTATION: RIGHT

## 2024-05-04 ASSESSMENT — PAIN DESCRIPTION - DESCRIPTORS: DESCRIPTORS: ACHING

## 2024-05-04 ASSESSMENT — PAIN - FUNCTIONAL ASSESSMENT
PAIN_FUNCTIONAL_ASSESSMENT: 0-10
PAIN_FUNCTIONAL_ASSESSMENT: ACTIVITIES ARE NOT PREVENTED
PAIN_FUNCTIONAL_ASSESSMENT: 0-10

## 2024-05-04 ASSESSMENT — PAIN DESCRIPTION - FREQUENCY: FREQUENCY: CONTINUOUS

## 2024-05-04 ASSESSMENT — PAIN DESCRIPTION - ONSET: ONSET: ON-GOING

## 2024-05-04 NOTE — ED PROVIDER NOTES
[facility] ED  EMERGENCY DEPARTMENT ENCOUNTER        Pt Name: Hiro Esteban  MRN: [id number]  Birthdate 1955  Date of evaluation: 5/4/2024  Provider: Anai Khan PA-C  PCP: Moe Kumar MD  Note Started: 12:33 PM EDT 5/4/24      ABRIL. I have evaluated this patient.        CHIEF COMPLAINT       Chief Complaint   Patient presents with    Fall     Pt arrives via EMS for sliding out of wheelchair. Pt states he has redness to buttock from fall but no pain. Pt c/o chronic chest pain from fall a month ago.        HISTORY OF PRESENT ILLNESS: 1 or more Elements     History From: fall            Chief Complaint:fall    Hiro Esteban is a 68 y.o. male who presents because he is telling me he is here from [facility] for a fall.  He states he was sitting in his wheelchair and he bent over to pick something up any slid out of his chair and fell onto his buttock.  He states he is here because the nursing home is \"covering their ass \".  He denies any new pain.  He did not hit his head.  He is walking.  No other new complaints or issues    Nursing Notes were all reviewed and agreed with or any disagreements were addressed in the HPI.    REVIEW OF SYSTEMS :      Review of Systems    Positives and Pertinent negatives as per HPI.     SURGICAL HISTORY     Past Surgical History:   Procedure Laterality Date    CHOLECYSTECTOMY      COLONOSCOPY      CT GUIDED CHEST TUBE  03/01/2022    CT GUIDED CHEST TUBE 3/1/2022 [facility] CT SCAN    FEMUR FRACTURE SURGERY Right 12/2/2023    FEMORAL NAIL RIGHT RETROGRADE performed by Chai Butler DO at [facility] OR    FRACTURE SURGERY      IR MIDLINE CATH  12/13/2023    IR MIDLINE CATH 12/13/2023 [facility] SPECIAL PROCEDURES    JOINT REPLACEMENT      OTHER SURGICAL HISTORY Right 12/02/2023    OTHER SURGICAL HISTORY Right 12/02/2023    femoral nail right retrograde       CURRENTMEDICATIONS       Previous Medications    ALBUTEROL SULFATE HFA (PROVENTIL;VENTOLIN;PROAIR)

## 2024-05-04 NOTE — ED NOTES
1300-Call back received from [employer] [facility] booked with [facility] transport eta of 1400.

## 2024-05-06 ENCOUNTER — CARE COORDINATION (OUTPATIENT)
Dept: CARE COORDINATION | Age: 69
End: 2024-05-06

## 2024-05-06 NOTE — CARE COORDINATION
ACM outreach made for payer referral with no answer. Unable to leave VM as mailbox is full. Will attempt at a later date.

## 2024-05-07 ENCOUNTER — CARE COORDINATION (OUTPATIENT)
Dept: CARE COORDINATION | Age: 69
End: 2024-05-07

## 2024-05-07 NOTE — CARE COORDINATION
ACM outreach made for payer referral with no answer, 2nd attempt. Unable to leave VM as mailbox is full. Will attempt at a later date.

## 2024-05-08 ENCOUNTER — CARE COORDINATION (OUTPATIENT)
Dept: CARE COORDINATION | Age: 69
End: 2024-05-08

## 2024-05-08 NOTE — CARE COORDINATION
ACM outreach made with no answer and no way of leaving a VM at this time. Patient does not have MyChart set up. Will remain available should patient call back. No further outreaches to be made at this time.

## 2024-10-03 ENCOUNTER — HOSPITAL ENCOUNTER (OUTPATIENT)
Age: 69
Setting detail: OBSERVATION
Discharge: HOME | End: 2024-10-07
Attending: EMERGENCY MEDICINE | Admitting: INTERNAL MEDICINE
Payer: MEDICARE

## 2024-10-03 ENCOUNTER — APPOINTMENT (OUTPATIENT)
Dept: GENERAL RADIOLOGY | Age: 69
End: 2024-10-03
Payer: MEDICARE

## 2024-10-03 ENCOUNTER — APPOINTMENT (OUTPATIENT)
Dept: CT IMAGING | Age: 69
End: 2024-10-03
Payer: MEDICARE

## 2024-10-03 DIAGNOSIS — S32.401A CLOSED DISPLACED FRACTURE OF RIGHT ACETABULUM, UNSPECIFIED PORTION OF ACETABULUM, INITIAL ENCOUNTER (HCC): Primary | ICD-10-CM
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
## 2024-10-03 LAB
ANION GAP SERPL CALCULATED.3IONS-SCNC: 9 MMOL/L (ref 3–16)
BASOPHILS # BLD: 0 K/UL (ref 0–0.2)
BASOPHILS NFR BLD: 0.7 %
BUN SERPL-MCNC: 10 MG/DL (ref 7–20)
CALCIUM SERPL-MCNC: 8.2 MG/DL (ref 8.3–10.6)
CHLORIDE SERPL-SCNC: 104 MMOL/L (ref 99–110)
CK SERPL-CCNC: 126 U/L (ref 39–308)
CO2 SERPL-SCNC: 30 MMOL/L (ref 21–32)
CREAT SERPL-MCNC: <0.5 MG/DL (ref 0.8–1.3)
DEPRECATED RDW RBC AUTO: 19.5 % (ref 12.4–15.4)
EOSINOPHIL # BLD: 0 K/UL (ref 0–0.6)
EOSINOPHIL NFR BLD: 0.6 %
ETHANOLAMINE SERPL-MCNC: 175 MG/DL (ref 0–0.08)
GFR SERPLBLD CREATININE-BSD FMLA CKD-EPI: >90 ML/MIN/{1.73_M2}
GLUCOSE SERPL-MCNC: 100 MG/DL (ref 70–99)
HCT VFR BLD AUTO: 35 % (ref 40.5–52.5)
HGB BLD-MCNC: 11.5 G/DL (ref 13.5–17.5)
INR PPP: 1.19 (ref 0.85–1.15)
LYMPHOCYTES # BLD: 2.1 K/UL (ref 1–5.1)
LYMPHOCYTES NFR BLD: 32.9 %
MCH RBC QN AUTO: 28.4 PG (ref 26–34)
MCHC RBC AUTO-ENTMCNC: 32.8 G/DL (ref 31–36)
MCV RBC AUTO: 86.5 FL (ref 80–100)
MONOCYTES # BLD: 0.6 K/UL (ref 0–1.3)
MONOCYTES NFR BLD: 9.2 %
NEUTROPHILS # BLD: 3.6 K/UL (ref 1.7–7.7)
NEUTROPHILS NFR BLD: 56.6 %
PLATELET # BLD AUTO: 196 K/UL (ref 135–450)
PMV BLD AUTO: 9.2 FL (ref 5–10.5)
POTASSIUM SERPL-SCNC: 3.2 MMOL/L (ref 3.5–5.1)
PROTHROMBIN TIME: 15.3 SEC (ref 11.9–14.9)
RBC # BLD AUTO: 4.05 M/UL (ref 4.2–5.9)
SODIUM SERPL-SCNC: 143 MMOL/L (ref 136–145)
WBC # BLD AUTO: 6.3 K/UL (ref 4–11)

## 2024-10-03 PROCEDURE — 96375 TX/PRO/DX INJ NEW DRUG ADDON: CPT

## 2024-10-03 PROCEDURE — 2580000003 HC RX 258: Performed by: INTERNAL MEDICINE

## 2024-10-03 PROCEDURE — 73700 CT LOWER EXTREMITY W/O DYE: CPT

## 2024-10-03 PROCEDURE — 82550 ASSAY OF CK (CPK): CPT

## 2024-10-03 PROCEDURE — 6370000000 HC RX 637 (ALT 250 FOR IP): Performed by: INTERNAL MEDICINE

## 2024-10-03 PROCEDURE — 82077 ASSAY SPEC XCP UR&BREATH IA: CPT

## 2024-10-03 PROCEDURE — 96361 HYDRATE IV INFUSION ADD-ON: CPT

## 2024-10-03 PROCEDURE — 6370000000 HC RX 637 (ALT 250 FOR IP): Performed by: NURSE PRACTITIONER

## 2024-10-03 PROCEDURE — G0378 HOSPITAL OBSERVATION PER HR: HCPCS

## 2024-10-03 PROCEDURE — 73502 X-RAY EXAM HIP UNI 2-3 VIEWS: CPT

## 2024-10-03 PROCEDURE — 99285 EMERGENCY DEPT VISIT HI MDM: CPT

## 2024-10-03 PROCEDURE — 6360000002 HC RX W HCPCS

## 2024-10-03 PROCEDURE — 82607 VITAMIN B-12: CPT

## 2024-10-03 PROCEDURE — 85610 PROTHROMBIN TIME: CPT

## 2024-10-03 PROCEDURE — 82746 ASSAY OF FOLIC ACID SERUM: CPT

## 2024-10-03 PROCEDURE — 85025 COMPLETE CBC W/AUTO DIFF WBC: CPT

## 2024-10-03 PROCEDURE — 80048 BASIC METABOLIC PNL TOTAL CA: CPT

## 2024-10-03 PROCEDURE — 96374 THER/PROPH/DIAG INJ IV PUSH: CPT

## 2024-10-03 RX ORDER — SODIUM CHLORIDE 0.9 % (FLUSH) 0.9 %
5-40 SYRINGE (ML) INJECTION PRN
Status: DISCONTINUED | OUTPATIENT
Start: 2024-10-03 | End: 2024-10-07 | Stop reason: HOSPADM

## 2024-10-03 RX ORDER — ONDANSETRON 4 MG/1
4 TABLET, ORALLY DISINTEGRATING ORAL EVERY 8 HOURS PRN
Status: DISCONTINUED | OUTPATIENT
Start: 2024-10-03 | End: 2024-10-07 | Stop reason: HOSPADM

## 2024-10-03 RX ORDER — POLYETHYLENE GLYCOL 3350 17 G
2 POWDER IN PACKET (EA) ORAL
Status: DISCONTINUED | OUTPATIENT
Start: 2024-10-03 | End: 2024-10-07 | Stop reason: HOSPADM

## 2024-10-03 RX ORDER — ONDANSETRON 2 MG/ML
4 INJECTION INTRAMUSCULAR; INTRAVENOUS EVERY 6 HOURS PRN
Status: DISCONTINUED | OUTPATIENT
Start: 2024-10-03 | End: 2024-10-07 | Stop reason: HOSPADM

## 2024-10-03 RX ORDER — GABAPENTIN 300 MG/1
300 CAPSULE ORAL 3 TIMES DAILY
Status: DISCONTINUED | OUTPATIENT
Start: 2024-10-03 | End: 2024-10-04

## 2024-10-03 RX ORDER — SODIUM CHLORIDE 0.9 % (FLUSH) 0.9 %
5-40 SYRINGE (ML) INJECTION EVERY 12 HOURS SCHEDULED
Status: DISCONTINUED | OUTPATIENT
Start: 2024-10-03 | End: 2024-10-07 | Stop reason: HOSPADM

## 2024-10-03 RX ORDER — LORAZEPAM 2 MG/ML
2 INJECTION INTRAMUSCULAR
Status: DISCONTINUED | OUTPATIENT
Start: 2024-10-03 | End: 2024-10-07 | Stop reason: HOSPADM

## 2024-10-03 RX ORDER — LORAZEPAM 1 MG/1
2 TABLET ORAL
Status: DISCONTINUED | OUTPATIENT
Start: 2024-10-03 | End: 2024-10-07 | Stop reason: HOSPADM

## 2024-10-03 RX ORDER — LORAZEPAM 2 MG/ML
1 INJECTION INTRAMUSCULAR
Status: DISCONTINUED | OUTPATIENT
Start: 2024-10-03 | End: 2024-10-07 | Stop reason: HOSPADM

## 2024-10-03 RX ORDER — IPRATROPIUM BROMIDE AND ALBUTEROL SULFATE 2.5; .5 MG/3ML; MG/3ML
1 SOLUTION RESPIRATORY (INHALATION)
Status: DISCONTINUED | OUTPATIENT
Start: 2024-10-04 | End: 2024-10-07 | Stop reason: HOSPADM

## 2024-10-03 RX ORDER — ACETAMINOPHEN 650 MG/1
650 SUPPOSITORY RECTAL EVERY 6 HOURS PRN
Status: DISCONTINUED | OUTPATIENT
Start: 2024-10-03 | End: 2024-10-07 | Stop reason: HOSPADM

## 2024-10-03 RX ORDER — PSEUDOEPHEDRINE HCL 30 MG
100 TABLET ORAL 2 TIMES DAILY
Status: DISCONTINUED | OUTPATIENT
Start: 2024-10-03 | End: 2024-10-03

## 2024-10-03 RX ORDER — SODIUM CHLORIDE 9 MG/ML
INJECTION, SOLUTION INTRAVENOUS PRN
Status: DISCONTINUED | OUTPATIENT
Start: 2024-10-03 | End: 2024-10-07 | Stop reason: HOSPADM

## 2024-10-03 RX ORDER — CARVEDILOL 6.25 MG/1
6.25 TABLET ORAL 2 TIMES DAILY WITH MEALS
Status: DISCONTINUED | OUTPATIENT
Start: 2024-10-03 | End: 2024-10-07 | Stop reason: HOSPADM

## 2024-10-03 RX ORDER — LORAZEPAM 1 MG/1
4 TABLET ORAL
Status: DISCONTINUED | OUTPATIENT
Start: 2024-10-03 | End: 2024-10-07 | Stop reason: HOSPADM

## 2024-10-03 RX ORDER — POTASSIUM CHLORIDE 1500 MG/1
40 TABLET, EXTENDED RELEASE ORAL PRN
Status: DISCONTINUED | OUTPATIENT
Start: 2024-10-03 | End: 2024-10-07 | Stop reason: HOSPADM

## 2024-10-03 RX ORDER — ONDANSETRON 2 MG/ML
4 INJECTION INTRAMUSCULAR; INTRAVENOUS ONCE
Status: COMPLETED | OUTPATIENT
Start: 2024-10-03 | End: 2024-10-03

## 2024-10-03 RX ORDER — MAGNESIUM SULFATE IN WATER 40 MG/ML
2000 INJECTION, SOLUTION INTRAVENOUS PRN
Status: DISCONTINUED | OUTPATIENT
Start: 2024-10-03 | End: 2024-10-07 | Stop reason: HOSPADM

## 2024-10-03 RX ORDER — FOLIC ACID 1 MG/1
1 TABLET ORAL DAILY
Status: DISCONTINUED | OUTPATIENT
Start: 2024-10-04 | End: 2024-10-07 | Stop reason: HOSPADM

## 2024-10-03 RX ORDER — MORPHINE SULFATE 2 MG/ML
2 INJECTION, SOLUTION INTRAMUSCULAR; INTRAVENOUS EVERY 4 HOURS PRN
Status: DISCONTINUED | OUTPATIENT
Start: 2024-10-03 | End: 2024-10-04

## 2024-10-03 RX ORDER — LANOLIN ALCOHOL/MO/W.PET/CERES
100 CREAM (GRAM) TOPICAL DAILY
Status: DISCONTINUED | OUTPATIENT
Start: 2024-10-03 | End: 2024-10-07 | Stop reason: HOSPADM

## 2024-10-03 RX ORDER — NICOTINE 21 MG/24HR
1 PATCH, TRANSDERMAL 24 HOURS TRANSDERMAL DAILY
Status: DISCONTINUED | OUTPATIENT
Start: 2024-10-03 | End: 2024-10-07 | Stop reason: HOSPADM

## 2024-10-03 RX ORDER — LANOLIN ALCOHOL/MO/W.PET/CERES
400 CREAM (GRAM) TOPICAL DAILY
Status: DISCONTINUED | OUTPATIENT
Start: 2024-10-03 | End: 2024-10-07 | Stop reason: HOSPADM

## 2024-10-03 RX ORDER — POTASSIUM CHLORIDE 7.45 MG/ML
10 INJECTION INTRAVENOUS PRN
Status: DISCONTINUED | OUTPATIENT
Start: 2024-10-03 | End: 2024-10-07 | Stop reason: HOSPADM

## 2024-10-03 RX ORDER — POTASSIUM CHLORIDE 1500 MG/1
40 TABLET, EXTENDED RELEASE ORAL ONCE
Status: COMPLETED | OUTPATIENT
Start: 2024-10-03 | End: 2024-10-03

## 2024-10-03 RX ORDER — LORAZEPAM 2 MG/ML
4 INJECTION INTRAMUSCULAR
Status: DISCONTINUED | OUTPATIENT
Start: 2024-10-03 | End: 2024-10-07 | Stop reason: HOSPADM

## 2024-10-03 RX ORDER — LORAZEPAM 1 MG/1
1 TABLET ORAL
Status: DISCONTINUED | OUTPATIENT
Start: 2024-10-03 | End: 2024-10-07 | Stop reason: HOSPADM

## 2024-10-03 RX ORDER — PANTOPRAZOLE SODIUM 40 MG/1
40 TABLET, DELAYED RELEASE ORAL
Status: DISCONTINUED | OUTPATIENT
Start: 2024-10-04 | End: 2024-10-07 | Stop reason: HOSPADM

## 2024-10-03 RX ORDER — HYDRALAZINE HYDROCHLORIDE 20 MG/ML
10 INJECTION INTRAMUSCULAR; INTRAVENOUS EVERY 6 HOURS PRN
Status: DISCONTINUED | OUTPATIENT
Start: 2024-10-03 | End: 2024-10-07 | Stop reason: HOSPADM

## 2024-10-03 RX ORDER — ACETAMINOPHEN 325 MG/1
650 TABLET ORAL EVERY 6 HOURS PRN
Status: DISCONTINUED | OUTPATIENT
Start: 2024-10-03 | End: 2024-10-04

## 2024-10-03 RX ORDER — LORAZEPAM 2 MG/ML
3 INJECTION INTRAMUSCULAR
Status: DISCONTINUED | OUTPATIENT
Start: 2024-10-03 | End: 2024-10-07 | Stop reason: HOSPADM

## 2024-10-03 RX ORDER — M-VIT,TX,IRON,MINS/CALC/FOLIC 27MG-0.4MG
1 TABLET ORAL DAILY
Status: DISCONTINUED | OUTPATIENT
Start: 2024-10-04 | End: 2024-10-07 | Stop reason: HOSPADM

## 2024-10-03 RX ORDER — SODIUM CHLORIDE, SODIUM LACTATE, POTASSIUM CHLORIDE, CALCIUM CHLORIDE 600; 310; 30; 20 MG/100ML; MG/100ML; MG/100ML; MG/100ML
INJECTION, SOLUTION INTRAVENOUS CONTINUOUS
Status: DISCONTINUED | OUTPATIENT
Start: 2024-10-03 | End: 2024-10-04

## 2024-10-03 RX ORDER — NIFEDIPINE 30 MG/1
30 TABLET, EXTENDED RELEASE ORAL DAILY
Status: DISCONTINUED | OUTPATIENT
Start: 2024-10-03 | End: 2024-10-07 | Stop reason: HOSPADM

## 2024-10-03 RX ORDER — MORPHINE SULFATE 4 MG/ML
4 INJECTION, SOLUTION INTRAMUSCULAR; INTRAVENOUS ONCE
Status: COMPLETED | OUTPATIENT
Start: 2024-10-03 | End: 2024-10-03

## 2024-10-03 RX ORDER — SENNOSIDES A AND B 8.6 MG/1
1 TABLET, FILM COATED ORAL DAILY PRN
Status: DISCONTINUED | OUTPATIENT
Start: 2024-10-03 | End: 2024-10-07 | Stop reason: HOSPADM

## 2024-10-03 RX ORDER — LORAZEPAM 1 MG/1
3 TABLET ORAL
Status: DISCONTINUED | OUTPATIENT
Start: 2024-10-03 | End: 2024-10-07 | Stop reason: HOSPADM

## 2024-10-03 RX ORDER — OXYCODONE HYDROCHLORIDE 5 MG/1
5 TABLET ORAL EVERY 4 HOURS PRN
Status: DISCONTINUED | OUTPATIENT
Start: 2024-10-03 | End: 2024-10-04

## 2024-10-03 RX ADMIN — Medication 100 MG: at 22:59

## 2024-10-03 RX ADMIN — MORPHINE SULFATE 4 MG: 4 INJECTION, SOLUTION INTRAMUSCULAR; INTRAVENOUS at 18:53

## 2024-10-03 RX ADMIN — Medication 10 ML: at 22:58

## 2024-10-03 RX ADMIN — SODIUM CHLORIDE, POTASSIUM CHLORIDE, SODIUM LACTATE AND CALCIUM CHLORIDE: 600; 310; 30; 20 INJECTION, SOLUTION INTRAVENOUS at 23:04

## 2024-10-03 RX ADMIN — CARVEDILOL 6.25 MG: 6.25 TABLET, FILM COATED ORAL at 23:00

## 2024-10-03 RX ADMIN — NIFEDIPINE 30 MG: 30 TABLET, FILM COATED, EXTENDED RELEASE ORAL at 23:00

## 2024-10-03 RX ADMIN — POTASSIUM CHLORIDE 40 MEQ: 1500 TABLET, EXTENDED RELEASE ORAL at 23:00

## 2024-10-03 RX ADMIN — OXYCODONE HYDROCHLORIDE 5 MG: 5 TABLET ORAL at 23:00

## 2024-10-03 RX ADMIN — ONDANSETRON 4 MG: 2 INJECTION INTRAMUSCULAR; INTRAVENOUS at 18:51

## 2024-10-03 RX ADMIN — Medication 400 MG: at 22:59

## 2024-10-03 RX ADMIN — NICOTINE POLACRILEX 2 MG: 2 LOZENGE ORAL at 22:59

## 2024-10-03 ASSESSMENT — PAIN SCALES - GENERAL
PAINLEVEL_OUTOF10: 8
PAINLEVEL_OUTOF10: 9
PAINLEVEL_OUTOF10: 8

## 2024-10-03 ASSESSMENT — PAIN DESCRIPTION - LOCATION
LOCATION: HIP

## 2024-10-03 ASSESSMENT — PAIN - FUNCTIONAL ASSESSMENT: PAIN_FUNCTIONAL_ASSESSMENT: 0-10

## 2024-10-03 ASSESSMENT — PAIN DESCRIPTION - ORIENTATION
ORIENTATION: RIGHT

## 2024-10-03 ASSESSMENT — PAIN DESCRIPTION - DESCRIPTORS
DESCRIPTORS: SHARP;SHOOTING
DESCRIPTORS: ACHING

## 2024-10-03 NOTE — ED PROVIDER NOTES
[facility]  ED  EMERGENCY DEPARTMENT ENCOUNTER        Pt Name: Hiro Esteban  MRN: [id number]  Birthdate 1955  Date of evaluation: 10/3/2024  Provider: JOYCE Hunter Jr  PCP: Moe Kumar MD  Note Started: 6:30 PM EDT 10/3/24       I have seen and evaluated this patient with my supervising physician Zachary Mcmanus MD.      CHIEF COMPLAINT       Chief Complaint   Patient presents with    Hip Pain     Patient reports a fall last night/ this morning (does not remember when or how) injured R hip, pt denies LOC takes 81 ASA and eliquis daily.       HISTORY OF PRESENT ILLNESS: 1 or more Elements     History from : Patient    Limitations to history : None    Hiro Esteban is a 68 y.o. male who presents with reports of right hip pain after mechanical fall that occurred at his [facility] today.  States that he has not been able to bear weight.  Denies hitting his head.  He is anticoagulated on Eliquis.  No other complaints this time.  Review of systems otherwise negative.    Nursing Notes were all reviewed and agreed with or any disagreements were addressed in the HPI.      SURGICAL HISTORY     Past Surgical History:   Procedure Laterality Date    CHOLECYSTECTOMY      COLONOSCOPY      CT GUIDED CHEST TUBE  03/01/2022    CT GUIDED CHEST TUBE 3/1/2022 [facility] CT SCAN    FEMUR FRACTURE SURGERY Right 12/2/2023    FEMORAL NAIL RIGHT RETROGRADE performed by Chai Butler DO at [facility] OR    FRACTURE SURGERY      IR MIDLINE CATH  12/13/2023    IR MIDLINE CATH 12/13/2023 [facility] SPECIAL PROCEDURES    JOINT REPLACEMENT      OTHER SURGICAL HISTORY Right 12/02/2023    OTHER SURGICAL HISTORY Right 12/02/2023    femoral nail right retrograde       CURRENTMEDICATIONS       Previous Medications    ALBUTEROL SULFATE HFA (PROVENTIL;VENTOLIN;PROAIR) 108 (90 BASE) MCG/ACT INHALER    Inhale 2 puffs into the lungs 4 times daily as needed for Wheezing or Shortness of Breath    APIXABAN (ELIQUIS) 5 MG TABS

## 2024-10-03 NOTE — ED NOTES
Hiro Esteban is a 68 y.o. male admitted for  Principal Problem:    Fall at home, sequela  Resolved Problems:    * No resolved hospital problems. *  .   Patient Home via EMS transportation with   Chief Complaint   Patient presents with    Hip Pain     Patient reports a fall last night/ this morning (does not remember when or how) injured R hip, pt denies LOC takes 81 ASA and eliquis daily.   .  Patient is alert and Person, Place, Time, and Situation  Patient's baseline mobility: Baseline Mobility: Independent   Code Status: Prior   Cardiac Rhythm:       Is patient on baseline Oxygen: no how many Liters:   Abnormal Assessment Findings: no    Isolation: None      NIH Score:    C-SSRS: Risk of Suicide: No Risk  Bedside swallow:        Active LDA's:   Peripheral IV 10/03/24 Left Antecubital (Active)   Site Assessment Clean, dry & intact 10/03/24 1505     Patient admitted with a vilchis:  If the vilchis is chronic was it exchanged:  Reason for vilchis:   Patient admitted with Central Line:  . PICC line placement confirmed: YES OR NO:[id number]}   Reason for Central line:   Was central line Inserted from an outside facility:        Family/Caregiver Present no Any Concerns: no   Restraints no  Sitter no         Vitals: MEWS Score: 1    Vitals:    10/03/24 1503 10/03/24 1810 10/03/24 1843 10/03/24 1853   BP: 138/82 (!) 155/89 (!) 143/82    Pulse: 89 92 94    Resp: 18 14 15 20   Temp: 98.7 °F (37.1 °C)      TempSrc: Oral      SpO2: 97% 96% 96%    Weight: 95.3 kg (210 lb)      Height: 1.803 m (5' 11\")          Last documented pain score (0-10 scale) Pain Level: 7  Pain medication administered Yes- see MAR.    Pertinent or High Risk Medications/Drips: No.    Pending Blood Product Administration: no    Abnormal labs:   Abnormal Labs Reviewed   CBC WITH AUTO DIFFERENTIAL - Abnormal; Notable for the following components:       Result Value    RBC 4.05 (*)     Hemoglobin 11.5 (*)     Hematocrit 35.0 (*)     RDW 19.5 (*)     All other

## 2024-10-03 NOTE — ED PROVIDER NOTES
THIS IS MY ABRIL SUPERVISORY AND SHARED VISIT NOTE:    I personally saw the patient and made/approved the management plan and take responsibility for the patient management.    History: 68-year-old male presenting for evaluation of fall, the fall happened sometime last night early this morning and he was not able to get up after the fall.  He has right hip pain.    Exam: There is intact distal pulses to the right lower extremity.  He is lying on the left lateral decubitus side.  No obvious deformity    MDM: 68-year-old male presenting for evaluation of right hip pain after a fall.    CT imaging does show nondisplaced right-sided acetabular fracture.  Orthopedics, Dr. Stafford was consulted who advised on PT OT eval, weightbearing as tolerated, potential placement.  Patient is agreeable with this plan.  Patient has been ordered nicotine patch.  Patient admitted to the hospitalist.    I personally saw the patient and independently provided 0 minutes of non-concurrent critical care out of the total shared critical care time provided.         No results found.      I, Dr. Mcmanus, am the primary clinician of record.     Comment: Please note this report has been produced using speech recognition software and may contain errors related to that system including errors in grammar, punctuation, and spelling, as well as words and phrases that may be inappropriate. If there are any questions or concerns please feel free to contact the dictating provider for clarification.     Zachary Mcmanus MD  10/03/24 1930

## 2024-10-04 ENCOUNTER — APPOINTMENT (OUTPATIENT)
Dept: CT IMAGING | Age: 69
End: 2024-10-04
Payer: MEDICARE

## 2024-10-04 LAB
ANION GAP SERPL CALCULATED.3IONS-SCNC: 11 MMOL/L (ref 3–16)
ANTI-XA UNFRAC HEPARIN: <0.1 IU/ML (ref 0.3–0.7)
APTT BLD: 31.8 SEC (ref 22.1–36.4)
BASOPHILS # BLD: 0.1 K/UL (ref 0–0.2)
BASOPHILS NFR BLD: 0.9 %
BUN SERPL-MCNC: 10 MG/DL (ref 7–20)
CALCIUM SERPL-MCNC: 8.2 MG/DL (ref 8.3–10.6)
CHLORIDE SERPL-SCNC: 99 MMOL/L (ref 99–110)
CO2 SERPL-SCNC: 27 MMOL/L (ref 21–32)
CREAT SERPL-MCNC: <0.5 MG/DL (ref 0.8–1.3)
DEPRECATED RDW RBC AUTO: 19.8 % (ref 12.4–15.4)
EOSINOPHIL # BLD: 0 K/UL (ref 0–0.6)
EOSINOPHIL NFR BLD: 0.4 %
FOLATE SERPL-MCNC: 5.33 NG/ML (ref 4.78–24.2)
GFR SERPLBLD CREATININE-BSD FMLA CKD-EPI: >90 ML/MIN/{1.73_M2}
GLUCOSE SERPL-MCNC: 200 MG/DL (ref 70–99)
HCT VFR BLD AUTO: 34.5 % (ref 40.5–52.5)
HGB BLD-MCNC: 11.6 G/DL (ref 13.5–17.5)
INR PPP: 1.19 (ref 0.85–1.15)
LYMPHOCYTES # BLD: 1.1 K/UL (ref 1–5.1)
LYMPHOCYTES NFR BLD: 15.8 %
MAGNESIUM SERPL-MCNC: 1.6 MG/DL (ref 1.8–2.4)
MCH RBC QN AUTO: 28.9 PG (ref 26–34)
MCHC RBC AUTO-ENTMCNC: 33.6 G/DL (ref 31–36)
MCV RBC AUTO: 86 FL (ref 80–100)
MONOCYTES # BLD: 0.6 K/UL (ref 0–1.3)
MONOCYTES NFR BLD: 9.1 %
NEUTROPHILS # BLD: 5.3 K/UL (ref 1.7–7.7)
NEUTROPHILS NFR BLD: 73.8 %
PHOSPHATE SERPL-MCNC: 2.5 MG/DL (ref 2.5–4.9)
PLATELET # BLD AUTO: 189 K/UL (ref 135–450)
PMV BLD AUTO: 9.5 FL (ref 5–10.5)
POTASSIUM SERPL-SCNC: 3.4 MMOL/L (ref 3.5–5.1)
PROTHROMBIN TIME: 15.3 SEC (ref 11.9–14.9)
RBC # BLD AUTO: 4.02 M/UL (ref 4.2–5.9)
SODIUM SERPL-SCNC: 137 MMOL/L (ref 136–145)
VIT B12 SERPL-MCNC: 287 PG/ML (ref 211–911)
WBC # BLD AUTO: 7.1 K/UL (ref 4–11)

## 2024-10-04 PROCEDURE — G0378 HOSPITAL OBSERVATION PER HR: HCPCS

## 2024-10-04 PROCEDURE — 97166 OT EVAL MOD COMPLEX 45 MIN: CPT

## 2024-10-04 PROCEDURE — 96361 HYDRATE IV INFUSION ADD-ON: CPT

## 2024-10-04 PROCEDURE — 85520 HEPARIN ASSAY: CPT

## 2024-10-04 PROCEDURE — 85025 COMPLETE CBC W/AUTO DIFF WBC: CPT

## 2024-10-04 PROCEDURE — 96366 THER/PROPH/DIAG IV INF ADDON: CPT

## 2024-10-04 PROCEDURE — 97110 THERAPEUTIC EXERCISES: CPT

## 2024-10-04 PROCEDURE — 85730 THROMBOPLASTIN TIME PARTIAL: CPT

## 2024-10-04 PROCEDURE — 94640 AIRWAY INHALATION TREATMENT: CPT

## 2024-10-04 PROCEDURE — 6370000000 HC RX 637 (ALT 250 FOR IP): Performed by: INTERNAL MEDICINE

## 2024-10-04 PROCEDURE — 84100 ASSAY OF PHOSPHORUS: CPT

## 2024-10-04 PROCEDURE — 96375 TX/PRO/DX INJ NEW DRUG ADDON: CPT

## 2024-10-04 PROCEDURE — 96376 TX/PRO/DX INJ SAME DRUG ADON: CPT

## 2024-10-04 PROCEDURE — 97530 THERAPEUTIC ACTIVITIES: CPT

## 2024-10-04 PROCEDURE — 83735 ASSAY OF MAGNESIUM: CPT

## 2024-10-04 PROCEDURE — 70450 CT HEAD/BRAIN W/O DYE: CPT

## 2024-10-04 PROCEDURE — 96365 THER/PROPH/DIAG IV INF INIT: CPT

## 2024-10-04 PROCEDURE — 80048 BASIC METABOLIC PNL TOTAL CA: CPT

## 2024-10-04 PROCEDURE — 6370000000 HC RX 637 (ALT 250 FOR IP): Performed by: NURSE PRACTITIONER

## 2024-10-04 PROCEDURE — 6370000000 HC RX 637 (ALT 250 FOR IP): Performed by: SPECIALIST/TECHNOLOGIST

## 2024-10-04 PROCEDURE — 96367 TX/PROPH/DG ADDL SEQ IV INF: CPT

## 2024-10-04 PROCEDURE — 36415 COLL VENOUS BLD VENIPUNCTURE: CPT

## 2024-10-04 PROCEDURE — 85610 PROTHROMBIN TIME: CPT

## 2024-10-04 PROCEDURE — 6360000002 HC RX W HCPCS: Performed by: INTERNAL MEDICINE

## 2024-10-04 PROCEDURE — 2580000003 HC RX 258: Performed by: INTERNAL MEDICINE

## 2024-10-04 PROCEDURE — 97162 PT EVAL MOD COMPLEX 30 MIN: CPT

## 2024-10-04 PROCEDURE — 99222 1ST HOSP IP/OBS MODERATE 55: CPT | Performed by: SPECIALIST/TECHNOLOGIST

## 2024-10-04 PROCEDURE — 6360000002 HC RX W HCPCS: Performed by: SPECIALIST/TECHNOLOGIST

## 2024-10-04 RX ORDER — HEPARIN SODIUM 1000 [USP'U]/ML
40 INJECTION, SOLUTION INTRAVENOUS; SUBCUTANEOUS PRN
Status: DISCONTINUED | OUTPATIENT
Start: 2024-10-04 | End: 2024-10-04

## 2024-10-04 RX ORDER — HEPARIN SODIUM 10000 [USP'U]/100ML
5-30 INJECTION, SOLUTION INTRAVENOUS CONTINUOUS
Status: DISCONTINUED | OUTPATIENT
Start: 2024-10-04 | End: 2024-10-04

## 2024-10-04 RX ORDER — OXYCODONE HYDROCHLORIDE 5 MG/1
5 TABLET ORAL EVERY 6 HOURS PRN
Qty: 28 TABLET | Refills: 0 | Status: SHIPPED | OUTPATIENT
Start: 2024-10-04 | End: 2024-10-11

## 2024-10-04 RX ORDER — HEPARIN SODIUM 1000 [USP'U]/ML
80 INJECTION, SOLUTION INTRAVENOUS; SUBCUTANEOUS ONCE
Status: COMPLETED | OUTPATIENT
Start: 2024-10-04 | End: 2024-10-04

## 2024-10-04 RX ORDER — OXYCODONE HYDROCHLORIDE 5 MG/1
5 TABLET ORAL EVERY 4 HOURS PRN
Status: DISCONTINUED | OUTPATIENT
Start: 2024-10-04 | End: 2024-10-07 | Stop reason: HOSPADM

## 2024-10-04 RX ORDER — ACETAMINOPHEN 325 MG/1
650 TABLET ORAL EVERY 6 HOURS
Qty: 240 TABLET | Refills: 0 | Status: SHIPPED | OUTPATIENT
Start: 2024-10-04 | End: 2024-11-03

## 2024-10-04 RX ORDER — HEPARIN SODIUM 1000 [USP'U]/ML
80 INJECTION, SOLUTION INTRAVENOUS; SUBCUTANEOUS PRN
Status: DISCONTINUED | OUTPATIENT
Start: 2024-10-04 | End: 2024-10-04

## 2024-10-04 RX ORDER — POLYETHYLENE GLYCOL 3350 17 G/17G
17 POWDER, FOR SOLUTION ORAL DAILY
Status: DISCONTINUED | OUTPATIENT
Start: 2024-10-04 | End: 2024-10-07 | Stop reason: HOSPADM

## 2024-10-04 RX ORDER — OXYCODONE HYDROCHLORIDE 5 MG/1
10 TABLET ORAL EVERY 4 HOURS PRN
Status: DISCONTINUED | OUTPATIENT
Start: 2024-10-04 | End: 2024-10-07 | Stop reason: HOSPADM

## 2024-10-04 RX ORDER — METHOCARBAMOL 500 MG/1
500 TABLET, FILM COATED ORAL 4 TIMES DAILY
Status: DISCONTINUED | OUTPATIENT
Start: 2024-10-04 | End: 2024-10-07 | Stop reason: HOSPADM

## 2024-10-04 RX ORDER — ACETAMINOPHEN 325 MG/1
650 TABLET ORAL EVERY 6 HOURS
Status: DISCONTINUED | OUTPATIENT
Start: 2024-10-04 | End: 2024-10-07 | Stop reason: HOSPADM

## 2024-10-04 RX ORDER — KETOROLAC TROMETHAMINE 30 MG/ML
15 INJECTION, SOLUTION INTRAMUSCULAR; INTRAVENOUS EVERY 6 HOURS PRN
Status: DISCONTINUED | OUTPATIENT
Start: 2024-10-04 | End: 2024-10-07 | Stop reason: HOSPADM

## 2024-10-04 RX ORDER — METHOCARBAMOL 500 MG/1
500 TABLET, FILM COATED ORAL 4 TIMES DAILY
Qty: 40 TABLET | Refills: 0 | Status: SHIPPED | OUTPATIENT
Start: 2024-10-04 | End: 2024-10-14

## 2024-10-04 RX ORDER — POLYETHYLENE GLYCOL 3350 17 G/17G
17 POWDER, FOR SOLUTION ORAL DAILY
Qty: 30 PACKET | Refills: 0 | Status: SHIPPED | OUTPATIENT
Start: 2024-10-04 | End: 2024-11-03

## 2024-10-04 RX ADMIN — METHOCARBAMOL 500 MG: 500 TABLET ORAL at 20:02

## 2024-10-04 RX ADMIN — NIFEDIPINE 30 MG: 30 TABLET, FILM COATED, EXTENDED RELEASE ORAL at 08:28

## 2024-10-04 RX ADMIN — METHOCARBAMOL 500 MG: 500 TABLET ORAL at 16:13

## 2024-10-04 RX ADMIN — ACETAMINOPHEN 650 MG: 325 TABLET ORAL at 12:03

## 2024-10-04 RX ADMIN — HEPARIN SODIUM 7600 UNITS: 1000 INJECTION INTRAVENOUS; SUBCUTANEOUS at 02:45

## 2024-10-04 RX ADMIN — OXYCODONE HYDROCHLORIDE 10 MG: 5 TABLET ORAL at 21:07

## 2024-10-04 RX ADMIN — Medication 1 TABLET: at 08:28

## 2024-10-04 RX ADMIN — METHOCARBAMOL 500 MG: 500 TABLET ORAL at 12:03

## 2024-10-04 RX ADMIN — PANTOPRAZOLE SODIUM 40 MG: 40 TABLET, DELAYED RELEASE ORAL at 06:30

## 2024-10-04 RX ADMIN — OXYCODONE HYDROCHLORIDE 10 MG: 5 TABLET ORAL at 16:13

## 2024-10-04 RX ADMIN — POTASSIUM CHLORIDE 40 MEQ: 1500 TABLET, EXTENDED RELEASE ORAL at 08:28

## 2024-10-04 RX ADMIN — KETOROLAC TROMETHAMINE 15 MG: 30 INJECTION, SOLUTION INTRAMUSCULAR at 23:01

## 2024-10-04 RX ADMIN — OXYCODONE HYDROCHLORIDE 5 MG: 5 TABLET ORAL at 07:18

## 2024-10-04 RX ADMIN — MAGNESIUM SULFATE HEPTAHYDRATE 2000 MG: 40 INJECTION, SOLUTION INTRAVENOUS at 08:37

## 2024-10-04 RX ADMIN — HEPARIN SODIUM 18 UNITS/KG/HR: 10000 INJECTION, SOLUTION INTRAVENOUS at 02:50

## 2024-10-04 RX ADMIN — IPRATROPIUM BROMIDE AND ALBUTEROL SULFATE 1 DOSE: 2.5; .5 SOLUTION RESPIRATORY (INHALATION) at 15:46

## 2024-10-04 RX ADMIN — FOLIC ACID 1 MG: 1 TABLET ORAL at 08:28

## 2024-10-04 RX ADMIN — MORPHINE SULFATE 2 MG: 2 INJECTION, SOLUTION INTRAMUSCULAR; INTRAVENOUS at 00:42

## 2024-10-04 RX ADMIN — LORAZEPAM 2 MG: 1 TABLET ORAL at 20:02

## 2024-10-04 RX ADMIN — CARVEDILOL 6.25 MG: 6.25 TABLET, FILM COATED ORAL at 08:28

## 2024-10-04 RX ADMIN — OXYCODONE HYDROCHLORIDE 5 MG: 5 TABLET ORAL at 03:02

## 2024-10-04 RX ADMIN — APIXABAN 5 MG: 5 TABLET, FILM COATED ORAL at 20:02

## 2024-10-04 RX ADMIN — KETOROLAC TROMETHAMINE 15 MG: 30 INJECTION, SOLUTION INTRAMUSCULAR at 14:20

## 2024-10-04 RX ADMIN — APIXABAN 5 MG: 5 TABLET, FILM COATED ORAL at 08:28

## 2024-10-04 RX ADMIN — OXYCODONE HYDROCHLORIDE 10 MG: 5 TABLET ORAL at 12:03

## 2024-10-04 RX ADMIN — Medication 100 MG: at 08:27

## 2024-10-04 RX ADMIN — ONDANSETRON 4 MG: 2 INJECTION INTRAMUSCULAR; INTRAVENOUS at 20:02

## 2024-10-04 RX ADMIN — ONDANSETRON 4 MG: 4 TABLET, ORALLY DISINTEGRATING ORAL at 03:02

## 2024-10-04 RX ADMIN — ACETAMINOPHEN 650 MG: 325 TABLET ORAL at 23:01

## 2024-10-04 RX ADMIN — SODIUM CHLORIDE: 9 INJECTION, SOLUTION INTRAVENOUS at 08:35

## 2024-10-04 RX ADMIN — Medication 400 MG: at 08:28

## 2024-10-04 RX ADMIN — ACETAMINOPHEN 650 MG: 325 TABLET ORAL at 16:13

## 2024-10-04 RX ADMIN — IPRATROPIUM BROMIDE AND ALBUTEROL SULFATE 1 DOSE: 2.5; .5 SOLUTION RESPIRATORY (INHALATION) at 19:17

## 2024-10-04 RX ADMIN — Medication 10 ML: at 08:27

## 2024-10-04 ASSESSMENT — PAIN DESCRIPTION - ORIENTATION
ORIENTATION: RIGHT
ORIENTATION: RIGHT;MID
ORIENTATION: RIGHT
ORIENTATION: RIGHT
ORIENTATION: RIGHT;LEFT
ORIENTATION: LEFT;RIGHT

## 2024-10-04 ASSESSMENT — PAIN SCALES - GENERAL
PAINLEVEL_OUTOF10: 7
PAINLEVEL_OUTOF10: 9
PAINLEVEL_OUTOF10: 8
PAINLEVEL_OUTOF10: 7
PAINLEVEL_OUTOF10: 7
PAINLEVEL_OUTOF10: 8
PAINLEVEL_OUTOF10: 10

## 2024-10-04 ASSESSMENT — PAIN DESCRIPTION - LOCATION
LOCATION: GROIN
LOCATION: GROIN
LOCATION: HIP
LOCATION: HIP
LOCATION: GROIN
LOCATION: HIP
LOCATION: GROIN
LOCATION: HEAD;HIP

## 2024-10-04 ASSESSMENT — PAIN DESCRIPTION - PAIN TYPE
TYPE: ACUTE PAIN
TYPE: ACUTE PAIN

## 2024-10-04 ASSESSMENT — PAIN DESCRIPTION - DESCRIPTORS
DESCRIPTORS: ACHING
DESCRIPTORS: ACHING;SHARP
DESCRIPTORS: ACHING;SHARP;SORE;STABBING
DESCRIPTORS: ACHING

## 2024-10-04 ASSESSMENT — PAIN - FUNCTIONAL ASSESSMENT
PAIN_FUNCTIONAL_ASSESSMENT: PREVENTS OR INTERFERES SOME ACTIVE ACTIVITIES AND ADLS

## 2024-10-04 NOTE — CARE COORDINATION
ETOH resources provided. Writer encouraged pt to reach out w/any questions. Pt verbalized understanding. Electronically signed by JEFERSON CRUZ RN on 10/4/2024 at 11:37 AM

## 2024-10-04 NOTE — DISCHARGE INSTRUCTIONS
Non-operative management of the pelvic fractures  Weightbearing as tolerated to the right leg with assistive device, recommend two wheeled rolling walker until cleared by physical therapy to advance to rollator  Continue your eliquis as prescribed  Follow up with Dr Last in 2 weeks for repeat xrays of the pelvis. Call [facility] at [phone] to schedule an appointment or with any questions

## 2024-10-04 NOTE — PLAN OF CARE
Problem: Chronic Conditions and Co-morbidities  Goal: Patient's chronic conditions and co-morbidity symptoms are monitored and maintained or improved  10/4/2024 1455 by Deepika Noriega RN  Outcome: Progressing  10/4/2024 0141 by Estefany Mackenzie RN  Outcome: Progressing     Problem: Discharge Planning  Goal: Discharge to home or other facility with appropriate resources  10/4/2024 1455 by Deepika Noriega RN  Outcome: Progressing  10/4/2024 0141 by Estefany Mackenzie RN  Outcome: Progressing     Problem: Pain  Goal: Verbalizes/displays adequate comfort level or baseline comfort level  10/4/2024 1455 by Deepika Noriega RN  Outcome: Progressing  10/4/2024 0141 by Estefany Mackenzie RN  Outcome: Progressing     Problem: Safety - Adult  Goal: Free from fall injury  10/4/2024 1455 by Deepika Noriega RN  Outcome: Progressing  10/4/2024 0141 by Estefany Mackenzie RN  Outcome: Progressing     Problem: ABCDS Injury Assessment  Goal: Absence of physical injury  10/4/2024 1455 by Deepika Noriega RN  Outcome: Progressing  10/4/2024 0141 by Estefany Mackenzie RN  Outcome: Progressing

## 2024-10-04 NOTE — H&P
V2.0  History and Physical      Name:  Hiro Esteban /Age/Sex: 1955  (68 y.o. male)   MRN & CSN:  [id number] & [id number] Encounter Date/Time: 10/3/2024 9:23 PM EDT   Location:   PCP: Moe Kumar MD       Hospital Day: 1    Assessment and Plan:   Hiro Esteban is a 68 y.o. male with a pmh of hypertension who presents with Fall at home, sequela    1.Closed fracture of the posterior wall of right acetabulum  -Following mechanical fall  -CT imaging of the right hip demonstrated comminuted nondisplaced fracture of the posterior wall of the acetabulum  -Neurovascularly intact.  -Admit to [facility] floor.  -Orthopedic surgery will be consulted.  -Pain management with pain medication.  -PT/OT evaluation.  -Continue with other supportive management.    2.Hx of essential hypertension, BP fairly controlled  -Resume home antihypertensive medication.  -Continue to monitor blood pressure.    3.Ambulatory dysfunction  -Due to fracture of the posterior wall of the left acetabulum    4.Hypokalemia  -Replete with Kcl    5.Right hip pain  -Due to closed fracture of the posterior wall of the right acetabulum  -Management of fracture of the posterior wall of the right acetabular acetabulum.  -Pain management with pain medication.    6.Hx of pulmonary embolism  -On Eliquis at home.  Hold Eliquis in case if Orthopedic team recommends procedure.  -Will start patient on heparin gtt.  Heparin can be discontinued and Eliquis restarted if no planned procedure by orthopedic team    7.Anemia  -Hemoglobin and hematocrit stable.  -Follow-up with CBC.    8.Hx of hepatitis C  -Uncertain if treated.  Outpatient follow-up    9.Hx of chronic pain  -Pain management with pain medication.    10.Fall at home  -PT/OT evaluation.    11.Nicotine dependence  -Counseled patient to quit cigarette smoking.  -Nicotine patch offered to the patient.    12.Hx of alcohol abuse  -Counseled patient to quit alcohol use.  -[facility] protocol

## 2024-10-04 NOTE — CONSULTS
Department of Orthopedic Surgery  Physician Assistant   Consult Note        Reason for Consult:  right acetabular fracture  Requesting Physician: Zheng Sandoval MD  Date of Service: 10/4/2024 11:18 AM    CHIEF COMPLAINT:  As Above    History Obtained From:  patient, electronic medical record    HISTORY OF PRESENT ILLNESS:                The patient is a 68 y.o. male who presents with above chief complaint.  Pt states that he tripped over his wheelchair, landing on his right hip directly on concrete. Pt was unable to bear weight, presented to [facility] ED where he was found to have an acetabular fracture and orthopedics was consulted. Pt ambulates independently without assistive device, has hx of distal femur fracture last year but has recovered to regular activity, is a resident at [facility]. Pt denies any numbness or tingling in the leg, has been able to reposition himself in bed without much pain, but pain increases with weight bearing. No family at bedside    Past Medical History:        Diagnosis Date    Alcohol abuse     Arthritis     Asthma     BPH (benign prostatic hyperplasia)     Cerebral artery occlusion with cerebral infarction (HCC)     Closed disp articular fx of head of right femur with routine healing     COPD (chronic obstructive pulmonary disease) (HCC)     Depression     Diabetic peripheral neuropathy (HCC)     GERD (gastroesophageal reflux disease)     Hepatitis C     pt got treated    Hypertension     Hypokalemia     Hypomagnesemia     Osteoarthritis     Polyneuropathy      Past Surgical History:        Procedure Laterality Date    CHOLECYSTECTOMY      COLONOSCOPY      CT GUIDED CHEST TUBE  03/01/2022    CT GUIDED CHEST TUBE 3/1/2022 [facility] [place]    FEMUR FRACTURE SURGERY Right 12/2/2023    FEMORAL NAIL RIGHT RETROGRADE performed by Chai Butler DO at [facility] [place]    FRACTURE SURGERY      IR MIDLINE CATH  12/13/2023    IR MIDLINE CATH 12/13/2023 [facility] SPECIAL PROCEDURES    JOINT

## 2024-10-04 NOTE — CARE COORDINATION
Case Management Assessment  Initial Evaluation    Date/Time of Evaluation: 10/4/2024 11:28 AM  Assessment Completed by: JEFERSON CRUZ RN    If patient is discharged prior to next notation, then this note serves as note for discharge by case management.    Patient Name: Hiro Esteban                   YOB: 1955  Diagnosis: Fall at home, sequela [W19.XXXS, Y92.009]  Closed displaced fracture of right acetabulum, unspecified portion of acetabulum, initial encounter ([facility]) [S32.401A]                   Date / Time: 10/3/2024  2:55 PM    Patient Admission Status: Observation   Readmission Risk (Low < 19, Mod (19-27), High > 27): Readmission Risk Score: 17.1    Current PCP: Moe Kumar MD  PCP verified by CM? Yes    Chart Reviewed: Yes      History Provided by: Patient  Patient Orientation: Alert and Oriented    Patient Cognition: Alert    Hospitalization in the last 30 days (Readmission):  No    If yes, Readmission Assessment in  Navigator will be completed.    Advance Directives:      Code Status: Full Code   Patient's Primary Decision Maker is: Legal Next of Kin      Discharge Planning:    Patient lives with: Alone Type of Home: Apartment  Primary Care Giver: Self  Patient Support Systems include: Friends/Neighbors   Current Financial resources: Medicare  Current community resources: None  Current services prior to admission: None            Current DME:              Type of Home Care services:  Aide Services, OT, PT, Nursing Services, Skilled Therapy    ADLS  Prior functional level: Independent in ADLs/IADLs  Current functional level: Assistance with the following:, Bathing, Dressing, Toileting, Cooking, Housework, Shopping, Mobility    PT AM-PAC:   /24  OT AM-PAC:   /24    Family can provide assistance at DC: No  Would you like Case Management to discuss the discharge plan with any other family members/significant others, and if so, who? No  Plans to Return to Present Housing: Unknown

## 2024-10-04 NOTE — DISCHARGE INSTR - COC
Continuity of Care Form    Patient Name: Hiro Esteban   :  1955  MRN:  [id number]    Admit date:  10/3/2024  Discharge date:  ***    Code Status Order: Full Code   Advance Directives:   Advance Care Flowsheet Documentation             Admitting Physician:  Jaxon Manriquez MD  PCP: Moe Kumar MD    Discharging Nurse: ***  Discharging Hospital Unit/Room#: 0524/0524-01  Discharging Unit Phone Number: ***    Emergency Contact:   Extended Emergency Contact Information  Primary Emergency Contact: Edward Esteban  Home Phone: [phone]  Mobile Phone: [phone]  Relation: Other Relative  Preferred language: English   needed? No  Secondary Emergency Contact: Phillips,Claude  Mobile Phone: [phone]  Relation: Brother/Sister    Past Surgical History:  Past Surgical History:   Procedure Laterality Date    CHOLECYSTECTOMY      COLONOSCOPY      CT GUIDED CHEST TUBE  2022    CT GUIDED CHEST TUBE 3/1/2022 [facility] [place]AN    FEMUR FRACTURE SURGERY Right 2023    FEMORAL NAIL RIGHT RETROGRADE performed by Chai Butler DO at [facility] OR    FRACTURE SURGERY      IR MIDLINE CATH  2023    IR MIDLINE CATH 2023 [facility] SPECIAL PROCEDURES    JOINT REPLACEMENT      OTHER SURGICAL HISTORY Right 2023    OTHER SURGICAL HISTORY Right 2023    femoral nail right retrograde       Immunization History:   Immunization History   Administered Date(s) Administered    COVID-19, MODERNA BLUE border, Primary or Immunocompromised, (age 12y+), IM, 100 mcg/0.5mL 2021, 2021, 2022    COVID-19, MODERNA Bivalent, (age 12y+), IM, 50 mcg/0.5 mL 11/10/2022    COVID-19, [place] Vaccine, Vaccine Unspecified 2022    Hepatitis B 10/04/2023, 2023    Influenza Virus Vaccine 2005, 2006, 2007, 2007, 10/01/2008, 2008, 2009, 2010, 2011, 2013, 2013, 2014, 2017, 10/19/2018, 2019    Influenza, FLUAD, (age

## 2024-10-05 LAB
ANION GAP SERPL CALCULATED.3IONS-SCNC: 9 MMOL/L (ref 3–16)
BUN SERPL-MCNC: 12 MG/DL (ref 7–20)
CALCIUM SERPL-MCNC: 8.7 MG/DL (ref 8.3–10.6)
CHLORIDE SERPL-SCNC: 98 MMOL/L (ref 99–110)
CO2 SERPL-SCNC: 30 MMOL/L (ref 21–32)
CREAT SERPL-MCNC: 0.8 MG/DL (ref 0.8–1.3)
GFR SERPLBLD CREATININE-BSD FMLA CKD-EPI: >90 ML/MIN/{1.73_M2}
GLUCOSE SERPL-MCNC: 130 MG/DL (ref 70–99)
MAGNESIUM SERPL-MCNC: 2.2 MG/DL (ref 1.8–2.4)
POTASSIUM SERPL-SCNC: 3.7 MMOL/L (ref 3.5–5.1)
SODIUM SERPL-SCNC: 137 MMOL/L (ref 136–145)

## 2024-10-05 PROCEDURE — 6370000000 HC RX 637 (ALT 250 FOR IP): Performed by: INTERNAL MEDICINE

## 2024-10-05 PROCEDURE — G0378 HOSPITAL OBSERVATION PER HR: HCPCS

## 2024-10-05 PROCEDURE — 80048 BASIC METABOLIC PNL TOTAL CA: CPT

## 2024-10-05 PROCEDURE — 97530 THERAPEUTIC ACTIVITIES: CPT

## 2024-10-05 PROCEDURE — 97110 THERAPEUTIC EXERCISES: CPT

## 2024-10-05 PROCEDURE — 96376 TX/PRO/DX INJ SAME DRUG ADON: CPT

## 2024-10-05 PROCEDURE — 36415 COLL VENOUS BLD VENIPUNCTURE: CPT

## 2024-10-05 PROCEDURE — 94640 AIRWAY INHALATION TREATMENT: CPT

## 2024-10-05 PROCEDURE — 83735 ASSAY OF MAGNESIUM: CPT

## 2024-10-05 PROCEDURE — 97116 GAIT TRAINING THERAPY: CPT

## 2024-10-05 PROCEDURE — 6370000000 HC RX 637 (ALT 250 FOR IP): Performed by: SPECIALIST/TECHNOLOGIST

## 2024-10-05 PROCEDURE — 6360000002 HC RX W HCPCS: Performed by: SPECIALIST/TECHNOLOGIST

## 2024-10-05 PROCEDURE — 2580000003 HC RX 258: Performed by: INTERNAL MEDICINE

## 2024-10-05 RX ORDER — FOLIC ACID 1 MG/1
1 TABLET ORAL DAILY
Qty: 30 TABLET | Refills: 3 | Status: SHIPPED | OUTPATIENT
Start: 2024-10-06

## 2024-10-05 RX ORDER — M-VIT,TX,IRON,MINS/CALC/FOLIC 27MG-0.4MG
1 TABLET ORAL DAILY
COMMUNITY
Start: 2024-10-06

## 2024-10-05 RX ADMIN — METHOCARBAMOL 500 MG: 500 TABLET ORAL at 20:20

## 2024-10-05 RX ADMIN — IPRATROPIUM BROMIDE AND ALBUTEROL SULFATE 1 DOSE: 2.5; .5 SOLUTION RESPIRATORY (INHALATION) at 19:18

## 2024-10-05 RX ADMIN — Medication 1 TABLET: at 08:21

## 2024-10-05 RX ADMIN — APIXABAN 5 MG: 5 TABLET, FILM COATED ORAL at 20:20

## 2024-10-05 RX ADMIN — METHOCARBAMOL 500 MG: 500 TABLET ORAL at 12:57

## 2024-10-05 RX ADMIN — KETOROLAC TROMETHAMINE 15 MG: 30 INJECTION, SOLUTION INTRAMUSCULAR at 20:20

## 2024-10-05 RX ADMIN — PANTOPRAZOLE SODIUM 40 MG: 40 TABLET, DELAYED RELEASE ORAL at 05:05

## 2024-10-05 RX ADMIN — Medication 100 MG: at 08:21

## 2024-10-05 RX ADMIN — Medication 10 ML: at 20:23

## 2024-10-05 RX ADMIN — OXYCODONE HYDROCHLORIDE 10 MG: 5 TABLET ORAL at 16:33

## 2024-10-05 RX ADMIN — APIXABAN 5 MG: 5 TABLET, FILM COATED ORAL at 08:21

## 2024-10-05 RX ADMIN — IPRATROPIUM BROMIDE AND ALBUTEROL SULFATE 1 DOSE: 2.5; .5 SOLUTION RESPIRATORY (INHALATION) at 08:58

## 2024-10-05 RX ADMIN — Medication 400 MG: at 08:21

## 2024-10-05 RX ADMIN — ACETAMINOPHEN 650 MG: 325 TABLET ORAL at 11:48

## 2024-10-05 RX ADMIN — POLYETHYLENE GLYCOL 3350 17 G: 17 POWDER, FOR SOLUTION ORAL at 08:22

## 2024-10-05 RX ADMIN — OXYCODONE HYDROCHLORIDE 10 MG: 5 TABLET ORAL at 01:04

## 2024-10-05 RX ADMIN — ACETAMINOPHEN 650 MG: 325 TABLET ORAL at 05:05

## 2024-10-05 RX ADMIN — METHOCARBAMOL 500 MG: 500 TABLET ORAL at 16:29

## 2024-10-05 RX ADMIN — OXYCODONE HYDROCHLORIDE 10 MG: 5 TABLET ORAL at 11:48

## 2024-10-05 RX ADMIN — Medication 10 ML: at 08:22

## 2024-10-05 RX ADMIN — FOLIC ACID 1 MG: 1 TABLET ORAL at 08:21

## 2024-10-05 RX ADMIN — OXYCODONE HYDROCHLORIDE 10 MG: 5 TABLET ORAL at 21:07

## 2024-10-05 RX ADMIN — ACETAMINOPHEN 650 MG: 325 TABLET ORAL at 16:29

## 2024-10-05 RX ADMIN — OXYCODONE HYDROCHLORIDE 10 MG: 5 TABLET ORAL at 05:05

## 2024-10-05 ASSESSMENT — PAIN SCALES - GENERAL
PAINLEVEL_OUTOF10: 5
PAINLEVEL_OUTOF10: 8
PAINLEVEL_OUTOF10: 7
PAINLEVEL_OUTOF10: 8
PAINLEVEL_OUTOF10: 7

## 2024-10-05 ASSESSMENT — PAIN DESCRIPTION - ORIENTATION
ORIENTATION: RIGHT

## 2024-10-05 ASSESSMENT — PAIN DESCRIPTION - DESCRIPTORS
DESCRIPTORS: ACHING;SORE
DESCRIPTORS: ACHING
DESCRIPTORS: SHARP;STABBING;THROBBING
DESCRIPTORS: STABBING;SHARP
DESCRIPTORS: ACHING;SORE
DESCRIPTORS: STABBING;SHARP
DESCRIPTORS: ACHING;SHARP

## 2024-10-05 ASSESSMENT — PAIN DESCRIPTION - LOCATION
LOCATION: GROIN
LOCATION: HIP

## 2024-10-05 ASSESSMENT — PAIN DESCRIPTION - FREQUENCY: FREQUENCY: CONTINUOUS

## 2024-10-05 ASSESSMENT — PAIN DESCRIPTION - PAIN TYPE
TYPE: ACUTE PAIN
TYPE: ACUTE PAIN

## 2024-10-05 ASSESSMENT — PAIN DESCRIPTION - ONSET: ONSET: ON-GOING

## 2024-10-05 NOTE — DISCHARGE SUMMARY
TECHNOLOGIST PROVIDED HISTORY: Reason for exam:->fall, pain Reason for Exam: fall FINDINGS: Long intramedullary nail in the femur.  No acute fracture or dislocation. Osteophytes at the acetabulum and mildly at the femoral head.  Pubic rami appear to be intact.  No widening of the SI joints or pubic symphysis.     1. No acute fracture       Consults:     IP CONSULT TO ORTHOPEDIC SURGERY  IP CONSULT TO SOCIAL WORK  IP CONSULT TO SOCIAL WORK    Labs:     Recent Labs     10/03/24  1845 10/04/24  0155   WBC 6.3 7.1   HGB 11.5* 11.6*   HCT 35.0* 34.5*    189     Recent Labs     10/03/24  1845 10/04/24  0155 10/05/24  0609    137 137   K 3.2* 3.4* 3.7    99 98*   CO2 30 27 30   BUN 10 10 12   CREATININE <0.5* <0.5* 0.8   CALCIUM 8.2* 8.2* 8.7   MG  --  1.60* 2.20   PHOS  --  2.5  --      No results for input(s): \"PROBNP\", \"TROPHS\" in the last 72 hours.  No results for input(s): \"LABA1C\" in the last 72 hours.  No results for input(s): \"AST\", \"ALT\", \"BILIDIR\", \"BILITOT\", \"ALKPHOS\" in the last 72 hours.  Recent Labs     10/03/24  1845 10/04/24  0155   INR 1.19* 1.19*       Urine Cultures: No results found for: \"LABURIN\"  Blood Cultures:   Lab Results   Component Value Date/Time    BC No Growth after 4 days of incubation. 03/24/2023 11:25 AM     Lab Results   Component Value Date/Time    BLOODCULT2 No Growth after 4 days of incubation. 03/24/2023 11:31 AM     Organism: No results found for: \"ORG\"    Signed:    Oxana Paniagua, APRN

## 2024-10-05 NOTE — FLOWSHEET NOTE
Orthostatic VS.   10/05/24 1007   Vital Signs   Temp 97.5 °F (36.4 °C)   Temp Source Oral   Pulse 78   Heart Rate Source Monitor   Respirations 15   BP Location Right upper arm   BP Method Automatic   Patient Position Semi fowlers   Orthostatic B/P and Pulse? Yes   Blood Pressure Lying 96/59   Pulse Lying 75 PER MINUTE   Blood Pressure Sitting 101/64   Pulse Sitting 82 PER MINUTE   Blood Pressure Standing 125/70   Pulse Standing 90 PER MINUTE   Oxygen Therapy   SpO2 92 %   O2 Device None (Room air)

## 2024-10-05 NOTE — PLAN OF CARE
Problem: Chronic Conditions and Co-morbidities  Goal: Patient's chronic conditions and co-morbidity symptoms are monitored and maintained or improved  10/4/2024 2121 by Ish Medina RN  Outcome: Progressing  10/4/2024 1455 by Deepika Noriega RN  Outcome: Progressing     Problem: Discharge Planning  Goal: Discharge to home or other facility with appropriate resources  10/4/2024 2121 by Ish Medina RN  Outcome: Progressing  10/4/2024 1455 by Deepika Noriega RN  Outcome: Progressing     Problem: Pain  Goal: Verbalizes/displays adequate comfort level or baseline comfort level  10/4/2024 2121 by Ish Medina RN  Outcome: Progressing  10/4/2024 1455 by Deepika Noriega RN  Outcome: Progressing     Problem: Safety - Adult  Goal: Free from fall injury  10/4/2024 2121 by Ish Medina RN  Outcome: Progressing  10/4/2024 1455 by Deepika Noriega RN  Outcome: Progressing     Problem: ABCDS Injury Assessment  Goal: Absence of physical injury  10/4/2024 2121 by Ish Medina RN  Outcome: Progressing  10/4/2024 1455 by Deepika Noriega RN  Outcome: Progressing

## 2024-10-05 NOTE — CARE COORDINATION
Pt likes [facility] and [facility] but cannot afford co pay days. Appears he was in [facility] 2 months ago, days may have reset. If in to co pay days, would like referral to SNF that has VA contract if has SNF coverage. Initial referral made to [facility]    [facility] cannot accept as pt has outstanding bill from last stay there. Pt thinks his dc date from [facility] was less than 60 days ago so is likely in copay days still. Will need to confirm if VA has any SNF benefit and get VA approval for VA contract SNF.

## 2024-10-06 PROCEDURE — G0378 HOSPITAL OBSERVATION PER HR: HCPCS

## 2024-10-06 PROCEDURE — 6370000000 HC RX 637 (ALT 250 FOR IP): Performed by: SPECIALIST/TECHNOLOGIST

## 2024-10-06 PROCEDURE — 6370000000 HC RX 637 (ALT 250 FOR IP): Performed by: INTERNAL MEDICINE

## 2024-10-06 PROCEDURE — 2580000003 HC RX 258: Performed by: INTERNAL MEDICINE

## 2024-10-06 PROCEDURE — 94640 AIRWAY INHALATION TREATMENT: CPT

## 2024-10-06 RX ADMIN — Medication 1 TABLET: at 08:14

## 2024-10-06 RX ADMIN — APIXABAN 5 MG: 5 TABLET, FILM COATED ORAL at 08:14

## 2024-10-06 RX ADMIN — ACETAMINOPHEN 650 MG: 325 TABLET ORAL at 10:58

## 2024-10-06 RX ADMIN — METHOCARBAMOL 500 MG: 500 TABLET ORAL at 17:15

## 2024-10-06 RX ADMIN — POLYETHYLENE GLYCOL 3350 17 G: 17 POWDER, FOR SOLUTION ORAL at 08:14

## 2024-10-06 RX ADMIN — OXYCODONE HYDROCHLORIDE 10 MG: 5 TABLET ORAL at 05:23

## 2024-10-06 RX ADMIN — PANTOPRAZOLE SODIUM 40 MG: 40 TABLET, DELAYED RELEASE ORAL at 05:22

## 2024-10-06 RX ADMIN — OXYCODONE HYDROCHLORIDE 10 MG: 5 TABLET ORAL at 11:03

## 2024-10-06 RX ADMIN — OXYCODONE HYDROCHLORIDE 10 MG: 5 TABLET ORAL at 01:08

## 2024-10-06 RX ADMIN — ACETAMINOPHEN 650 MG: 325 TABLET ORAL at 17:15

## 2024-10-06 RX ADMIN — OXYCODONE HYDROCHLORIDE 10 MG: 5 TABLET ORAL at 17:15

## 2024-10-06 RX ADMIN — Medication 400 MG: at 08:14

## 2024-10-06 RX ADMIN — METHOCARBAMOL 500 MG: 500 TABLET ORAL at 08:14

## 2024-10-06 RX ADMIN — METHOCARBAMOL 500 MG: 500 TABLET ORAL at 13:03

## 2024-10-06 RX ADMIN — IPRATROPIUM BROMIDE AND ALBUTEROL SULFATE 1 DOSE: 2.5; .5 SOLUTION RESPIRATORY (INHALATION) at 12:03

## 2024-10-06 RX ADMIN — IPRATROPIUM BROMIDE AND ALBUTEROL SULFATE 1 DOSE: 2.5; .5 SOLUTION RESPIRATORY (INHALATION) at 19:37

## 2024-10-06 RX ADMIN — Medication 10 ML: at 08:14

## 2024-10-06 RX ADMIN — FOLIC ACID 1 MG: 1 TABLET ORAL at 08:14

## 2024-10-06 RX ADMIN — ACETAMINOPHEN 650 MG: 325 TABLET ORAL at 05:24

## 2024-10-06 RX ADMIN — OXYCODONE HYDROCHLORIDE 10 MG: 5 TABLET ORAL at 22:18

## 2024-10-06 RX ADMIN — METHOCARBAMOL 500 MG: 500 TABLET ORAL at 21:05

## 2024-10-06 RX ADMIN — CARVEDILOL 6.25 MG: 6.25 TABLET, FILM COATED ORAL at 17:15

## 2024-10-06 RX ADMIN — IPRATROPIUM BROMIDE AND ALBUTEROL SULFATE 1 DOSE: 2.5; .5 SOLUTION RESPIRATORY (INHALATION) at 16:15

## 2024-10-06 RX ADMIN — IPRATROPIUM BROMIDE AND ALBUTEROL SULFATE 1 DOSE: 2.5; .5 SOLUTION RESPIRATORY (INHALATION) at 08:06

## 2024-10-06 RX ADMIN — APIXABAN 5 MG: 5 TABLET, FILM COATED ORAL at 21:05

## 2024-10-06 RX ADMIN — Medication 100 MG: at 08:14

## 2024-10-06 RX ADMIN — CARVEDILOL 6.25 MG: 6.25 TABLET, FILM COATED ORAL at 08:14

## 2024-10-06 RX ADMIN — Medication 10 ML: at 21:05

## 2024-10-06 ASSESSMENT — PAIN DESCRIPTION - DESCRIPTORS
DESCRIPTORS: SHARP
DESCRIPTORS: SHARP;SORE
DESCRIPTORS: SHARP;SORE
DESCRIPTORS: THROBBING;STABBING;SHARP
DESCRIPTORS: STABBING;THROBBING;ACHING

## 2024-10-06 ASSESSMENT — PAIN SCALES - GENERAL
PAINLEVEL_OUTOF10: 0
PAINLEVEL_OUTOF10: 8
PAINLEVEL_OUTOF10: 7
PAINLEVEL_OUTOF10: 8
PAINLEVEL_OUTOF10: 7
PAINLEVEL_OUTOF10: 6
PAINLEVEL_OUTOF10: 6
PAINLEVEL_OUTOF10: 8
PAINLEVEL_OUTOF10: 7

## 2024-10-06 ASSESSMENT — PAIN DESCRIPTION - LOCATION
LOCATION: GROIN
LOCATION: HIP
LOCATION: GROIN
LOCATION: HIP

## 2024-10-06 ASSESSMENT — PAIN DESCRIPTION - PAIN TYPE
TYPE: ACUTE PAIN

## 2024-10-06 ASSESSMENT — PAIN DESCRIPTION - ORIENTATION
ORIENTATION: RIGHT

## 2024-10-06 ASSESSMENT — PAIN DESCRIPTION - FREQUENCY
FREQUENCY: CONTINUOUS
FREQUENCY: CONTINUOUS

## 2024-10-06 ASSESSMENT — PAIN DESCRIPTION - ONSET
ONSET: ON-GOING
ONSET: PROGRESSIVE

## 2024-10-06 NOTE — PLAN OF CARE
Problem: Chronic Conditions and Co-morbidities  Goal: Patient's chronic conditions and co-morbidity symptoms are monitored and maintained or improved  10/6/2024 0949 by Deepika Noriega RN  Outcome: Progressing  10/5/2024 2039 by Ish Medina RN  Outcome: Progressing     Problem: Discharge Planning  Goal: Discharge to home or other facility with appropriate resources  10/6/2024 0949 by Deepika Noriega RN  Outcome: Progressing  10/5/2024 2039 by Ish Medina RN  Outcome: Progressing     Problem: Pain  Goal: Verbalizes/displays adequate comfort level or baseline comfort level  10/6/2024 0949 by Deepika Noriega RN  Outcome: Progressing  10/5/2024 2039 by Ish Medina RN  Outcome: Progressing     Problem: Safety - Adult  Goal: Free from fall injury  10/6/2024 0949 by Deepika Noriega RN  Outcome: Progressing  10/5/2024 2039 by Ish Medina RN  Outcome: Progressing     Problem: ABCDS Injury Assessment  Goal: Absence of physical injury  10/6/2024 0949 by Deepika Noriega RN  Outcome: Progressing  10/5/2024 2039 by Ish Medina RN  Outcome: Progressing

## 2024-10-06 NOTE — CARE COORDINATION
Placed referral in [facility] and left vm for [facility] liaison re: dc date from there/LOS/SNF benefit remaining under [employer]. Pt states he cannot afford copay if in to copay days. Also states he has VA and would dc to VA contract SNF, discussed [facility] and [facility] - not able to establish if has SNF benefit under his VA this w/e. Pt does want SNF dc at this time.

## 2024-10-06 NOTE — ACP (ADVANCE CARE PLANNING)
Advance Care Planning     Advance Care Planning Inpatient Note  [facility] Department    Today's Date: 10/6/2024  Unit: [facility] C5 - MED SURG/ORTHO    Received request from patient.  Upon review of chart and communication with care team, patient's decision making abilities are not in question.. Patient was/were present in the room during visit.    Goals of ACP Conversation:  Discuss advance care planning documents    Health Care Decision Makers:     No healthcare decision makers have been documented.    Summary:  No Decision Maker named by patient at this time    Advance Care Planning Documents (Patient Wishes):  None     Assessment:  Patient states he has a brother who is his closest relative.  He states he does have an adult son but they are not getting along.  He emphatically states he does not want to designate a decision maker for times of incapacity because he will make his own decisions.   attempted to educate patient on difference between a health care power of  (Which would kick in any time he is incapacitated) and a DNR (applies in the event of an arrest).      Interventions:  Provided education on documents for clarity and greater understanding  Discussed and provided education on state decision maker hierarchy  Encouraged ongoing ACP conversation with future decision makers and loved ones.  He was made aware that without a document, if his son wasn't available, decision making would go to his brother (Next of kin in the [place] decision making hierarchy)    Care Preferences Communicated:   Patient states he wants no attempts at any resuscitation.   He was encouraged to talk about his wishes with his current hospital provider so appropriate understanding of implications of his decision can be understood, and the appropriate code status be ordered.    Outcomes/Plan:  ACP Discussion: Completed.  Primary RN aware that patient desires DNR and made physician aware.  Of note:  he has a DNR form on

## 2024-10-06 NOTE — PLAN OF CARE
Problem: Chronic Conditions and Co-morbidities  Goal: Patient's chronic conditions and co-morbidity symptoms are monitored and maintained or improved  10/5/2024 2039 by Ish Medina RN  Outcome: Progressing  10/5/2024 1229 by Deepika Noriega RN  Outcome: Progressing     Problem: Discharge Planning  Goal: Discharge to home or other facility with appropriate resources  10/5/2024 2039 by Ish Medina RN  Outcome: Progressing  10/5/2024 1229 by Deepika Noriega RN  Outcome: Progressing     Problem: Pain  Goal: Verbalizes/displays adequate comfort level or baseline comfort level  10/5/2024 2039 by Ish Medina RN  Outcome: Progressing  10/5/2024 1229 by Deepika Noriega RN  Outcome: Progressing     Problem: Safety - Adult  Goal: Free from fall injury  10/5/2024 2039 by Ish Medina RN  Outcome: Progressing  10/5/2024 1229 by Deepika Noriega RN  Outcome: Progressing     Problem: ABCDS Injury Assessment  Goal: Absence of physical injury  10/5/2024 2039 by Ish Medina RN  Outcome: Progressing  10/5/2024 1229 by Deepika Noriega RN  Outcome: Progressing

## 2024-10-07 VITALS
SYSTOLIC BLOOD PRESSURE: 134 MMHG | TEMPERATURE: 98.5 F | RESPIRATION RATE: 18 BRPM | HEART RATE: 69 BPM | BODY MASS INDEX: 29.4 KG/M2 | HEIGHT: 71 IN | WEIGHT: 210 LBS | DIASTOLIC BLOOD PRESSURE: 70 MMHG | OXYGEN SATURATION: 98 %

## 2024-10-07 PROCEDURE — 6370000000 HC RX 637 (ALT 250 FOR IP): Performed by: SPECIALIST/TECHNOLOGIST

## 2024-10-07 PROCEDURE — G0378 HOSPITAL OBSERVATION PER HR: HCPCS

## 2024-10-07 PROCEDURE — 6370000000 HC RX 637 (ALT 250 FOR IP): Performed by: INTERNAL MEDICINE

## 2024-10-07 PROCEDURE — 2580000003 HC RX 258: Performed by: INTERNAL MEDICINE

## 2024-10-07 RX ADMIN — APIXABAN 5 MG: 5 TABLET, FILM COATED ORAL at 09:48

## 2024-10-07 RX ADMIN — FOLIC ACID 1 MG: 1 TABLET ORAL at 09:48

## 2024-10-07 RX ADMIN — OXYCODONE HYDROCHLORIDE 10 MG: 5 TABLET ORAL at 04:24

## 2024-10-07 RX ADMIN — METHOCARBAMOL 500 MG: 500 TABLET ORAL at 09:48

## 2024-10-07 RX ADMIN — ACETAMINOPHEN 650 MG: 325 TABLET ORAL at 06:05

## 2024-10-07 RX ADMIN — Medication 1 TABLET: at 09:48

## 2024-10-07 RX ADMIN — CARVEDILOL 6.25 MG: 6.25 TABLET, FILM COATED ORAL at 09:48

## 2024-10-07 RX ADMIN — PANTOPRAZOLE SODIUM 40 MG: 40 TABLET, DELAYED RELEASE ORAL at 06:05

## 2024-10-07 RX ADMIN — OXYCODONE HYDROCHLORIDE 10 MG: 5 TABLET ORAL at 09:47

## 2024-10-07 RX ADMIN — Medication 400 MG: at 09:48

## 2024-10-07 RX ADMIN — ACETAMINOPHEN 650 MG: 325 TABLET ORAL at 11:53

## 2024-10-07 RX ADMIN — Medication 100 MG: at 09:49

## 2024-10-07 RX ADMIN — Medication 10 ML: at 09:50

## 2024-10-07 ASSESSMENT — PAIN - FUNCTIONAL ASSESSMENT
PAIN_FUNCTIONAL_ASSESSMENT: PREVENTS OR INTERFERES SOME ACTIVE ACTIVITIES AND ADLS

## 2024-10-07 ASSESSMENT — PAIN SCALES - GENERAL
PAINLEVEL_OUTOF10: 3
PAINLEVEL_OUTOF10: 7
PAINLEVEL_OUTOF10: 4
PAINLEVEL_OUTOF10: 6
PAINLEVEL_OUTOF10: 8

## 2024-10-07 ASSESSMENT — PAIN DESCRIPTION - DESCRIPTORS
DESCRIPTORS: SHARP
DESCRIPTORS: SHARP
DESCRIPTORS: ACHING;DISCOMFORT
DESCRIPTORS: ACHING;DISCOMFORT

## 2024-10-07 ASSESSMENT — PAIN DESCRIPTION - LOCATION
LOCATION: GROIN
LOCATION: GENERALIZED
LOCATION: HIP;KNEE
LOCATION: HIP;KNEE
LOCATION: GROIN

## 2024-10-07 ASSESSMENT — PAIN DESCRIPTION - ORIENTATION
ORIENTATION: RIGHT

## 2024-10-07 NOTE — PLAN OF CARE
Problem: Chronic Conditions and Co-morbidities  Goal: Patient's chronic conditions and co-morbidity symptoms are monitored and maintained or improved  10/6/2024 2333 by Jonas Lewis RN  Outcome: Progressing  10/6/2024 0949 by Deepika Noriega RN  Outcome: Progressing     Problem: Discharge Planning  Goal: Discharge to home or other facility with appropriate resources  10/6/2024 2333 by Jonas Lewis RN  Outcome: Progressing  10/6/2024 0949 by Deepika Noriega RN  Outcome: Progressing     Problem: Pain  Goal: Verbalizes/displays adequate comfort level or baseline comfort level  10/6/2024 2333 by Jonas Lewis RN  Outcome: Progressing  10/6/2024 0949 by Deepika Noriega RN  Outcome: Progressing     Problem: Safety - Adult  Goal: Free from fall injury  10/6/2024 2333 by Jonas Lewis RN  Outcome: Progressing  10/6/2024 0949 by Deepika Noriega RN  Outcome: Progressing     Problem: Respiratory - Adult  Goal: Achieves optimal ventilation and oxygenation  Outcome: Progressing     Problem: Neurosensory - Adult  Goal: Achieves stable or improved neurological status  Outcome: Progressing     Problem: ABCDS Injury Assessment  Goal: Absence of physical injury  10/6/2024 2333 by Jonas Lewis RN  Outcome: Progressing  10/6/2024 0949 by Deepika Noriega RN  Outcome: Progressing

## 2024-10-07 NOTE — PLAN OF CARE
Problem: Chronic Conditions and Co-morbidities  Goal: Patient's chronic conditions and co-morbidity symptoms are monitored and maintained or improved  10/7/2024 1347 by Zita Merritt RN  Outcome: Completed     Problem: Discharge Planning  Goal: Discharge to home or other facility with appropriate resources  10/7/2024 1347 by Zita Merritt RN  Outcome: Completed     Problem: Pain  Goal: Verbalizes/displays adequate comfort level or baseline comfort level  10/7/2024 1347 by iZta Merritt RN  Outcome: Completed     Problem: Safety - Adult  Goal: Free from fall injury  10/7/2024 1347 by Zita Merritt RN  Outcome: Completed     Problem: ABCDS Injury Assessment  Goal: Absence of physical injury  10/7/2024 1347 by Zita Merritt RN  Outcome: Completed     Problem: Neurosensory - Adult  Goal: Achieves stable or improved neurological status  10/7/2024 1347 by Zita Merritt RN  Outcome: Completed     Problem: Respiratory - Adult  Goal: Achieves optimal ventilation and oxygenation  10/7/2024 1347 by Zita Merritt RN  Outcome: Completed

## 2024-10-07 NOTE — PROGRESS NOTES
Hospital Medicine Progress Note      Date of Admission: 10/3/2024  Hospital Day: 5    Chief Admission Complaint:    Hip Pain (Patient reports a fall last night/ this morning (does not remember when or how) injured R hip, pt denies LOC takes 81 ASA and eliquis daily.)     Subjective:      Patient is upset this morning as he wants to have freedom to move around and smoke a cigarette.      DISPO: Awaiting placement      Presenting Admission History:       Hiro Esteban is a 68 y.o. male with pmh of hypertension, chronic pain, HCV, and about a fifth of whiskey per week (3-4 ounces per day on average) who presents to the emergency room for evaluation following fall.  Patient states that he was walking, subsequently fell on his right side.  He denies dizziness prior to fall.  He denies loss of consciousness.  No head trauma.  Unable to stand after fall.  Patient is complaining of right hip pain.      Assessment/Plan:       Fracture of the posterior wall of the R acetabulum.    - Non-operative management per ortho  - WBAT  - PT/OT.  Patient now open to SNF placement once he worked with PT. FU with Dr. Last in 2 weeks.      HTN - w/out known CAD and no evidence of active signs/sxs of ischemia/failure.   - Currently controlled on home meds w/ vitals documented and reviewed.  - Stopped nifedipine as his blood pressure decreased significantly with this medication.     H/o PE.    - Continue Apixaban.      Alcohol overuse.   - Encouraged him to cut back.   - Continue Thiamine.      Tobacco smoking, nicotine dependence.   - Encouraged to quit.  Offered nicotine patch.          Physical Exam Performed:      General appearance:  No apparent distress  Respiratory:  Normal respiratory effort.   Cardiovascular:  Regular rate and rhythm.  Abdomen:  Soft, non-tender, non-distended.  Musculoskeletal:  No edema  Neurologic:  Non-focal  Psychiatric:  Alert and oriented    /70   Pulse 69   Temp 98.5 °F (36.9 °C) (Oral)   
4 Eyes Skin Assessment     NAME:  Hiro Esteban  YOB: 1955  MEDICAL RECORD NUMBER:  [id number]    The patient is being assessed for  Admission    I agree that at least one RN has performed a thorough Head to Toe Skin Assessment on the patient. ALL assessment sites listed below have been assessed.      Areas assessed by both nurses:    Head, Face, Ears, Shoulders, Back, Chest, Arms, Elbows, Hands, Sacrum. Buttock, Coccyx, Ischium, and Legs. Feet and Heels        Does the Patient have a Wound? No noted wound(s)       Fidel Prevention initiated by RN: Yes  Wound Care Orders initiated by RN: No    Pressure Injury (Stage 3,4, Unstageable, DTI, NWPT, and Complex wounds) if present, place Wound referral order by RN under : No    New Ostomies, if present place, Ostomy referral order under : No     Nurse 1 eSignature: Electronically signed by Estefany Mackenzie RN on 10/4/24 at 5:04 AM EDT    **SHARE this note so that the co-signing nurse can place an eSignature**    Nurse 2 eSignature: {Esignature:[id number]}   
Hospital Medicine Progress Note        Subjective:  He is feeling OK.  Pain controlled.  He doesn't think he is in withdrawal.       General appearance: No apparent distress, appears stated age and cooperative.  HEENT: Pupils equal, round.  Conjunctivae/corneas clear.  Neck: No jugular venous distention.   Respiratory:  Normal respiratory effort.  Bilaterally without Rales/Wheezes/Rhonchi.  Cardiovascular: Normal rate and regular rhythm with normal S1/S2 without murmurs, rubs or gallops.  Abdomen: Soft, non-tender, non-distended with normal bowel sounds.  Musculoskeletal: No cyanosis bilaterally.  No edema.  Without deformity.  Skin: No jaundice.  No rashes or lesions.  Neurologic:  Neurovascularly intact without any focal sensory/motor deficits. Cranial nerves: II-XII intact, grossly non-focal.  Psychiatric: Alert and oriented, normal insight.      Assessment and Plan:       \"Hiro Esteban is a 68 y.o. male with pmh of hypertension, chronic pain, HCV, and about a fifth of whiskey per week (3-4 ounces per day on average) who presents to the emergency room for evaluation following fall.  Patient states that he was walking, subsequently fell on his right side.  He denies dizziness prior to fall.  He denies loss of consciousness.  No head trauma.  Unable to stand after fall.  Patient is complaining of right hip pain.\"      Fracture of the posterior wall of the R acetabulum.  Non-operative management per ortho, WBAT, PT/OT.  Patient plans to refuse SNF.  Has not yet walked.    HTN.  Carvedilol, added nifedipine.     H/o PE.  Apixaban.     Alcohol overuse.  Encouraged him to cut back.  Vitamins.     Tobacco smoking, nicotine dependence.  Encouraged to quit.  Offered nicotine patch.        Diet: ADULT DIET; Regular; Low Fat/Low Chol/High Fiber/SULAIMAN  ADULT ORAL NUTRITION SUPPLEMENT; AM Snack, PM Snack; Standard High Calorie/High Protein Oral Supplement  DVT Prophylaxis: anticoagulation as above  Disposition: PT/OT rec'd 
Occupational Therapy  Facility/Department: [facility] C5 - MED SURG/ORTHO  Occupational Therapy Initial Assessment and Treatment Note    Name: Hiro Esteban  : 1955  MRN: [id number]  Date of Service: 10/4/2024    Discharge Recommendations:  Continue to assess pending progress  OT Equipment Recommendations  Equipment Needed:  (CTA)     Therapy discharge recommendations are subject to collaboration from the patient’s interdisciplinary healthcare team, including MD and case management recommendations.    Barriers to Home Discharge:   [] Steps to access home entry or bed/bath:   [x] Unable to transfer, ambulate, or propel wheelchair household distances without assist   [x] Limited available assist at home upon discharge    [] Patient or family requests d/c to post-acute facility    [x] Poor cognition/safety awareness for d/c to home alone    [] Unable to maintain ordered weight bearing status    [] Patient with salient signs of long-standing immobility   [x] Decreased independence with ADLs   [x] Increased risk for falls   [] Other:    If pt is unable to be seen after this session, please let this note serve as discharge summary.  Please see case management note for discharge disposition.  Thank you.    Patient Diagnosis(es): The primary encounter diagnosis was Closed displaced fracture of right acetabulum, unspecified portion of acetabulum, initial encounter ([facility]). A diagnosis of Fall at home, sequela was also pertinent to this visit.  Past Medical History:  has a past medical history of Alcohol abuse, Arthritis, Asthma, BPH (benign prostatic hyperplasia), Cerebral artery occlusion with cerebral infarction ([facility]), Closed disp articular fx of head of right femur with routine healing, COPD (chronic obstructive pulmonary disease) ([facility]), Depression, Diabetic peripheral neuropathy ([facility]), GERD (gastroesophageal reflux disease), Hepatitis C, Hypertension, Hypokalemia, Hypomagnesemia, Osteoarthritis, and 
Physical Therapy  Facility/Department: [facility] C5 - MED SURG/ORTHO  Physical Therapy Initial Assessment/Treatment     Name: Hiro Esteban  : 1955  MRN: [id number]  Date of Service: 10/4/2024    Discharge Recommendations:  Continue to assess pending progress, 24 hour supervision or assist, Home with Home health PT, Subacute/Skilled Nursing Facility   PT Equipment Recommendations  Equipment Needed: Yes  Mobility Devices: Walker  Walker: Rolling  Other: Needs RW if going home      Patient Diagnosis(es): The primary encounter diagnosis was Closed displaced fracture of right acetabulum, unspecified portion of acetabulum, initial encounter (HCC). A diagnosis of Fall at home, sequela was also pertinent to this visit.  Past Medical History:  has a past medical history of Alcohol abuse, Arthritis, Asthma, BPH (benign prostatic hyperplasia), Cerebral artery occlusion with cerebral infarction ([facility]), Closed disp articular fx of head of right femur with routine healing, COPD (chronic obstructive pulmonary disease) ([facility]), Depression, Diabetic peripheral neuropathy ([facility]), GERD (gastroesophageal reflux disease), Hepatitis C, Hypertension, Hypokalemia, Hypomagnesemia, Osteoarthritis, and Polyneuropathy.  Past Surgical History:  has a past surgical history that includes CT GUIDED CHEST TUBE (2022); Cholecystectomy; fracture surgery; joint replacement; Colonoscopy; other surgical history (Right, 2023); other surgical history (Right, 2023); Femur fracture surgery (Right, 2023); and IR MIDLINE CATH (2023).    Assessment  Body Structures, Functions, Activity Limitations Requiring Skilled Therapeutic Intervention: Decreased functional mobility ;Decreased endurance;Decreased balance;Increased pain;Decreased strength  Assessment: Pt to [facility] with diagnosis of fall at home and with right posterior wall nondisplaced acetabular fracture, nondisplaced inferior pubic ramus fracture. Per ortho there will be no 
Physical/Occupational Therapy    Pt with fall at home and has ortho surgery consult placed for Comminuted nondisplaced fracture of the posterior wall of the acetabulum. Will hold PT/OT at this time pending ortho surgery consult. Thank you.     Jazmin Barton PT, DPT   Jyothi Cortes, OTR/L    
Pt  stated \"I must have hit my head when I fell because I have a small knot on my head above my right eye.\"  MD perfect served.  
Pt ok for discharge per MD. Discharge instructions and scripts given. IV removed. No questions or concerns at this time. Transported by wheelchair to personal car with all belongings.  
Pt stated during the admission that he drinks alcohol all day everyday and doesn't remember a day without alcohol.  Pt stated \"after I fell I layed on the floor and drank beer as a painkiller until I was found.\"    
Received pt to room 118.  Report taken.  Belongings placed in closet.  VSS.  No complaints.  Call bell within reach.    
Report given to Winnie on A1, all questions answered. Pt wheeled down to room 118, with all his belongings, cigarettes placed in lock box.  
 Wt 95.3 kg (210 lb)   SpO2 97%   BMI 29.29 kg/m²     Diet: ADULT DIET; Regular; Low Fat/Low Chol/High Fiber/SULAIMAN  ADULT ORAL NUTRITION SUPPLEMENT; AM Snack, PM Snack; Standard High Calorie/High Protein Oral Supplement  DVT Prophylaxis: []PPx LMWH  []SQ Heparin  []IPC/SCDs  [x]Eliquis  []Xarelto  []Coumadin  []Other -      Code status: Full Code  PT/OT Eval Status:   []NOT yet ordered  [x]Ordered and Pending   []Seen with Recommendations for:  []Home independently  []Home w/ assist  []HHC  []SNF  []Acute Rehab    Anticipated Discharge Day/Date:  10/7/2024    Anticipated Discharge Location: []Home  []HHC  [x]SNF  []Acute Rehab  []ECF  []LTAC  []Hospice  []Other -      Consults:      IP CONSULT TO ORTHOPEDIC SURGERY  IP CONSULT TO SOCIAL WORK  IP CONSULT TO SOCIAL WORK  IP CONSULT TO SPIRITUAL SERVICES      ------------------------------------------------------------------------------------------------------------------------------------------------------------------------    MDM    Moderate     Medications:  Personally reviewed in detail in conjunction w/ labs as documented for evidence of drug toxicity.     Infusion Medications    sodium chloride      sodium chloride 25 mL/hr at 10/04/24 0835     Scheduled Medications    apixaban  5 mg Oral BID    acetaminophen  650 mg Oral Q6H    methocarbamol  500 mg Oral 4x Daily    polyethylene glycol  17 g Oral Daily    nicotine  1 patch TransDERmal Daily    carvedilol  6.25 mg Oral BID WC    magnesium oxide  400 mg Oral Daily    pantoprazole  40 mg Oral QAM AC    thiamine  100 mg Oral Daily    sodium chloride flush  5-40 mL IntraVENous 2 times per day    folic acid  1 mg Oral Daily    multivitamin  1 tablet Oral Daily    sodium chloride flush  5-40 mL IntraVENous 2 times per day    [Held by provider] NIFEdipine  30 mg Oral Daily    ipratropium 0.5 mg-albuterol 2.5 mg  1 Dose Inhalation Q4H WA RT     PRN Meds: oxyCODONE **OR** oxyCODONE, ketorolac, nicotine polacrilex, sodium 
assist;  Education Method: Verbal  Barriers to Learning: None  Education Outcome: Verbalized understanding;Continued education needed               AM-PAC - ADL  AM-PAC Daily Activity - Inpatient   How much help is needed for putting on and taking off regular lower body clothing?: A Little  How much help is needed for bathing (which includes washing, rinsing, drying)?: A Little  How much help is needed for toileting (which includes using toilet, bedpan, or urinal)?: A Little  How much help is needed for putting on and taking off regular upper body clothing?: A Little  How much help is needed for taking care of personal grooming?: A Little  How much help for eating meals?: None  AM-PAC Inpatient Daily Activity Raw Score: 19  AM-PAC Inpatient ADL T-Scale Score : 40.22  ADL Inpatient CMS 0-100% Score: 42.8  ADL Inpatient CMS G-Code Modifier : CK    Tinneti Score       Goals  Short Term Goals  Time Frame for Short Term Goals: 1 week (10/11/24) unless otherwise noted; 10/05 ongoing;  Short Term Goal 1: Pt will perform toilet transfer with SPV and LRAD by 10/10  Short Term Goal 2: Pt will perform LB dressing with SPV  Short Term Goal 3: Pt will perform 1-2 grooming tasks in stance at sink with SPV; 10/05 CGA for 2 grooming tasks standing at sink  Short Term Goal 4: Pt will perform UB ADL with sete-up  Short Term Goal 5: Pt will tolerate x20 reps of BUE therex to increase strength for funcitonal transfers and ADL tasks; 10/05 tolerated 2 sets of 10 reps BUE strengthening exercises;  Patient Goals   Patient goals : \"go home\"      Therapy Time   Individual Concurrent Group Co-treatment   Time In 1535         Time Out 1605         Minutes 30                 Romina Foster, OT     
Pt will ambulate 50 ft with LRAD and SBA. 10/05 GOAL NOT MET.  Short Term Goal 4: Pt will perform 12-15 reps of BLE exercises by 10/07/24. 10/05 GOAL MET, continue to progress.  Additional Goals?: No  Patient Goals   Patient Goals : \"to go home\"    Education  Patient Education  Education Given To: Patient  Education Provided: Role of Therapy;Plan of Care;Equipment;Transfer Training;Precautions;Home Exercise Program  Education Provided Comments: educated pt on WB status, safety with RW, safety while OOB, rationale for chair alarm, benefits to short term stay in rehab.  Education Method: Verbal;Demonstration  Barriers to Learning: None  Education Outcome: Verbalized understanding;Continued education needed    AM-PAC - Mobility    AM-PAC Basic Mobility - Inpatient   How much help is needed turning from your back to your side while in a flat bed without using bedrails?: None  How much help is needed moving from lying on your back to sitting on the side of a flat bed without using bedrails?: None  How much help is needed moving to and from a bed to a chair?: A Little  How much help is needed standing up from a chair using your arms?: A Little  How much help is needed walking in hospital room?: A Little  How much help is needed climbing 3-5 steps with a railing?: A Little  AM-PAC Inpatient Mobility Raw Score : 20  AM-PAC Inpatient T-Scale Score : 47.67  Mobility Inpatient CMS 0-100% Score: 35.83  Mobility Inpatient CMS G-Code Modifier : CJ         Therapy Time   Individual Concurrent Group Co-treatment   Time In 1142         Time Out 1235         Minutes 53         Timed Code Treatment Minutes: 53 Minutes       Tyrell Madera PT, DPT  If pt is unable to be seen after this session, please let this note serve as discharge summary.  Please see case management note for discharge disposition.  Thank you.

## 2024-10-07 NOTE — PLAN OF CARE
Problem: Chronic Conditions and Co-morbidities  Goal: Patient's chronic conditions and co-morbidity symptoms are monitored and maintained or improved  10/7/2024 1056 by Zita Merritt RN  Outcome: Progressing     Problem: Discharge Planning  Goal: Discharge to home or other facility with appropriate resources  10/7/2024 1056 by Zita Merritt RN  Outcome: Progressing     Problem: Pain  Goal: Verbalizes/displays adequate comfort level or baseline comfort level  10/7/2024 1056 by Zita Merritt RN  Outcome: Progressing     Problem: Safety - Adult  Goal: Free from fall injury  10/7/2024 1056 by Zita Merritt RN  Outcome: Progressing     Problem: ABCDS Injury Assessment  Goal: Absence of physical injury  10/7/2024 1056 by Zita Merritt RN  Outcome: Progressing     Problem: Neurosensory - Adult  Goal: Achieves stable or improved neurological status  10/7/2024 1056 by Zita Merritt RN  Outcome: Progressing     Problem: Respiratory - Adult  Goal: Achieves optimal ventilation and oxygenation  10/7/2024 1056 by Zita Merritt RN  Outcome: Progressing

## 2024-10-07 NOTE — CARE COORDINATION
CASE MANAGEMENT DISCHARGE SUMMARY      Discharge to: home, with skilled home care         IMM given: (date) NA    New Durable Medical Equipment ordered/agency: pt states already has walker, brother is bringing    Transportation:    Family/car: brother       Confirmed discharge plan with:     Patient: yes      Family:   no- pt called brother     [facility]- [facility] will have home care company call pt once arrange.            RN, name: Zita

## 2024-10-07 NOTE — CARE COORDINATION
CM left message for [facility] at [facility] to see if pt has benefits to cover SNF stay    10:10 AM  CM spoke with [facility] at [facility]. Pt does not have SNF benefits but all veterans are eligible [facility] go to [facility]. Cm can reach out to them to see if they have a bed.    11:31 AM  [facility] with [facility]. [facility]. They need information faxed to approve pt for a stay. F [phone]    CM spoke with pt and he states he prefers to dc to his home apartment at [facility]. Pt will need lyft ride to apartment.    Cm informed nurse Zita, pt wants to dc to home. Informed Lyft ride can be set up once pt has dc papers.

## 2024-10-08 ENCOUNTER — CARE COORDINATION (OUTPATIENT)
Dept: CASE MANAGEMENT | Age: 69
End: 2024-10-08

## 2024-10-08 NOTE — CARE COORDINATION
Care Transitions Note    Initial Call - Call within 2 business days of discharge: Yes    Attempted to reach patient for transitions of care follow up. Unable to reach patient.    Outreach Attempts:   Unable to leave message. Received message the call cannot be completed as dialed please check number and dial again. CTN attempted several times and received the same message.     Patient: Hiro Esteban    Patient : 1955   MRN: [id number]    Reason for Admission: Hip Pain fall at home   Discharge Date: 10/7/24  RURS: Readmission Risk Score: 17.1    Last Discharge Facility       Date Complaint Diagnosis Description Type Department Provider    10/3/24 Hip Pain Closed displaced fracture of right acetabulum, unspecified portion of acetabulum, initial encounter (HCC) ... ED to Hosp-Admission (Discharged) (ADMITTED) [facility] Asif Casas MD; Simi Mcmanus..            Was this an external facility discharge? No    Follow Up Appointment:   Patient does not have a follow up appointment scheduled at time of call.  Unable to reach patient       Plan for follow-up on next business day.      Anna Masters, MSN, RN, [facility]  Care Transition Nurse  [phone] mobile

## 2024-10-09 ENCOUNTER — CARE COORDINATION (OUTPATIENT)
Dept: CASE MANAGEMENT | Age: 69
End: 2024-10-09

## 2024-10-09 NOTE — CARE COORDINATION
Care Transitions Note    Initial Call - Call within 2 business days of discharge: Yes    Attempted to reach patient for transitions of care follow up. Unable to reach patient.    Outreach Attempts:   Unable to leave message.     Patient: Hiro Esteban    Patient : 1955   MRN: [id number]    Reason for Admission: Hip pain fall   Discharge Date: 10/7/24  RURS: Readmission Risk Score: 17.1    Last Discharge Facility       Date Complaint Diagnosis Description Type Department Provider    10/3/24 Hip Pain Closed displaced fracture of right acetabulum, unspecified portion of acetabulum, initial encounter (HCC) ... ED to Hosp-Admission (Discharged) (ADMITTED) [facility] Asif Casas MD; Simi Mcmanus..            Was this an external facility discharge? No    Follow Up Appointment:   Patient does not have a follow up appointment scheduled at time of call.  Unable to reach patient. [facility] .       No further follow-up call indicated     Anna Masters, MSN, RN, [facility]  Care Transition Nurse  [phone] mobile

## 2024-10-30 ENCOUNTER — HOSPITAL ENCOUNTER (INPATIENT)
Age: 69
LOS: 1 days | Discharge: ANOTHER HOSPITAL | DRG: 885 | End: 2024-10-31
Attending: EMERGENCY MEDICINE | Admitting: PSYCHIATRY & NEUROLOGY
Payer: MEDICARE

## 2024-10-30 DIAGNOSIS — D64.9 CHRONIC ANEMIA: ICD-10-CM

## 2024-10-30 DIAGNOSIS — R46.89 AGGRESSIVE BEHAVIOR: ICD-10-CM

## 2024-10-30 DIAGNOSIS — F10.920 ACUTE ALCOHOLIC INTOXICATION WITHOUT COMPLICATION (HCC): ICD-10-CM

## 2024-10-30 DIAGNOSIS — R45.851 SUICIDAL IDEATION: Primary | ICD-10-CM

## 2024-10-30 DIAGNOSIS — E87.6 HYPOKALEMIA: ICD-10-CM

## 2024-10-30 LAB
ALBUMIN SERPL-MCNC: 3.6 G/DL (ref 3.4–5)
ALBUMIN/GLOB SERPL: 1 {RATIO} (ref 1.1–2.2)
ALP SERPL-CCNC: 299 U/L (ref 40–129)
ALT SERPL-CCNC: 17 U/L (ref 10–40)
AMORPH SED URNS QL MICRO: ABNORMAL /HPF
AMPHETAMINES UR QL SCN>1000 NG/ML: NORMAL
ANION GAP SERPL CALCULATED.3IONS-SCNC: 14 MMOL/L (ref 3–16)
APAP SERPL-MCNC: <5 UG/ML (ref 10–30)
AST SERPL-CCNC: 68 U/L (ref 15–37)
BARBITURATES UR QL SCN>200 NG/ML: NORMAL
BASOPHILS # BLD: 0 K/UL (ref 0–0.2)
BASOPHILS NFR BLD: 0.6 %
BENZODIAZ UR QL SCN>200 NG/ML: NORMAL
BILIRUB SERPL-MCNC: 0.6 MG/DL (ref 0–1)
BILIRUB UR QL STRIP.AUTO: ABNORMAL
BUN SERPL-MCNC: 9 MG/DL (ref 7–20)
CALCIUM SERPL-MCNC: 8.3 MG/DL (ref 8.3–10.6)
CANNABINOIDS UR QL SCN>50 NG/ML: NORMAL
CHLORIDE SERPL-SCNC: 100 MMOL/L (ref 99–110)
CLARITY UR: CLEAR
CO2 SERPL-SCNC: 26 MMOL/L (ref 21–32)
COCAINE UR QL SCN: NORMAL
COLOR UR: YELLOW
CREAT SERPL-MCNC: 0.6 MG/DL (ref 0.8–1.3)
DEPRECATED RDW RBC AUTO: 18.4 % (ref 12.4–15.4)
DRUG SCREEN COMMENT UR-IMP: NORMAL
EOSINOPHIL # BLD: 0 K/UL (ref 0–0.6)
EOSINOPHIL NFR BLD: 0.2 %
ETHANOLAMINE SERPL-MCNC: 296 MG/DL (ref 0–0.08)
FENTANYL SCREEN, URINE: NORMAL
GFR SERPLBLD CREATININE-BSD FMLA CKD-EPI: >90 ML/MIN/{1.73_M2}
GLUCOSE SERPL-MCNC: 134 MG/DL (ref 70–99)
GLUCOSE UR STRIP.AUTO-MCNC: NEGATIVE MG/DL
HCT VFR BLD AUTO: 37.8 % (ref 40.5–52.5)
HGB BLD-MCNC: 12.6 G/DL (ref 13.5–17.5)
HGB UR QL STRIP.AUTO: ABNORMAL
HYALINE CASTS #/AREA URNS LPF: ABNORMAL /LPF (ref 0–2)
KETONES UR STRIP.AUTO-MCNC: ABNORMAL MG/DL
LEUKOCYTE ESTERASE UR QL STRIP.AUTO: NEGATIVE
LYMPHOCYTES # BLD: 1.3 K/UL (ref 1–5.1)
LYMPHOCYTES NFR BLD: 19.4 %
MAGNESIUM SERPL-MCNC: 1.62 MG/DL (ref 1.8–2.4)
MCH RBC QN AUTO: 28 PG (ref 26–34)
MCHC RBC AUTO-ENTMCNC: 33.4 G/DL (ref 31–36)
MCV RBC AUTO: 83.9 FL (ref 80–100)
METHADONE UR QL SCN>300 NG/ML: NORMAL
MONOCYTES # BLD: 0.5 K/UL (ref 0–1.3)
MONOCYTES NFR BLD: 7.9 %
NEUTROPHILS # BLD: 4.7 K/UL (ref 1.7–7.7)
NEUTROPHILS NFR BLD: 71.9 %
NITRITE UR QL STRIP.AUTO: NEGATIVE
OPIATES UR QL SCN>300 NG/ML: NORMAL
OXYCODONE UR QL SCN: NORMAL
PCP UR QL SCN>25 NG/ML: NORMAL
PH UR STRIP.AUTO: 6 [PH] (ref 5–8)
PH UR STRIP: 6 [PH]
PLATELET # BLD AUTO: 138 K/UL (ref 135–450)
PMV BLD AUTO: 8.7 FL (ref 5–10.5)
POTASSIUM SERPL-SCNC: 3 MMOL/L (ref 3.5–5.1)
PROT SERPL-MCNC: 7.3 G/DL (ref 6.4–8.2)
PROT UR STRIP.AUTO-MCNC: 100 MG/DL
RBC # BLD AUTO: 4.5 M/UL (ref 4.2–5.9)
RBC #/AREA URNS HPF: ABNORMAL /HPF (ref 0–4)
RENAL EPI CELLS #/AREA UR COMP ASSIST: ABNORMAL /HPF (ref 0–1)
SALICYLATES SERPL-MCNC: 0.7 MG/DL (ref 15–30)
SODIUM SERPL-SCNC: 140 MMOL/L (ref 136–145)
SP GR UR STRIP.AUTO: 1.02 (ref 1–1.03)
UA COMPLETE W REFLEX CULTURE PNL UR: ABNORMAL
UA DIPSTICK W REFLEX MICRO PNL UR: YES
URN SPEC COLLECT METH UR: ABNORMAL
UROBILINOGEN UR STRIP-ACNC: 4 E.U./DL
WBC # BLD AUTO: 6.6 K/UL (ref 4–11)
WBC #/AREA URNS HPF: ABNORMAL /HPF (ref 0–5)

## 2024-10-30 PROCEDURE — 80179 DRUG ASSAY SALICYLATE: CPT

## 2024-10-30 PROCEDURE — 85025 COMPLETE CBC W/AUTO DIFF WBC: CPT

## 2024-10-30 PROCEDURE — 83735 ASSAY OF MAGNESIUM: CPT

## 2024-10-30 PROCEDURE — 99285 EMERGENCY DEPT VISIT HI MDM: CPT

## 2024-10-30 PROCEDURE — 80053 COMPREHEN METABOLIC PANEL: CPT

## 2024-10-30 PROCEDURE — 93005 ELECTROCARDIOGRAM TRACING: CPT | Performed by: EMERGENCY MEDICINE

## 2024-10-30 PROCEDURE — 80143 DRUG ASSAY ACETAMINOPHEN: CPT

## 2024-10-30 PROCEDURE — 81001 URINALYSIS AUTO W/SCOPE: CPT

## 2024-10-30 PROCEDURE — 80307 DRUG TEST PRSMV CHEM ANLYZR: CPT

## 2024-10-30 PROCEDURE — 6370000000 HC RX 637 (ALT 250 FOR IP): Performed by: EMERGENCY MEDICINE

## 2024-10-30 PROCEDURE — 82077 ASSAY SPEC XCP UR&BREATH IA: CPT

## 2024-10-30 RX ORDER — LORAZEPAM 2 MG/1
2 TABLET ORAL ONCE
Status: COMPLETED | OUTPATIENT
Start: 2024-10-30 | End: 2024-10-30

## 2024-10-30 RX ORDER — POTASSIUM CHLORIDE 1500 MG/1
40 TABLET, EXTENDED RELEASE ORAL ONCE
Status: COMPLETED | OUTPATIENT
Start: 2024-10-30 | End: 2024-10-30

## 2024-10-30 RX ORDER — OXYCODONE AND ACETAMINOPHEN 5; 325 MG/1; MG/1
1 TABLET ORAL ONCE
Status: COMPLETED | OUTPATIENT
Start: 2024-10-30 | End: 2024-10-30

## 2024-10-30 RX ORDER — POLYETHYLENE GLYCOL 3350 17 G
2 POWDER IN PACKET (EA) ORAL ONCE
Status: COMPLETED | OUTPATIENT
Start: 2024-10-30 | End: 2024-10-30

## 2024-10-30 RX ADMIN — OXYCODONE HYDROCHLORIDE AND ACETAMINOPHEN 1 TABLET: 5; 325 TABLET ORAL at 22:23

## 2024-10-30 RX ADMIN — POTASSIUM CHLORIDE 40 MEQ: 1500 TABLET, EXTENDED RELEASE ORAL at 23:25

## 2024-10-30 RX ADMIN — LORAZEPAM 2 MG: 2 TABLET ORAL at 23:26

## 2024-10-30 RX ADMIN — NICOTINE POLACRILEX 2 MG: 2 LOZENGE ORAL at 23:26

## 2024-10-30 ASSESSMENT — PAIN DESCRIPTION - ORIENTATION
ORIENTATION: RIGHT
ORIENTATION: RIGHT

## 2024-10-30 ASSESSMENT — PAIN DESCRIPTION - LOCATION
LOCATION: HIP;HEAD;LEG
LOCATION: HIP

## 2024-10-30 ASSESSMENT — ENCOUNTER SYMPTOMS
NAUSEA: 0
BACK PAIN: 0
COUGH: 0
VOMITING: 0
SHORTNESS OF BREATH: 0

## 2024-10-30 ASSESSMENT — PAIN DESCRIPTION - PAIN TYPE: TYPE: ACUTE PAIN

## 2024-10-30 ASSESSMENT — PAIN - FUNCTIONAL ASSESSMENT
PAIN_FUNCTIONAL_ASSESSMENT: 0-10
PAIN_FUNCTIONAL_ASSESSMENT: ACTIVITIES ARE NOT PREVENTED

## 2024-10-30 ASSESSMENT — LIFESTYLE VARIABLES
HOW OFTEN DO YOU HAVE A DRINK CONTAINING ALCOHOL: 4 OR MORE TIMES A WEEK
HOW MANY STANDARD DRINKS CONTAINING ALCOHOL DO YOU HAVE ON A TYPICAL DAY: 5 OR 6

## 2024-10-30 ASSESSMENT — PAIN DESCRIPTION - DESCRIPTORS: DESCRIPTORS: STABBING

## 2024-10-30 ASSESSMENT — PAIN SCALES - GENERAL
PAINLEVEL_OUTOF10: 8
PAINLEVEL_OUTOF10: 7
PAINLEVEL_OUTOF10: 7

## 2024-10-30 ASSESSMENT — PAIN DESCRIPTION - FREQUENCY: FREQUENCY: INTERMITTENT

## 2024-10-30 ASSESSMENT — PAIN DESCRIPTION - ONSET: ONSET: ON-GOING

## 2024-10-31 ENCOUNTER — APPOINTMENT (OUTPATIENT)
Dept: CT IMAGING | Age: 69
DRG: 305 | End: 2024-10-31
Attending: INTERNAL MEDICINE
Payer: MEDICARE

## 2024-10-31 ENCOUNTER — HOSPITAL ENCOUNTER (INPATIENT)
Age: 69
LOS: 1 days | Discharge: PSYCHIATRIC OR SUBSTANCE USE FACILITY | DRG: 305 | End: 2024-11-01
Attending: INTERNAL MEDICINE | Admitting: INTERNAL MEDICINE
Payer: MEDICARE

## 2024-10-31 VITALS
WEIGHT: 196 LBS | BODY MASS INDEX: 27.44 KG/M2 | HEIGHT: 71 IN | RESPIRATION RATE: 16 BRPM | HEART RATE: 80 BPM | TEMPERATURE: 97.7 F | OXYGEN SATURATION: 94 % | DIASTOLIC BLOOD PRESSURE: 120 MMHG | SYSTOLIC BLOOD PRESSURE: 190 MMHG

## 2024-10-31 PROBLEM — F32.A DEPRESSION, UNSPECIFIED: Status: ACTIVE | Noted: 2024-10-31

## 2024-10-31 PROBLEM — I16.0 HYPERTENSIVE URGENCY: Status: ACTIVE | Noted: 2024-10-31

## 2024-10-31 LAB
EKG ATRIAL RATE: 94 BPM
EKG DIAGNOSIS: NORMAL
EKG P AXIS: 66 DEGREES
EKG P-R INTERVAL: 186 MS
EKG Q-T INTERVAL: 390 MS
EKG QRS DURATION: 88 MS
EKG QTC CALCULATION (BAZETT): 487 MS
EKG R AXIS: 86 DEGREES
EKG T AXIS: 57 DEGREES
EKG VENTRICULAR RATE: 94 BPM
ETHANOLAMINE SERPL-MCNC: 156 MG/DL (ref 0–0.08)
ETHANOLAMINE SERPL-MCNC: 91 MG/DL (ref 0–0.08)
TROPONIN, HIGH SENSITIVITY: 13 NG/L (ref 0–22)

## 2024-10-31 PROCEDURE — 90791 PSYCH DIAGNOSTIC EVALUATION: CPT

## 2024-10-31 PROCEDURE — 6360000002 HC RX W HCPCS

## 2024-10-31 PROCEDURE — 1240000000 HC EMOTIONAL WELLNESS R&B

## 2024-10-31 PROCEDURE — 36415 COLL VENOUS BLD VENIPUNCTURE: CPT

## 2024-10-31 PROCEDURE — 2060000000 HC ICU INTERMEDIATE R&B

## 2024-10-31 PROCEDURE — 6370000000 HC RX 637 (ALT 250 FOR IP): Performed by: INTERNAL MEDICINE

## 2024-10-31 PROCEDURE — 82077 ASSAY SPEC XCP UR&BREATH IA: CPT

## 2024-10-31 PROCEDURE — 6370000000 HC RX 637 (ALT 250 FOR IP): Performed by: EMERGENCY MEDICINE

## 2024-10-31 PROCEDURE — 84484 ASSAY OF TROPONIN QUANT: CPT

## 2024-10-31 PROCEDURE — 6370000000 HC RX 637 (ALT 250 FOR IP)

## 2024-10-31 PROCEDURE — 2580000003 HC RX 258

## 2024-10-31 PROCEDURE — 93005 ELECTROCARDIOGRAM TRACING: CPT

## 2024-10-31 PROCEDURE — 70450 CT HEAD/BRAIN W/O DYE: CPT

## 2024-10-31 PROCEDURE — 93010 ELECTROCARDIOGRAM REPORT: CPT | Performed by: INTERNAL MEDICINE

## 2024-10-31 RX ORDER — LORAZEPAM 2 MG/1
2 TABLET ORAL
Status: DISCONTINUED | OUTPATIENT
Start: 2024-10-31 | End: 2024-11-01 | Stop reason: HOSPADM

## 2024-10-31 RX ORDER — MULTIVITAMIN WITH IRON
1 TABLET ORAL DAILY
Status: DISCONTINUED | OUTPATIENT
Start: 2024-10-31 | End: 2024-10-31

## 2024-10-31 RX ORDER — LORAZEPAM 1 MG/1
1 TABLET ORAL
Status: CANCELLED | OUTPATIENT
Start: 2024-10-31

## 2024-10-31 RX ORDER — MULTIVITAMIN WITH IRON
1 TABLET ORAL DAILY
Status: CANCELLED | OUTPATIENT
Start: 2024-11-01

## 2024-10-31 RX ORDER — HYDROXYZINE HYDROCHLORIDE 50 MG/1
50 TABLET, FILM COATED ORAL 3 TIMES DAILY PRN
Status: DISCONTINUED | OUTPATIENT
Start: 2024-10-31 | End: 2024-10-31

## 2024-10-31 RX ORDER — MULTIVITAMIN WITH IRON
1 TABLET ORAL DAILY
Status: DISCONTINUED | OUTPATIENT
Start: 2024-11-01 | End: 2024-11-01 | Stop reason: HOSPADM

## 2024-10-31 RX ORDER — POTASSIUM CHLORIDE 7.45 MG/ML
10 INJECTION INTRAVENOUS PRN
Status: CANCELLED | OUTPATIENT
Start: 2024-10-31

## 2024-10-31 RX ORDER — ONDANSETRON 2 MG/ML
4 INJECTION INTRAMUSCULAR; INTRAVENOUS EVERY 6 HOURS PRN
Status: DISCONTINUED | OUTPATIENT
Start: 2024-10-31 | End: 2024-11-01 | Stop reason: HOSPADM

## 2024-10-31 RX ORDER — SODIUM CHLORIDE 0.9 % (FLUSH) 0.9 %
10 SYRINGE (ML) INJECTION PRN
Status: CANCELLED | OUTPATIENT
Start: 2024-10-31

## 2024-10-31 RX ORDER — LANOLIN ALCOHOL/MO/W.PET/CERES
100 CREAM (GRAM) TOPICAL DAILY
Status: DISCONTINUED | OUTPATIENT
Start: 2024-10-31 | End: 2024-10-31

## 2024-10-31 RX ORDER — LABETALOL HYDROCHLORIDE 5 MG/ML
10 INJECTION, SOLUTION INTRAVENOUS EVERY 6 HOURS PRN
Status: DISCONTINUED | OUTPATIENT
Start: 2024-10-31 | End: 2024-11-01 | Stop reason: HOSPADM

## 2024-10-31 RX ORDER — LORAZEPAM 2 MG/1
2 TABLET ORAL
Status: CANCELLED | OUTPATIENT
Start: 2024-10-31

## 2024-10-31 RX ORDER — ACETAMINOPHEN 325 MG/1
650 TABLET ORAL EVERY 4 HOURS PRN
Status: DISCONTINUED | OUTPATIENT
Start: 2024-10-31 | End: 2024-11-01 | Stop reason: HOSPADM

## 2024-10-31 RX ORDER — OLANZAPINE 5 MG/1
5 TABLET ORAL EVERY 4 HOURS PRN
Status: CANCELLED | OUTPATIENT
Start: 2024-10-31

## 2024-10-31 RX ORDER — CARVEDILOL 6.25 MG/1
6.25 TABLET ORAL 2 TIMES DAILY WITH MEALS
Status: DISCONTINUED | OUTPATIENT
Start: 2024-11-01 | End: 2024-11-01 | Stop reason: HOSPADM

## 2024-10-31 RX ORDER — TRAMADOL HYDROCHLORIDE 50 MG/1
100 TABLET ORAL ONCE
Status: COMPLETED | OUTPATIENT
Start: 2024-10-31 | End: 2024-10-31

## 2024-10-31 RX ORDER — IBUPROFEN 400 MG/1
400 TABLET, FILM COATED ORAL EVERY 6 HOURS PRN
Status: DISCONTINUED | OUTPATIENT
Start: 2024-10-31 | End: 2024-10-31

## 2024-10-31 RX ORDER — AMLODIPINE BESYLATE 5 MG/1
10 TABLET ORAL NIGHTLY
Status: DISCONTINUED | OUTPATIENT
Start: 2024-10-31 | End: 2024-10-31

## 2024-10-31 RX ORDER — LORAZEPAM 2 MG/1
4 TABLET ORAL
Status: DISCONTINUED | OUTPATIENT
Start: 2024-10-31 | End: 2024-11-01 | Stop reason: HOSPADM

## 2024-10-31 RX ORDER — OXYCODONE AND ACETAMINOPHEN 5; 325 MG/1; MG/1
1 TABLET ORAL ONCE
Status: COMPLETED | OUTPATIENT
Start: 2024-10-31 | End: 2024-10-31

## 2024-10-31 RX ORDER — OLANZAPINE 5 MG/1
5 TABLET ORAL EVERY 4 HOURS PRN
Status: DISCONTINUED | OUTPATIENT
Start: 2024-10-31 | End: 2024-10-31

## 2024-10-31 RX ORDER — SODIUM CHLORIDE 0.9 % (FLUSH) 0.9 %
10 SYRINGE (ML) INJECTION PRN
Status: DISCONTINUED | OUTPATIENT
Start: 2024-10-31 | End: 2024-11-01 | Stop reason: HOSPADM

## 2024-10-31 RX ORDER — LANOLIN ALCOHOL/MO/W.PET/CERES
100 CREAM (GRAM) TOPICAL DAILY
Status: DISCONTINUED | OUTPATIENT
Start: 2024-11-01 | End: 2024-11-01 | Stop reason: HOSPADM

## 2024-10-31 RX ORDER — LABETALOL HYDROCHLORIDE 5 MG/ML
10 INJECTION, SOLUTION INTRAVENOUS EVERY 4 HOURS PRN
Status: DISCONTINUED | OUTPATIENT
Start: 2024-10-31 | End: 2024-10-31

## 2024-10-31 RX ORDER — LORAZEPAM 2 MG/1
2 TABLET ORAL
Status: DISCONTINUED | OUTPATIENT
Start: 2024-10-31 | End: 2024-10-31

## 2024-10-31 RX ORDER — SODIUM CHLORIDE 9 MG/ML
INJECTION, SOLUTION INTRAVENOUS CONTINUOUS
Status: ACTIVE | OUTPATIENT
Start: 2024-10-31 | End: 2024-11-01

## 2024-10-31 RX ORDER — CARVEDILOL 6.25 MG/1
6.25 TABLET ORAL 2 TIMES DAILY WITH MEALS
Status: CANCELLED | OUTPATIENT
Start: 2024-11-01

## 2024-10-31 RX ORDER — MAGNESIUM HYDROXIDE/ALUMINUM HYDROXICE/SIMETHICONE 120; 1200; 1200 MG/30ML; MG/30ML; MG/30ML
30 SUSPENSION ORAL EVERY 6 HOURS PRN
Status: CANCELLED | OUTPATIENT
Start: 2024-10-31

## 2024-10-31 RX ORDER — SODIUM CHLORIDE 9 MG/ML
INJECTION, SOLUTION INTRAVENOUS PRN
Status: CANCELLED | OUTPATIENT
Start: 2024-10-31

## 2024-10-31 RX ORDER — SODIUM CHLORIDE 9 MG/ML
INJECTION, SOLUTION INTRAVENOUS PRN
Status: DISCONTINUED | OUTPATIENT
Start: 2024-10-31 | End: 2024-11-01 | Stop reason: HOSPADM

## 2024-10-31 RX ORDER — LANOLIN ALCOHOL/MO/W.PET/CERES
100 CREAM (GRAM) TOPICAL DAILY
Status: CANCELLED | OUTPATIENT
Start: 2024-11-01

## 2024-10-31 RX ORDER — TRAZODONE HYDROCHLORIDE 50 MG/1
50 TABLET, FILM COATED ORAL NIGHTLY PRN
Status: DISCONTINUED | OUTPATIENT
Start: 2024-10-31 | End: 2024-11-01 | Stop reason: HOSPADM

## 2024-10-31 RX ORDER — MAGNESIUM SULFATE 1 G/100ML
1000 INJECTION INTRAVENOUS PRN
Status: CANCELLED | OUTPATIENT
Start: 2024-10-31

## 2024-10-31 RX ORDER — IBUPROFEN 400 MG/1
400 TABLET, FILM COATED ORAL EVERY 6 HOURS PRN
Status: CANCELLED | OUTPATIENT
Start: 2024-10-31

## 2024-10-31 RX ORDER — DIPHENHYDRAMINE HYDROCHLORIDE 50 MG/ML
50 INJECTION INTRAMUSCULAR; INTRAVENOUS EVERY 4 HOURS PRN
Status: DISCONTINUED | OUTPATIENT
Start: 2024-10-31 | End: 2024-11-01 | Stop reason: HOSPADM

## 2024-10-31 RX ORDER — LORAZEPAM 2 MG/1
4 TABLET ORAL
Status: CANCELLED | OUTPATIENT
Start: 2024-10-31

## 2024-10-31 RX ORDER — CARVEDILOL 6.25 MG/1
6.25 TABLET ORAL 2 TIMES DAILY WITH MEALS
Status: DISCONTINUED | OUTPATIENT
Start: 2024-10-31 | End: 2024-10-31

## 2024-10-31 RX ORDER — ONDANSETRON 2 MG/ML
4 INJECTION INTRAMUSCULAR; INTRAVENOUS EVERY 6 HOURS PRN
Status: CANCELLED | OUTPATIENT
Start: 2024-10-31

## 2024-10-31 RX ORDER — LORAZEPAM 1 MG/1
1 TABLET ORAL ONCE
Status: COMPLETED | OUTPATIENT
Start: 2024-10-31 | End: 2024-10-31

## 2024-10-31 RX ORDER — DIPHENHYDRAMINE HYDROCHLORIDE 50 MG/ML
50 INJECTION INTRAMUSCULAR; INTRAVENOUS EVERY 4 HOURS PRN
Status: CANCELLED | OUTPATIENT
Start: 2024-10-31

## 2024-10-31 RX ORDER — OLANZAPINE 5 MG/1
5 TABLET ORAL EVERY 4 HOURS PRN
Status: DISCONTINUED | OUTPATIENT
Start: 2024-10-31 | End: 2024-11-01 | Stop reason: HOSPADM

## 2024-10-31 RX ORDER — HYDRALAZINE HYDROCHLORIDE 25 MG/1
25 TABLET, FILM COATED ORAL EVERY 8 HOURS PRN
Status: DISCONTINUED | OUTPATIENT
Start: 2024-10-31 | End: 2024-10-31 | Stop reason: HOSPADM

## 2024-10-31 RX ORDER — MAGNESIUM SULFATE 1 G/100ML
1000 INJECTION INTRAVENOUS PRN
Status: DISCONTINUED | OUTPATIENT
Start: 2024-10-31 | End: 2024-11-01 | Stop reason: HOSPADM

## 2024-10-31 RX ORDER — ONDANSETRON 4 MG/1
4 TABLET, ORALLY DISINTEGRATING ORAL EVERY 8 HOURS PRN
Status: CANCELLED | OUTPATIENT
Start: 2024-10-31

## 2024-10-31 RX ORDER — TRAZODONE HYDROCHLORIDE 50 MG/1
50 TABLET, FILM COATED ORAL NIGHTLY PRN
Status: CANCELLED | OUTPATIENT
Start: 2024-10-31

## 2024-10-31 RX ORDER — IBUPROFEN 400 MG/1
400 TABLET, FILM COATED ORAL EVERY 6 HOURS PRN
Status: DISCONTINUED | OUTPATIENT
Start: 2024-10-31 | End: 2024-11-01 | Stop reason: HOSPADM

## 2024-10-31 RX ORDER — CYCLOBENZAPRINE HCL 10 MG
10 TABLET ORAL ONCE
Status: COMPLETED | OUTPATIENT
Start: 2024-10-31 | End: 2024-10-31

## 2024-10-31 RX ORDER — LORAZEPAM 1 MG/1
1 TABLET ORAL
Status: DISCONTINUED | OUTPATIENT
Start: 2024-10-31 | End: 2024-10-31

## 2024-10-31 RX ORDER — POLYETHYLENE GLYCOL 3350 17 G
2 POWDER IN PACKET (EA) ORAL
Status: DISCONTINUED | OUTPATIENT
Start: 2024-10-31 | End: 2024-11-01 | Stop reason: HOSPADM

## 2024-10-31 RX ORDER — ACETAMINOPHEN 325 MG/1
650 TABLET ORAL EVERY 4 HOURS PRN
Status: CANCELLED | OUTPATIENT
Start: 2024-10-31

## 2024-10-31 RX ORDER — MAGNESIUM HYDROXIDE/ALUMINUM HYDROXICE/SIMETHICONE 120; 1200; 1200 MG/30ML; MG/30ML; MG/30ML
30 SUSPENSION ORAL EVERY 6 HOURS PRN
Status: DISCONTINUED | OUTPATIENT
Start: 2024-10-31 | End: 2024-11-01 | Stop reason: HOSPADM

## 2024-10-31 RX ORDER — HYDROXYZINE HYDROCHLORIDE 50 MG/1
50 TABLET, FILM COATED ORAL 3 TIMES DAILY PRN
Status: DISCONTINUED | OUTPATIENT
Start: 2024-10-31 | End: 2024-11-01 | Stop reason: HOSPADM

## 2024-10-31 RX ORDER — POTASSIUM CHLORIDE 1500 MG/1
40 TABLET, EXTENDED RELEASE ORAL PRN
Status: CANCELLED | OUTPATIENT
Start: 2024-10-31

## 2024-10-31 RX ORDER — SODIUM CHLORIDE 0.9 % (FLUSH) 0.9 %
5-40 SYRINGE (ML) INJECTION EVERY 12 HOURS SCHEDULED
Status: CANCELLED | OUTPATIENT
Start: 2024-10-31

## 2024-10-31 RX ORDER — LANOLIN ALCOHOL/MO/W.PET/CERES
400 CREAM (GRAM) TOPICAL 2 TIMES DAILY
Status: DISCONTINUED | OUTPATIENT
Start: 2024-10-31 | End: 2024-10-31 | Stop reason: HOSPADM

## 2024-10-31 RX ORDER — HYDROXYZINE HYDROCHLORIDE 50 MG/1
50 TABLET, FILM COATED ORAL 3 TIMES DAILY PRN
Status: CANCELLED | OUTPATIENT
Start: 2024-10-31

## 2024-10-31 RX ORDER — POLYETHYLENE GLYCOL 3350 17 G
2 POWDER IN PACKET (EA) ORAL
Status: DISCONTINUED | OUTPATIENT
Start: 2024-10-31 | End: 2024-10-31

## 2024-10-31 RX ORDER — POTASSIUM CHLORIDE 1500 MG/1
40 TABLET, EXTENDED RELEASE ORAL PRN
Status: DISCONTINUED | OUTPATIENT
Start: 2024-10-31 | End: 2024-11-01 | Stop reason: HOSPADM

## 2024-10-31 RX ORDER — ONDANSETRON 4 MG/1
4 TABLET, ORALLY DISINTEGRATING ORAL EVERY 8 HOURS PRN
Status: DISCONTINUED | OUTPATIENT
Start: 2024-10-31 | End: 2024-11-01 | Stop reason: HOSPADM

## 2024-10-31 RX ORDER — LISINOPRIL 10 MG/1
10 TABLET ORAL DAILY
Status: DISCONTINUED | OUTPATIENT
Start: 2024-10-31 | End: 2024-10-31

## 2024-10-31 RX ORDER — SODIUM CHLORIDE 0.9 % (FLUSH) 0.9 %
5-40 SYRINGE (ML) INJECTION EVERY 12 HOURS SCHEDULED
Status: DISCONTINUED | OUTPATIENT
Start: 2024-10-31 | End: 2024-11-01 | Stop reason: HOSPADM

## 2024-10-31 RX ORDER — DIPHENHYDRAMINE HYDROCHLORIDE 50 MG/ML
50 INJECTION INTRAMUSCULAR; INTRAVENOUS EVERY 4 HOURS PRN
Status: DISCONTINUED | OUTPATIENT
Start: 2024-10-31 | End: 2024-10-31

## 2024-10-31 RX ORDER — TRAZODONE HYDROCHLORIDE 50 MG/1
50 TABLET, FILM COATED ORAL NIGHTLY PRN
Status: DISCONTINUED | OUTPATIENT
Start: 2024-10-31 | End: 2024-10-31

## 2024-10-31 RX ORDER — LORAZEPAM 2 MG/1
4 TABLET ORAL
Status: DISCONTINUED | OUTPATIENT
Start: 2024-10-31 | End: 2024-10-31

## 2024-10-31 RX ORDER — POLYETHYLENE GLYCOL 3350 17 G
2 POWDER IN PACKET (EA) ORAL
Status: CANCELLED | OUTPATIENT
Start: 2024-10-31

## 2024-10-31 RX ORDER — ACETAMINOPHEN 325 MG/1
650 TABLET ORAL EVERY 4 HOURS PRN
Status: DISCONTINUED | OUTPATIENT
Start: 2024-10-31 | End: 2024-10-31

## 2024-10-31 RX ORDER — POTASSIUM CHLORIDE 7.45 MG/ML
10 INJECTION INTRAVENOUS PRN
Status: DISCONTINUED | OUTPATIENT
Start: 2024-10-31 | End: 2024-11-01 | Stop reason: HOSPADM

## 2024-10-31 RX ORDER — MAGNESIUM HYDROXIDE/ALUMINUM HYDROXICE/SIMETHICONE 120; 1200; 1200 MG/30ML; MG/30ML; MG/30ML
30 SUSPENSION ORAL EVERY 6 HOURS PRN
Status: DISCONTINUED | OUTPATIENT
Start: 2024-10-31 | End: 2024-10-31

## 2024-10-31 RX ORDER — LORAZEPAM 1 MG/1
1 TABLET ORAL
Status: DISCONTINUED | OUTPATIENT
Start: 2024-10-31 | End: 2024-11-01 | Stop reason: HOSPADM

## 2024-10-31 RX ORDER — AMLODIPINE BESYLATE 5 MG/1
10 TABLET ORAL NIGHTLY
Status: CANCELLED | OUTPATIENT
Start: 2024-10-31

## 2024-10-31 RX ORDER — LISINOPRIL 10 MG/1
10 TABLET ORAL NIGHTLY
Status: DISCONTINUED | OUTPATIENT
Start: 2024-10-31 | End: 2024-11-01

## 2024-10-31 RX ADMIN — HYDRALAZINE HYDROCHLORIDE 25 MG: 25 TABLET ORAL at 18:12

## 2024-10-31 RX ADMIN — TRAMADOL HYDROCHLORIDE 100 MG: 50 TABLET ORAL at 22:21

## 2024-10-31 RX ADMIN — CYCLOBENZAPRINE 10 MG: 10 TABLET, FILM COATED ORAL at 04:42

## 2024-10-31 RX ADMIN — LORAZEPAM 1 MG: 1 TABLET ORAL at 06:56

## 2024-10-31 RX ADMIN — CARVEDILOL 6.25 MG: 6.25 TABLET, FILM COATED ORAL at 17:26

## 2024-10-31 RX ADMIN — LISINOPRIL 10 MG: 10 TABLET ORAL at 21:11

## 2024-10-31 RX ADMIN — Medication 10 ML: at 21:54

## 2024-10-31 RX ADMIN — APIXABAN 5 MG: 5 TABLET, FILM COATED ORAL at 21:11

## 2024-10-31 RX ADMIN — OXYCODONE AND ACETAMINOPHEN 1 TABLET: 5; 325 TABLET ORAL at 02:20

## 2024-10-31 RX ADMIN — SODIUM CHLORIDE: 9 INJECTION, SOLUTION INTRAVENOUS at 21:53

## 2024-10-31 RX ADMIN — OXYCODONE AND ACETAMINOPHEN 1 TABLET: 5; 325 TABLET ORAL at 12:19

## 2024-10-31 RX ADMIN — LABETALOL HYDROCHLORIDE 10 MG: 5 INJECTION, SOLUTION INTRAVENOUS at 22:23

## 2024-10-31 ASSESSMENT — PAIN DESCRIPTION - DESCRIPTORS
DESCRIPTORS: ACHING
DESCRIPTORS: ACHING
DESCRIPTORS: STABBING
DESCRIPTORS: ACHING
DESCRIPTORS: ACHING

## 2024-10-31 ASSESSMENT — PAIN SCALES - GENERAL
PAINLEVEL_OUTOF10: 8
PAINLEVEL_OUTOF10: 7
PAINLEVEL_OUTOF10: 8
PAINLEVEL_OUTOF10: 8

## 2024-10-31 ASSESSMENT — PAIN DESCRIPTION - ORIENTATION
ORIENTATION: RIGHT

## 2024-10-31 ASSESSMENT — LIFESTYLE VARIABLES
HOW OFTEN DO YOU HAVE A DRINK CONTAINING ALCOHOL: 4 OR MORE TIMES A WEEK
HOW MANY STANDARD DRINKS CONTAINING ALCOHOL DO YOU HAVE ON A TYPICAL DAY: 3 OR 4

## 2024-10-31 ASSESSMENT — PAIN DESCRIPTION - LOCATION
LOCATION: HIP
LOCATION: HEAD;HIP;LEG
LOCATION: HIP;KNEE

## 2024-10-31 ASSESSMENT — PAIN DESCRIPTION - FREQUENCY: FREQUENCY: CONTINUOUS

## 2024-10-31 NOTE — PLAN OF CARE
Patient to be transferred to medical for HTN urgency 190/120s    PCU admit     IV labetalol prn for now, added lisinopril as well     Could be 2/2 to alcohol withdrawal? Is on CIWA and started IVF     Patient has just received coreg and hydralazine in the last hour     Did have vomiting and headache earlier- has since resolved     CT head pending       JOYCE Dotson  10/31/24  6:51 PM

## 2024-10-31 NOTE — BH NOTE
Hiro arrived on the unit at approximately 1530.  He was oriented to the day room and wanted to sit near the TV.  He is currently watching a Halloween movie.
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
## 2024-10-31 NOTE — BH NOTE
Vital signs were done r/t admission.  Results did not match recent history so manual BP and pulse were done.  This was at about 1630.  BP was 190/120 and pulse was 80.  Hiro was also vomiting just prior to taking these vital signs.      Medical was informed via [employer].   Pt ate dinner, about 75% and said that his stomach was no longer nauseous.    At about 1715 Vital signs repeated.  BP was 194/118 Manual, left upper arm.  Pulse was 80, Left wrist.  Pulse was unstable.  Fast and bounding at times during the minute and slow/shallow at other times during the minute    New orders for Coreg now, Head CT and EKG.    Coreg administered.
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
## 2024-10-31 NOTE — BH NOTE
Phone call from Karla Charles, medical provider soon after Hiro returned from CT. She stated that Hiro is moving to medical.  Clinical has been called and on call psych, Karla Rahman will be called by provider Araceli.      Unit manager informed that Hiro is moving to medical.

## 2024-10-31 NOTE — PROGRESS NOTES
At 1930 as endorsed, pt continuously having high blood pressure. Latest was 192/104. Slightly headache and pt still vomiting. Endorsed to PCU nurse. Pt accepted at room 3-12. Pt cam and cooperative. Transferred to PCU accompanied by staff and security.

## 2024-10-31 NOTE — VIRTUAL HEALTH
Hiro GRIGSBY Esteban  [id number]  1955     Social Work Behavioral Health Crisis Assessment    10/31/24    Chief Complaint: Suicidal Ideation    HPI: Patient is a 69 y.o. White (non-) male who presents for suicidal ideation with a plan to shoot himself. Patient presented to the ED on 10/30/24 from home via EMS. Records show pt went to his brother's house in an attempt to get his gun to shoot himself but his brother wouldn't allow him access. Records show hx of Depression and Alcohol Abuse.    Upon assessment pt is calm and agreeable to meet  When asked about reason for presentation pt states \"I was severely depressed and didn't know what else to do and wanted to die\". Pt states he went to his brother's house to get his gun \"to shoot myself\". Pt denies any previous attempts. He states \"my depression has been so bad lately and I couldn't control myself\". He is denying SI currently but states \"my depression is sad bad I don't know what I could do\". He does report a slight improvement in depression since the incident yesterday but still stating \"I feel really down and depressed and I need my meds changed\". [facility] and believes he is prescribed Trazodone but is unsure about psychiatric medications. He reports chronic, daily alcohol use and states he drinks \"Sometimes once a day, sometimes once an hour. It all depends on how bad I'm hurting\". He reports an increase in alcohol use since his depression has increased in the last few months. He reports living at an Independent Living facility for the last two months and feels that environment has contributed to his depressive symptoms. Pt continues to state he feels he needs to be admitted and have his medications stabilized. He did not give permission for writer to contact collateral.     Past Psychiatric History:  Previous Diagnoses/symptoms: Depression, Alcohol Abuse  Previous suicide  Provider, Historical, MD        Labs:  UDS: negative  ETOH: 91 @0830  HCG: na      Mental Status Exam:  Level of consciousness:  within normal limits   Appearance:  hospital attire.  Does appear stated age. No acute distress.  Behavior/Motor:  no abnormalities noted  Attitude toward examiner:  cooperative and guarded  SI/HI: reports SI, denies HI  Speech:  normal rate and normal volume , Tone: normal tone  Mood: labile  Affect: flat  Thought Processes:  goal directed.   Thought Content: Suicidal Ideation:  passive  Hallucinations:  Hallucinations: Denies AVOT-H  Cognition:  oriented to person, place, and time   Concentration: intact  Memory: intact, though not formally tested.  Insight: fair   Judgement: fair   Fund of Knowledge: adequate    Overall Level Suicide Risk: TelePsych CSSRS Risk Level: High Risk  CSSR-S Screening: pt reporting passive SI currently    Brief ClinicalSummary:   Patient is a 69 y.o. White (non-) male who presents for suicidal ideation with a plan to shoot himself. Patient presented to the ED on 10/30/24 from home via EMS. Records show pt went to his brother's house in an attempt to get his gun to shoot himself but his brother wouldn't allow him access. Records show hx of Depression and Alcohol Abuse.    Upon assessment pt is calm and agreeable to meet  When asked about reason for presentation pt states \"I was severely depressed and didn't know what else to do and wanted to die\". Pt states he went to his brother's house to get his gun \"to shoot myself\". Pt denies any previous attempts. He states \"my depression has been so bad lately and I couldn't control myself\". He is denying SI currently but states \"my depression is sad bad I don't know what I could do\". He does report a slight improvement in depression since the incident yesterday but still stating \"I feel really down and depressed and I need my meds changed\". Pt reports being connected to a psychiatrist and therapist through the

## 2024-10-31 NOTE — ED PROVIDER NOTES
Emergency Department Attending Physician Note  Location: [facility] ED  10/30/2024       Pt Name: Hiro Esteban  MRN: [id number]  Birthdate 1955    Date of evaluation: 10/30/2024  Provider: JACINTO ANTHONY DO  PCP: Moe Kumar MD    Note Started: 9:55 PM EDT 10/30/24    CHIEF COMPLAINT  Chief Complaint   Patient presents with    Suicidal     Pt arrives via squad with S/I. Pt states he was going to get a gun and shoot self but his brother had all his guns locked up. Pt denies H/I.       ROOM: [address]    HISTORY OF PRESENT ILLNESS:  History obtained by patient. Limitations to history : None.     Hiro Esteban is a 69 y.o. male with a significant PMHx of recent right hip surgery, COPD, diabetes, and other comorbidities listed below, including alcohol abuse, presenting with department today with concerns of suicidal ideation.  Patient says that \"if I had my gun right now, you would not even be talking to me.\"  Says that he called his brother, who talked him out of it, and told him to call 911.  Patient called 911 himself.  Patient does say that he drinks alcohol \"any chance I get,\" and says his last drink was around 10 AM.  Says he does not usually go through alcohol withdrawal.  Is asking for something for pain, Percocet, for his hip, his activity takes at home for his recent right hip surgery as below.  Denies any falls.  No chest pain or shortness of breath.  No abdominal pain or back pain.  Says that \"I do not know why anyone cares about me, I am not a good denys.\"  Did not attempt self-harm, did not ingest any substances.  Denies any other concerns today in the emergency department.  Denies any illnesses.  Says he face planted, as below, but says \"that is all right now.\"  No vision changes, or headaches after his recent fall, seen at [facility].  Says he has been \"on the 72-hour hold before,\" in the past, but is not currently seeing any psychiatrist or on any mental health  APIXABAN (ELIQUIS) 5 MG TABS TABLET    Take 1 tablet by mouth 2 times daily for 20 days 2 bid- 7 days, 1 bid thereafter    CARVEDILOL (COREG) 6.25 MG TABLET    Take 1 tablet by mouth 2 times daily (with meals)    FOLIC ACID (FOLVITE) 1 MG TABLET    Take 1 tablet by mouth daily    MAGNESIUM OXIDE (MAG-OX) 400 (240 MG) MG TABLET    Take 1 tablet by mouth daily    MULTIPLE VITAMINS-MINERALS (THERAPEUTIC MULTIVITAMIN-MINERALS) TABLET    Take 1 tablet by mouth daily    OMEPRAZOLE (PRILOSEC) 20 MG DELAYED RELEASE CAPSULE    Take 1 capsule by mouth 2 times daily (before meals)    POLYETHYLENE GLYCOL (GLYCOLAX) 17 G PACKET    Take 1 packet by mouth daily    THIAMINE 100 MG TABLET    Take 1 tablet by mouth daily       ALLERGIES  Patient has no known allergies.    FAMILYHISTORY  History reviewed. No pertinent family history.    SOCIAL HISTORY  Social History     Tobacco Use    Smoking status: Every Day     Current packs/day: 1.00     Average packs/day: 1 pack/day for 42.0 years (42.0 ttl pk-yrs)     Types: Cigarettes   Vaping Use    Vaping status: Never Used   Substance Use Topics    Alcohol use: Yes     Comment: 2-3 shots every other day(vodka 100 proof)    Drug use: Not Currently       SCREENINGS    [place] Coma Scale  Eye Opening: Spontaneous  Best Verbal Response: Oriented  Best Motor Response: Obeys commands  [place] Coma Scale Score: 15       CIWA Assessment  BP: (!) 168/91  Pulse: 87  Nausea and Vomiting: no nausea and no vomiting  Tactile Disturbances: none  Tremor: no tremor  Auditory Disturbances: not present  Paroxysmal Sweats: no sweat visible  Visual Disturbances: not present  Anxiety: no anxiety, at ease  Headache, Fullness in Head: none present  Agitation: normal activity  Orientation and Clouding of Sensorium: oriented and can do serial additions  CIWA-Ar Total: 0             REVIEW OF SYSTEMS:    Review of Systems   Constitutional:  Negative for activity change, appetite change and fever.   HENT:  Negative

## 2024-10-31 NOTE — BH NOTE
EKG is done.  Shows septal infarct, age undetermined.   Phone call from Practitioner Araceli who wanted to know current symptoms.  No further vomiting, pt has eaten dinner and is currently watching TV.    Ms Charles wanted BP rechecked prior to Hiro going to CT.  Manual BP is 190/120.  Hydralazine 25 mg administered prior to Hiro leaving for CT.

## 2024-10-31 NOTE — ED NOTES
Patient presented to ED via EMS  for evaluation. Explained process of securing patient belongings for patient/staff safety, patient verbalized understanding. Security at bedside, metal detector wanding completed. Patient changed into safety gown. All belongings itemized by  Julio, placed in labeled bag, removed from the patient care area, and taken to the security locker. Itemized list placed with belongings, in patient record, and a copy given to the patient.

## 2024-11-01 ENCOUNTER — HOSPITAL ENCOUNTER (INPATIENT)
Age: 69
LOS: 3 days | Discharge: HOME | DRG: 885 | End: 2024-11-04
Attending: PSYCHIATRY & NEUROLOGY | Admitting: PSYCHIATRY & NEUROLOGY
Payer: MEDICARE

## 2024-11-01 ENCOUNTER — APPOINTMENT (OUTPATIENT)
Dept: GENERAL RADIOLOGY | Age: 69
DRG: 305 | End: 2024-11-01
Attending: INTERNAL MEDICINE
Payer: MEDICARE

## 2024-11-01 ENCOUNTER — FOLLOWUP TELEPHONE ENCOUNTER (OUTPATIENT)
Dept: PSYCHIATRY | Age: 69
End: 2024-11-01

## 2024-11-01 VITALS
WEIGHT: 192.13 LBS | SYSTOLIC BLOOD PRESSURE: 164 MMHG | HEART RATE: 77 BPM | DIASTOLIC BLOOD PRESSURE: 92 MMHG | RESPIRATION RATE: 16 BRPM | BODY MASS INDEX: 26.9 KG/M2 | HEIGHT: 71 IN | TEMPERATURE: 98.1 F | OXYGEN SATURATION: 95 %

## 2024-11-01 PROBLEM — R45.851 SUICIDAL IDEATION: Status: ACTIVE | Noted: 2024-11-01

## 2024-11-01 LAB
ALBUMIN SERPL-MCNC: 3.5 G/DL (ref 3.4–5)
ALP SERPL-CCNC: 292 U/L (ref 40–129)
ALT SERPL-CCNC: 15 U/L (ref 10–40)
ANION GAP SERPL CALCULATED.3IONS-SCNC: 11 MMOL/L (ref 3–16)
AST SERPL-CCNC: 49 U/L (ref 15–37)
BASOPHILS # BLD: 0 K/UL (ref 0–0.2)
BASOPHILS NFR BLD: 0.4 %
BILIRUB DIRECT SERPL-MCNC: 0.3 MG/DL (ref 0–0.3)
BILIRUB INDIRECT SERPL-MCNC: 0.4 MG/DL (ref 0–1)
BILIRUB SERPL-MCNC: 0.7 MG/DL (ref 0–1)
BUN SERPL-MCNC: 7 MG/DL (ref 7–20)
CALCIUM SERPL-MCNC: 8.3 MG/DL (ref 8.3–10.6)
CHLORIDE SERPL-SCNC: 98 MMOL/L (ref 99–110)
CO2 SERPL-SCNC: 26 MMOL/L (ref 21–32)
CREAT SERPL-MCNC: 0.5 MG/DL (ref 0.8–1.3)
DEPRECATED RDW RBC AUTO: 18.3 % (ref 12.4–15.4)
EKG ATRIAL RATE: 107 BPM
EKG DIAGNOSIS: NORMAL
EKG P AXIS: 65 DEGREES
EKG P-R INTERVAL: 164 MS
EKG Q-T INTERVAL: 344 MS
EKG QRS DURATION: 76 MS
EKG QTC CALCULATION (BAZETT): 459 MS
EKG R AXIS: 79 DEGREES
EKG T AXIS: 62 DEGREES
EKG VENTRICULAR RATE: 107 BPM
EOSINOPHIL # BLD: 0 K/UL (ref 0–0.6)
EOSINOPHIL NFR BLD: 0.4 %
GFR SERPLBLD CREATININE-BSD FMLA CKD-EPI: >90 ML/MIN/{1.73_M2}
GLUCOSE SERPL-MCNC: 98 MG/DL (ref 70–99)
HCT VFR BLD AUTO: 35.3 % (ref 40.5–52.5)
HGB BLD-MCNC: 11.9 G/DL (ref 13.5–17.5)
LYMPHOCYTES # BLD: 0.9 K/UL (ref 1–5.1)
LYMPHOCYTES NFR BLD: 21.3 %
MCH RBC QN AUTO: 28.1 PG (ref 26–34)
MCHC RBC AUTO-ENTMCNC: 33.6 G/DL (ref 31–36)
MCV RBC AUTO: 83.6 FL (ref 80–100)
MONOCYTES # BLD: 0.4 K/UL (ref 0–1.3)
MONOCYTES NFR BLD: 10.5 %
NEUTROPHILS # BLD: 2.8 K/UL (ref 1.7–7.7)
NEUTROPHILS NFR BLD: 67.4 %
PLATELET # BLD AUTO: 103 K/UL (ref 135–450)
PMV BLD AUTO: 9.3 FL (ref 5–10.5)
POTASSIUM SERPL-SCNC: 3.6 MMOL/L (ref 3.5–5.1)
PROT SERPL-MCNC: 6.9 G/DL (ref 6.4–8.2)
RBC # BLD AUTO: 4.23 M/UL (ref 4.2–5.9)
SODIUM SERPL-SCNC: 135 MMOL/L (ref 136–145)
WBC # BLD AUTO: 4.2 K/UL (ref 4–11)

## 2024-11-01 PROCEDURE — 6370000000 HC RX 637 (ALT 250 FOR IP)

## 2024-11-01 PROCEDURE — 80076 HEPATIC FUNCTION PANEL: CPT

## 2024-11-01 PROCEDURE — 85025 COMPLETE CBC W/AUTO DIFF WBC: CPT

## 2024-11-01 PROCEDURE — 36415 COLL VENOUS BLD VENIPUNCTURE: CPT

## 2024-11-01 PROCEDURE — 6370000000 HC RX 637 (ALT 250 FOR IP): Performed by: INTERNAL MEDICINE

## 2024-11-01 PROCEDURE — 93010 ELECTROCARDIOGRAM REPORT: CPT | Performed by: INTERNAL MEDICINE

## 2024-11-01 PROCEDURE — 2580000003 HC RX 258

## 2024-11-01 PROCEDURE — 1240000000 HC EMOTIONAL WELLNESS R&B

## 2024-11-01 PROCEDURE — 71045 X-RAY EXAM CHEST 1 VIEW: CPT

## 2024-11-01 PROCEDURE — 80048 BASIC METABOLIC PNL TOTAL CA: CPT

## 2024-11-01 PROCEDURE — 99223 1ST HOSP IP/OBS HIGH 75: CPT | Performed by: INTERNAL MEDICINE

## 2024-11-01 PROCEDURE — 99223 1ST HOSP IP/OBS HIGH 75: CPT

## 2024-11-01 RX ORDER — NIFEDIPINE 30 MG/1
30 TABLET, EXTENDED RELEASE ORAL DAILY
Status: DISCONTINUED | OUTPATIENT
Start: 2024-11-02 | End: 2024-11-04 | Stop reason: HOSPADM

## 2024-11-01 RX ORDER — OLANZAPINE 5 MG/1
5 TABLET ORAL EVERY 4 HOURS PRN
Status: DISCONTINUED | OUTPATIENT
Start: 2024-11-01 | End: 2024-11-04 | Stop reason: HOSPADM

## 2024-11-01 RX ORDER — OXYCODONE AND ACETAMINOPHEN 5; 325 MG/1; MG/1
1 TABLET ORAL EVERY 6 HOURS PRN
Status: DISCONTINUED | OUTPATIENT
Start: 2024-11-01 | End: 2024-11-01 | Stop reason: HOSPADM

## 2024-11-01 RX ORDER — OXYCODONE AND ACETAMINOPHEN 5; 325 MG/1; MG/1
1 TABLET ORAL EVERY 6 HOURS PRN
Status: CANCELLED | OUTPATIENT
Start: 2024-11-01

## 2024-11-01 RX ORDER — CARVEDILOL 6.25 MG/1
6.25 TABLET ORAL 2 TIMES DAILY WITH MEALS
Status: CANCELLED | OUTPATIENT
Start: 2024-11-01

## 2024-11-01 RX ORDER — NIFEDIPINE 30 MG/1
30 TABLET, EXTENDED RELEASE ORAL DAILY
Status: CANCELLED | OUTPATIENT
Start: 2024-11-02

## 2024-11-01 RX ORDER — MULTIVITAMIN WITH IRON
1 TABLET ORAL DAILY
Status: DISCONTINUED | OUTPATIENT
Start: 2024-11-02 | End: 2024-11-04 | Stop reason: HOSPADM

## 2024-11-01 RX ORDER — ARIPIPRAZOLE 2 MG/1
2 TABLET ORAL DAILY
Status: DISCONTINUED | OUTPATIENT
Start: 2024-11-01 | End: 2024-11-02

## 2024-11-01 RX ORDER — LANOLIN ALCOHOL/MO/W.PET/CERES
100 CREAM (GRAM) TOPICAL DAILY
Status: DISCONTINUED | OUTPATIENT
Start: 2024-11-02 | End: 2024-11-04 | Stop reason: HOSPADM

## 2024-11-01 RX ORDER — LISINOPRIL 10 MG/1
10 TABLET ORAL ONCE
Status: COMPLETED | OUTPATIENT
Start: 2024-11-01 | End: 2024-11-01

## 2024-11-01 RX ORDER — LISINOPRIL 20 MG/1
20 TABLET ORAL NIGHTLY
Status: DISCONTINUED | OUTPATIENT
Start: 2024-11-02 | End: 2024-11-01 | Stop reason: HOSPADM

## 2024-11-01 RX ORDER — OXYCODONE AND ACETAMINOPHEN 5; 325 MG/1; MG/1
1 TABLET ORAL EVERY 6 HOURS PRN
Status: DISCONTINUED | OUTPATIENT
Start: 2024-11-01 | End: 2024-11-04 | Stop reason: HOSPADM

## 2024-11-01 RX ORDER — IBUPROFEN 400 MG/1
400 TABLET, FILM COATED ORAL EVERY 6 HOURS PRN
Status: CANCELLED | OUTPATIENT
Start: 2024-11-01

## 2024-11-01 RX ORDER — LANOLIN ALCOHOL/MO/W.PET/CERES
100 CREAM (GRAM) TOPICAL DAILY
Status: CANCELLED | OUTPATIENT
Start: 2024-11-02

## 2024-11-01 RX ORDER — TRAZODONE HYDROCHLORIDE 50 MG/1
50 TABLET, FILM COATED ORAL NIGHTLY PRN
Status: DISCONTINUED | OUTPATIENT
Start: 2024-11-01 | End: 2024-11-04 | Stop reason: HOSPADM

## 2024-11-01 RX ORDER — LISINOPRIL 20 MG/1
20 TABLET ORAL NIGHTLY
Status: DISCONTINUED | OUTPATIENT
Start: 2024-11-02 | End: 2024-11-04 | Stop reason: HOSPADM

## 2024-11-01 RX ORDER — LISINOPRIL 20 MG/1
20 TABLET ORAL NIGHTLY
Qty: 30 TABLET | Refills: 0
Start: 2024-11-02

## 2024-11-01 RX ORDER — MAGNESIUM HYDROXIDE/ALUMINUM HYDROXICE/SIMETHICONE 120; 1200; 1200 MG/30ML; MG/30ML; MG/30ML
30 SUSPENSION ORAL EVERY 6 HOURS PRN
Status: CANCELLED | OUTPATIENT
Start: 2024-11-01

## 2024-11-01 RX ORDER — MULTIVITAMIN WITH IRON
1 TABLET ORAL DAILY
Status: CANCELLED | OUTPATIENT
Start: 2024-11-02

## 2024-11-01 RX ORDER — POLYETHYLENE GLYCOL 3350 17 G
2 POWDER IN PACKET (EA) ORAL
Status: CANCELLED | OUTPATIENT
Start: 2024-11-01

## 2024-11-01 RX ORDER — HYDROXYZINE HYDROCHLORIDE 50 MG/1
50 TABLET, FILM COATED ORAL 3 TIMES DAILY PRN
Status: CANCELLED | OUTPATIENT
Start: 2024-11-01

## 2024-11-01 RX ORDER — OLANZAPINE 5 MG/1
5 TABLET ORAL EVERY 4 HOURS PRN
Status: CANCELLED | OUTPATIENT
Start: 2024-11-01

## 2024-11-01 RX ORDER — DIPHENHYDRAMINE HYDROCHLORIDE 50 MG/ML
50 INJECTION INTRAMUSCULAR; INTRAVENOUS EVERY 4 HOURS PRN
Status: CANCELLED | OUTPATIENT
Start: 2024-11-01

## 2024-11-01 RX ORDER — NIFEDIPINE 30 MG/1
30 TABLET, EXTENDED RELEASE ORAL DAILY
Status: DISCONTINUED | OUTPATIENT
Start: 2024-11-01 | End: 2024-11-01 | Stop reason: HOSPADM

## 2024-11-01 RX ORDER — NIFEDIPINE 30 MG
30 TABLET, EXTENDED RELEASE ORAL DAILY
COMMUNITY

## 2024-11-01 RX ORDER — LISINOPRIL 20 MG/1
20 TABLET ORAL NIGHTLY
Status: CANCELLED | OUTPATIENT
Start: 2024-11-02

## 2024-11-01 RX ORDER — MAGNESIUM HYDROXIDE/ALUMINUM HYDROXICE/SIMETHICONE 120; 1200; 1200 MG/30ML; MG/30ML; MG/30ML
30 SUSPENSION ORAL EVERY 6 HOURS PRN
Status: DISCONTINUED | OUTPATIENT
Start: 2024-11-01 | End: 2024-11-04 | Stop reason: HOSPADM

## 2024-11-01 RX ORDER — ACETAMINOPHEN 325 MG/1
650 TABLET ORAL EVERY 4 HOURS PRN
Status: DISCONTINUED | OUTPATIENT
Start: 2024-11-01 | End: 2024-11-04 | Stop reason: HOSPADM

## 2024-11-01 RX ORDER — POLYETHYLENE GLYCOL 3350 17 G
2 POWDER IN PACKET (EA) ORAL
Status: DISCONTINUED | OUTPATIENT
Start: 2024-11-01 | End: 2024-11-04 | Stop reason: HOSPADM

## 2024-11-01 RX ORDER — HYDROXYZINE HYDROCHLORIDE 50 MG/1
50 TABLET, FILM COATED ORAL 3 TIMES DAILY PRN
Status: DISCONTINUED | OUTPATIENT
Start: 2024-11-01 | End: 2024-11-04 | Stop reason: HOSPADM

## 2024-11-01 RX ORDER — ACETAMINOPHEN 325 MG/1
650 TABLET ORAL EVERY 4 HOURS PRN
Status: CANCELLED | OUTPATIENT
Start: 2024-11-01

## 2024-11-01 RX ORDER — ARIPIPRAZOLE 2 MG/1
2 TABLET ORAL DAILY
Status: DISCONTINUED | OUTPATIENT
Start: 2024-11-01 | End: 2024-11-01 | Stop reason: HOSPADM

## 2024-11-01 RX ORDER — IBUPROFEN 400 MG/1
400 TABLET, FILM COATED ORAL EVERY 6 HOURS PRN
Status: DISCONTINUED | OUTPATIENT
Start: 2024-11-01 | End: 2024-11-04 | Stop reason: HOSPADM

## 2024-11-01 RX ORDER — TRAZODONE HYDROCHLORIDE 50 MG/1
50 TABLET, FILM COATED ORAL NIGHTLY PRN
Status: CANCELLED | OUTPATIENT
Start: 2024-11-01

## 2024-11-01 RX ORDER — CARVEDILOL 6.25 MG/1
6.25 TABLET ORAL 2 TIMES DAILY WITH MEALS
Status: DISCONTINUED | OUTPATIENT
Start: 2024-11-01 | End: 2024-11-04 | Stop reason: HOSPADM

## 2024-11-01 RX ORDER — DIPHENHYDRAMINE HYDROCHLORIDE 50 MG/ML
50 INJECTION INTRAMUSCULAR; INTRAVENOUS EVERY 4 HOURS PRN
Status: DISCONTINUED | OUTPATIENT
Start: 2024-11-01 | End: 2024-11-04 | Stop reason: HOSPADM

## 2024-11-01 RX ADMIN — OXYCODONE AND ACETAMINOPHEN 1 TABLET: 5; 325 TABLET ORAL at 03:16

## 2024-11-01 RX ADMIN — ARIPIPRAZOLE 2 MG: 2 TABLET ORAL at 17:59

## 2024-11-01 RX ADMIN — CARVEDILOL 6.25 MG: 6.25 TABLET, FILM COATED ORAL at 08:59

## 2024-11-01 RX ADMIN — APIXABAN 5 MG: 5 TABLET, FILM COATED ORAL at 19:57

## 2024-11-01 RX ADMIN — LISINOPRIL 10 MG: 10 TABLET ORAL at 13:04

## 2024-11-01 RX ADMIN — NIFEDIPINE 30 MG: 30 TABLET, FILM COATED, EXTENDED RELEASE ORAL at 08:59

## 2024-11-01 RX ADMIN — THERA TABS 1 TABLET: TAB at 08:59

## 2024-11-01 RX ADMIN — OXYCODONE AND ACETAMINOPHEN 1 TABLET: 5; 325 TABLET ORAL at 18:05

## 2024-11-01 RX ADMIN — Medication 100 MG: at 08:59

## 2024-11-01 RX ADMIN — ONDANSETRON 4 MG: 4 TABLET, ORALLY DISINTEGRATING ORAL at 06:37

## 2024-11-01 RX ADMIN — CARVEDILOL 6.25 MG: 6.25 TABLET, FILM COATED ORAL at 17:59

## 2024-11-01 RX ADMIN — ONDANSETRON 4 MG: 4 TABLET, ORALLY DISINTEGRATING ORAL at 12:07

## 2024-11-01 RX ADMIN — IBUPROFEN 400 MG: 400 TABLET, FILM COATED ORAL at 06:33

## 2024-11-01 RX ADMIN — APIXABAN 5 MG: 5 TABLET, FILM COATED ORAL at 08:59

## 2024-11-01 RX ADMIN — OXYCODONE AND ACETAMINOPHEN 1 TABLET: 5; 325 TABLET ORAL at 08:59

## 2024-11-01 RX ADMIN — Medication 10 ML: at 09:03

## 2024-11-01 ASSESSMENT — PAIN SCALES - GENERAL
PAINLEVEL_OUTOF10: 7
PAINLEVEL_OUTOF10: 6
PAINLEVEL_OUTOF10: 7
PAINLEVEL_OUTOF10: 8
PAINLEVEL_OUTOF10: 8
PAINLEVEL_OUTOF10: 7

## 2024-11-01 ASSESSMENT — PATIENT HEALTH QUESTIONNAIRE - PHQ9
SUM OF ALL RESPONSES TO PHQ QUESTIONS 1-9: 1
SUM OF ALL RESPONSES TO PHQ9 QUESTIONS 1 & 2: 1
1. LITTLE INTEREST OR PLEASURE IN DOING THINGS: NOT AT ALL
2. FEELING DOWN, DEPRESSED OR HOPELESS: SEVERAL DAYS
SUM OF ALL RESPONSES TO PHQ QUESTIONS 1-9: 1

## 2024-11-01 ASSESSMENT — PAIN DESCRIPTION - LOCATION
LOCATION: HIP
LOCATION: HIP;KNEE
LOCATION: HEAD
LOCATION: LEG;HIP
LOCATION: HIP;KNEE
LOCATION: HIP;LEG

## 2024-11-01 ASSESSMENT — SLEEP AND FATIGUE QUESTIONNAIRES
SLEEP PATTERN: DIFFICULTY FALLING ASLEEP;DISTURBED/INTERRUPTED SLEEP
AVERAGE NUMBER OF SLEEP HOURS: 5
SLEEP PATTERN: DIFFICULTY FALLING ASLEEP
DO YOU HAVE DIFFICULTY SLEEPING: YES
AVERAGE NUMBER OF SLEEP HOURS: -1
DO YOU HAVE DIFFICULTY SLEEPING: YES
DO YOU USE A SLEEP AID: YES
DO YOU USE A SLEEP AID: YES

## 2024-11-01 ASSESSMENT — PAIN DESCRIPTION - DESCRIPTORS
DESCRIPTORS: SHARP
DESCRIPTORS: SHARP
DESCRIPTORS: STABBING
DESCRIPTORS: SHARP

## 2024-11-01 ASSESSMENT — PAIN DESCRIPTION - ORIENTATION: ORIENTATION: RIGHT

## 2024-11-01 ASSESSMENT — PAIN DESCRIPTION - PAIN TYPE: TYPE: ACUTE PAIN

## 2024-11-01 ASSESSMENT — PAIN DESCRIPTION - FREQUENCY: FREQUENCY: CONTINUOUS

## 2024-11-01 ASSESSMENT — LIFESTYLE VARIABLES
HOW MANY STANDARD DRINKS CONTAINING ALCOHOL DO YOU HAVE ON A TYPICAL DAY: 3 OR 4
HOW OFTEN DO YOU HAVE A DRINK CONTAINING ALCOHOL: 2-3 TIMES A WEEK

## 2024-11-01 ASSESSMENT — PAIN DESCRIPTION - ONSET: ONSET: ON-GOING

## 2024-11-01 NOTE — BH NOTE
4 Eyes Skin Assessment     NAME:  Hiro Esteban  YOB: 1955  MEDICAL RECORD NUMBER:  [id number]    The patient is being assessed for  Admission    I agree that at least one RN has performed a thorough Head to Toe Skin Assessment on the patient. ALL assessment sites listed below have been assessed.      Areas assessed by both nurses:  Other None    3 scratches on face         Does the Patient have a Wound? No noted wound(s)       Fidel Prevention initiated by RN: No  Wound Care Orders initiated by RN: No    Pressure Injury (Stage 3,4, Unstageable, DTI, NWPT, and Complex wounds) if present, place Wound referral order by RN under : No    New Ostomies, if present place, Ostomy referral order under : No     Nurse 1 eSignature: Electronically signed by CRISTIN SEGUNDO RN on 11/1/24 at 6:47 PM EDT    **SHARE this note so that the co-signing nurse can place an eSignature**    Nurse 2 eSignature: Electronically signed by Kaleb Perry RN on 11/1/24 at 6:50 PM EDT

## 2024-11-01 NOTE — PLAN OF CARE
Problem: Safety - Adult  Goal: Free from fall injury  Outcome: Progressing     Problem: Self Harm/Suicidality  Goal: Will have no self-injury during hospital stay  Description: INTERVENTIONS:  1.  Ensure constant observer at bedside with Q15M safety checks  2.  Maintain a safe environment  3.  Secure patient belongings  4.  Ensure family/visitors adhere to safety recommendations  5.  Ensure safety tray has been added to patient's diet order  6.  Every shift and PRN: Re-assess suicidal risk via Frequent Screener  Outcome: Progressing     Pt calm and cooperative upon admission. Denies all. Medication compliant. Pt uses wheelchair. Independent/ cont. A&Ox4.

## 2024-11-01 NOTE — PLAN OF CARE
Problem: Chronic Conditions and Co-morbidities  Goal: Patient's chronic conditions and co-morbidity symptoms are monitored and maintained or improved  Outcome: Progressing     Problem: Discharge Planning  Goal: Discharge to home or other facility with appropriate resources  Outcome: Progressing     Problem: Safety - Adult  Goal: Free from fall injury  Outcome: Progressing     Problem: Risk for Elopement  Goal: Patient will not exit the unit/facility without proper excort  Outcome: Progressing     Problem: Self Harm/Suicidality  Goal: Will have no self-injury during hospital stay  Description: INTERVENTIONS:  1.  Ensure constant observer at bedside with Q15M safety checks  2.  Maintain a safe environment  3.  Secure patient belongings  4.  Ensure family/visitors adhere to safety recommendations  5.  Ensure safety tray has been added to patient's diet order  6.  Every shift and PRN: Re-assess suicidal risk via Frequent Screener    Outcome: Progressing

## 2024-11-01 NOTE — FLOWSHEET NOTE
10/1955   Vital Signs   Temp 97.8 °F (36.6 °C)   Temp Source Oral   Pulse (!) 105   Heart Rate Source Monitor   Respirations 18   BP (!) 200/98   MAP (Calculated) 132   BP Location Left upper arm   BP Method Automatic   Patient Position Semi fowlers   Pain Assessment   Response to Pain Intervention Pain improved but above pain goal   Oxygen Therapy   SpO2 94 %   O2 Device None (Room air)   Height and Weight   Height 1.803 m (5' 10.98\")   Weight - Scale 87.1 kg (192 lb 2 oz)   Weight Method Standing scale   BSA (Calculated - sq m) 2.09 sq meters   BMI (Calculated) 26.9     Admission Assessment completed. Scheduled medications given per MAR, On Room Air, A&O X4, Vital Signs completed and Charted, Patient denies any further needs at this time. Call light within reach, Reminded patient to call RN if he needs anything. Sitter at bedside for patients safety.

## 2024-11-01 NOTE — BH NOTE
CSSR-S Assessment completed with patient who then scored HIGH RISK.     Provider  Karla Rahman was notified of HIGH RISK score, via Telephone at 1620.      Were suicide precautions ordered: NO    If not ordered, justification as follows: Provider notified of high risk. Pt denied wanting to hurt self or anyone else and stated he would come get a staff member if starting to feel suicidal. Orders for 1:1 constant observer were discontinued at this time.     Completed by: Gricel Pickett

## 2024-11-01 NOTE — DISCHARGE SUMMARY
Name:  Hiro Esteban  Room:  /0312-01  MRN:    [id number]    Discharge Summary      This discharge summary is in conjunction with a complete physical exam done on the day of discharge.      Discharging Physician: Dr. Jane      Admit: 10/31/2024  Discharge:  11/1/2024    Diagnoses this Admission    Principal Problem:    Hypertensive urgency  Active Problems:    Suicidal ideation  Resolved Problems:    * No resolved hospital problems. *          Procedures (Please Review Full Report for Details)      Consults    IP CONSULT TO HOSPITALIST  IP CONSULT TO SOCIAL WORK  IP CONSULT TO PSYCHIATRY  IP CONSULT TO HOSPITALIST      HPI:    The patient is a 69 y.o. male with PMHx hypertension, COPD, alcohol abuse, tobacco dependence who presents to [facility] with SI. History obtained from the patient and review of EMR. Patient found lying in bed, no acute distress. Reports that he is having pain in his right hip and right knee. Had recent right hip fx, he is no longer at SNF. Says he was taken off nifedipine, but believes he was put back on it. Denies any SOB, chest pain, abdominal pain. Reports does not drink every day, but when he does drink he drinks a few liquor drinks. Has never gone through withdrawal. Patient initially to be admitted to psychiatry service, however, given hypertensive urgency was admitted to medical unit.      Physical Exam at Discharge:   BP (!) 164/92   Pulse 77   Temp 98.1 °F (36.7 °C) (Oral)   Resp 16   Ht 1.803 m (5' 10.98\")   Wt 87.1 kg (192 lb 2 oz)   SpO2 95%   BMI 26.81 kg/m²   Gen: No distress. Alert. Chronically ill-appearing.   Eyes: PERRL. No sclera icterus. No conjunctival injection.   ENT: No discharge. Pharynx clear. Various areas of healing bruises/scrapes from prior fall.  Neck: No JVD.  No Carotid Bruit. Trachea midline.  Resp: No accessory muscle use. No crackles. No wheezes. No rhonchi.   CV: Regular rate. Regular rhythm. No murmur.  No rub. No edema.   GI:

## 2024-11-01 NOTE — H&P
Hospital Medicine History & Physical      PCP: Jonathon Wagoner MD    Date of Admission: 10/31/2024    Date of Service: Pt seen/examined on 11/1/2024     Chief Complaint:  No chief complaint on file.        History Of Present Illness:      The patient is a 69 y.o. male with PMHx hypertension, COPD, alcohol abuse, tobacco dependence who presents to [facility] with SI. History obtained from the patient and review of EMR. Patient found lying in bed, no acute distress. Reports that he is having pain in his right hip and right knee. Had recent right hip fx, he is no longer at SNF. Says he was taken off nifedipine, but believes he was put back on it. Denies any SOB, chest pain, abdominal pain. Reports does not drink every day, but when he does drink he drinks a few liquor drinks. Has never gone through withdrawal. Patient initially to be admitted to psychiatry service, however, given hypertensive urgency was admitted to medical unit.    Past Medical History:        Diagnosis Date    Alcohol abuse     Arthritis     Asthma     BPH (benign prostatic hyperplasia)     Cerebral artery occlusion with cerebral infarction (HCC)     Closed disp articular fx of head of right femur with routine healing     COPD (chronic obstructive pulmonary disease) (HCC)     Depression     Diabetic peripheral neuropathy (HCC)     GERD (gastroesophageal reflux disease)     Hepatitis C     pt got treated    Hypertension     Hypokalemia     Hypomagnesemia     Osteoarthritis     Polyneuropathy        Past Surgical History:        Procedure Laterality Date    CHOLECYSTECTOMY      COLONOSCOPY      CT GUIDED CHEST TUBE  03/01/2022    CT GUIDED CHEST TUBE 3/1/2022 [facility] CT SCAN    FEMUR FRACTURE SURGERY Right 12/2/2023    FEMORAL NAIL RIGHT RETROGRADE performed by Chai Butler DO at [facility] OR    FRACTURE SURGERY      IR MIDLINE CATH  12/13/2023    IR MIDLINE CATH 12/13/2023 [facility] SPECIAL PROCEDURES    JOINT REPLACEMENT      OTHER SURGICAL

## 2024-11-01 NOTE — CONSULTS
Psychiatric Consult     Admit Date:  10/31/2024    Consult Date:  11/1/2024     Reason for Consult: MDD/SI    Summary Present Illness: Per tele-psych notes:  \" Patient is a 69 y.o. White (non-) male who presents for suicidal ideation with a plan to shoot himself. Patient presented to the ED on 10/30/24 from home via EMS. Records show pt went to his brother's house in an attempt to get his gun to shoot himself but his brother wouldn't allow him access. Records show hx of Depression and Alcohol Abuse.     Upon assessment pt is calm and agreeable to meet  When asked about reason for presentation pt states \"I was severely depressed and didn't know what else to do and wanted to die\". Pt states he went to his brother's house to get his gun \"to shoot myself\". Pt denies any previous attempts. He states \"my depression has been so bad lately and I couldn't control myself\". He is denying SI currently but states \"my depression is sad bad I don't know what I could do\". He does report a slight improvement in depression since the incident yesterday but still stating \"I feel really down and depressed and I need my meds changed\". Pt [facility] and believes he is prescribed Trazodone but is unsure about psychiatric medications. He reports chronic, daily alcohol use and states he drinks \"Sometimes once a day, sometimes once an hour. It all depends on how bad I'm hurting\". He reports an increase in alcohol use since his depression has increased in the last few months. He reports living at an Independent Living facility for the last two months and feels that environment has contributed to his depressive symptoms. Pt continues to state he feels he needs to be admitted and have his medications stabilized. He did not give permission for writer to contact collateral. \"    Pt originally admitted to [facility], blood pressure elevated with headaches and emesis.  Pt transferred

## 2024-11-01 NOTE — PLAN OF CARE
Patient admitted to room 312 from [facility]. Patient oriented to room, call light, bed rails, phone, lights and bathroom. Patient instructed about the schedule of the day including: vital sign frequency, lab draws, possible tests, frequency of MD and staff rounds, daily weights, I &O's and prescribed diet.  Telemetry box in place, patient aware of placement and reason. Bed locked, in lowest position, side rails up 2/4, call light within reach.        Recliner Assessment  Patient is able to demonstrate the ability to move from a reclining position to an upright position within the recliner.       4 Eyes Skin Assessment     NAME:  Hiro Esteban  YOB: 1955  MEDICAL RECORD NUMBER:  [id number]    The patient is being assessed for  Transfer to New Unit    I agree that at least one RN has performed a thorough Head to Toe Skin Assessment on the patient. ALL assessment sites listed below have been assessed.      Areas assessed by both nurses:    Head, Face, Ears, Shoulders, Back, Chest, Arms, Elbows, Hands, Sacrum. Buttock, Coccyx, Ischium, Legs. Feet and Heels, and Under Medical Devices         Does the Patient have a Wound? No noted wound(s)       Fidel Prevention initiated by RN: No  Wound Care Orders initiated by RN: No    Pressure Injury (Stage 3,4, Unstageable, DTI, NWPT, and Complex wounds) if present, place Wound referral order by RN under : No    New Ostomies, if present place, Ostomy referral order under : No     Nurse 1 eSignature: Electronically signed by Bailey Batron RN on 10/31/24 at 10:54 PM EDT    **SHARE this note so that the co-signing nurse can place an eSignature**    Nurse 2 eSignature: Electronically signed by Tarah Patricio RN on 11/1/24 at 2:44 AM EDT

## 2024-11-02 PROBLEM — Z87.820 HISTORY OF TRAUMATIC BRAIN INJURY: Status: ACTIVE | Noted: 2024-11-02

## 2024-11-02 PROBLEM — Z86.711 HISTORY OF PULMONARY EMBOLISM: Status: ACTIVE | Noted: 2024-11-02

## 2024-11-02 PROBLEM — Z78.9 ALCOHOL USE: Status: ACTIVE | Noted: 2024-11-02

## 2024-11-02 PROCEDURE — 97530 THERAPEUTIC ACTIVITIES: CPT

## 2024-11-02 PROCEDURE — 6370000000 HC RX 637 (ALT 250 FOR IP): Performed by: PSYCHIATRY & NEUROLOGY

## 2024-11-02 PROCEDURE — 1240000000 HC EMOTIONAL WELLNESS R&B

## 2024-11-02 PROCEDURE — 97535 SELF CARE MNGMENT TRAINING: CPT

## 2024-11-02 PROCEDURE — 97166 OT EVAL MOD COMPLEX 45 MIN: CPT

## 2024-11-02 PROCEDURE — 99223 1ST HOSP IP/OBS HIGH 75: CPT | Performed by: PSYCHIATRY & NEUROLOGY

## 2024-11-02 PROCEDURE — 6370000000 HC RX 637 (ALT 250 FOR IP)

## 2024-11-02 RX ORDER — ARIPIPRAZOLE 5 MG/1
5 TABLET ORAL
Status: DISCONTINUED | OUTPATIENT
Start: 2024-11-02 | End: 2024-11-04 | Stop reason: HOSPADM

## 2024-11-02 RX ADMIN — APIXABAN 5 MG: 5 TABLET, FILM COATED ORAL at 20:14

## 2024-11-02 RX ADMIN — ARIPIPRAZOLE 5 MG: 5 TABLET ORAL at 20:15

## 2024-11-02 RX ADMIN — NIFEDIPINE 30 MG: 30 TABLET, FILM COATED, EXTENDED RELEASE ORAL at 10:31

## 2024-11-02 RX ADMIN — ARIPIPRAZOLE 2 MG: 2 TABLET ORAL at 10:32

## 2024-11-02 RX ADMIN — OXYCODONE AND ACETAMINOPHEN 1 TABLET: 5; 325 TABLET ORAL at 10:42

## 2024-11-02 RX ADMIN — MULTIVITAMIN TABLET 1 TABLET: TABLET at 10:32

## 2024-11-02 RX ADMIN — CARVEDILOL 6.25 MG: 6.25 TABLET, FILM COATED ORAL at 11:21

## 2024-11-02 RX ADMIN — OXYCODONE AND ACETAMINOPHEN 1 TABLET: 5; 325 TABLET ORAL at 22:49

## 2024-11-02 RX ADMIN — Medication 100 MG: at 10:32

## 2024-11-02 RX ADMIN — LISINOPRIL 20 MG: 20 TABLET ORAL at 20:15

## 2024-11-02 RX ADMIN — CARVEDILOL 6.25 MG: 6.25 TABLET, FILM COATED ORAL at 16:48

## 2024-11-02 RX ADMIN — TRAZODONE HYDROCHLORIDE 50 MG: 50 TABLET ORAL at 22:49

## 2024-11-02 RX ADMIN — OXYCODONE AND ACETAMINOPHEN 1 TABLET: 5; 325 TABLET ORAL at 16:52

## 2024-11-02 RX ADMIN — APIXABAN 5 MG: 5 TABLET, FILM COATED ORAL at 10:32

## 2024-11-02 ASSESSMENT — PAIN SCALES - WONG BAKER: WONGBAKER_NUMERICALRESPONSE: NO HURT

## 2024-11-02 ASSESSMENT — PATIENT HEALTH QUESTIONNAIRE - PHQ9
SUM OF ALL RESPONSES TO PHQ QUESTIONS 1-9: 12
8. MOVING OR SPEAKING SO SLOWLY THAT OTHER PEOPLE COULD HAVE NOTICED. OR THE OPPOSITE, BEING SO FIGETY OR RESTLESS THAT YOU HAVE BEEN MOVING AROUND A LOT MORE THAN USUAL: NEARLY EVERY DAY
7. TROUBLE CONCENTRATING ON THINGS, SUCH AS READING THE NEWSPAPER OR WATCHING TELEVISION: NOT AT ALL
1. LITTLE INTEREST OR PLEASURE IN DOING THINGS: MORE THAN HALF THE DAYS
SUM OF ALL RESPONSES TO PHQ9 QUESTIONS 1 & 2: 3
SUM OF ALL RESPONSES TO PHQ QUESTIONS 1-9: 11
4. FEELING TIRED OR HAVING LITTLE ENERGY: MORE THAN HALF THE DAYS
9. THOUGHTS THAT YOU WOULD BE BETTER OFF DEAD, OR OF HURTING YOURSELF: SEVERAL DAYS
3. TROUBLE FALLING OR STAYING ASLEEP: NEARLY EVERY DAY
2. FEELING DOWN, DEPRESSED OR HOPELESS: SEVERAL DAYS
6. FEELING BAD ABOUT YOURSELF - OR THAT YOU ARE A FAILURE OR HAVE LET YOURSELF OR YOUR FAMILY DOWN: NOT AT ALL
SUM OF ALL RESPONSES TO PHQ QUESTIONS 1-9: 12
SUM OF ALL RESPONSES TO PHQ QUESTIONS 1-9: 12
5. POOR APPETITE OR OVEREATING: NOT AT ALL
10. IF YOU CHECKED OFF ANY PROBLEMS, HOW DIFFICULT HAVE THESE PROBLEMS MADE IT FOR YOU TO DO YOUR WORK, TAKE CARE OF THINGS AT HOME, OR GET ALONG WITH OTHER PEOPLE: VERY DIFFICULT

## 2024-11-02 ASSESSMENT — PAIN DESCRIPTION - ONSET: ONSET: ON-GOING

## 2024-11-02 ASSESSMENT — SLEEP AND FATIGUE QUESTIONNAIRES
DO YOU HAVE DIFFICULTY SLEEPING: YES
AVERAGE NUMBER OF SLEEP HOURS: 6
SLEEP PATTERN: DIFFICULTY FALLING ASLEEP;DISTURBED/INTERRUPTED SLEEP
DO YOU USE A SLEEP AID: YES

## 2024-11-02 ASSESSMENT — PAIN DESCRIPTION - FREQUENCY: FREQUENCY: INTERMITTENT

## 2024-11-02 ASSESSMENT — PAIN DESCRIPTION - ORIENTATION
ORIENTATION: RIGHT
ORIENTATION: UPPER
ORIENTATION: RIGHT
ORIENTATION: RIGHT

## 2024-11-02 ASSESSMENT — PAIN DESCRIPTION - LOCATION
LOCATION: HIP;KNEE
LOCATION: BACK
LOCATION: KNEE;HIP
LOCATION: HIP;KNEE

## 2024-11-02 ASSESSMENT — PAIN DESCRIPTION - DESCRIPTORS
DESCRIPTORS: SHARP;THROBBING
DESCRIPTORS: ACHING
DESCRIPTORS: SHARP
DESCRIPTORS: DISCOMFORT

## 2024-11-02 ASSESSMENT — PAIN SCALES - GENERAL
PAINLEVEL_OUTOF10: 8
PAINLEVEL_OUTOF10: 0
PAINLEVEL_OUTOF10: 2
PAINLEVEL_OUTOF10: 3
PAINLEVEL_OUTOF10: 7
PAINLEVEL_OUTOF10: 7

## 2024-11-02 ASSESSMENT — PAIN - FUNCTIONAL ASSESSMENT
PAIN_FUNCTIONAL_ASSESSMENT: PREVENTS OR INTERFERES SOME ACTIVE ACTIVITIES AND ADLS
PAIN_FUNCTIONAL_ASSESSMENT: ACTIVITIES ARE NOT PREVENTED
PAIN_FUNCTIONAL_ASSESSMENT: PREVENTS OR INTERFERES SOME ACTIVE ACTIVITIES AND ADLS

## 2024-11-02 ASSESSMENT — PAIN DESCRIPTION - PAIN TYPE: TYPE: CHRONIC PAIN

## 2024-11-02 NOTE — PLAN OF CARE
Problem: Pain  Goal: Verbalizes/displays adequate comfort level or baseline comfort level  Outcome: Progressing     Problem: Safety - Adult  Goal: Free from fall injury  Outcome: Progressing     Problem: Self Harm/Suicidality  Goal: Will have no self-injury during hospital stay  Description: INTERVENTIONS:  1.  Ensure constant observer at bedside with Q15M safety checks  2.  Maintain a safe environment  3.  Secure patient belongings  4.  Ensure family/visitors adhere to safety recommendations  5.  Ensure safety tray has been added to patient's diet order  6.  Every shift and PRN: Re-assess suicidal risk via Frequent Screener    Outcome: Progressing     Problem: ABCDS Injury Assessment  Goal: Absence of physical injury  Outcome: Progressing

## 2024-11-02 NOTE — PROGRESS NOTES
Behavioral Services  Medicare Certification Upon Admission    I certify that this patient's inpatient psychiatric hospital admission is medically necessary for:    [x] (1) Treatment which could reasonably be expected to improve this patient's condition,       [x] (2) Or for diagnostic study;     AND     [x](2) The inpatient psychiatric services are provided while the individual is under the care of a physician and are included in the individualized plan of care.    Estimated length of stay/service 4-6 days    Plan for post-hospital care incomplete     Electronically signed by JOSHUA MESSINA MD on 11/2/2024 at 3:10 PM

## 2024-11-02 NOTE — PROGRESS NOTES
Pt laying in bed and this nurse enters room to check on pt. He reports not sleeping well last night. Also reports pain to right hip and knee, rates pain a #7 on a 0-10 scale. Pt offered his PRN pain medication and accepts. He takes percocet 5/325mg whole and without issue. Will reassess for effectiveness.

## 2024-11-02 NOTE — PROGRESS NOTES
Inpatient Occupational Therapy Evaluation & Treatment    Unit: [facility]  Date:  11/2/2024  Patient Name:    Hiro Esteban  Admitting diagnosis:  Depression, unspecified [F32.A]  Admit Date:  11/1/2024  Precautions/Restrictions/WB Status/ Lines/ Wounds/ Oxygen: Fall risk and Standard BHI Precautions    Treatment Time:  07:42-08:15  Treatment Number:  1  Timed Code Treatment Minutes: 23 minutes  Total Treatment Minutes: 33 minutes    Patient Goals for Therapy: \"to go home\"          Discharge Recommendations:  Return to Independent Living Facility with HHOT  DME needs for discharge: Needs Met       Therapy recommendations for staff:   Stand by assist for ambulation with use of rolling walker (RW) and gait belt to/from chair  to/from bathroom  within room  within community room    History of Present Illness: Per admission H&P from Carla Dumont DO on 10/30  \"Hiro Esteban is a 69 y.o. male with a significant PMHx of recent right hip surgery, COPD, diabetes, and other comorbidities listed below, including alcohol abuse, presenting with department today with concerns of suicidal ideation.  Patient says that \"if I had my gun right now, you would not even be talking to me.\"  Says that he called his brother, who talked him out of it, and told him to call 911.  Patient called 911 himself.  Patient does say that he drinks alcohol \"any chance I get,\" and says his last drink was around 10 AM.  Says he does not usually go through alcohol withdrawal.  Is asking for something for pain, Percocet, for his hip, his activity takes at home for his recent right hip surgery as below.  Denies any falls.  No chest pain or shortness of breath.  No abdominal pain or back pain.  Says that \"I do not know why anyone cares about me, I am not a good denys.\"  Did not attempt self-harm, did not ingest any substances.  Denies any other concerns today in the emergency department.  Denies any illnesses.  Says he face planted, as below, but says

## 2024-11-02 NOTE — CARE COORDINATION
legal issues n/a   Comment n/a   Juvenile legal history No   Abuse Assessment   Physical abuse Denies  (patient denies any hx of abuse, denies any lasting effects from )   Verbal abuse Denies  (patient denies any hx of abuse, denies any lasting effects from )   Emotional abuse Denies  (patient denies any hx of abuse, denies any lasting effects from )   Financial abuse Denies  (patient denies any hx of abuse, denies any lasting effects from )   Sexual abuse Denies  (patient denies any hx of abuse, denies any lasting effects from )   Possible abuse reported to None needed   Substance Use   Use of substances  No   Motivation for SA Treatment   Stage of engagement Pre-engagement/engagement   Motivation for treatment Yes   Current barriers to treatment Other  (patient reports physical barriers and being in the hospital as a barrier)   Comment patient reports physical barriers and being in the hospital as a barrier   Education   Education HS graduate -GED   Special education Other  (n/a)   Work History   Currently employed No   Recent job loss or change Other (Comment)  (Disabled since 2003)    service Branch of service(Comment)  ([employer])   /[employer] involvement [place]   Cultural and Spiritual   Spiritual concerns No   Cultural concerns No   Comment n/a   Collateral Contacts   Contacts Other (Comment)  (n/a)       Jyothi Euceda, [facility]

## 2024-11-02 NOTE — BH NOTE
Pt remains withdrawn to his room all shift. He eats breakfast but refuses his lunch and dinner despite being encouraged to eat. Pt denies all this shift and not noted to be RTIS. He takes his meds whole and without issue. Continues to require PRN Percocet for c/o right hip and knee pain. Percocet given is effective at lowering pain to a 2-3 but does not eliminate pain totally. Pt is calm, cooperative and friendly upon approach. No issues.

## 2024-11-02 NOTE — PLAN OF CARE
Pt A/Ox4, denies SI, HI, AVH, +meds whole. Pt in bed for most of shift. Pt pleasant and cooperative when spoken to. No adverse events noted at this time. Pt currently laying in bed with eyes closed. Will continue to monitor.   Problem: Pain  Goal: Verbalizes/displays adequate comfort level or baseline comfort level  Outcome: Progressing     Problem: Safety - Adult  Goal: Free from fall injury  11/1/2024 2049 by Tyrell Urrutia RN  Outcome: Progressing  11/1/2024 1712 by Gricel Pickett RN  Outcome: Progressing     Problem: Self Harm/Suicidality  Goal: Will have no self-injury during hospital stay  Description: INTERVENTIONS:  1.  Ensure constant observer at bedside with Q15M safety checks  2.  Maintain a safe environment  3.  Secure patient belongings  4.  Ensure family/visitors adhere to safety recommendations  5.  Ensure safety tray has been added to patient's diet order  6.  Every shift and PRN: Re-assess suicidal risk via Frequent Screener    11/1/2024 2049 by Tyrell Urrutia RN  Outcome: Progressing  11/1/2024 1712 by Gricel Pickett RN  Outcome: Progressing     Problem: ABCDS Injury Assessment  Goal: Absence of physical injury  Outcome: Progressing

## 2024-11-02 NOTE — H&P
[address] [address]                           HISTORY & PHYSICAL      PATIENT NAME: JOEY VELASQUEZ            : 1955  MED REC NO: [id number]                      ROOM: 2204  ACCOUNT NO: [id number]                       ADMIT DATE: 2024  PROVIDER: Jonathon Wagoner MD      PSYCHIATRY HISTORY AND PHYSICAL:      IDENTIFICATION:  This is a domiciled, , and retired 69-year-old with a self-reported history of bipolar disorder and alcohol use, who originally presented to our Emergency Department on the  with suicidal ideation.  He was admitted to Senior Psychiatry, but quickly transferred to Medicine with hypertensive urgency.  He was then seen by Psychiatry Consult Service and transferred back for further evaluation and treatment.    SOURCES OF INFORMATION:  The patient, focused record review.    CHIEF COMPLAINT:  \"I went to get my gun.\"    HISTORY OF PRESENT ILLNESS:  The patient reports he lives at [facility] .  He broke his hip on October 3 and has been recovering.    He says that in the setting of his chihuahua dying and chronic pain, he has struggled intermittently with depressed mood.  Says that on the , he woke up feeling acutely depressed and became suicidal.  He was not thinking of suicide before that day.  He went to his brother's house to obtain his gun, but his brother would not give it up.  Instead, the patient called 911 and was brought to our Emergency Department.  After admission to Psychiatry, he was transferred to Medicine because of hypertensive urgency, stabilized, and then transferred back here.    The patient presents withdrawn and flat.  He initially the only day he felt depressed was the day he wanted to get his gun, but later acknowledged feeling depressed off and on over the last month.  He also insists he has not thought of suicide since that day.    He

## 2024-11-02 NOTE — PROGRESS NOTES
Bedside Mobility Assessment Tool (BMAT):     Assessment Level 1- Sit and Shake    1. From a semi-reclined position, ask patient to sit up and rotate to a seated position at the side of the bed. Can use the bedrail.    2. Ask patient to reach out and grab your hand and shake making sure patient reaches across his/her midline.   Pass- Patient is able to come to a seated position, maintain core strength. Maintains seated balance while reaching across midline. Move on to Assessment Level 2.     Assessment Level 2- Stretch and Point   1. With patient in seated position at the side of the bed, have patient place both feet on the floor (or stool) with knees no higher than hips.    2. Ask patient to stretch one leg and straighten the knee, then bend the ankle/flex and point the toes. If appropriate, repeat with the other leg.   Fail- Patient is unable to complete task. Patient is MOBILITY LEVEL 2.     Assessment Level 3- Stand   1. Ask patient to elevate off the bed or chair (seated to standing) using an assistive device (cane, bedrail).    2. Patient should be able to raise buttocks off be and hold for a count of five. May repeat once.   Fail- Patient unable to demonstrate standing stability. Patient is MOBILITY LEVEL 3.     Assessment Level 4- Walk   1. Ask patient to march in place at bedside.    2. Then ask patient to advance step and return each foot. Some medical conditions may render a patient from stepping backwards, use your best clinical judgement.   Fail- Patient not able to complete tasks OR requires use of assistive device. Patient is MOBILITY LEVEL 3.       Mobility Level- 1

## 2024-11-02 NOTE — PROGRESS NOTES
Bedside Mobility Assessment Tool (BMAT):     Assessment Level 1- Sit and Shake    1. From a semi-reclined position, ask patient to sit up and rotate to a seated position at the side of the bed. Can use the bedrail.    2. Ask patient to reach out and grab your hand and shake making sure patient reaches across his/her midline.   Pass- Patient is able to come to a seated position, maintain core strength. Maintains seated balance while reaching across midline. Move on to Assessment Level 2.     Assessment Level 2- Stretch and Point   1. With patient in seated position at the side of the bed, have patient place both feet on the floor (or stool) with knees no higher than hips.    2. Ask patient to stretch one leg and straighten the knee, then bend the ankle/flex and point the toes. If appropriate, repeat with the other leg.   Pass- Patient is able to demonstrate appropriate quad strength on intended weight bearing limb(s). Move onto Assessment Level 3.     Assessment Level 3- Stand   1. Ask patient to elevate off the bed or chair (seated to standing) using an assistive device (cane, bedrail).    2. Patient should be able to raise buttocks off be and hold for a count of five. May repeat once.   Pass- Patient maintains standing stability for at least 5 seconds, proceed to assessment level 4.    Assessment Level 4- Walk   1. Ask patient to march in place at bedside.    2. Then ask patient to advance step and return each foot. Some medical conditions may render a patient from stepping backwards, use your best clinical judgement.   Fail- Patient not able to complete tasks OR requires use of assistive device. Patient is MOBILITY LEVEL 3.       Mobility Level- 3

## 2024-11-02 NOTE — BH NOTE
Pt out of room at this time to eat his dinner. Pt not social with peers but it is present in common area.

## 2024-11-02 NOTE — BH NOTE
During the assessment with the patient, patient reported that he has four pistols and that was his method if he were to follow through with suicidal ideations. Patient then reported that he does not have the guns in his home but that they are locked in a safe at his brother's home. Patient gave permission to contact brother (Claude, [phone]) to confirm status of guns. LPC reached out to Claude to confirm. Claude confirmed the guns have been at his home for the last 1.5 years with no plans to return to patient at thistime. Claude reports being his brother's Medical POA and requested reason why patient was admitted. LPC explained POA vs. Guardianship. Patient is currently A&Ox4, so LPC obtained verbal consent to share information. Patient reported he would like us to let his brother know he \"came in to feel better and is currently feeling better\". [facility] shared this information with Claude.

## 2024-11-02 NOTE — PROGRESS NOTES
[facility]  Treatment Team Note  Review Date & Time: 11/2/2024  3987    Patient was not in treatment team      Status EXAM:   Mental Status and Behavioral Exam  Normal: No  Level of Assistance: Independent/Self  Facial Expression: Flat  Affect: Congruent  Level of Consciousness: Alert  Frequency of Checks: 4 times per hour, close  Mood:Normal: No  Mood: Depressed  Motor Activity:Normal: Yes  Eye Contact: Good  Observed Behavior: Cooperative, Friendly, Withdrawn  Sexual Misconduct History: Current - no  Preception: Lynn to person, Lynn to time, Lynn to place, Lynn to situation  Attention:Normal: Yes  Thought Processes: Circumstantial  Thought Content:Normal: Yes  Depression Symptoms: Feelings of hopelessess, Isolative, Feelings of worthlessness  Anxiety Symptoms: Generalized  Tori Symptoms: No problems reported or observed.  Hallucinations: None  Delusions: No  Memory:Normal: Yes  Insight and Judgment: No  Insight and Judgment: Poor judgment, Poor insight      Suicide Risk CSSR-S:  1) Within the past month, have you wished you were dead or wished you could go to sleep and not wake up? : Yes  2) Have you actually had any thoughts of killing yourself? : Yes  3) Have you been thinking about how you might kill yourself? : Yes  5) Have you started to work out or worked out the details of how to kill yourself? Do you intend to carry out this plan? : No  6) Have you ever done anything, started to do anything, or prepared to do anything to end your life?: No      PLAN/TREATMENT RECOMMENDATIONS UPDATE:   Patient will take medication as prescribed, eat 75% of meals, attend groups, participate in milieu activities, participate in treatment team and care planning for discharge and follow up.           CORIN RICHMOND RN

## 2024-11-02 NOTE — H&P
Patient from medical.    69 y.o. male with PMHx hypertension, COPD, alcohol abuse, tobacco dependence who presents to [facility] with SI. Patient initially to be admitted to psychiatry service, however, given hypertensive urgency was admitted to medical unit.     Admitted to [facility] 10/31-11/1/24 for hypertensive urgency.     Assessment/Plan  Hypertensive urgency  -Continue coreg, lisinopril, nifedipine.   -monitor     SI  Depression  -management per psychiatry     Hx PE  -continue Eliquis.     Alcohol abuse  -no withdrawal sx.  -reports drinking a few liquor drinks a day, does not drink everyday; last drink 10/30.  -EtOH level: 296 OA.     Hypokalemia- resolved  -K 3.0 OA, currently 3.6.     Recent fx of posterior wall of right acetabulum  -managed conservatively.   -follow up outpatient.   -PRN pain control.      Tobacco use  -recommended smoking cessation.  -nicotine patch ordered.      Notify medical provider for any acute needs or concerns.    Ira GALINDOP-C  11/2/2024

## 2024-11-03 PROCEDURE — 1240000000 HC EMOTIONAL WELLNESS R&B

## 2024-11-03 PROCEDURE — 97530 THERAPEUTIC ACTIVITIES: CPT

## 2024-11-03 PROCEDURE — 6370000000 HC RX 637 (ALT 250 FOR IP)

## 2024-11-03 PROCEDURE — 97110 THERAPEUTIC EXERCISES: CPT

## 2024-11-03 PROCEDURE — 97161 PT EVAL LOW COMPLEX 20 MIN: CPT

## 2024-11-03 PROCEDURE — 6370000000 HC RX 637 (ALT 250 FOR IP): Performed by: PSYCHIATRY & NEUROLOGY

## 2024-11-03 RX ADMIN — APIXABAN 5 MG: 5 TABLET, FILM COATED ORAL at 20:34

## 2024-11-03 RX ADMIN — HYDROXYZINE HYDROCHLORIDE 50 MG: 50 TABLET, FILM COATED ORAL at 20:34

## 2024-11-03 RX ADMIN — APIXABAN 5 MG: 5 TABLET, FILM COATED ORAL at 10:19

## 2024-11-03 RX ADMIN — LISINOPRIL 20 MG: 20 TABLET ORAL at 20:34

## 2024-11-03 RX ADMIN — MULTIVITAMIN TABLET 1 TABLET: TABLET at 10:19

## 2024-11-03 RX ADMIN — Medication 100 MG: at 10:19

## 2024-11-03 RX ADMIN — NIFEDIPINE 30 MG: 30 TABLET, FILM COATED, EXTENDED RELEASE ORAL at 10:19

## 2024-11-03 RX ADMIN — CARVEDILOL 6.25 MG: 6.25 TABLET, FILM COATED ORAL at 10:19

## 2024-11-03 RX ADMIN — ARIPIPRAZOLE 5 MG: 5 TABLET ORAL at 20:34

## 2024-11-03 RX ADMIN — CARVEDILOL 6.25 MG: 6.25 TABLET, FILM COATED ORAL at 16:27

## 2024-11-03 RX ADMIN — OXYCODONE AND ACETAMINOPHEN 1 TABLET: 5; 325 TABLET ORAL at 18:58

## 2024-11-03 RX ADMIN — OXYCODONE AND ACETAMINOPHEN 1 TABLET: 5; 325 TABLET ORAL at 12:30

## 2024-11-03 RX ADMIN — OXYCODONE AND ACETAMINOPHEN 1 TABLET: 5; 325 TABLET ORAL at 07:06

## 2024-11-03 RX ADMIN — TRAZODONE HYDROCHLORIDE 50 MG: 50 TABLET ORAL at 20:34

## 2024-11-03 ASSESSMENT — PAIN DESCRIPTION - DESCRIPTORS
DESCRIPTORS: SHARP
DESCRIPTORS: BURNING
DESCRIPTORS: ACHING

## 2024-11-03 ASSESSMENT — PAIN SCALES - GENERAL
PAINLEVEL_OUTOF10: 8
PAINLEVEL_OUTOF10: 0
PAINLEVEL_OUTOF10: 0
PAINLEVEL_OUTOF10: 8
PAINLEVEL_OUTOF10: 5
PAINLEVEL_OUTOF10: 8

## 2024-11-03 ASSESSMENT — PAIN - FUNCTIONAL ASSESSMENT
PAIN_FUNCTIONAL_ASSESSMENT: ACTIVITIES ARE NOT PREVENTED
PAIN_FUNCTIONAL_ASSESSMENT: PREVENTS OR INTERFERES SOME ACTIVE ACTIVITIES AND ADLS
PAIN_FUNCTIONAL_ASSESSMENT: ACTIVITIES ARE NOT PREVENTED

## 2024-11-03 ASSESSMENT — PAIN DESCRIPTION - LOCATION
LOCATION: LEG
LOCATION: KNEE
LOCATION: LEG

## 2024-11-03 ASSESSMENT — PAIN DESCRIPTION - ORIENTATION
ORIENTATION: RIGHT

## 2024-11-03 ASSESSMENT — PAIN DESCRIPTION - PAIN TYPE: TYPE: CHRONIC PAIN

## 2024-11-03 ASSESSMENT — PAIN DESCRIPTION - FREQUENCY: FREQUENCY: INTERMITTENT

## 2024-11-03 ASSESSMENT — PAIN DESCRIPTION - ONSET: ONSET: ON-GOING

## 2024-11-03 NOTE — PROGRESS NOTES
Inpatient Occupational Therapy Treatment    Unit: [facility]  Date:  11/3/2024  Patient Name:    Hiro Esteban  Admitting diagnosis:  Depression, unspecified [F32.A]  Admit Date:  11/1/2024  Precautions/Restrictions/WB Status/ Lines/ Wounds/ Oxygen: Fall risk, Bed/chair alarm, WB Restrictions (WBAT R LE), and Standard BHI Precautions    Treatment Time:  0810-0850  Treatment Number:  1  Timed Code Treatment Minutes: 40 minutes  Total Treatment Minutes:  40  minutes    Patient Goals for Therapy: none stated, though discussed good effort and notable improvement with functional mobility during session        Discharge Recommendations:  Return to Independent Living Facility with HHOT  DME needs for discharge: Needs Met       Therapy recommendations for staff:   Stand by assist for ambulation with use of rolling walker (RW) and gait belt to/from chair  to/from bathroom  within community room    History of Present Illness:   Per admission H&P from Carla Dumont DO on 10/30  \"Hiro Esteban is a 69 y.o. male with a significant PMHx of recent right hip surgery, COPD, diabetes, and other comorbidities listed below, including alcohol abuse, presenting with department today with concerns of suicidal ideation.  Patient says that \"if I had my gun right now, you would not even be talking to me.\"  Says that he called his brother, who talked him out of it, and told him to call 911.  Patient called 911 himself.  Patient does say that he drinks alcohol \"any chance I get,\" and says his last drink was around 10 AM.  Says he does not usually go through alcohol withdrawal.  Is asking for something for pain, Percocet, for his hip, his activity takes at home for his recent right hip surgery as below.  Denies any falls.  No chest pain or shortness of breath.  No abdominal pain or back pain.  Says that \"I do not know why anyone cares about me, I am not a good denys.\"  Did not attempt self-harm, did not ingest any substances.  Denies any

## 2024-11-03 NOTE — PLAN OF CARE
Patient is alert and oriented x4,  Fall precautions intact. Uses front wheeled walker and wheelchair, self transfers. Takes medications whole with water and without issue. Patient is independent and continent. Calm, friendly and cooperative with staff and care. Is visible on unit, watching [place] game on TV with peers.  Interacting with peers appropriately. Denies AH/VH/SI/HI and no RTIS noted. Eating meals in dayroom. No signs or symptoms of distress. No signs of aggression. Patient c/o right leg pain, requested Percocet.       Problem: Safety - Adult  Goal: Free from fall injury  Outcome: Progressing     Problem: Self Harm/Suicidality  Goal: Will have no self-injury during hospital stay  Description: INTERVENTIONS:  1.  Ensure constant observer at bedside with Q15M safety checks  2.  Maintain a safe environment  3.  Secure patient belongings  4.  Ensure family/visitors adhere to safety recommendations  5.  Ensure safety tray has been added to patient's diet order  6.  Every shift and PRN: Re-assess suicidal risk via Frequent Screener    Outcome: Progressing

## 2024-11-03 NOTE — PROGRESS NOTES
Inpatient Physical Therapy Evaluation & Treatment    Unit: [facility]  Date:  11/3/2024  Patient Name:    Hiro Esteban  Admitting diagnosis:  Depression, unspecified [F32.A]  Admit Date:  11/1/2024  Precautions/Restrictions/WB Status/ Lines/ Wounds/ Oxygen: Fall risk, WB Restrictions (WBAT RLE), and Standard BHI Precautions          Treatment Time:  14:35-15;05  Treatment Number:  1   Timed Code Treatment Minutes: 20 minutes  Total Treatment Minutes:  30  minutes    Patient Stated Goals for Therapy: \" to go home \"          Discharge Recommendations: Return to independent living  with home PT   DME needs for discharge: Needs Met       Therapy recommendation for EMS Transport: NA    Therapy recommendations for staff:   Assist of 1 for ambulation with use of rolling walker (RW) and gait belt to/from bathroom  within room  within halls  within community room    History of Present Illness:   Per admission H&P from Carla Dumont DO on 10/30  \"Hiro Esteban is a 69 y.o. male with a significant PMHx of recent right hip surgery, COPD, diabetes, and other comorbidities listed below, including alcohol abuse, presenting with department today with concerns of suicidal ideation.  Patient says that \"if I had my gun right now, you would not even be talking to me.\"  Says that he called his brother, who talked him out of it, and told him to call 911.  Patient called 911 himself.  Patient does say that he drinks alcohol \"any chance I get,\" and says his last drink was around 10 AM.  Says he does not usually go through alcohol withdrawal.  Is asking for something for pain, Percocet, for his hip, his activity takes at home for his recent right hip surgery as below.  Denies any falls.  No chest pain or shortness of breath.  No abdominal pain or back pain.  Says that \"I do not know why anyone cares about me, I am not a good denys.\"  Did not attempt self-harm, did not ingest any substances.  Denies any other concerns today in the

## 2024-11-03 NOTE — PLAN OF CARE
Pt free from falls and/or injury.  He transfers and uses wheelchair independently and has good safety awareness.  Problem: Safety - Adult  Goal: Free from fall injury  11/2/2024 2029 by Sheela Graham, RN  Outcome: Progressing  11/2/2024 1201 by Jyothi Moss, RN  Outcome: Progressing

## 2024-11-03 NOTE — PROGRESS NOTES
Pt A+Ox4, visible on unit, watching TV, cooperative and medication compliant.  He denies SI/HI/AVH and no RTIS noted.  He was provided with a snack and denies any needs at this time.

## 2024-11-04 VITALS
SYSTOLIC BLOOD PRESSURE: 145 MMHG | OXYGEN SATURATION: 94 % | HEART RATE: 80 BPM | RESPIRATION RATE: 18 BRPM | DIASTOLIC BLOOD PRESSURE: 97 MMHG | TEMPERATURE: 97.8 F

## 2024-11-04 PROCEDURE — 97530 THERAPEUTIC ACTIVITIES: CPT

## 2024-11-04 PROCEDURE — 97110 THERAPEUTIC EXERCISES: CPT

## 2024-11-04 PROCEDURE — 99239 HOSP IP/OBS DSCHRG MGMT >30: CPT

## 2024-11-04 PROCEDURE — 6370000000 HC RX 637 (ALT 250 FOR IP)

## 2024-11-04 RX ORDER — ARIPIPRAZOLE 5 MG/1
5 TABLET ORAL
Qty: 30 TABLET | Refills: 0 | Status: SHIPPED | OUTPATIENT
Start: 2024-11-04

## 2024-11-04 RX ADMIN — OXYCODONE AND ACETAMINOPHEN 1 TABLET: 5; 325 TABLET ORAL at 08:10

## 2024-11-04 RX ADMIN — Medication 100 MG: at 08:10

## 2024-11-04 RX ADMIN — OXYCODONE AND ACETAMINOPHEN 1 TABLET: 5; 325 TABLET ORAL at 00:56

## 2024-11-04 RX ADMIN — APIXABAN 5 MG: 5 TABLET, FILM COATED ORAL at 08:10

## 2024-11-04 RX ADMIN — NIFEDIPINE 30 MG: 30 TABLET, FILM COATED, EXTENDED RELEASE ORAL at 08:10

## 2024-11-04 RX ADMIN — CARVEDILOL 6.25 MG: 6.25 TABLET, FILM COATED ORAL at 08:10

## 2024-11-04 RX ADMIN — OXYCODONE AND ACETAMINOPHEN 1 TABLET: 5; 325 TABLET ORAL at 14:17

## 2024-11-04 RX ADMIN — MULTIVITAMIN TABLET 1 TABLET: TABLET at 08:09

## 2024-11-04 ASSESSMENT — PAIN SCALES - GENERAL
PAINLEVEL_OUTOF10: 8
PAINLEVEL_OUTOF10: 7
PAINLEVEL_OUTOF10: 6
PAINLEVEL_OUTOF10: 0
PAINLEVEL_OUTOF10: 8

## 2024-11-04 ASSESSMENT — PAIN DESCRIPTION - FREQUENCY: FREQUENCY: INTERMITTENT

## 2024-11-04 ASSESSMENT — PAIN DESCRIPTION - LOCATION
LOCATION: HIP
LOCATION: LEG
LOCATION: HIP

## 2024-11-04 ASSESSMENT — PAIN DESCRIPTION - ORIENTATION
ORIENTATION: RIGHT
ORIENTATION: RIGHT
ORIENTATION: LEFT

## 2024-11-04 ASSESSMENT — PAIN - FUNCTIONAL ASSESSMENT
PAIN_FUNCTIONAL_ASSESSMENT: PREVENTS OR INTERFERES SOME ACTIVE ACTIVITIES AND ADLS
PAIN_FUNCTIONAL_ASSESSMENT: ACTIVITIES ARE NOT PREVENTED
PAIN_FUNCTIONAL_ASSESSMENT: ACTIVITIES ARE NOT PREVENTED

## 2024-11-04 ASSESSMENT — PAIN DESCRIPTION - PAIN TYPE: TYPE: ACUTE PAIN

## 2024-11-04 ASSESSMENT — PAIN SCALES - WONG BAKER: WONGBAKER_NUMERICALRESPONSE: HURTS A LITTLE BIT

## 2024-11-04 ASSESSMENT — PAIN DESCRIPTION - DESCRIPTORS
DESCRIPTORS: ACHING

## 2024-11-04 ASSESSMENT — PAIN DESCRIPTION - ONSET: ONSET: ON-GOING

## 2024-11-04 NOTE — PROGRESS NOTES
[facility] [place]  Discharge Note    Pt discharged with followings belongings:   Dental Appliances: None  Vision - Corrective Lenses: Eyeglasses  Hearing Aid: None  Jewelry: Necklace  Body Piercings Removed: N/A  Clothing: Pants, Shirt, Footwear, Socks  Other Valuables: Keys, Lighter/Matches (cigarettes)   Valuables returned to patient. Patient educated on aftercare instructions: n/a  Information faxed to n/a by [email or web address]  at 2:38 PM .Patient verbalize understanding of AVS:  yes.    Status EXAM upon discharge:  Mental Status and Behavioral Exam  Normal: No  Level of Assistance: Independent/Self  Facial Expression: Flat  Affect: Congruent, Stable  Level of Consciousness: Alert  Frequency of Checks: 4 times per hour, close  Mood:Normal: No  Mood: Depressed  Motor Activity:Normal: No  Motor Activity: Decreased  Eye Contact: Good  Observed Behavior: Withdrawn  Sexual Misconduct History: Current - no  Preception: [place] to person, [place] to time, [place] to place  Attention:Normal: No  Attention: Others (comment) (n/a)  Thought Processes: Circumstantial  Thought Content:Normal: Yes  Depression Symptoms: Change in energy level  Anxiety Symptoms: Generalized  Tori Symptoms: No problems reported or observed.  Hallucinations: None  Delusions: No  Memory:Normal: Yes  Insight and Judgment: No  Insight and Judgment: Poor judgment, Poor insight    Tobacco Screening:  Practical Counseling, on admission, arun X, if applicable and completed (first 3 are required if patient doesn't refuse):            ( ) Recognizing danger situations (included triggers and roadblocks)                    ( ) Coping skills (new ways to manage stress,relaxation techniques, changing routine, distraction)                                                           ( ) Basic information about quitting (benefits of quitting, techniques in how to quit, available resources  ( ) [facility]

## 2024-11-04 NOTE — PROGRESS NOTES
Pt A+Ox4, withdrawn to room, cooperative and medication compliant.  He denies SI/HI/AVH and no RTIS noted.  Atarax PRN for anxiety and Trazodone PRN @ 2034.  He is currently resting in bed and denies any needs.

## 2024-11-04 NOTE — PLAN OF CARE
Problem: Pain  Goal: Verbalizes/displays adequate comfort level or baseline comfort level  Outcome: Adequate for Discharge     Problem: Safety - Adult  Goal: Free from fall injury  Outcome: Adequate for Discharge     Problem: Self Harm/Suicidality  Goal: Will have no self-injury during hospital stay  Description: INTERVENTIONS:  1.  Ensure constant observer at bedside with Q15M safety checks  2.  Maintain a safe environment  3.  Secure patient belongings  4.  Ensure family/visitors adhere to safety recommendations  5.  Ensure safety tray has been added to patient's diet order  6.  Every shift and PRN: Re-assess suicidal risk via Frequent Screener    Outcome: Adequate for Discharge     Problem: ABCDS Injury Assessment  Goal: Absence of physical injury  Outcome: Adequate for Discharge    Patient compliant with medication, no complaints of SI, HI, AVH. Patient has no current complaints of pain or discomfort and ready for discharge per MD order.

## 2024-11-04 NOTE — TRANSITION OF CARE
Behavioral Health Transition Record    Patient Name: Hiro Esteban  YOB: 1955   Medical Record Number: [id number]  Date of Admission: 11/1/2024  3:01 PM   Date of Discharge: 11/04/2024    Attending Provider: Dr. Dieter Dunlap MD  Discharging Provider: Dr. Dieter Dunlap MD  To contact this individual call [phone] and ask the  to page.  If unavailable, ask to be transferred to Behavioral Health Provider on call.  A Behavioral Health Provider will be available on call 24/7 and during holidays.    Primary Care Provider: No primary care provider on file.    No Known Allergies    Reason for Admission: A 70 yo male admitted with SI with plan to shoot himself with his gun. He went to his brother's home with intent to get his gun to use in an attempt. He reports intermittent depression over the past month with SI starting the day of admission. Symptoms were precipitated by recent hip fracture that required surgical repair, death of his dog, and chronic pain.     Admission Diagnosis: Depression, unspecified [F32.A]    Discharge Diagnosis: Depression, unspecified [F32.A]    Discharge Plan/Destination: Private Residence    The crisis number for [facility] is [phone] ([facility]). This crisis line is available 24 hours a day, seven days a week.  The National Suicide and Crisis Hotline Number is 988.  You can call, chat, or text this number at any time to access emergency mental health services.      Follow-up Information       Follow up With Specialties Details Why Contact Info    [facility]  Go on 11/12/2024 Hospital Follow-Up: Tuesday, November 12, 2024 at 3:00 pm    Dr. Villegas will discuss awith you the need for a [facility]. If needed, they will have one of the mental health intake providers meet with you at this appointment. Provider: Dr. Kumar     Address: [address] [place] [address], [place] [address]    Phone: [phone]    [facility]

## 2024-11-04 NOTE — PROGRESS NOTES
Bedside Mobility Assessment Tool (BMAT):     Assessment Level 1- Sit and Shake    1. From a semi-reclined position, ask patient to sit up and rotate to a seated position at the side of the bed. Can use the bedrail.    2. Ask patient to reach out and grab your hand and shake making sure patient reaches across his/her midline.   Pass- Patient is able to come to a seated position, maintain core strength. Maintains seated balance while reaching across midline. Move on to Assessment Level 2.     Assessment Level 2- Stretch and Point   1. With patient in seated position at the side of the bed, have patient place both feet on the floor (or stool) with knees no higher than hips.    2. Ask patient to stretch one leg and straighten the knee, then bend the ankle/flex and point the toes. If appropriate, repeat with the other leg.   Pass- Patient is able to demonstrate appropriate quad strength on intended weight bearing limb(s). Move onto Assessment Level 3.     Assessment Level 3- Stand   1. Ask patient to elevate off the bed or chair (seated to standing) using an assistive device (cane, bedrail).    2. Patient should be able to raise buttocks off be and hold for a count of five. May repeat once.   Pass- Patient maintains standing stability for at least 5 seconds, proceed to assessment level 4.    Assessment Level 4- Walk   1. Ask patient to march in place at bedside.    2. Then ask patient to advance step and return each foot. Some medical conditions may render a patient from stepping backwards, use your best clinical judgement.   Fail- Patient not able to complete tasks OR requires use of assistive device. Patient is MOBILITY LEVEL 3.       Mobility Level- 4

## 2024-11-04 NOTE — BH NOTE
[facility] Emergency Notification completed. ID: [id number]       Left vm message for Laura with [facility] suicide prevention out-reach team ( [phone]) requesting return call to get pt connected with them.

## 2024-11-04 NOTE — PROGRESS NOTES
Inpatient Occupational Therapy Treatment    Unit: [facility]  Date:  11/4/2024  Patient Name:    Hiro Esteban  Admitting diagnosis:  Depression, unspecified [F32.A]  Admit Date:  11/1/2024  Precautions/Restrictions/WB Status/ Lines/ Wounds/ Oxygen: Fall risk, Bed/chair alarm, WB Restrictions (WBAT RLE), and Standard BHI Precautions    Treatment Time:  653-178  Treatment Number:  1  Timed Code Treatment Minutes: 25 minutes  Total Treatment Minutes:  25  minutes    Patient Goals for Therapy: \"get warmer\"          Discharge Recommendations:  Return to [facility] with HHOT  DME needs for discharge: Needs Met       Therapy recommendations for staff:   Stand by assist for ambulation with/without use of rolling walker (RW) and gait belt to/from bathroom  within community room    History of Present Illness: Per admission H&P from Carla Dumont DO on 10/30  \"Hiro Esteban is a 69 y.o. male with a significant PMHx of recent right hip surgery, COPD, diabetes, and other comorbidities listed below, including alcohol abuse, presenting with department today with concerns of suicidal ideation.  Patient says that \"if I had my gun right now, you would not even be talking to me.\"  Says that he called his brother, who talked him out of it, and told him to call 911.  Patient called 911 himself.  Patient does say that he drinks alcohol \"any chance I get,\" and says his last drink was around 10 AM.  Says he does not usually go through alcohol withdrawal.  Is asking for something for pain, Percocet, for his hip, his activity takes at home for his recent right hip surgery as below.  Denies any falls.  No chest pain or shortness of breath.  No abdominal pain or back pain.  Says that \"I do not know why anyone cares about me, I am not a good denys.\"  Did not attempt self-harm, did not ingest any substances.  Denies any other concerns today in the emergency department.  Denies any illnesses.  Says he face planted, as below, but says \"that is all

## 2024-11-04 NOTE — PROGRESS NOTES
Bridge Appointment completed: Reviewed Discharge Instructions with patient.    Patient verbalizes understanding and agreement with the discharge plan using the teachback method.     Referral for Outpatient Tobacco Cessation Counseling, upon discharge (arun X if applicable and completed):    ( )  Hospital staff assisted patient to call Quit Line or faxed referral                                   during hospitalization                  ( )  Recognizing danger situations (included triggers and roadblocks), if not completed on admission                    ( )  Coping skills (new ways to manage stress, exercise, relaxation techniques, changing routine, distraction), if not completed on admission                                                           ( )  Basic information about quitting (benefits of quitting, techniques in how to quit, available resources, if not completed on admission  ( ) [facility]   (x ) Patient refused referral  ( ) Patient refused counseling  ( ) Patient refused smoking cessation medication upon discharge    Vaccinations (arun X if applicable and completed):  ( ) Patient states already received influenza vaccine elsewhere  ( ) Patient received influenza vaccine during this hospitalization  ( ) Patient refused influenza vaccine at this time  ( x) Not offered

## 2024-11-04 NOTE — PLAN OF CARE
Pt free from falls and/or injury.  He uses wheelchair and transfers independently.    Problem: Safety - Adult  Goal: Free from fall injury  11/3/2024 2055 by Sheela Graham, RN  Outcome: Progressing  11/3/2024 1403 by Lola Pizarro, RN  Outcome: Progressing

## 2024-11-04 NOTE — DISCHARGE SUMMARY
34.0 pg Final    MCHC 11/01/2024 33.6  31.0 - 36.0 g/dL Final    RDW 11/01/2024 18.3 (H)  12.4 - 15.4 % Final    Platelets 11/01/2024 103 (L)  135 - 450 K/uL Final    MPV 11/01/2024 9.3  5.0 - 10.5 fL Final    Neutrophils % 11/01/2024 67.4  % Final    Lymphocytes % 11/01/2024 21.3  % Final    Monocytes % 11/01/2024 10.5  % Final    Eosinophils % 11/01/2024 0.4  % Final    Basophils % 11/01/2024 0.4  % Final    Neutrophils Absolute 11/01/2024 2.8  1.7 - 7.7 K/uL Final    Lymphocytes Absolute 11/01/2024 0.9 (L)  1.0 - 5.1 K/uL Final    Monocytes Absolute 11/01/2024 0.4  0.0 - 1.3 K/uL Final    Eosinophils Absolute 11/01/2024 0.0  0.0 - 0.6 K/uL Final    Basophils Absolute 11/01/2024 0.0  0.0 - 0.2 K/uL Final    Troponin, High Sensitivity 10/31/2024 13  0 - 22 ng/L Final    Comment: The high-sensitivity troponin T result should not be compared with other  troponin methodologies.            Mental Status Exam at Discharge:  Level of consciousness:  awake  Appearance:  well-appearing, in chair, good grooming and good hygiene well-developed, well-nourished  Behavior/Motor:  no abnormalities noted normal gait and station AIMS: 0  Attitude toward examiner:  cooperative, attentive and good eye contact  Speech:  spontaneous, normal rate, normal volume and well articulated  Mood:  dysthymic  Affect:  mood congruent Anxiety: mild  Hallucinations: Absent  Thought processes:  coherent Attention span, Concentration & Attention:  attention span and concentration were age appropriate  Thought content:  No evidence of delusions OCD: none    Insight: normal insight and judgment Cognition:  oriented to person, place, and time  Fund of Knowledge: average  IQ:average Memory: intact  Suicide:  No specific plan to harm self  Sleep: sleeps through the night  Appetite: ok     Reassess Suicide Risk:  no specific plan to harm self Pt has phone numbers to contact if suicidal thoughts recur and states pt will return to the hospital if suicidal

## 2024-11-04 NOTE — DISCHARGE SUMMARY
Discharge Summary    Admit Date: 10/30/2024   Discharge Date:  10/31/2024  11/4/2024    Final Dx: Depression, unspecified     At Discharge: 41-50 serious symptoms   All conditions and active problems were treated while patient was hospitalized.     Condition on DC  Mood and affect are stable and pt is not suicidal     VITALS:  BP (!) 190/120 Comment: manual  Pulse 80 Comment: manual, left wrist.  irregular.  Temp 97.7 °F (36.5 °C) (Tympanic)   Resp 16   Ht 1.803 m (5' 11\")   Wt 88.9 kg (196 lb)   SpO2 94%   BMI 27.34 kg/m²     Brief Summary Present Illness   Per tele-psych notes:  \" Patient is a 69 y.o. White (non-) male who presents for suicidal ideation with a plan to shoot himself. Patient presented to the ED on 10/30/24 from home via EMS. Records show pt went to his brother's house in an attempt to get his gun to shoot himself but his brother wouldn't allow him access. Records show hx of Depression and Alcohol Abuse.     Pt admitted to [facility], required transfer to [facility] for hypertension.      PE: (reviewed) and labs (see medical H&PE)  Labs:    Admission on 10/31/2024, Discharged on 11/01/2024   Component Date Value Ref Range Status    Sodium 11/01/2024 135 (L)  136 - 145 mmol/L Final    Potassium reflex Magnesium 11/01/2024 3.6  3.5 - 5.1 mmol/L Final    Chloride 11/01/2024 98 (L)  99 - 110 mmol/L Final    CO2 11/01/2024 26  21 - 32 mmol/L Final    Anion Gap 11/01/2024 11  3 - 16 Final    Glucose 11/01/2024 98  70 - 99 mg/dL Final    BUN 11/01/2024 7  7 - 20 mg/dL Final    Creatinine 11/01/2024 0.5 (L)  0.8 - 1.3 mg/dL Final    Est, Glom Filt Rate 11/01/2024 >90  >60 Final    Comment: Pediatric calculator link  https://www.kidney.org/professionals/kdoqi/gfr_calculatorped  Effective Oct 3, 2022  These results are not intended for use in patients  <18 years of age.  eGFR results are calculated without  a race factor using the 2021 CKD-EPI equation.  Careful  clinical correlation is recommended,

## 2024-11-05 ENCOUNTER — FOLLOWUP TELEPHONE ENCOUNTER (OUTPATIENT)
Dept: PSYCHIATRY | Age: 69
End: 2024-11-05

## 2024-11-05 NOTE — H&P
counseling the patient on their symptoms, treatment, and expected goals.        ______  PLAN   1.  Admit to Adult Behavioral Unit / Senior Behavioral Unit  2.  Consult Internal Medicine to evaluate and treat medical conditions  3.  Adjust psychotropic medications to target symptoms  4.  Occupational Therapy, Physical Therapy, Group Psychotherapy as tolerated   5.  Reviewed treatment plan with patient including medication risks, benefits, side effects.  Obtained informed consent for treatment.     Karla Rahman, RYDERP-BC

## (undated) DEVICE — 3M™ TEGADERM™ TRANSPARENT FILM DRESSING FRAME STYLE, 1626W, 4 IN X 4-3/4 IN (10 CM X 12 CM), 50/CT 4CT/CASE: Brand: 3M™ TEGADERM™

## (undated) DEVICE — SUTURE MCRYL + SZ 4-0 L18IN ABSRB UD L19MM PS-2 3/8 CIR MCP496G

## (undated) DEVICE — SUTURE VCRL + SZ 2-0 L18IN ABSRB UD CT1 L36MM 1/2 CIR VCP839D

## (undated) DEVICE — MAT 40INX72IN TRK FLR OB

## (undated) DEVICE — 3M™ COBAN™ NL STERILE NON-LATEX SELF-ADHERENT WRAP, 2084S, 4 IN X 5 YD (10 CM X 4,5 M), 18 ROLLS/CASE: Brand: 3M™ COBAN™

## (undated) DEVICE — ELECTRODE PT RET AD L9FT HI MOIST COND ADH HYDRGEL CORDED

## (undated) DEVICE — ROD RMR L950MM DIA3MM W/ STR BALL TIP

## (undated) DEVICE — COVER,MAYO STAND,STERILE: Brand: MEDLINE

## (undated) DEVICE — TOWEL,OR,DSP,ST,BLUE,STD,6/PK,12PK/CS: Brand: MEDLINE

## (undated) DEVICE — BANDAGE E SELF CLSR 6INX5YD VELC SWIFTWRAP

## (undated) DEVICE — SUTURE VCRL + SZ 0 L27IN ABSRB UD L36MM CT-1 1/2 CIR VCPP41D

## (undated) DEVICE — PADDING CAST N ADH 12X6 IN CRIMPED FINISH 100% COTTON WBRLII

## (undated) DEVICE — GUIDEWIRE ORTH L400MM DIA3.2MM FOR TFN

## (undated) DEVICE — SUTURE VCRL SZ 1 L27IN ABSRB UD L36MM CT-1 1/2 CIR JJ40G

## (undated) DEVICE — DRAPE C ARM UNIV W41XL74IN CLR PLAS XR VELC CLSR POLY STRP

## (undated) DEVICE — Z INACTIVE USE 2855096 SPONGE GZ W4XL4IN 8 PLY 100% COT

## (undated) DEVICE — Z INACTIVE NO ACTIVE SUPPLIER APPLICATOR MEDICATED 26 CC TINT HI-LITE ORNG STRL CHLORAPREP

## (undated) DEVICE — YANKAUER,BULB TIP,W/O VENT,RIGID,STERILE: Brand: MEDLINE

## (undated) DEVICE — DRESSING FOAM W4XL10IN SIL RECT ADH WTRPRF FLM BK W/ BORD

## (undated) DEVICE — CUFF RESTRN WR OR ANK BLU FOAM AD

## (undated) DEVICE — SOLUTION IV IRRIG 500ML 0.9% SODIUM CHL 2F7123

## (undated) DEVICE — COTTON UNDERCAST PADDING,CRIMPED FINISH: Brand: WEBRIL

## (undated) DEVICE — BIT DRL L145MM DIA4.2MM NONSTERILE 3 FLUT NDL PNT QUIK CPL

## (undated) DEVICE — CIRCUIT ANES L72IN 3L BACT AND VIR FLTR EL CONN SGL LIMB

## (undated) DEVICE — GLOVE ORANGE PI 7 1/2   MSG9075

## (undated) DEVICE — SMARTGOWN SURGICAL GOWN, XL: Brand: CONVERTORS

## (undated) DEVICE — MAJOR SET UP PK

## (undated) DEVICE — BIT DRL L330MM DIA4.2MM CALIB 100MM 3 FLUT QUIK CPL

## (undated) DEVICE — TUBING, SUCTION, 3/16" X 10', STRAIGHT: Brand: MEDLINE

## (undated) DEVICE — GLOVE ORANGE PI 7   MSG9070

## (undated) DEVICE — MANIFOLD SURG NEPTUNE WST MGMT

## (undated) DEVICE — DRAPE,REIN 53X77,STERILE: Brand: MEDLINE

## (undated) DEVICE — 3M™ STERI-DRAPE™  ISOLATION DRAPE WITH INCISE FILM AND POUCH 1017: Brand: STERI-DRAPE™

## (undated) DEVICE — SKIN AFFIX SURG ADHESIVE 72/CS 0.55ML: Brand: MEDLINE

## (undated) DEVICE — SUTURE ABSORBABLE BRAIDED 2-0 CT-1 27 IN UD VICRYL J259H